# Patient Record
Sex: MALE | Race: WHITE | NOT HISPANIC OR LATINO | Employment: OTHER | URBAN - METROPOLITAN AREA
[De-identification: names, ages, dates, MRNs, and addresses within clinical notes are randomized per-mention and may not be internally consistent; named-entity substitution may affect disease eponyms.]

---

## 2017-08-28 ENCOUNTER — APPOINTMENT (OUTPATIENT)
Dept: AUDIOLOGY | Age: 72
End: 2017-08-28
Payer: COMMERCIAL

## 2017-08-28 PROCEDURE — 92567 TYMPANOMETRY: CPT | Performed by: AUDIOLOGIST

## 2017-08-28 PROCEDURE — 92557 COMPREHENSIVE HEARING TEST: CPT | Performed by: AUDIOLOGIST

## 2020-01-01 ENCOUNTER — PREP FOR PROCEDURE (OUTPATIENT)
Dept: INTERVENTIONAL RADIOLOGY/VASCULAR | Facility: CLINIC | Age: 75
End: 2020-01-01

## 2020-01-01 ENCOUNTER — HOSPITAL ENCOUNTER (OUTPATIENT)
Dept: INFUSION CENTER | Facility: CLINIC | Age: 75
Discharge: HOME | End: 2020-11-05

## 2020-01-01 ENCOUNTER — TELEPHONE (OUTPATIENT)
Dept: PALLIATIVE MEDICINE | Facility: CLINIC | Age: 75
End: 2020-01-01

## 2020-01-01 ENCOUNTER — OFFICE VISIT (OUTPATIENT)
Dept: HEMATOLOGY ONCOLOGY | Facility: CLINIC | Age: 75
End: 2020-01-01
Payer: COMMERCIAL

## 2020-01-01 ENCOUNTER — SOCIAL WORK (OUTPATIENT)
Dept: PALLIATIVE MEDICINE | Facility: CLINIC | Age: 75
End: 2020-01-01
Payer: COMMERCIAL

## 2020-01-01 ENCOUNTER — APPOINTMENT (INPATIENT)
Dept: ULTRASOUND IMAGING | Facility: HOSPITAL | Age: 75
DRG: 871 | End: 2020-01-01
Payer: COMMERCIAL

## 2020-01-01 ENCOUNTER — HOSPITAL ENCOUNTER (OUTPATIENT)
Dept: INFUSION CENTER | Facility: CLINIC | Age: 75
Discharge: HOME | End: 2020-10-20
Payer: COMMERCIAL

## 2020-01-01 ENCOUNTER — APPOINTMENT (EMERGENCY)
Dept: CT IMAGING | Facility: HOSPITAL | Age: 75
DRG: 872 | End: 2020-01-01
Payer: COMMERCIAL

## 2020-01-01 ENCOUNTER — APPOINTMENT (INPATIENT)
Dept: NON INVASIVE DIAGNOSTICS | Facility: HOSPITAL | Age: 75
DRG: 871 | End: 2020-01-01
Payer: COMMERCIAL

## 2020-01-01 ENCOUNTER — DOCUMENTATION (OUTPATIENT)
Dept: RADIATION ONCOLOGY | Facility: HOSPITAL | Age: 75
End: 2020-01-01

## 2020-01-01 ENCOUNTER — CONSULT (OUTPATIENT)
Dept: SURGERY | Facility: CLINIC | Age: 75
End: 2020-01-01
Payer: COMMERCIAL

## 2020-01-01 ENCOUNTER — DOCUMENTATION (OUTPATIENT)
Dept: CARDIOLOGY UNIT | Facility: HOSPITAL | Age: 75
End: 2020-01-01

## 2020-01-01 ENCOUNTER — TELEPHONE (OUTPATIENT)
Dept: HEMATOLOGY ONCOLOGY | Facility: CLINIC | Age: 75
End: 2020-01-01

## 2020-01-01 ENCOUNTER — TELEPHONE (OUTPATIENT)
Dept: SURGICAL ONCOLOGY | Facility: CLINIC | Age: 75
End: 2020-01-01

## 2020-01-01 ENCOUNTER — OFFICE VISIT (OUTPATIENT)
Dept: PALLIATIVE MEDICINE | Facility: CLINIC | Age: 75
End: 2020-01-01
Payer: COMMERCIAL

## 2020-01-01 ENCOUNTER — TELEPHONE (OUTPATIENT)
Dept: INTERNAL MEDICINE CLINIC | Facility: CLINIC | Age: 75
End: 2020-01-01

## 2020-01-01 ENCOUNTER — TELEPHONE (OUTPATIENT)
Dept: NUTRITION | Facility: CLINIC | Age: 75
End: 2020-01-01

## 2020-01-01 ENCOUNTER — APPOINTMENT (EMERGENCY)
Dept: RADIOLOGY | Facility: HOSPITAL | Age: 75
End: 2020-01-01
Payer: COMMERCIAL

## 2020-01-01 ENCOUNTER — TELEMEDICINE (OUTPATIENT)
Dept: INTERNAL MEDICINE CLINIC | Facility: CLINIC | Age: 75
End: 2020-01-01
Payer: COMMERCIAL

## 2020-01-01 ENCOUNTER — ANESTHESIA EVENT (OUTPATIENT)
Dept: GASTROENTEROLOGY | Facility: HOSPITAL | Age: 75
End: 2020-01-01

## 2020-01-01 ENCOUNTER — HOSPITAL ENCOUNTER (OUTPATIENT)
Dept: INFUSION CENTER | Facility: CLINIC | Age: 75
Discharge: HOME | End: 2020-10-06
Payer: COMMERCIAL

## 2020-01-01 ENCOUNTER — TELEPHONE (OUTPATIENT)
Dept: NEPHROLOGY | Facility: CLINIC | Age: 75
End: 2020-01-01

## 2020-01-01 ENCOUNTER — TELEPHONE (OUTPATIENT)
Dept: RADIOLOGY | Facility: HOSPITAL | Age: 75
End: 2020-01-01

## 2020-01-01 ENCOUNTER — HOSPITAL ENCOUNTER (OUTPATIENT)
Dept: INFUSION CENTER | Facility: CLINIC | Age: 75
Discharge: HOME | End: 2020-11-17

## 2020-01-01 ENCOUNTER — OFFICE VISIT (OUTPATIENT)
Dept: SURGICAL ONCOLOGY | Facility: CLINIC | Age: 75
End: 2020-01-01
Payer: COMMERCIAL

## 2020-01-01 ENCOUNTER — TELEPHONE (OUTPATIENT)
Dept: OTHER | Facility: OTHER | Age: 75
End: 2020-01-01

## 2020-01-01 ENCOUNTER — APPOINTMENT (EMERGENCY)
Dept: RADIOLOGY | Facility: HOSPITAL | Age: 75
DRG: 871 | End: 2020-01-01
Payer: COMMERCIAL

## 2020-01-01 ENCOUNTER — HOSPITAL ENCOUNTER (OUTPATIENT)
Dept: INFUSION CENTER | Facility: CLINIC | Age: 75
Discharge: HOME | End: 2020-12-24
Payer: COMMERCIAL

## 2020-01-01 ENCOUNTER — HOSPITAL ENCOUNTER (INPATIENT)
Facility: HOSPITAL | Age: 75
LOS: 2 days | Discharge: HOME HEALTH | DRG: 872 | End: 2020-11-19
Attending: INTERNAL MEDICINE | Admitting: INTERNAL MEDICINE
Payer: COMMERCIAL

## 2020-01-01 ENCOUNTER — CONSULT (OUTPATIENT)
Dept: SURGICAL ONCOLOGY | Facility: CLINIC | Age: 75
End: 2020-01-01
Payer: COMMERCIAL

## 2020-01-01 ENCOUNTER — TELEPHONE (OUTPATIENT)
Dept: SURGERY | Facility: HOSPITAL | Age: 75
End: 2020-01-01

## 2020-01-01 ENCOUNTER — HOSPITAL ENCOUNTER (OUTPATIENT)
Dept: INFUSION CENTER | Facility: CLINIC | Age: 75
Discharge: HOME | End: 2020-12-22
Payer: COMMERCIAL

## 2020-01-01 ENCOUNTER — HOSPITAL ENCOUNTER (INPATIENT)
Facility: HOSPITAL | Age: 75
LOS: 6 days | Discharge: HOME | DRG: 871 | End: 2020-09-09
Attending: HOSPITALIST | Admitting: HOSPITALIST
Payer: COMMERCIAL

## 2020-01-01 ENCOUNTER — ANESTHESIA (OUTPATIENT)
Dept: GASTROENTEROLOGY | Facility: HOSPITAL | Age: 75
End: 2020-01-01

## 2020-01-01 ENCOUNTER — HOSPITAL ENCOUNTER (EMERGENCY)
Facility: HOSPITAL | Age: 75
End: 2020-09-03
Payer: COMMERCIAL

## 2020-01-01 ENCOUNTER — HOSPITAL ENCOUNTER (INPATIENT)
Facility: HOSPITAL | Age: 75
LOS: 4 days | Discharge: HOME HEALTH | DRG: 872 | End: 2020-08-04
Attending: EMERGENCY MEDICINE | Admitting: INTERNAL MEDICINE
Payer: COMMERCIAL

## 2020-01-01 ENCOUNTER — LAB REQUISITION (OUTPATIENT)
Dept: LAB | Facility: HOSPITAL | Age: 75
DRG: 872 | End: 2020-01-01
Payer: COMMERCIAL

## 2020-01-01 ENCOUNTER — LAB REQUISITION (OUTPATIENT)
Dept: LAB | Facility: HOSPITAL | Age: 75
End: 2020-01-01
Payer: COMMERCIAL

## 2020-01-01 ENCOUNTER — HOSPITAL ENCOUNTER (OUTPATIENT)
Dept: INFUSION CENTER | Facility: CLINIC | Age: 75
End: 2020-01-01

## 2020-01-01 ENCOUNTER — APPOINTMENT (INPATIENT)
Dept: RADIOLOGY | Facility: HOSPITAL | Age: 75
DRG: 871 | End: 2020-01-01
Payer: COMMERCIAL

## 2020-01-01 ENCOUNTER — HOSPITAL ENCOUNTER (OUTPATIENT)
Dept: INFUSION CENTER | Facility: CLINIC | Age: 75
Discharge: HOME | End: 2020-09-21
Payer: COMMERCIAL

## 2020-01-01 ENCOUNTER — HOSPITAL ENCOUNTER (OUTPATIENT)
Dept: INFUSION CENTER | Facility: CLINIC | Age: 75
Discharge: HOME | End: 2020-12-01
Payer: COMMERCIAL

## 2020-01-01 ENCOUNTER — HOSPITAL ENCOUNTER (OUTPATIENT)
Dept: CT IMAGING | Facility: HOSPITAL | Age: 75
Discharge: HOME | End: 2020-11-27
Payer: COMMERCIAL

## 2020-01-01 ENCOUNTER — HOSPITAL ENCOUNTER (OUTPATIENT)
Dept: INFUSION CENTER | Facility: CLINIC | Age: 75
Discharge: HOME | End: 2020-11-22

## 2020-01-01 ENCOUNTER — HOSPITAL ENCOUNTER (OUTPATIENT)
Dept: INFUSION CENTER | Facility: CLINIC | Age: 75
Discharge: HOME | End: 2020-10-08

## 2020-01-01 ENCOUNTER — HOSPITAL ENCOUNTER (INPATIENT)
Facility: HOSPITAL | Age: 75
LOS: 1 days | DRG: 871 | End: 2020-11-17
Attending: EMERGENCY MEDICINE | Admitting: INTERNAL MEDICINE
Payer: COMMERCIAL

## 2020-01-01 ENCOUNTER — APPOINTMENT (EMERGENCY)
Dept: RADIOLOGY | Facility: HOSPITAL | Age: 75
DRG: 872 | End: 2020-01-01
Payer: COMMERCIAL

## 2020-01-01 ENCOUNTER — HOSPITAL ENCOUNTER (OUTPATIENT)
Dept: INFUSION CENTER | Facility: CLINIC | Age: 75
Discharge: HOME | End: 2020-12-16
Payer: COMMERCIAL

## 2020-01-01 ENCOUNTER — HOSPITAL ENCOUNTER (OUTPATIENT)
Dept: GASTROENTEROLOGY | Facility: AMBULARY SURGERY CENTER | Age: 75
Setting detail: OUTPATIENT SURGERY
Discharge: HOME | End: 2020-09-25

## 2020-01-01 ENCOUNTER — HOSPITAL ENCOUNTER (OUTPATIENT)
Dept: GASTROENTEROLOGY | Facility: HOSPITAL | Age: 75
Setting detail: OUTPATIENT SURGERY
Discharge: HOME | End: 2020-08-13
Admitting: INTERNAL MEDICINE
Payer: COMMERCIAL

## 2020-01-01 ENCOUNTER — HOSPITAL ENCOUNTER (OUTPATIENT)
Dept: INFUSION CENTER | Facility: CLINIC | Age: 75
Discharge: HOME | End: 2020-11-03
Payer: COMMERCIAL

## 2020-01-01 ENCOUNTER — HOSPITAL ENCOUNTER (OUTPATIENT)
Dept: INFUSION CENTER | Facility: CLINIC | Age: 75
Discharge: HOME | End: 2020-09-23

## 2020-01-01 ENCOUNTER — HOSPITAL ENCOUNTER (OUTPATIENT)
Dept: INFUSION CENTER | Facility: CLINIC | Age: 75
Discharge: HOME | End: 2020-11-20

## 2020-01-01 ENCOUNTER — CONSULT (OUTPATIENT)
Dept: HEMATOLOGY ONCOLOGY | Facility: CLINIC | Age: 75
End: 2020-01-01
Payer: COMMERCIAL

## 2020-01-01 ENCOUNTER — HOSPITAL ENCOUNTER (OUTPATIENT)
Dept: RADIOLOGY | Facility: HOSPITAL | Age: 75
Discharge: HOME | End: 2020-09-16
Attending: RADIOLOGY
Payer: COMMERCIAL

## 2020-01-01 ENCOUNTER — CONSULT (OUTPATIENT)
Dept: PALLIATIVE MEDICINE | Facility: CLINIC | Age: 75
End: 2020-01-01
Payer: COMMERCIAL

## 2020-01-01 ENCOUNTER — HOSPITAL ENCOUNTER (OUTPATIENT)
Dept: INFUSION CENTER | Facility: CLINIC | Age: 75
Discharge: HOME | End: 2020-12-03
Payer: COMMERCIAL

## 2020-01-01 ENCOUNTER — HOSPITAL ENCOUNTER (OUTPATIENT)
Dept: RADIOLOGY | Age: 75
Discharge: HOME | DRG: 871 | End: 2020-08-31
Payer: COMMERCIAL

## 2020-01-01 ENCOUNTER — APPOINTMENT (INPATIENT)
Dept: RADIOLOGY | Facility: HOSPITAL | Age: 75
DRG: 871 | End: 2020-01-01
Attending: HOSPITALIST
Payer: COMMERCIAL

## 2020-01-01 ENCOUNTER — HOSPITAL ENCOUNTER (OUTPATIENT)
Dept: INFUSION CENTER | Facility: CLINIC | Age: 75
Discharge: HOME | End: 2020-10-22

## 2020-01-01 VITALS
TEMPERATURE: 97.3 F | BODY MASS INDEX: 31.47 KG/M2 | DIASTOLIC BLOOD PRESSURE: 82 MMHG | RESPIRATION RATE: 18 BRPM | OXYGEN SATURATION: 96 % | HEIGHT: 72 IN | HEART RATE: 117 BPM | WEIGHT: 232.37 LBS | SYSTOLIC BLOOD PRESSURE: 128 MMHG

## 2020-01-01 VITALS
TEMPERATURE: 97.7 F | WEIGHT: 229 LBS | HEART RATE: 101 BPM | HEIGHT: 72 IN | SYSTOLIC BLOOD PRESSURE: 137 MMHG | BODY MASS INDEX: 31.02 KG/M2 | RESPIRATION RATE: 18 BRPM | OXYGEN SATURATION: 95 % | DIASTOLIC BLOOD PRESSURE: 74 MMHG

## 2020-01-01 VITALS
OXYGEN SATURATION: 95 % | DIASTOLIC BLOOD PRESSURE: 83 MMHG | HEART RATE: 85 BPM | TEMPERATURE: 98.8 F | RESPIRATION RATE: 18 BRPM | WEIGHT: 247.4 LBS | HEIGHT: 72 IN | BODY MASS INDEX: 33.51 KG/M2 | SYSTOLIC BLOOD PRESSURE: 140 MMHG

## 2020-01-01 VITALS
OXYGEN SATURATION: 98 % | SYSTOLIC BLOOD PRESSURE: 91 MMHG | TEMPERATURE: 97.9 F | HEART RATE: 107 BPM | RESPIRATION RATE: 22 BRPM | WEIGHT: 246 LBS | DIASTOLIC BLOOD PRESSURE: 47 MMHG | BODY MASS INDEX: 33.36 KG/M2

## 2020-01-01 VITALS
SYSTOLIC BLOOD PRESSURE: 110 MMHG | DIASTOLIC BLOOD PRESSURE: 61 MMHG | OXYGEN SATURATION: 94 % | RESPIRATION RATE: 18 BRPM | TEMPERATURE: 98.8 F | HEART RATE: 94 BPM

## 2020-01-01 VITALS
SYSTOLIC BLOOD PRESSURE: 148 MMHG | HEART RATE: 107 BPM | OXYGEN SATURATION: 96 % | DIASTOLIC BLOOD PRESSURE: 76 MMHG | TEMPERATURE: 98 F | RESPIRATION RATE: 20 BRPM | HEIGHT: 72 IN | BODY MASS INDEX: 31.83 KG/M2 | WEIGHT: 235 LBS

## 2020-01-01 VITALS
HEIGHT: 72 IN | TEMPERATURE: 98.5 F | SYSTOLIC BLOOD PRESSURE: 170 MMHG | HEIGHT: 70 IN | OXYGEN SATURATION: 95 % | BODY MASS INDEX: 31.29 KG/M2 | BODY MASS INDEX: 35.93 KG/M2 | DIASTOLIC BLOOD PRESSURE: 92 MMHG | OXYGEN SATURATION: 96 % | RESPIRATION RATE: 16 BRPM | WEIGHT: 231.04 LBS | HEART RATE: 114 BPM | DIASTOLIC BLOOD PRESSURE: 84 MMHG | WEIGHT: 251 LBS | RESPIRATION RATE: 17 BRPM | HEART RATE: 98 BPM | TEMPERATURE: 97.7 F | SYSTOLIC BLOOD PRESSURE: 163 MMHG

## 2020-01-01 VITALS
WEIGHT: 226 LBS | SYSTOLIC BLOOD PRESSURE: 168 MMHG | HEIGHT: 72 IN | RESPIRATION RATE: 16 BRPM | DIASTOLIC BLOOD PRESSURE: 96 MMHG | BODY MASS INDEX: 30.61 KG/M2 | OXYGEN SATURATION: 98 % | TEMPERATURE: 97.5 F | HEART RATE: 128 BPM

## 2020-01-01 VITALS
HEIGHT: 71 IN | OXYGEN SATURATION: 98 % | SYSTOLIC BLOOD PRESSURE: 162 MMHG | WEIGHT: 235 LBS | RESPIRATION RATE: 16 BRPM | TEMPERATURE: 97.5 F | HEART RATE: 108 BPM | BODY MASS INDEX: 32.9 KG/M2 | DIASTOLIC BLOOD PRESSURE: 82 MMHG

## 2020-01-01 VITALS
BODY MASS INDEX: 34.72 KG/M2 | DIASTOLIC BLOOD PRESSURE: 82 MMHG | HEIGHT: 72 IN | HEART RATE: 129 BPM | WEIGHT: 242.5 LBS | BODY MASS INDEX: 34.19 KG/M2 | HEART RATE: 100 BPM | WEIGHT: 252.4 LBS | DIASTOLIC BLOOD PRESSURE: 80 MMHG | TEMPERATURE: 98.6 F | TEMPERATURE: 98.4 F | SYSTOLIC BLOOD PRESSURE: 120 MMHG | HEIGHT: 70 IN | SYSTOLIC BLOOD PRESSURE: 144 MMHG | RESPIRATION RATE: 14 BRPM

## 2020-01-01 VITALS
OXYGEN SATURATION: 97 % | HEART RATE: 143 BPM | HEIGHT: 72 IN | SYSTOLIC BLOOD PRESSURE: 142 MMHG | RESPIRATION RATE: 16 BRPM | DIASTOLIC BLOOD PRESSURE: 76 MMHG | BODY MASS INDEX: 30.88 KG/M2 | WEIGHT: 228 LBS | TEMPERATURE: 98.4 F

## 2020-01-01 VITALS
HEIGHT: 72 IN | RESPIRATION RATE: 20 BRPM | BODY MASS INDEX: 30.94 KG/M2 | TEMPERATURE: 97.7 F | DIASTOLIC BLOOD PRESSURE: 77 MMHG | SYSTOLIC BLOOD PRESSURE: 141 MMHG | WEIGHT: 228.4 LBS | OXYGEN SATURATION: 98 % | HEART RATE: 108 BPM

## 2020-01-01 VITALS
HEART RATE: 104 BPM | TEMPERATURE: 97.3 F | SYSTOLIC BLOOD PRESSURE: 150 MMHG | RESPIRATION RATE: 18 BRPM | OXYGEN SATURATION: 94 % | DIASTOLIC BLOOD PRESSURE: 73 MMHG

## 2020-01-01 VITALS — TEMPERATURE: 97.8 F

## 2020-01-01 VITALS
SYSTOLIC BLOOD PRESSURE: 150 MMHG | DIASTOLIC BLOOD PRESSURE: 70 MMHG | HEIGHT: 72 IN | OXYGEN SATURATION: 98 % | TEMPERATURE: 98 F | WEIGHT: 225 LBS | HEART RATE: 125 BPM | BODY MASS INDEX: 30.48 KG/M2 | RESPIRATION RATE: 18 BRPM

## 2020-01-01 VITALS
SYSTOLIC BLOOD PRESSURE: 141 MMHG | RESPIRATION RATE: 20 BRPM | HEIGHT: 72 IN | DIASTOLIC BLOOD PRESSURE: 78 MMHG | TEMPERATURE: 99.7 F | HEART RATE: 92 BPM | WEIGHT: 225.31 LBS | BODY MASS INDEX: 30.52 KG/M2 | OXYGEN SATURATION: 100 %

## 2020-01-01 VITALS
SYSTOLIC BLOOD PRESSURE: 138 MMHG | WEIGHT: 242 LBS | TEMPERATURE: 128 F | BODY MASS INDEX: 32.82 KG/M2 | RESPIRATION RATE: 18 BRPM | DIASTOLIC BLOOD PRESSURE: 88 MMHG | HEART RATE: 98 BPM

## 2020-01-01 VITALS
WEIGHT: 250.66 LBS | HEIGHT: 72 IN | RESPIRATION RATE: 18 BRPM | BODY MASS INDEX: 33.95 KG/M2 | OXYGEN SATURATION: 95 % | DIASTOLIC BLOOD PRESSURE: 88 MMHG | TEMPERATURE: 98.2 F | SYSTOLIC BLOOD PRESSURE: 151 MMHG | HEART RATE: 89 BPM

## 2020-01-01 VITALS
DIASTOLIC BLOOD PRESSURE: 88 MMHG | RESPIRATION RATE: 16 BRPM | TEMPERATURE: 98.6 F | HEART RATE: 126 BPM | BODY MASS INDEX: 30.85 KG/M2 | SYSTOLIC BLOOD PRESSURE: 132 MMHG | HEIGHT: 72 IN | WEIGHT: 227.8 LBS

## 2020-01-01 VITALS
SYSTOLIC BLOOD PRESSURE: 136 MMHG | DIASTOLIC BLOOD PRESSURE: 75 MMHG | TEMPERATURE: 97.6 F | HEIGHT: 72 IN | BODY MASS INDEX: 30.49 KG/M2 | RESPIRATION RATE: 18 BRPM | WEIGHT: 225.09 LBS | HEART RATE: 100 BPM | OXYGEN SATURATION: 95 %

## 2020-01-01 VITALS — TEMPERATURE: 97.8 F | TEMPERATURE: 97.8 F

## 2020-01-01 VITALS — TEMPERATURE: 97.3 F

## 2020-01-01 VITALS — TEMPERATURE: 96.1 F

## 2020-01-01 VITALS
BODY MASS INDEX: 33.04 KG/M2 | TEMPERATURE: 98.1 F | RESPIRATION RATE: 20 BRPM | SYSTOLIC BLOOD PRESSURE: 168 MMHG | HEART RATE: 100 BPM | OXYGEN SATURATION: 97 % | HEIGHT: 71 IN | DIASTOLIC BLOOD PRESSURE: 78 MMHG | WEIGHT: 236 LBS

## 2020-01-01 VITALS
WEIGHT: 225 LBS | HEART RATE: 134 BPM | BODY MASS INDEX: 30.52 KG/M2 | SYSTOLIC BLOOD PRESSURE: 135 MMHG | RESPIRATION RATE: 18 BRPM | TEMPERATURE: 98.9 F | OXYGEN SATURATION: 97 % | DIASTOLIC BLOOD PRESSURE: 79 MMHG

## 2020-01-01 VITALS — TEMPERATURE: 95.8 F

## 2020-01-01 DIAGNOSIS — C25.2 MALIGNANT NEOPLASM OF TAIL OF PANCREAS (HCC): Primary | ICD-10-CM

## 2020-01-01 DIAGNOSIS — A41.9 SEPSIS (HCC): Primary | ICD-10-CM

## 2020-01-01 DIAGNOSIS — Z16.12 UTI DUE TO EXTENDED-SPECTRUM BETA LACTAMASE (ESBL) PRODUCING ESCHERICHIA COLI: ICD-10-CM

## 2020-01-01 DIAGNOSIS — I10 ESSENTIAL HYPERTENSION: ICD-10-CM

## 2020-01-01 DIAGNOSIS — R11.0 NAUSEA: ICD-10-CM

## 2020-01-01 DIAGNOSIS — N18.30 CHRONIC KIDNEY DISEASE (CKD), STAGE III (MODERATE) (HCC): ICD-10-CM

## 2020-01-01 DIAGNOSIS — R78.81 BACTEREMIA: ICD-10-CM

## 2020-01-01 DIAGNOSIS — E11.65 TYPE 2 DIABETES MELLITUS WITH HYPERGLYCEMIA, WITH LONG-TERM CURRENT USE OF INSULIN (HCC): ICD-10-CM

## 2020-01-01 DIAGNOSIS — R50.9 FEVER AND CHILLS: Primary | ICD-10-CM

## 2020-01-01 DIAGNOSIS — G89.3 CANCER RELATED PAIN: ICD-10-CM

## 2020-01-01 DIAGNOSIS — C25.2 MALIGNANT NEOPLASM OF TAIL OF PANCREAS (HCC): ICD-10-CM

## 2020-01-01 DIAGNOSIS — G47.33 OBSTRUCTIVE SLEEP APNEA SYNDROME: Primary | ICD-10-CM

## 2020-01-01 DIAGNOSIS — C25.0 MALIGNANT NEOPLASM OF HEAD OF PANCREAS (HCC): Primary | ICD-10-CM

## 2020-01-01 DIAGNOSIS — E87.6 HYPOKALEMIA: ICD-10-CM

## 2020-01-01 DIAGNOSIS — R18.8 OTHER ASCITES: ICD-10-CM

## 2020-01-01 DIAGNOSIS — E11.9 DIABETES MELLITUS WITHOUT COMPLICATION (HCC): Primary | ICD-10-CM

## 2020-01-01 DIAGNOSIS — Z79.4 TYPE 2 DIABETES MELLITUS WITH HYPERGLYCEMIA, WITH LONG-TERM CURRENT USE OF INSULIN (HCC): ICD-10-CM

## 2020-01-01 DIAGNOSIS — K86.89 PANCREATIC MASS: ICD-10-CM

## 2020-01-01 DIAGNOSIS — Z79.899 PATIENT ON ANTINEOPLASTIC CHEMOTHERAPY REGIMEN: ICD-10-CM

## 2020-01-01 DIAGNOSIS — E44.0 MODERATE PROTEIN-CALORIE MALNUTRITION (HCC): ICD-10-CM

## 2020-01-01 DIAGNOSIS — N18.30 STAGE 3 CHRONIC KIDNEY DISEASE, UNSPECIFIED WHETHER STAGE 3A OR 3B CKD (HCC): ICD-10-CM

## 2020-01-01 DIAGNOSIS — C25.9 PANCREATIC CARCINOMA (HCC): Primary | ICD-10-CM

## 2020-01-01 DIAGNOSIS — N39.0 UTI DUE TO EXTENDED-SPECTRUM BETA LACTAMASE (ESBL) PRODUCING ESCHERICHIA COLI: ICD-10-CM

## 2020-01-01 DIAGNOSIS — N12 PYELONEPHRITIS: ICD-10-CM

## 2020-01-01 DIAGNOSIS — N30.00 ACUTE CYSTITIS WITHOUT HEMATURIA: ICD-10-CM

## 2020-01-01 DIAGNOSIS — E11.9 DIABETES MELLITUS WITHOUT COMPLICATION (HCC): ICD-10-CM

## 2020-01-01 DIAGNOSIS — Z51.5 PALLIATIVE CARE PATIENT: ICD-10-CM

## 2020-01-01 DIAGNOSIS — E83.42 HYPOMAGNESEMIA: ICD-10-CM

## 2020-01-01 DIAGNOSIS — Z71.89 COUNSELING AND COORDINATION OF CARE: Primary | ICD-10-CM

## 2020-01-01 DIAGNOSIS — E46 PROTEIN-CALORIE MALNUTRITION, UNSPECIFIED SEVERITY (HCC): ICD-10-CM

## 2020-01-01 DIAGNOSIS — C25.2 CANCER OF PANCREAS, TAIL (HCC): Primary | ICD-10-CM

## 2020-01-01 DIAGNOSIS — E66.9 OBESITY (BMI 30.0-34.9): ICD-10-CM

## 2020-01-01 DIAGNOSIS — C25.9 CARCINOMA OF PANCREAS (HCC): ICD-10-CM

## 2020-01-01 DIAGNOSIS — Z71.89 GOALS OF CARE, COUNSELING/DISCUSSION: ICD-10-CM

## 2020-01-01 DIAGNOSIS — N39.0 URINARY TRACT INFECTION: ICD-10-CM

## 2020-01-01 DIAGNOSIS — U07.1 COVID-19: ICD-10-CM

## 2020-01-01 DIAGNOSIS — B96.29 UTI DUE TO EXTENDED-SPECTRUM BETA LACTAMASE (ESBL) PRODUCING ESCHERICHIA COLI: ICD-10-CM

## 2020-01-01 DIAGNOSIS — C25.9 PANCREATIC CANCER (HCC): ICD-10-CM

## 2020-01-01 DIAGNOSIS — N17.9 AKI (ACUTE KIDNEY INJURY) (HCC): Primary | ICD-10-CM

## 2020-01-01 DIAGNOSIS — C25.0 MALIGNANT NEOPLASM OF HEAD OF PANCREAS (HCC): ICD-10-CM

## 2020-01-01 DIAGNOSIS — C25.9 PANCREATIC CARCINOMA (HCC): ICD-10-CM

## 2020-01-01 DIAGNOSIS — C25.9 MALIGNANT NEOPLASM OF PANCREAS, UNSPECIFIED LOCATION OF MALIGNANCY (HCC): Primary | ICD-10-CM

## 2020-01-01 DIAGNOSIS — N17.9 ACUTE RENAL FAILURE, UNSPECIFIED ACUTE RENAL FAILURE TYPE (HCC): Primary | ICD-10-CM

## 2020-01-01 LAB
ALBUMIN FLD-MCNC: 2.4 G/DL
ALBUMIN SERPL BCP-MCNC: 2.1 G/DL (ref 3.5–5)
ALBUMIN SERPL BCP-MCNC: 2.2 G/DL (ref 3.5–5)
ALBUMIN SERPL BCP-MCNC: 2.3 G/DL (ref 3.5–5)
ALBUMIN SERPL BCP-MCNC: 2.6 G/DL (ref 3.5–5)
ALBUMIN SERPL BCP-MCNC: 3.1 G/DL (ref 3.5–5)
ALBUMIN SERPL BCP-MCNC: 3.5 G/DL (ref 3.4–4.8)
ALBUMIN SERPL BCP-MCNC: 3.5 G/DL (ref 3.4–4.8)
ALBUMIN SERPL BCP-MCNC: 3.8 G/DL (ref 3.4–4.8)
ALBUMIN SERPL BCP-MCNC: 4.4 G/DL (ref 3.4–4.8)
ALBUMIN SERPL-MCNC: 3.2 G/DL (ref 3.6–5.1)
ALBUMIN SERPL-MCNC: 3.4 G/DL (ref 3.6–5.1)
ALBUMIN SERPL-MCNC: 3.7 G/DL (ref 3.6–5.1)
ALBUMIN SERPL-MCNC: 3.8 G/DL (ref 3.6–5.1)
ALBUMIN SERPL-MCNC: 3.8 G/DL (ref 3.6–5.1)
ALBUMIN SERPL-MCNC: 3.9 G/DL (ref 3.6–5.1)
ALBUMIN SERPL-MCNC: 4.2 G/DL (ref 3.6–5.1)
ALBUMIN/GLOB SERPL: 1.2 (CALC) (ref 1–2.5)
ALBUMIN/GLOB SERPL: 1.3 (CALC) (ref 1–2.5)
ALBUMIN/GLOB SERPL: 1.4 (CALC) (ref 1–2.5)
ALBUMIN/GLOB SERPL: 1.5 (CALC) (ref 1–2.5)
ALBUMIN/GLOB SERPL: 1.5 (CALC) (ref 1–2.5)
ALP SERPL-CCNC: 46 U/L (ref 46–116)
ALP SERPL-CCNC: 48 U/L (ref 46–116)
ALP SERPL-CCNC: 48 U/L (ref 46–116)
ALP SERPL-CCNC: 49 U/L (ref 46–116)
ALP SERPL-CCNC: 50 U/L (ref 46–116)
ALP SERPL-CCNC: 53 U/L (ref 35–144)
ALP SERPL-CCNC: 53 U/L (ref 46–116)
ALP SERPL-CCNC: 54.4 U/L (ref 10–129)
ALP SERPL-CCNC: 56.1 U/L (ref 10–129)
ALP SERPL-CCNC: 56.7 U/L (ref 10–129)
ALP SERPL-CCNC: 57 U/L (ref 35–144)
ALP SERPL-CCNC: 58 U/L (ref 35–144)
ALP SERPL-CCNC: 60 U/L (ref 10–129)
ALP SERPL-CCNC: 61 U/L (ref 35–144)
ALP SERPL-CCNC: 66 U/L (ref 35–144)
ALP SERPL-CCNC: 75 U/L (ref 35–144)
ALP SERPL-CCNC: 76 U/L (ref 35–144)
ALP SERPL-CCNC: 94 U/L (ref 46–116)
ALT SERPL W P-5'-P-CCNC: 10 U/L (ref 5–63)
ALT SERPL W P-5'-P-CCNC: 11 U/L (ref 12–78)
ALT SERPL W P-5'-P-CCNC: 12 U/L (ref 12–78)
ALT SERPL W P-5'-P-CCNC: 13 U/L (ref 5–63)
ALT SERPL W P-5'-P-CCNC: 14 U/L (ref 5–63)
ALT SERPL W P-5'-P-CCNC: 15 U/L (ref 12–78)
ALT SERPL W P-5'-P-CCNC: 8 U/L (ref 12–78)
ALT SERPL W P-5'-P-CCNC: 8 U/L (ref 12–78)
ALT SERPL W P-5'-P-CCNC: 8 U/L (ref 5–63)
ALT SERPL W P-5'-P-CCNC: 9 U/L (ref 12–78)
ALT SERPL W P-5'-P-CCNC: 9 U/L (ref 12–78)
ALT SERPL-CCNC: 10 U/L (ref 9–46)
ALT SERPL-CCNC: 13 U/L (ref 9–46)
ALT SERPL-CCNC: 30 U/L (ref 9–46)
ALT SERPL-CCNC: 6 U/L (ref 9–46)
ALT SERPL-CCNC: 7 U/L (ref 9–46)
ALT SERPL-CCNC: 8 U/L (ref 9–46)
ALT SERPL-CCNC: 8 U/L (ref 9–46)
AMORPH URATE CRY URNS QL MICRO: ABNORMAL /HPF
ANION GAP SERPL CALCULATED.3IONS-SCNC: 10 MMOL/L (ref 4–13)
ANION GAP SERPL CALCULATED.3IONS-SCNC: 11 MMOL/L (ref 4–13)
ANION GAP SERPL CALCULATED.3IONS-SCNC: 12 MMOL/L (ref 4–13)
ANION GAP SERPL CALCULATED.3IONS-SCNC: 16 MMOL/L (ref 4–13)
ANION GAP SERPL CALCULATED.3IONS-SCNC: 4 MMOL/L (ref 4–13)
ANION GAP SERPL CALCULATED.3IONS-SCNC: 5 MMOL/L (ref 4–13)
ANION GAP SERPL CALCULATED.3IONS-SCNC: 6 MMOL/L (ref 4–13)
ANION GAP SERPL CALCULATED.3IONS-SCNC: 7 MMOL/L (ref 4–13)
ANION GAP SERPL CALCULATED.3IONS-SCNC: 7 MMOL/L (ref 4–13)
ANION GAP SERPL CALCULATED.3IONS-SCNC: 8 MMOL/L (ref 4–13)
ANION GAP SERPL CALCULATED.3IONS-SCNC: 9 MMOL/L (ref 4–13)
ANION GAP SERPL CALCULATED.3IONS-SCNC: 9 MMOL/L (ref 4–13)
APTT PPP: 24 SECONDS (ref 23–31)
APTT PPP: 29 SECONDS (ref 23–31)
AST SERPL W P-5'-P-CCNC: 10 U/L (ref 15–41)
AST SERPL W P-5'-P-CCNC: 10 U/L (ref 5–45)
AST SERPL W P-5'-P-CCNC: 10 U/L (ref 5–45)
AST SERPL W P-5'-P-CCNC: 11 U/L (ref 5–45)
AST SERPL W P-5'-P-CCNC: 13 U/L (ref 15–41)
AST SERPL W P-5'-P-CCNC: 14 U/L (ref 15–41)
AST SERPL W P-5'-P-CCNC: 17 U/L (ref 5–45)
AST SERPL W P-5'-P-CCNC: 21 U/L (ref 5–45)
AST SERPL W P-5'-P-CCNC: 5 U/L (ref 5–45)
AST SERPL W P-5'-P-CCNC: 7 U/L (ref 5–45)
AST SERPL W P-5'-P-CCNC: 9 U/L (ref 15–41)
AST SERPL-CCNC: 10 U/L (ref 10–35)
AST SERPL-CCNC: 11 U/L (ref 10–35)
AST SERPL-CCNC: 11 U/L (ref 10–35)
AST SERPL-CCNC: 12 U/L (ref 10–35)
AST SERPL-CCNC: 12 U/L (ref 10–35)
AST SERPL-CCNC: 17 U/L (ref 10–35)
AST SERPL-CCNC: 18 U/L (ref 10–35)
ATRIAL RATE: 109 BPM
ATRIAL RATE: 135 BPM
ATRIAL RATE: 91 BPM
BACTERIA BLD CULT: ABNORMAL
BACTERIA BLD CULT: NORMAL
BACTERIA SPEC BFLD CULT: NO GROWTH
BACTERIA UR CULT: ABNORMAL
BACTERIA UR CULT: ABNORMAL
BACTERIA UR QL AUTO: ABNORMAL /HPF
BASOPHILS # BLD AUTO: 0.01 THOUSANDS/ΜL (ref 0–0.1)
BASOPHILS # BLD AUTO: 0.02 THOUSANDS/ΜL (ref 0–0.1)
BASOPHILS # BLD AUTO: 0.03 THOUSANDS/ΜL (ref 0–0.1)
BASOPHILS # BLD AUTO: 0.04 THOUSANDS/ΜL (ref 0–0.1)
BASOPHILS # BLD AUTO: 0.05 THOUSANDS/ΜL (ref 0–0.1)
BASOPHILS # BLD AUTO: 22 CELLS/UL (ref 0–200)
BASOPHILS # BLD AUTO: 29 CELLS/UL (ref 0–200)
BASOPHILS # BLD AUTO: 32 CELLS/UL (ref 0–200)
BASOPHILS # BLD AUTO: 32 CELLS/UL (ref 0–200)
BASOPHILS # BLD AUTO: 42 CELLS/UL (ref 0–200)
BASOPHILS # BLD AUTO: 59 CELLS/UL (ref 0–200)
BASOPHILS # BLD AUTO: 62 CELLS/UL (ref 0–200)
BASOPHILS # BLD MANUAL: 0 THOUSAND/UL (ref 0–0.1)
BASOPHILS NFR BLD AUTO: 0 % (ref 0–1)
BASOPHILS NFR BLD AUTO: 0.3 %
BASOPHILS NFR BLD AUTO: 0.4 %
BASOPHILS NFR BLD AUTO: 0.5 %
BASOPHILS NFR BLD AUTO: 0.6 %
BASOPHILS NFR BLD AUTO: 0.6 %
BASOPHILS NFR BLD AUTO: 0.8 %
BASOPHILS NFR BLD AUTO: 0.9 %
BASOPHILS NFR BLD AUTO: 1 % (ref 0–1)
BASOPHILS NFR MAR MANUAL: 0 % (ref 0–1)
BILIRUB SERPL-MCNC: 0.25 MG/DL (ref 0.2–1)
BILIRUB SERPL-MCNC: 0.26 MG/DL (ref 0.2–1)
BILIRUB SERPL-MCNC: 0.29 MG/DL (ref 0.2–1)
BILIRUB SERPL-MCNC: 0.3 MG/DL (ref 0.2–1.2)
BILIRUB SERPL-MCNC: 0.33 MG/DL (ref 0.2–1)
BILIRUB SERPL-MCNC: 0.4 MG/DL (ref 0.2–1.2)
BILIRUB SERPL-MCNC: 0.4 MG/DL (ref 0.2–1.2)
BILIRUB SERPL-MCNC: 0.41 MG/DL (ref 0.2–1)
BILIRUB SERPL-MCNC: 0.44 MG/DL (ref 0.2–1)
BILIRUB SERPL-MCNC: 0.5 MG/DL (ref 0.2–1.2)
BILIRUB SERPL-MCNC: 0.53 MG/DL (ref 0.3–1.2)
BILIRUB SERPL-MCNC: 0.6 MG/DL (ref 0.2–1)
BILIRUB SERPL-MCNC: 0.63 MG/DL (ref 0.3–1.2)
BILIRUB SERPL-MCNC: 0.79 MG/DL (ref 0.3–1.2)
BILIRUB SERPL-MCNC: 1.14 MG/DL (ref 0.3–1.2)
BILIRUB UR QL STRIP: ABNORMAL
BILIRUB UR QL STRIP: NEGATIVE
BILIRUB UR QL STRIP: NEGATIVE
BUN SERPL-MCNC: 10 MG/DL (ref 5–25)
BUN SERPL-MCNC: 11 MG/DL (ref 6–20)
BUN SERPL-MCNC: 11 MG/DL (ref 7–25)
BUN SERPL-MCNC: 12 MG/DL (ref 5–25)
BUN SERPL-MCNC: 12 MG/DL (ref 6–20)
BUN SERPL-MCNC: 12 MG/DL (ref 6–20)
BUN SERPL-MCNC: 12 MG/DL (ref 7–25)
BUN SERPL-MCNC: 14 MG/DL (ref 5–25)
BUN SERPL-MCNC: 14 MG/DL (ref 7–25)
BUN SERPL-MCNC: 15 MG/DL (ref 5–25)
BUN SERPL-MCNC: 15 MG/DL (ref 6–20)
BUN SERPL-MCNC: 15 MG/DL (ref 7–25)
BUN SERPL-MCNC: 15 MG/DL (ref 7–25)
BUN SERPL-MCNC: 19 MG/DL (ref 7–25)
BUN SERPL-MCNC: 20 MG/DL (ref 6–20)
BUN SERPL-MCNC: 21 MG/DL (ref 5–25)
BUN SERPL-MCNC: 38 MG/DL (ref 5–25)
BUN SERPL-MCNC: 48 MG/DL (ref 6–20)
BUN SERPL-MCNC: 54 MG/DL (ref 5–25)
BUN SERPL-MCNC: 54 MG/DL (ref 5–25)
BUN SERPL-MCNC: 57 MG/DL (ref 5–25)
BUN SERPL-MCNC: 59 MG/DL (ref 5–25)
BUN SERPL-MCNC: 7 MG/DL (ref 7–25)
BUN SERPL-MCNC: 7 MG/DL (ref 7–25)
BUN SERPL-MCNC: 8 MG/DL (ref 5–25)
BUN SERPL-MCNC: 8 MG/DL (ref 6–20)
BUN SERPL-MCNC: 8 MG/DL (ref 6–20)
BUN SERPL-MCNC: 9 MG/DL (ref 7–25)
BUN/CREAT SERPL: ABNORMAL (CALC) (ref 6–22)
CALCIUM ALBUM COR SERPL-MCNC: 9.6 MG/DL (ref 8.3–10.1)
CALCIUM SERPL-MCNC: 7.5 MG/DL (ref 8.3–10.1)
CALCIUM SERPL-MCNC: 7.6 MG/DL (ref 8.3–10.1)
CALCIUM SERPL-MCNC: 7.6 MG/DL (ref 8.3–10.1)
CALCIUM SERPL-MCNC: 7.8 MG/DL (ref 8.3–10.1)
CALCIUM SERPL-MCNC: 7.9 MG/DL (ref 8.3–10.1)
CALCIUM SERPL-MCNC: 7.9 MG/DL (ref 8.3–10.1)
CALCIUM SERPL-MCNC: 8.2 MG/DL (ref 8.3–10.1)
CALCIUM SERPL-MCNC: 8.3 MG/DL (ref 8.6–10.3)
CALCIUM SERPL-MCNC: 8.4 MG/DL (ref 8.3–10.1)
CALCIUM SERPL-MCNC: 8.4 MG/DL (ref 8.4–10.2)
CALCIUM SERPL-MCNC: 8.4 MG/DL (ref 8.6–10.3)
CALCIUM SERPL-MCNC: 8.4 MG/DL (ref 8.6–10.3)
CALCIUM SERPL-MCNC: 8.5 MG/DL (ref 8.4–10.2)
CALCIUM SERPL-MCNC: 8.6 MG/DL (ref 8.4–10.2)
CALCIUM SERPL-MCNC: 8.6 MG/DL (ref 8.4–10.2)
CALCIUM SERPL-MCNC: 8.7 MG/DL (ref 8.3–10.1)
CALCIUM SERPL-MCNC: 8.7 MG/DL (ref 8.4–10.2)
CALCIUM SERPL-MCNC: 8.7 MG/DL (ref 8.6–10.3)
CALCIUM SERPL-MCNC: 8.8 MG/DL (ref 8.4–10.2)
CALCIUM SERPL-MCNC: 8.9 MG/DL (ref 8.3–10.1)
CALCIUM SERPL-MCNC: 8.9 MG/DL (ref 8.4–10.2)
CALCIUM SERPL-MCNC: 9 MG/DL (ref 8.6–10.3)
CALCIUM SERPL-MCNC: 9.2 MG/DL (ref 8.6–10.3)
CALCIUM SERPL-MCNC: 9.3 MG/DL (ref 8.6–10.3)
CALCIUM SERPL-MCNC: 9.3 MG/DL (ref 8.6–10.3)
CALCIUM SERPL-MCNC: 9.5 MG/DL (ref 8.3–10.1)
CALCIUM SERPL-MCNC: 9.7 MG/DL (ref 8.4–10.2)
CANCER AG19-9 SERPL-ACNC: 6782 U/ML
CANCER AG19-9 SERPL-ACNC: 9017 U/ML
CANCER AG19-9 SERPL-ACNC: 9714 U/ML
CANCER AG19-9 SERPL-ACNC: ABNORMAL U/ML
CANCER AG19-9 SERPL-ACNC: ABNORMAL U/ML
CHLORIDE SERPL-SCNC: 100 MMOL/L (ref 100–108)
CHLORIDE SERPL-SCNC: 100 MMOL/L (ref 96–108)
CHLORIDE SERPL-SCNC: 101 MMOL/L (ref 100–108)
CHLORIDE SERPL-SCNC: 101 MMOL/L (ref 96–108)
CHLORIDE SERPL-SCNC: 101 MMOL/L (ref 98–110)
CHLORIDE SERPL-SCNC: 101 MMOL/L (ref 98–110)
CHLORIDE SERPL-SCNC: 102 MMOL/L (ref 96–108)
CHLORIDE SERPL-SCNC: 102 MMOL/L (ref 98–110)
CHLORIDE SERPL-SCNC: 103 MMOL/L (ref 100–108)
CHLORIDE SERPL-SCNC: 103 MMOL/L (ref 96–108)
CHLORIDE SERPL-SCNC: 103 MMOL/L (ref 98–110)
CHLORIDE SERPL-SCNC: 104 MMOL/L (ref 100–108)
CHLORIDE SERPL-SCNC: 104 MMOL/L (ref 100–108)
CHLORIDE SERPL-SCNC: 104 MMOL/L (ref 96–108)
CHLORIDE SERPL-SCNC: 104 MMOL/L (ref 96–108)
CHLORIDE SERPL-SCNC: 107 MMOL/L (ref 100–108)
CHLORIDE SERPL-SCNC: 108 MMOL/L (ref 100–108)
CHLORIDE SERPL-SCNC: 108 MMOL/L (ref 100–108)
CHLORIDE SERPL-SCNC: 110 MMOL/L (ref 100–108)
CHLORIDE SERPL-SCNC: 95 MMOL/L (ref 96–108)
CHLORIDE SERPL-SCNC: 98 MMOL/L (ref 100–108)
CHLORIDE SERPL-SCNC: 98 MMOL/L (ref 96–108)
CHLORIDE SERPL-SCNC: 99 MMOL/L (ref 100–108)
CLARITY UR: ABNORMAL
CLARITY UR: ABNORMAL
CLARITY UR: CLEAR
CO2 SERPL-SCNC: 20 MMOL/L (ref 21–32)
CO2 SERPL-SCNC: 22 MMOL/L (ref 21–32)
CO2 SERPL-SCNC: 22 MMOL/L (ref 21–32)
CO2 SERPL-SCNC: 22 MMOL/L (ref 22–33)
CO2 SERPL-SCNC: 23 MMOL/L (ref 21–32)
CO2 SERPL-SCNC: 23 MMOL/L (ref 21–32)
CO2 SERPL-SCNC: 24 MMOL/L (ref 21–32)
CO2 SERPL-SCNC: 24 MMOL/L (ref 22–33)
CO2 SERPL-SCNC: 25 MMOL/L (ref 22–33)
CO2 SERPL-SCNC: 25 MMOL/L (ref 22–33)
CO2 SERPL-SCNC: 26 MMOL/L (ref 20–32)
CO2 SERPL-SCNC: 26 MMOL/L (ref 20–32)
CO2 SERPL-SCNC: 26 MMOL/L (ref 21–32)
CO2 SERPL-SCNC: 26 MMOL/L (ref 21–32)
CO2 SERPL-SCNC: 26 MMOL/L (ref 22–33)
CO2 SERPL-SCNC: 27 MMOL/L (ref 20–32)
CO2 SERPL-SCNC: 27 MMOL/L (ref 21–32)
CO2 SERPL-SCNC: 28 MMOL/L (ref 20–32)
CO2 SERPL-SCNC: 28 MMOL/L (ref 20–32)
CO2 SERPL-SCNC: 28 MMOL/L (ref 21–32)
CO2 SERPL-SCNC: 28 MMOL/L (ref 21–32)
CO2 SERPL-SCNC: 28 MMOL/L (ref 22–33)
CO2 SERPL-SCNC: 28 MMOL/L (ref 22–33)
CO2 SERPL-SCNC: 29 MMOL/L (ref 20–32)
CO2 SERPL-SCNC: 29 MMOL/L (ref 22–33)
CO2 SERPL-SCNC: 30 MMOL/L (ref 20–32)
CO2 SERPL-SCNC: 30 MMOL/L (ref 20–32)
COLOR UR: ABNORMAL
COLOR UR: YELLOW
COLOR UR: YELLOW
CORTIS SERPL-MCNC: 31.3 UG/DL
CREAT SERPL-MCNC: 0.75 MG/DL (ref 0.5–1.2)
CREAT SERPL-MCNC: 0.75 MG/DL (ref 0.6–1.3)
CREAT SERPL-MCNC: 0.84 MG/DL (ref 0.6–1.3)
CREAT SERPL-MCNC: 0.85 MG/DL (ref 0.5–1.2)
CREAT SERPL-MCNC: 0.86 MG/DL (ref 0.5–1.2)
CREAT SERPL-MCNC: 0.9 MG/DL (ref 0.7–1.18)
CREAT SERPL-MCNC: 0.91 MG/DL (ref 0.5–1.2)
CREAT SERPL-MCNC: 0.92 MG/DL (ref 0.5–1.2)
CREAT SERPL-MCNC: 0.93 MG/DL (ref 0.6–1.3)
CREAT SERPL-MCNC: 0.94 MG/DL (ref 0.6–1.3)
CREAT SERPL-MCNC: 0.94 MG/DL (ref 0.7–1.18)
CREAT SERPL-MCNC: 0.95 MG/DL (ref 0.7–1.18)
CREAT SERPL-MCNC: 0.98 MG/DL (ref 0.6–1.3)
CREAT SERPL-MCNC: 1 MG/DL (ref 0.7–1.18)
CREAT SERPL-MCNC: 1.01 MG/DL (ref 0.5–1.2)
CREAT SERPL-MCNC: 1.01 MG/DL (ref 0.6–1.3)
CREAT SERPL-MCNC: 1.02 MG/DL (ref 0.7–1.18)
CREAT SERPL-MCNC: 1.05 MG/DL (ref 0.5–1.2)
CREAT SERPL-MCNC: 1.08 MG/DL (ref 0.6–1.3)
CREAT SERPL-MCNC: 1.51 MG/DL (ref 0.7–1.18)
CREAT SERPL-MCNC: 1.59 MG/DL (ref 0.6–1.3)
CREAT SERPL-MCNC: 3.28 MG/DL (ref 0.6–1.3)
CREAT SERPL-MCNC: 5.74 MG/DL (ref 0.6–1.3)
CREAT SERPL-MCNC: 5.85 MG/DL (ref 0.5–1.2)
CREAT SERPL-MCNC: 6.21 MG/DL (ref 0.6–1.3)
CREAT SERPL-MCNC: 6.44 MG/DL (ref 0.6–1.3)
EOSINOPHIL # BLD AUTO: 0.05 THOUSAND/ΜL (ref 0–0.61)
EOSINOPHIL # BLD AUTO: 0.09 THOUSAND/ΜL (ref 0–0.61)
EOSINOPHIL # BLD AUTO: 0.1 THOUSAND/ΜL (ref 0–0.61)
EOSINOPHIL # BLD AUTO: 0.1 THOUSAND/ΜL (ref 0–0.61)
EOSINOPHIL # BLD AUTO: 0.12 THOUSAND/ΜL (ref 0–0.61)
EOSINOPHIL # BLD AUTO: 0.17 THOUSAND/ΜL (ref 0–0.61)
EOSINOPHIL # BLD AUTO: 0.18 THOUSAND/ΜL (ref 0–0.61)
EOSINOPHIL # BLD AUTO: 0.2 THOUSAND/ΜL (ref 0–0.61)
EOSINOPHIL # BLD AUTO: 0.21 THOUSAND/ΜL (ref 0–0.61)
EOSINOPHIL # BLD AUTO: 0.22 THOUSAND/ΜL (ref 0–0.61)
EOSINOPHIL # BLD AUTO: 0.25 THOUSAND/ΜL (ref 0–0.61)
EOSINOPHIL # BLD AUTO: 0.28 THOUSAND/ΜL (ref 0–0.61)
EOSINOPHIL # BLD AUTO: 0.32 THOUSAND/ΜL (ref 0–0.61)
EOSINOPHIL # BLD AUTO: 133 CELLS/UL (ref 15–500)
EOSINOPHIL # BLD AUTO: 162 CELLS/UL (ref 15–500)
EOSINOPHIL # BLD AUTO: 200 CELLS/UL (ref 15–500)
EOSINOPHIL # BLD AUTO: 208 CELLS/UL (ref 15–500)
EOSINOPHIL # BLD AUTO: 210 CELLS/UL (ref 15–500)
EOSINOPHIL # BLD AUTO: 214 CELLS/UL (ref 15–500)
EOSINOPHIL # BLD AUTO: 281 CELLS/UL (ref 15–500)
EOSINOPHIL # BLD MANUAL: 0.13 THOUSAND/UL (ref 0–0.4)
EOSINOPHIL NFR BLD AUTO: 1 % (ref 0–6)
EOSINOPHIL NFR BLD AUTO: 1 % (ref 0–6)
EOSINOPHIL NFR BLD AUTO: 1.7 %
EOSINOPHIL NFR BLD AUTO: 1.8 %
EOSINOPHIL NFR BLD AUTO: 2 % (ref 0–6)
EOSINOPHIL NFR BLD AUTO: 3 %
EOSINOPHIL NFR BLD AUTO: 3 % (ref 0–6)
EOSINOPHIL NFR BLD AUTO: 3.1 %
EOSINOPHIL NFR BLD AUTO: 3.3 %
EOSINOPHIL NFR BLD AUTO: 3.7 %
EOSINOPHIL NFR BLD AUTO: 4 % (ref 0–6)
EOSINOPHIL NFR BLD AUTO: 5.3 %
EOSINOPHIL NFR BLD MANUAL: 3 % (ref 0–6)
ERYTHROCYTE [DISTWIDTH] IN BLOOD BY AUTOMATED COUNT: 12.2 % (ref 11–15)
ERYTHROCYTE [DISTWIDTH] IN BLOOD BY AUTOMATED COUNT: 12.5 % (ref 11–15)
ERYTHROCYTE [DISTWIDTH] IN BLOOD BY AUTOMATED COUNT: 12.7 % (ref 11.6–15.1)
ERYTHROCYTE [DISTWIDTH] IN BLOOD BY AUTOMATED COUNT: 12.8 % (ref 11.6–15.1)
ERYTHROCYTE [DISTWIDTH] IN BLOOD BY AUTOMATED COUNT: 12.9 % (ref 11.6–15.1)
ERYTHROCYTE [DISTWIDTH] IN BLOOD BY AUTOMATED COUNT: 13.2 % (ref 11.6–15.1)
ERYTHROCYTE [DISTWIDTH] IN BLOOD BY AUTOMATED COUNT: 13.2 % (ref 11–15)
ERYTHROCYTE [DISTWIDTH] IN BLOOD BY AUTOMATED COUNT: 13.3 % (ref 11.6–15.1)
ERYTHROCYTE [DISTWIDTH] IN BLOOD BY AUTOMATED COUNT: 13.3 % (ref 11.6–15.1)
ERYTHROCYTE [DISTWIDTH] IN BLOOD BY AUTOMATED COUNT: 13.5 % (ref 11.6–15.1)
ERYTHROCYTE [DISTWIDTH] IN BLOOD BY AUTOMATED COUNT: 13.6 % (ref 11.6–15.1)
ERYTHROCYTE [DISTWIDTH] IN BLOOD BY AUTOMATED COUNT: 13.7 % (ref 11.6–15.1)
ERYTHROCYTE [DISTWIDTH] IN BLOOD BY AUTOMATED COUNT: 14.9 % (ref 11–15)
ERYTHROCYTE [DISTWIDTH] IN BLOOD BY AUTOMATED COUNT: 16.4 % (ref 11–15)
ERYTHROCYTE [DISTWIDTH] IN BLOOD BY AUTOMATED COUNT: 16.6 % (ref 11.6–15.1)
ERYTHROCYTE [DISTWIDTH] IN BLOOD BY AUTOMATED COUNT: 16.6 % (ref 11–15)
ERYTHROCYTE [DISTWIDTH] IN BLOOD BY AUTOMATED COUNT: 17.1 % (ref 11.6–15.1)
ERYTHROCYTE [DISTWIDTH] IN BLOOD BY AUTOMATED COUNT: 17.3 % (ref 11.6–15.1)
ERYTHROCYTE [DISTWIDTH] IN BLOOD BY AUTOMATED COUNT: 17.3 % (ref 11–15)
ERYTHROCYTE [DISTWIDTH] IN BLOOD BY AUTOMATED COUNT: 17.4 % (ref 11.6–15.1)
ERYTHROCYTE [DISTWIDTH] IN BLOOD BY AUTOMATED COUNT: 17.8 % (ref 11.6–15.1)
ERYTHROCYTE [DISTWIDTH] IN BLOOD BY AUTOMATED COUNT: 18 % (ref 11.6–15.1)
ERYTHROCYTE [DISTWIDTH] IN BLOOD BY AUTOMATED COUNT: 18.1 % (ref 11.6–15.1)
EST. AVERAGE GLUCOSE BLD GHB EST-MCNC: 140 MG/DL
EST. AVERAGE GLUCOSE BLD GHB EST-MCNC: 160 MG/DL
FLUAV RNA RESP QL NAA+PROBE: NEGATIVE
FLUBV RNA RESP QL NAA+PROBE: NEGATIVE
GFR SERPL CREATININE-BSD FRML MDRD: 17 ML/MIN/1.73SQ M
GFR SERPL CREATININE-BSD FRML MDRD: 41 ML/MIN/1.73SQ M
GFR SERPL CREATININE-BSD FRML MDRD: 66 ML/MIN/1.73SQ M
GFR SERPL CREATININE-BSD FRML MDRD: 68 ML/MIN/1.73SQ M
GFR SERPL CREATININE-BSD FRML MDRD: 71 ML/MIN/1.73SQ M
GFR SERPL CREATININE-BSD FRML MDRD: 71 ML/MIN/1.73SQ M
GFR SERPL CREATININE-BSD FRML MDRD: 74 ML/MIN/1.73SQ M
GFR SERPL CREATININE-BSD FRML MDRD: 78 ML/MIN/1.73SQ M
GFR SERPL CREATININE-BSD FRML MDRD: 79 ML/MIN/1.73SQ M
GFR SERPL CREATININE-BSD FRML MDRD: 8 ML/MIN/1.73SQ M
GFR SERPL CREATININE-BSD FRML MDRD: 8 ML/MIN/1.73SQ M
GFR SERPL CREATININE-BSD FRML MDRD: 80 ML/MIN/1.73SQ M
GFR SERPL CREATININE-BSD FRML MDRD: 81 ML/MIN/1.73SQ M
GFR SERPL CREATININE-BSD FRML MDRD: 84 ML/MIN/1.73SQ M
GFR SERPL CREATININE-BSD FRML MDRD: 88 ML/MIN/1.73SQ M
GFR SERPL CREATININE-BSD FRML MDRD: 88 ML/MIN/1.73SQ M
GFR SERPL CREATININE-BSD FRML MDRD: 9 ML/MIN/1.73SQ M
GFR SERPL CREATININE-BSD FRML MDRD: 9 ML/MIN/1.73SQ M
GLOBULIN SER CALC-MCNC: 2.5 G/DL (CALC) (ref 1.9–3.7)
GLOBULIN SER CALC-MCNC: 2.6 G/DL (CALC) (ref 1.9–3.7)
GLOBULIN SER CALC-MCNC: 2.8 G/DL (CALC) (ref 1.9–3.7)
GLOBULIN SER CALC-MCNC: 3 G/DL (CALC) (ref 1.9–3.7)
GLOBULIN SER CALC-MCNC: 3.1 G/DL (CALC) (ref 1.9–3.7)
GLUCOSE SERPL-MCNC: 117 MG/DL (ref 65–99)
GLUCOSE SERPL-MCNC: 123 MG/DL (ref 65–140)
GLUCOSE SERPL-MCNC: 126 MG/DL (ref 65–140)
GLUCOSE SERPL-MCNC: 128 MG/DL (ref 65–140)
GLUCOSE SERPL-MCNC: 129 MG/DL (ref 65–140)
GLUCOSE SERPL-MCNC: 129 MG/DL (ref 65–140)
GLUCOSE SERPL-MCNC: 130 MG/DL (ref 65–140)
GLUCOSE SERPL-MCNC: 130 MG/DL (ref 65–140)
GLUCOSE SERPL-MCNC: 132 MG/DL (ref 65–140)
GLUCOSE SERPL-MCNC: 133 MG/DL (ref 65–140)
GLUCOSE SERPL-MCNC: 134 MG/DL (ref 65–99)
GLUCOSE SERPL-MCNC: 134 MG/DL (ref 65–99)
GLUCOSE SERPL-MCNC: 138 MG/DL (ref 65–140)
GLUCOSE SERPL-MCNC: 141 MG/DL (ref 65–140)
GLUCOSE SERPL-MCNC: 143 MG/DL (ref 65–140)
GLUCOSE SERPL-MCNC: 144 MG/DL (ref 65–140)
GLUCOSE SERPL-MCNC: 145 MG/DL (ref 65–140)
GLUCOSE SERPL-MCNC: 145 MG/DL (ref 65–140)
GLUCOSE SERPL-MCNC: 146 MG/DL (ref 65–140)
GLUCOSE SERPL-MCNC: 147 MG/DL (ref 65–140)
GLUCOSE SERPL-MCNC: 147 MG/DL (ref 65–140)
GLUCOSE SERPL-MCNC: 149 MG/DL (ref 65–140)
GLUCOSE SERPL-MCNC: 150 MG/DL (ref 65–140)
GLUCOSE SERPL-MCNC: 152 MG/DL (ref 65–99)
GLUCOSE SERPL-MCNC: 153 MG/DL (ref 65–140)
GLUCOSE SERPL-MCNC: 154 MG/DL (ref 65–140)
GLUCOSE SERPL-MCNC: 156 MG/DL (ref 65–140)
GLUCOSE SERPL-MCNC: 156 MG/DL (ref 65–140)
GLUCOSE SERPL-MCNC: 157 MG/DL (ref 65–140)
GLUCOSE SERPL-MCNC: 158 MG/DL (ref 65–140)
GLUCOSE SERPL-MCNC: 160 MG/DL (ref 65–140)
GLUCOSE SERPL-MCNC: 160 MG/DL (ref 65–140)
GLUCOSE SERPL-MCNC: 162 MG/DL (ref 65–140)
GLUCOSE SERPL-MCNC: 163 MG/DL (ref 65–140)
GLUCOSE SERPL-MCNC: 163 MG/DL (ref 65–140)
GLUCOSE SERPL-MCNC: 164 MG/DL (ref 65–140)
GLUCOSE SERPL-MCNC: 164 MG/DL (ref 65–140)
GLUCOSE SERPL-MCNC: 167 MG/DL (ref 65–140)
GLUCOSE SERPL-MCNC: 169 MG/DL (ref 65–140)
GLUCOSE SERPL-MCNC: 169 MG/DL (ref 65–140)
GLUCOSE SERPL-MCNC: 170 MG/DL (ref 65–140)
GLUCOSE SERPL-MCNC: 171 MG/DL (ref 65–140)
GLUCOSE SERPL-MCNC: 171 MG/DL (ref 65–140)
GLUCOSE SERPL-MCNC: 172 MG/DL (ref 65–140)
GLUCOSE SERPL-MCNC: 172 MG/DL (ref 65–99)
GLUCOSE SERPL-MCNC: 174 MG/DL (ref 65–140)
GLUCOSE SERPL-MCNC: 175 MG/DL (ref 65–140)
GLUCOSE SERPL-MCNC: 176 MG/DL (ref 65–140)
GLUCOSE SERPL-MCNC: 177 MG/DL (ref 65–140)
GLUCOSE SERPL-MCNC: 177 MG/DL (ref 65–140)
GLUCOSE SERPL-MCNC: 178 MG/DL (ref 65–140)
GLUCOSE SERPL-MCNC: 178 MG/DL (ref 65–140)
GLUCOSE SERPL-MCNC: 178 MG/DL (ref 65–99)
GLUCOSE SERPL-MCNC: 180 MG/DL (ref 65–140)
GLUCOSE SERPL-MCNC: 180 MG/DL (ref 65–140)
GLUCOSE SERPL-MCNC: 180 MG/DL (ref 65–99)
GLUCOSE SERPL-MCNC: 182 MG/DL (ref 65–140)
GLUCOSE SERPL-MCNC: 187 MG/DL (ref 65–140)
GLUCOSE SERPL-MCNC: 189 MG/DL (ref 65–140)
GLUCOSE SERPL-MCNC: 190 MG/DL (ref 65–140)
GLUCOSE SERPL-MCNC: 190 MG/DL (ref 65–140)
GLUCOSE SERPL-MCNC: 191 MG/DL (ref 65–140)
GLUCOSE SERPL-MCNC: 192 MG/DL (ref 65–140)
GLUCOSE SERPL-MCNC: 193 MG/DL (ref 65–140)
GLUCOSE SERPL-MCNC: 193 MG/DL (ref 65–99)
GLUCOSE SERPL-MCNC: 195 MG/DL (ref 65–140)
GLUCOSE SERPL-MCNC: 198 MG/DL (ref 65–140)
GLUCOSE SERPL-MCNC: 200 MG/DL (ref 65–140)
GLUCOSE SERPL-MCNC: 205 MG/DL (ref 65–140)
GLUCOSE SERPL-MCNC: 207 MG/DL (ref 65–140)
GLUCOSE SERPL-MCNC: 216 MG/DL (ref 65–140)
GLUCOSE SERPL-MCNC: 225 MG/DL (ref 65–140)
GLUCOSE SERPL-MCNC: 230 MG/DL (ref 65–140)
GLUCOSE SERPL-MCNC: 262 MG/DL (ref 65–140)
GLUCOSE SERPL-MCNC: 268 MG/DL (ref 65–140)
GLUCOSE SERPL-MCNC: 273 MG/DL (ref 65–140)
GLUCOSE SERPL-MCNC: 295 MG/DL (ref 65–140)
GLUCOSE UR STRIP-MCNC: NEGATIVE MG/DL
GRAM STN SPEC: ABNORMAL
GRAM STN SPEC: NORMAL
HBA1C MFR BLD: 6.5 %
HBA1C MFR BLD: 7.2 %
HCT VFR BLD AUTO: 33 % (ref 36.5–49.3)
HCT VFR BLD AUTO: 33 % (ref 36.5–49.3)
HCT VFR BLD AUTO: 33.1 % (ref 36.5–49.3)
HCT VFR BLD AUTO: 33.5 % (ref 36.5–49.3)
HCT VFR BLD AUTO: 33.7 % (ref 36.5–49.3)
HCT VFR BLD AUTO: 34.1 % (ref 36.5–49.3)
HCT VFR BLD AUTO: 34.4 % (ref 38.5–50)
HCT VFR BLD AUTO: 34.5 % (ref 36.5–49.3)
HCT VFR BLD AUTO: 34.5 % (ref 36.5–49.3)
HCT VFR BLD AUTO: 34.6 % (ref 36.5–49.3)
HCT VFR BLD AUTO: 34.6 % (ref 38.5–50)
HCT VFR BLD AUTO: 34.7 % (ref 36.5–49.3)
HCT VFR BLD AUTO: 34.8 % (ref 36.5–49.3)
HCT VFR BLD AUTO: 35.2 % (ref 36.5–49.3)
HCT VFR BLD AUTO: 35.3 % (ref 38.5–50)
HCT VFR BLD AUTO: 35.8 % (ref 36.5–49.3)
HCT VFR BLD AUTO: 36.1 % (ref 36.5–49.3)
HCT VFR BLD AUTO: 36.7 % (ref 38.5–50)
HCT VFR BLD AUTO: 37.4 % (ref 36.5–49.3)
HCT VFR BLD AUTO: 37.6 % (ref 36.5–49.3)
HCT VFR BLD AUTO: 38.3 % (ref 38.5–50)
HCT VFR BLD AUTO: 38.4 % (ref 38.5–50)
HCT VFR BLD AUTO: 38.7 % (ref 38.5–50)
HCT VFR BLD AUTO: 38.9 % (ref 36.5–49.3)
HCT VFR BLD AUTO: 40.5 % (ref 36.5–49.3)
HCT VFR BLD AUTO: 44.1 % (ref 36.5–49.3)
HGB BLD-MCNC: 10.5 G/DL (ref 12–17)
HGB BLD-MCNC: 10.6 G/DL (ref 12–17)
HGB BLD-MCNC: 10.7 G/DL (ref 12–17)
HGB BLD-MCNC: 10.8 G/DL (ref 12–17)
HGB BLD-MCNC: 10.9 G/DL (ref 12–17)
HGB BLD-MCNC: 11 G/DL (ref 12–17)
HGB BLD-MCNC: 11 G/DL (ref 12–17)
HGB BLD-MCNC: 11.1 G/DL (ref 13.2–17.1)
HGB BLD-MCNC: 11.2 G/DL (ref 12–17)
HGB BLD-MCNC: 11.4 G/DL (ref 12–17)
HGB BLD-MCNC: 11.4 G/DL (ref 13.2–17.1)
HGB BLD-MCNC: 11.4 G/DL (ref 13.2–17.1)
HGB BLD-MCNC: 11.7 G/DL (ref 12–17)
HGB BLD-MCNC: 11.8 G/DL (ref 13.2–17.1)
HGB BLD-MCNC: 11.9 G/DL (ref 12–17)
HGB BLD-MCNC: 12 G/DL (ref 12–17)
HGB BLD-MCNC: 12.1 G/DL (ref 12–17)
HGB BLD-MCNC: 12.4 G/DL (ref 12–17)
HGB BLD-MCNC: 12.4 G/DL (ref 12–17)
HGB BLD-MCNC: 12.4 G/DL (ref 13.2–17.1)
HGB BLD-MCNC: 12.6 G/DL (ref 13.2–17.1)
HGB BLD-MCNC: 12.7 G/DL (ref 13.2–17.1)
HGB BLD-MCNC: 13.4 G/DL (ref 12–17)
HGB BLD-MCNC: 14.7 G/DL (ref 12–17)
HGB UR QL STRIP.AUTO: ABNORMAL
HGB UR QL STRIP.AUTO: ABNORMAL
HGB UR QL STRIP.AUTO: NEGATIVE
HISTIOCYTES NFR FLD: 15 %
HOLD SPECIMEN: NORMAL
HOLD SPECIMEN: NORMAL
IMM GRANULOCYTES # BLD AUTO: 0 THOUSAND/UL (ref 0–0.2)
IMM GRANULOCYTES # BLD AUTO: 0.01 THOUSAND/UL (ref 0–0.2)
IMM GRANULOCYTES # BLD AUTO: 0.01 THOUSAND/UL (ref 0–0.2)
IMM GRANULOCYTES # BLD AUTO: 0.02 THOUSAND/UL (ref 0–0.2)
IMM GRANULOCYTES # BLD AUTO: 0.02 THOUSAND/UL (ref 0–0.2)
IMM GRANULOCYTES # BLD AUTO: 0.03 THOUSAND/UL (ref 0–0.2)
IMM GRANULOCYTES # BLD AUTO: 0.04 THOUSAND/UL (ref 0–0.2)
IMM GRANULOCYTES # BLD AUTO: 0.05 THOUSAND/UL (ref 0–0.2)
IMM GRANULOCYTES # BLD AUTO: 0.05 THOUSAND/UL (ref 0–0.2)
IMM GRANULOCYTES # BLD AUTO: 0.07 THOUSAND/UL (ref 0–0.2)
IMM GRANULOCYTES # BLD AUTO: 0.17 THOUSAND/UL (ref 0–0.2)
IMM GRANULOCYTES NFR BLD AUTO: 0 % (ref 0–2)
IMM GRANULOCYTES NFR BLD AUTO: 1 % (ref 0–2)
IMM GRANULOCYTES NFR BLD AUTO: 2 % (ref 0–2)
INR PPP: 0.99 (ref 0.9–1.1)
INR PPP: 1 (ref 0.9–1.1)
KETONES UR STRIP-MCNC: ABNORMAL MG/DL
KETONES UR STRIP-MCNC: NEGATIVE MG/DL
KETONES UR STRIP-MCNC: NEGATIVE MG/DL
LACTATE SERPL-SCNC: 0.7 MMOL/L (ref 0–2)
LACTATE SERPL-SCNC: 1.3 MMOL/L (ref 0.5–2)
LACTATE SERPL-SCNC: 1.6 MMOL/L (ref 0–2)
LACTATE SERPL-SCNC: 1.9 MMOL/L (ref 0–2)
LACTATE SERPL-SCNC: 2.2 MMOL/L (ref 0.5–2)
LACTATE SERPL-SCNC: 2.4 MMOL/L (ref 0.5–2)
LACTATE SERPL-SCNC: 2.5 MMOL/L (ref 0–2)
LACTATE SERPL-SCNC: 2.7 MMOL/L (ref 0.5–2)
LEUKOCYTE ESTERASE UR QL STRIP: ABNORMAL
LIPASE SERPL-CCNC: 14 U/L (ref 13–60)
LYMPHOCYTES # BLD AUTO: 0.64 THOUSANDS/ΜL (ref 0.6–4.47)
LYMPHOCYTES # BLD AUTO: 0.71 THOUSANDS/ΜL (ref 0.6–4.47)
LYMPHOCYTES # BLD AUTO: 1.03 THOUSANDS/ΜL (ref 0.6–4.47)
LYMPHOCYTES # BLD AUTO: 1.04 THOUSANDS/ΜL (ref 0.6–4.47)
LYMPHOCYTES # BLD AUTO: 1.09 THOUSANDS/ΜL (ref 0.6–4.47)
LYMPHOCYTES # BLD AUTO: 1.11 THOUSANDS/ΜL (ref 0.6–4.47)
LYMPHOCYTES # BLD AUTO: 1.14 THOUSANDS/ΜL (ref 0.6–4.47)
LYMPHOCYTES # BLD AUTO: 1.14 THOUSANDS/ΜL (ref 0.6–4.47)
LYMPHOCYTES # BLD AUTO: 1.21 THOUSANDS/ΜL (ref 0.6–4.47)
LYMPHOCYTES # BLD AUTO: 1.25 THOUSANDS/ΜL (ref 0.6–4.47)
LYMPHOCYTES # BLD AUTO: 1.31 THOUSANDS/ΜL (ref 0.6–4.47)
LYMPHOCYTES # BLD AUTO: 1.42 THOUSANDS/ΜL (ref 0.6–4.47)
LYMPHOCYTES # BLD AUTO: 1.58 THOUSANDS/ΜL (ref 0.6–4.47)
LYMPHOCYTES # BLD AUTO: 1.6 THOUSANDS/ΜL (ref 0.6–4.47)
LYMPHOCYTES # BLD AUTO: 1.81 THOUSAND/UL (ref 0.6–4.47)
LYMPHOCYTES # BLD AUTO: 1102 CELLS/UL (ref 850–3900)
LYMPHOCYTES # BLD AUTO: 1235 CELLS/UL (ref 850–3900)
LYMPHOCYTES # BLD AUTO: 1414 CELLS/UL (ref 850–3900)
LYMPHOCYTES # BLD AUTO: 1643 CELLS/UL (ref 850–3900)
LYMPHOCYTES # BLD AUTO: 1651 CELLS/UL (ref 850–3900)
LYMPHOCYTES # BLD AUTO: 2.11 THOUSANDS/ΜL (ref 0.6–4.47)
LYMPHOCYTES # BLD AUTO: 2013 CELLS/UL (ref 850–3900)
LYMPHOCYTES # BLD AUTO: 2322 CELLS/UL (ref 850–3900)
LYMPHOCYTES # BLD AUTO: 42 % (ref 14–44)
LYMPHOCYTES NFR BLD AUTO: 11.6 %
LYMPHOCYTES NFR BLD AUTO: 12 % (ref 14–44)
LYMPHOCYTES NFR BLD AUTO: 13 % (ref 14–44)
LYMPHOCYTES NFR BLD AUTO: 15 % (ref 14–44)
LYMPHOCYTES NFR BLD AUTO: 15 % (ref 14–44)
LYMPHOCYTES NFR BLD AUTO: 16 % (ref 14–44)
LYMPHOCYTES NFR BLD AUTO: 17 % (ref 14–44)
LYMPHOCYTES NFR BLD AUTO: 17.9 %
LYMPHOCYTES NFR BLD AUTO: 18 % (ref 14–44)
LYMPHOCYTES NFR BLD AUTO: 19 % (ref 14–44)
LYMPHOCYTES NFR BLD AUTO: 20.2 %
LYMPHOCYTES NFR BLD AUTO: 23 % (ref 14–44)
LYMPHOCYTES NFR BLD AUTO: 25 %
LYMPHOCYTES NFR BLD AUTO: 26.2 %
LYMPHOCYTES NFR BLD AUTO: 27.2 %
LYMPHOCYTES NFR BLD AUTO: 30 % (ref 14–44)
LYMPHOCYTES NFR BLD AUTO: 31 %
LYMPHOCYTES NFR BLD AUTO: 37 % (ref 14–44)
LYMPHOCYTES NFR BLD AUTO: 43 %
LYMPHOCYTES NFR BLD AUTO: 43 % (ref 14–44)
LYMPHOCYTES NFR BLD AUTO: 7 % (ref 14–44)
MAGNESIUM SERPL-MCNC: 1.4 MG/DL (ref 1.6–2.6)
MAGNESIUM SERPL-MCNC: 1.5 MG/DL (ref 1.6–2.6)
MAGNESIUM SERPL-MCNC: 1.6 MG/DL (ref 1.6–2.6)
MAGNESIUM SERPL-MCNC: 1.7 MG/DL (ref 1.6–2.6)
MAGNESIUM SERPL-MCNC: 1.8 MG/DL (ref 1.6–2.6)
MAGNESIUM SERPL-MCNC: 1.8 MG/DL (ref 1.6–2.6)
MAGNESIUM SERPL-MCNC: 1.9 MG/DL (ref 1.6–2.6)
MAGNESIUM SERPL-MCNC: 1.9 MG/DL (ref 1.6–2.6)
MAGNESIUM SERPL-MCNC: 2 MG/DL (ref 1.6–2.6)
MAGNESIUM SERPL-MCNC: 2.3 MG/DL (ref 1.6–2.6)
MCH RBC QN AUTO: 28.1 PG (ref 27–33)
MCH RBC QN AUTO: 28.5 PG (ref 27–33)
MCH RBC QN AUTO: 28.6 PG (ref 27–33)
MCH RBC QN AUTO: 28.9 PG (ref 27–33)
MCH RBC QN AUTO: 29 PG (ref 26.8–34.3)
MCH RBC QN AUTO: 29 PG (ref 26.8–34.3)
MCH RBC QN AUTO: 29.1 PG (ref 26.8–34.3)
MCH RBC QN AUTO: 29.2 PG (ref 26.8–34.3)
MCH RBC QN AUTO: 29.2 PG (ref 27–33)
MCH RBC QN AUTO: 29.3 PG (ref 26.8–34.3)
MCH RBC QN AUTO: 29.3 PG (ref 26.8–34.3)
MCH RBC QN AUTO: 29.4 PG (ref 26.8–34.3)
MCH RBC QN AUTO: 29.5 PG (ref 26.8–34.3)
MCH RBC QN AUTO: 29.5 PG (ref 26.8–34.3)
MCH RBC QN AUTO: 29.7 PG (ref 26.8–34.3)
MCH RBC QN AUTO: 29.8 PG (ref 26.8–34.3)
MCH RBC QN AUTO: 29.9 PG (ref 26.8–34.3)
MCH RBC QN AUTO: 30.1 PG (ref 26.8–34.3)
MCH RBC QN AUTO: 30.4 PG (ref 27–33)
MCH RBC QN AUTO: 30.6 PG (ref 27–33)
MCHC RBC AUTO-ENTMCNC: 31.6 G/DL (ref 31.4–37.4)
MCHC RBC AUTO-ENTMCNC: 31.8 G/DL (ref 31.4–37.4)
MCHC RBC AUTO-ENTMCNC: 31.8 G/DL (ref 31.4–37.4)
MCHC RBC AUTO-ENTMCNC: 31.9 G/DL (ref 31.4–37.4)
MCHC RBC AUTO-ENTMCNC: 31.9 G/DL (ref 31.4–37.4)
MCHC RBC AUTO-ENTMCNC: 32.1 G/DL (ref 32–36)
MCHC RBC AUTO-ENTMCNC: 32.2 G/DL (ref 32–36)
MCHC RBC AUTO-ENTMCNC: 32.3 G/DL (ref 31.4–37.4)
MCHC RBC AUTO-ENTMCNC: 32.3 G/DL (ref 31.4–37.4)
MCHC RBC AUTO-ENTMCNC: 32.3 G/DL (ref 32–36)
MCHC RBC AUTO-ENTMCNC: 32.3 G/DL (ref 32–36)
MCHC RBC AUTO-ENTMCNC: 32.4 G/DL (ref 31.4–37.4)
MCHC RBC AUTO-ENTMCNC: 32.4 G/DL (ref 31.4–37.4)
MCHC RBC AUTO-ENTMCNC: 32.5 G/DL (ref 31.4–37.4)
MCHC RBC AUTO-ENTMCNC: 32.6 G/DL (ref 32–36)
MCHC RBC AUTO-ENTMCNC: 32.7 G/DL (ref 31.4–37.4)
MCHC RBC AUTO-ENTMCNC: 32.8 G/DL (ref 31.4–37.4)
MCHC RBC AUTO-ENTMCNC: 32.9 G/DL (ref 31.4–37.4)
MCHC RBC AUTO-ENTMCNC: 33 G/DL (ref 31.4–37.4)
MCHC RBC AUTO-ENTMCNC: 33 G/DL (ref 31.4–37.4)
MCHC RBC AUTO-ENTMCNC: 33.1 G/DL (ref 31.4–37.4)
MCHC RBC AUTO-ENTMCNC: 33.1 G/DL (ref 32–36)
MCHC RBC AUTO-ENTMCNC: 33.2 G/DL (ref 31.4–37.4)
MCHC RBC AUTO-ENTMCNC: 33.2 G/DL (ref 32–36)
MCHC RBC AUTO-ENTMCNC: 33.3 G/DL (ref 31.4–37.4)
MCHC RBC AUTO-ENTMCNC: 33.5 G/DL (ref 31.4–37.4)
MCV RBC AUTO: 87.4 FL (ref 80–100)
MCV RBC AUTO: 88 FL (ref 80–100)
MCV RBC AUTO: 88 FL (ref 82–98)
MCV RBC AUTO: 88.5 FL (ref 80–100)
MCV RBC AUTO: 88.7 FL (ref 80–100)
MCV RBC AUTO: 89 FL (ref 82–98)
MCV RBC AUTO: 89.6 FL (ref 80–100)
MCV RBC AUTO: 90 FL (ref 82–98)
MCV RBC AUTO: 91 FL (ref 82–98)
MCV RBC AUTO: 92 FL (ref 82–98)
MCV RBC AUTO: 92.3 FL (ref 80–100)
MCV RBC AUTO: 93 FL (ref 82–98)
MCV RBC AUTO: 93 FL (ref 82–98)
MCV RBC AUTO: 93.5 FL (ref 80–100)
METAMYELOCYTES NFR BLD MANUAL: 1 % (ref 0–1)
MONOCYTES # BLD AUTO: 0.38 THOUSAND/ΜL (ref 0.17–1.22)
MONOCYTES # BLD AUTO: 0.4 THOUSAND/ΜL (ref 0.17–1.22)
MONOCYTES # BLD AUTO: 0.51 THOUSAND/ΜL (ref 0.17–1.22)
MONOCYTES # BLD AUTO: 0.52 THOUSAND/UL (ref 0–1.22)
MONOCYTES # BLD AUTO: 0.59 THOUSAND/ΜL (ref 0.17–1.22)
MONOCYTES # BLD AUTO: 0.64 THOUSAND/ΜL (ref 0.17–1.22)
MONOCYTES # BLD AUTO: 0.66 THOUSAND/ΜL (ref 0.17–1.22)
MONOCYTES # BLD AUTO: 0.67 THOUSAND/ΜL (ref 0.17–1.22)
MONOCYTES # BLD AUTO: 0.7 THOUSAND/ΜL (ref 0.17–1.22)
MONOCYTES # BLD AUTO: 0.74 THOUSAND/ΜL (ref 0.17–1.22)
MONOCYTES # BLD AUTO: 0.76 THOUSAND/ΜL (ref 0.17–1.22)
MONOCYTES # BLD AUTO: 0.84 THOUSAND/ΜL (ref 0.17–1.22)
MONOCYTES # BLD AUTO: 0.84 THOUSAND/ΜL (ref 0.17–1.22)
MONOCYTES # BLD AUTO: 1.04 THOUSAND/ΜL (ref 0.17–1.22)
MONOCYTES # BLD AUTO: 1.04 THOUSAND/ΜL (ref 0.17–1.22)
MONOCYTES # BLD AUTO: 1.17 THOUSAND/ΜL (ref 0.17–1.22)
MONOCYTES # BLD AUTO: 393 CELLS/UL (ref 200–950)
MONOCYTES # BLD AUTO: 413 CELLS/UL (ref 200–950)
MONOCYTES # BLD AUTO: 513 CELLS/UL (ref 200–950)
MONOCYTES # BLD AUTO: 529 CELLS/UL (ref 200–950)
MONOCYTES # BLD AUTO: 602 CELLS/UL (ref 200–950)
MONOCYTES # BLD AUTO: 622 CELLS/UL (ref 200–950)
MONOCYTES # BLD AUTO: 637 CELLS/UL (ref 200–950)
MONOCYTES NFR BLD AUTO: 10 % (ref 4–12)
MONOCYTES NFR BLD AUTO: 11 % (ref 4–12)
MONOCYTES NFR BLD AUTO: 11 % (ref 4–12)
MONOCYTES NFR BLD AUTO: 11.8 %
MONOCYTES NFR BLD AUTO: 12 % (ref 4–12)
MONOCYTES NFR BLD AUTO: 13 % (ref 4–12)
MONOCYTES NFR BLD AUTO: 16 % (ref 4–12)
MONOCYTES NFR BLD AUTO: 23 %
MONOCYTES NFR BLD AUTO: 24 % (ref 4–12)
MONOCYTES NFR BLD AUTO: 5.4 %
MONOCYTES NFR BLD AUTO: 5.7 %
MONOCYTES NFR BLD AUTO: 7 % (ref 4–12)
MONOCYTES NFR BLD AUTO: 7.8 %
MONOCYTES NFR BLD AUTO: 8 % (ref 4–12)
MONOCYTES NFR BLD AUTO: 8 % (ref 4–12)
MONOCYTES NFR BLD AUTO: 8.4 %
MONOCYTES NFR BLD AUTO: 8.4 %
MONOCYTES NFR BLD AUTO: 8.6 %
MONOCYTES NFR BLD AUTO: 9 % (ref 4–12)
MONOCYTES NFR BLD AUTO: 9 % (ref 4–12)
MONOCYTES NFR BLD: 12 % (ref 4–12)
MUCOUS THREADS UR QL AUTO: ABNORMAL
MYELOCYTES NFR BLD MANUAL: 1 % (ref 0–1)
NEUTROPHILS # BLD AUTO: 0.67 THOUSANDS/ΜL (ref 1.85–7.62)
NEUTROPHILS # BLD AUTO: 1.19 THOUSANDS/ΜL (ref 1.85–7.62)
NEUTROPHILS # BLD AUTO: 1.47 THOUSANDS/ΜL (ref 1.85–7.62)
NEUTROPHILS # BLD AUTO: 10.86 THOUSANDS/ΜL (ref 1.85–7.62)
NEUTROPHILS # BLD AUTO: 2219 CELLS/UL (ref 1500–7800)
NEUTROPHILS # BLD AUTO: 2931 CELLS/UL (ref 1500–7800)
NEUTROPHILS # BLD AUTO: 3.84 THOUSANDS/ΜL (ref 1.85–7.62)
NEUTROPHILS # BLD AUTO: 3881 CELLS/UL (ref 1500–7800)
NEUTROPHILS # BLD AUTO: 4.44 THOUSANDS/ΜL (ref 1.85–7.62)
NEUTROPHILS # BLD AUTO: 4.79 THOUSANDS/ΜL (ref 1.85–7.62)
NEUTROPHILS # BLD AUTO: 4.84 THOUSANDS/ΜL (ref 1.85–7.62)
NEUTROPHILS # BLD AUTO: 4.84 THOUSANDS/ΜL (ref 1.85–7.62)
NEUTROPHILS # BLD AUTO: 4.86 THOUSANDS/ΜL (ref 1.85–7.62)
NEUTROPHILS # BLD AUTO: 4573 CELLS/UL (ref 1500–7800)
NEUTROPHILS # BLD AUTO: 4732 CELLS/UL (ref 1500–7800)
NEUTROPHILS # BLD AUTO: 4996 CELLS/UL (ref 1500–7800)
NEUTROPHILS # BLD AUTO: 5.49 THOUSANDS/ΜL (ref 1.85–7.62)
NEUTROPHILS # BLD AUTO: 6 THOUSANDS/ΜL (ref 1.85–7.62)
NEUTROPHILS # BLD AUTO: 7.33 THOUSANDS/ΜL (ref 1.85–7.62)
NEUTROPHILS # BLD AUTO: 7695 CELLS/UL (ref 1500–7800)
NEUTROPHILS # BLD AUTO: 8.06 THOUSANDS/ΜL (ref 1.85–7.62)
NEUTROPHILS # BLD AUTO: 9.64 THOUSANDS/ΜL (ref 1.85–7.62)
NEUTROPHILS # BLD MANUAL: 1.77 THOUSAND/UL (ref 1.85–7.62)
NEUTROPHILS NFR BLD AUTO: 41.1 %
NEUTROPHILS NFR BLD AUTO: 55.3 %
NEUTROPHILS NFR BLD AUTO: 61.6 %
NEUTROPHILS NFR BLD AUTO: 61.8 %
NEUTROPHILS NFR BLD AUTO: 67.6 %
NEUTROPHILS NFR BLD AUTO: 72.4 %
NEUTROPHILS NFR BLD AUTO: 81 %
NEUTS BAND NFR BLD MANUAL: 2 % (ref 0–8)
NEUTS SEG NFR BLD AUTO: 28 % (ref 43–75)
NEUTS SEG NFR BLD AUTO: 39 % (ref 43–75)
NEUTS SEG NFR BLD AUTO: 4 %
NEUTS SEG NFR BLD AUTO: 47 % (ref 43–75)
NEUTS SEG NFR BLD AUTO: 51 % (ref 43–75)
NEUTS SEG NFR BLD AUTO: 61 % (ref 43–75)
NEUTS SEG NFR BLD AUTO: 66 % (ref 43–75)
NEUTS SEG NFR BLD AUTO: 67 % (ref 43–75)
NEUTS SEG NFR BLD AUTO: 68 % (ref 43–75)
NEUTS SEG NFR BLD AUTO: 70 % (ref 43–75)
NEUTS SEG NFR BLD AUTO: 71 % (ref 43–75)
NEUTS SEG NFR BLD AUTO: 71 % (ref 43–75)
NEUTS SEG NFR BLD AUTO: 73 % (ref 43–75)
NEUTS SEG NFR BLD AUTO: 73 % (ref 43–75)
NEUTS SEG NFR BLD AUTO: 74 % (ref 43–75)
NEUTS SEG NFR BLD AUTO: 79 % (ref 43–75)
NEUTS SEG NFR BLD AUTO: 83 % (ref 43–75)
NITRITE UR QL STRIP: NEGATIVE
NITRITE UR QL STRIP: POSITIVE
NITRITE UR QL STRIP: POSITIVE
NON-SQ EPI CELLS URNS QL MICRO: ABNORMAL /HPF
NRBC BLD AUTO-RTO: 0 /100 WBCS
OSMOLALITY UR/SERPL-RTO: 296 MMOL/KG (ref 282–298)
OSMOLALITY UR: 308 MMOL/KG
P AXIS: 46 DEGREES
P AXIS: 53 DEGREES
P AXIS: 67 DEGREES
PATHOLOGY REVIEW: YES
PH UR STRIP.AUTO: 5 [PH]
PH UR STRIP.AUTO: 6 [PH]
PH UR STRIP.AUTO: 6 [PH]
PHOSPHATE SERPL-MCNC: 2.7 MG/DL (ref 2.3–4.1)
PHOSPHATE SERPL-MCNC: 3 MG/DL (ref 2.3–4.1)
PHOSPHATE SERPL-MCNC: 3.4 MG/DL (ref 2.3–4.1)
PHOSPHATE SERPL-MCNC: 3.4 MG/DL (ref 2.3–4.1)
PHOSPHATE SERPL-MCNC: 4.6 MG/DL (ref 2.3–4.1)
PHOSPHATE SERPL-MCNC: 6.9 MG/DL (ref 2.3–4.1)
PLATELET # BLD AUTO: 100 THOUSANDS/UL (ref 149–390)
PLATELET # BLD AUTO: 108 THOUSANDS/UL (ref 149–390)
PLATELET # BLD AUTO: 109 THOUSANDS/UL (ref 149–390)
PLATELET # BLD AUTO: 110 THOUSAND/UL (ref 140–400)
PLATELET # BLD AUTO: 115 THOUSANDS/UL (ref 149–390)
PLATELET # BLD AUTO: 123 THOUSANDS/UL (ref 149–390)
PLATELET # BLD AUTO: 126 THOUSAND/UL (ref 140–400)
PLATELET # BLD AUTO: 127 THOUSAND/UL (ref 140–400)
PLATELET # BLD AUTO: 147 THOUSAND/UL (ref 140–400)
PLATELET # BLD AUTO: 149 THOUSAND/UL (ref 140–400)
PLATELET # BLD AUTO: 166 THOUSANDS/UL (ref 149–390)
PLATELET # BLD AUTO: 170 THOUSAND/UL (ref 140–400)
PLATELET # BLD AUTO: 183 THOUSANDS/UL (ref 149–390)
PLATELET # BLD AUTO: 185 THOUSANDS/UL (ref 149–390)
PLATELET # BLD AUTO: 185 THOUSANDS/UL (ref 149–390)
PLATELET # BLD AUTO: 191 THOUSANDS/UL (ref 149–390)
PLATELET # BLD AUTO: 196 THOUSANDS/UL (ref 149–390)
PLATELET # BLD AUTO: 201 THOUSANDS/UL (ref 149–390)
PLATELET # BLD AUTO: 254 THOUSANDS/UL (ref 149–390)
PLATELET # BLD AUTO: 303 THOUSANDS/UL (ref 149–390)
PLATELET # BLD AUTO: 47 THOUSANDS/UL (ref 149–390)
PLATELET # BLD AUTO: 57 THOUSANDS/UL (ref 149–390)
PLATELET # BLD AUTO: 60 THOUSANDS/UL (ref 149–390)
PLATELET # BLD AUTO: 71 THOUSANDS/UL (ref 149–390)
PLATELET # BLD AUTO: 92 THOUSANDS/UL (ref 149–390)
PLATELET # BLD AUTO: 98 THOUSANDS/UL (ref 149–390)
PLATELET # BLD AUTO: 99 THOUSAND/UL (ref 140–400)
PLATELET BLD QL SMEAR: ADEQUATE
PMV BLD AUTO: 11.1 FL (ref 8.9–12.7)
PMV BLD AUTO: 11.1 FL (ref 8.9–12.7)
PMV BLD AUTO: 11.2 FL (ref 8.9–12.7)
PMV BLD AUTO: 11.3 FL (ref 8.9–12.7)
PMV BLD AUTO: 11.5 FL (ref 8.9–12.7)
PMV BLD AUTO: 11.6 FL (ref 8.9–12.7)
PMV BLD AUTO: 11.6 FL (ref 8.9–12.7)
PMV BLD AUTO: 11.9 FL (ref 8.9–12.7)
PMV BLD AUTO: 12 FL (ref 8.9–12.7)
PMV BLD AUTO: 12.1 FL (ref 8.9–12.7)
PMV BLD AUTO: 12.2 FL (ref 8.9–12.7)
PMV BLD AUTO: 12.3 FL (ref 8.9–12.7)
PMV BLD AUTO: 12.3 FL (ref 8.9–12.7)
PMV BLD AUTO: 12.6 FL (ref 8.9–12.7)
PMV BLD AUTO: 12.8 FL (ref 8.9–12.7)
PMV BLD REES-ECKER: 11 FL (ref 7.5–12.5)
PMV BLD REES-ECKER: 11.4 FL (ref 7.5–12.5)
PMV BLD REES-ECKER: 11.6 FL (ref 7.5–12.5)
PMV BLD REES-ECKER: 11.6 FL (ref 7.5–12.5)
PMV BLD REES-ECKER: 12 FL (ref 7.5–12.5)
PMV BLD REES-ECKER: 12.2 FL (ref 7.5–12.5)
PMV BLD REES-ECKER: 12.2 FL (ref 7.5–12.5)
POTASSIUM SERPL-SCNC: 3.2 MMOL/L (ref 3.5–5)
POTASSIUM SERPL-SCNC: 3.4 MMOL/L (ref 3.5–5)
POTASSIUM SERPL-SCNC: 3.4 MMOL/L (ref 3.5–5.3)
POTASSIUM SERPL-SCNC: 3.5 MMOL/L (ref 3.5–5.3)
POTASSIUM SERPL-SCNC: 3.6 MMOL/L (ref 3.5–5)
POTASSIUM SERPL-SCNC: 3.6 MMOL/L (ref 3.5–5)
POTASSIUM SERPL-SCNC: 3.6 MMOL/L (ref 3.5–5.3)
POTASSIUM SERPL-SCNC: 3.7 MMOL/L (ref 3.5–5)
POTASSIUM SERPL-SCNC: 3.7 MMOL/L (ref 3.5–5)
POTASSIUM SERPL-SCNC: 3.7 MMOL/L (ref 3.5–5.3)
POTASSIUM SERPL-SCNC: 3.8 MMOL/L (ref 3.5–5.3)
POTASSIUM SERPL-SCNC: 3.9 MMOL/L (ref 3.5–5)
POTASSIUM SERPL-SCNC: 3.9 MMOL/L (ref 3.5–5.3)
POTASSIUM SERPL-SCNC: 4 MMOL/L (ref 3.5–5)
POTASSIUM SERPL-SCNC: 4 MMOL/L (ref 3.5–5.3)
POTASSIUM SERPL-SCNC: 4.1 MMOL/L (ref 3.5–5.3)
POTASSIUM SERPL-SCNC: 4.1 MMOL/L (ref 3.5–5.3)
POTASSIUM SERPL-SCNC: 4.2 MMOL/L (ref 3.5–5.3)
POTASSIUM SERPL-SCNC: 4.2 MMOL/L (ref 3.5–5.3)
POTASSIUM SERPL-SCNC: 4.3 MMOL/L (ref 3.5–5.3)
POTASSIUM SERPL-SCNC: 4.3 MMOL/L (ref 3.5–5.3)
POTASSIUM SERPL-SCNC: 4.4 MMOL/L (ref 3.5–5.3)
POTASSIUM SERPL-SCNC: 4.6 MMOL/L (ref 3.5–5.3)
PR INTERVAL: 138 MS
PR INTERVAL: 141 MS
PR INTERVAL: 152 MS
PROCALCITONIN SERPL-MCNC: 0.08 NG/ML
PROCALCITONIN SERPL-MCNC: 0.14 NG/ML
PROCALCITONIN SERPL-MCNC: 0.21 NG/ML
PROCALCITONIN SERPL-MCNC: 0.24 NG/ML
PROCALCITONIN SERPL-MCNC: 0.54 NG/ML
PROCALCITONIN SERPL-MCNC: 0.61 NG/ML
PROCALCITONIN SERPL-MCNC: <0.05 NG/ML
PROT FLD-MCNC: 4.9 G/DL
PROT SERPL-MCNC: 5.4 G/DL (ref 6.4–8.2)
PROT SERPL-MCNC: 5.7 G/DL (ref 6.1–8.1)
PROT SERPL-MCNC: 5.7 G/DL (ref 6.4–8.2)
PROT SERPL-MCNC: 5.8 G/DL (ref 6.4–8.2)
PROT SERPL-MCNC: 5.8 G/DL (ref 6.4–8.2)
PROT SERPL-MCNC: 5.9 G/DL (ref 6.4–8.2)
PROT SERPL-MCNC: 6 G/DL (ref 6.4–8.2)
PROT SERPL-MCNC: 6.2 G/DL (ref 6.1–8.1)
PROT SERPL-MCNC: 6.3 G/DL (ref 6.4–8.3)
PROT SERPL-MCNC: 6.5 G/DL (ref 6.1–8.1)
PROT SERPL-MCNC: 6.6 G/DL (ref 6.1–8.1)
PROT SERPL-MCNC: 6.7 G/DL (ref 6.1–8.1)
PROT SERPL-MCNC: 6.8 G/DL (ref 6.4–8.2)
PROT SERPL-MCNC: 6.8 G/DL (ref 6.4–8.3)
PROT SERPL-MCNC: 6.9 G/DL (ref 6.1–8.1)
PROT SERPL-MCNC: 7 G/DL (ref 6.1–8.1)
PROT SERPL-MCNC: 7.2 G/DL (ref 6.4–8.3)
PROT SERPL-MCNC: 7.8 G/DL (ref 6.4–8.3)
PROT UR STRIP-MCNC: ABNORMAL MG/DL
PROT UR STRIP-MCNC: ABNORMAL MG/DL
PROT UR STRIP-MCNC: NEGATIVE MG/DL
PROTHROMBIN TIME: 10.6 SECONDS (ref 9.5–12.1)
PROTHROMBIN TIME: 11.2 SECONDS (ref 9.5–12.1)
QRS AXIS: -13 DEGREES
QRS AXIS: -27 DEGREES
QRS AXIS: -73 DEGREES
QRSD INTERVAL: 101 MS
QRSD INTERVAL: 85 MS
QRSD INTERVAL: 92 MS
QT INTERVAL: 302 MS
QT INTERVAL: 348 MS
QT INTERVAL: 372 MS
QTC INTERVAL: 453 MS
QTC INTERVAL: 458 MS
QTC INTERVAL: 469 MS
RBC # BLD AUTO: 3.6 MILLION/UL (ref 3.88–5.62)
RBC # BLD AUTO: 3.65 MILLION/UL (ref 3.88–5.62)
RBC # BLD AUTO: 3.67 MILLION/UL (ref 3.88–5.62)
RBC # BLD AUTO: 3.69 MILLION/UL (ref 3.88–5.62)
RBC # BLD AUTO: 3.7 MILLION/UL (ref 3.88–5.62)
RBC # BLD AUTO: 3.7 MILLION/UL (ref 3.88–5.62)
RBC # BLD AUTO: 3.76 MILLION/UL (ref 3.88–5.62)
RBC # BLD AUTO: 3.77 MILLION/UL (ref 3.88–5.62)
RBC # BLD AUTO: 3.83 MILLION/UL (ref 3.88–5.62)
RBC # BLD AUTO: 3.84 MILLION/UL (ref 3.88–5.62)
RBC # BLD AUTO: 3.85 MILLION/UL (ref 3.88–5.62)
RBC # BLD AUTO: 3.9 MILLION/UL (ref 4.2–5.8)
RBC # BLD AUTO: 3.91 MILLION/UL (ref 4.2–5.8)
RBC # BLD AUTO: 3.94 MILLION/UL (ref 4.2–5.8)
RBC # BLD AUTO: 3.96 MILLION/UL (ref 3.88–5.62)
RBC # BLD AUTO: 4.01 MILLION/UL (ref 3.88–5.62)
RBC # BLD AUTO: 4.02 MILLION/UL (ref 3.88–5.62)
RBC # BLD AUTO: 4.02 MILLION/UL (ref 3.88–5.62)
RBC # BLD AUTO: 4.14 MILLION/UL (ref 4.2–5.8)
RBC # BLD AUTO: 4.15 MILLION/UL (ref 4.2–5.8)
RBC # BLD AUTO: 4.18 MILLION/UL (ref 3.88–5.62)
RBC # BLD AUTO: 4.2 MILLION/UL (ref 4.2–5.8)
RBC # BLD AUTO: 4.23 MILLION/UL (ref 3.88–5.62)
RBC # BLD AUTO: 4.34 MILLION/UL (ref 4.2–5.8)
RBC # BLD AUTO: 4.59 MILLION/UL (ref 3.88–5.62)
RBC # BLD AUTO: 4.95 MILLION/UL (ref 3.88–5.62)
RBC #/AREA URNS AUTO: ABNORMAL /HPF
RBC MORPH BLD: NORMAL
RSV RNA RESP QL NAA+PROBE: NEGATIVE
SARS-COV-2 RNA RESP QL NAA+PROBE: NEGATIVE
SARS-COV-2 RNA RESP QL NAA+PROBE: NEGATIVE
SITE: NORMAL
SL AMB EGFR AFRICAN AMERICAN: 51 ML/MIN/1.73M2
SL AMB EGFR AFRICAN AMERICAN: 82 ML/MIN/1.73M2
SL AMB EGFR AFRICAN AMERICAN: 84 ML/MIN/1.73M2
SL AMB EGFR AFRICAN AMERICAN: 89 ML/MIN/1.73M2
SL AMB EGFR AFRICAN AMERICAN: 90 ML/MIN/1.73M2
SL AMB EGFR AFRICAN AMERICAN: 95 ML/MIN/1.73M2
SL AMB EGFR NON AFRICAN AMERICAN: 44 ML/MIN/1.73M2
SL AMB EGFR NON AFRICAN AMERICAN: 71 ML/MIN/1.73M2
SL AMB EGFR NON AFRICAN AMERICAN: 72 ML/MIN/1.73M2
SL AMB EGFR NON AFRICAN AMERICAN: 77 ML/MIN/1.73M2
SL AMB EGFR NON AFRICAN AMERICAN: 78 ML/MIN/1.73M2
SL AMB EGFR NON AFRICAN AMERICAN: 82 ML/MIN/1.73M2
SODIUM 24H UR-SCNC: 13 MOL/L
SODIUM SERPL-SCNC: 131 MMOL/L (ref 136–145)
SODIUM SERPL-SCNC: 132 MMOL/L (ref 136–145)
SODIUM SERPL-SCNC: 132 MMOL/L (ref 136–145)
SODIUM SERPL-SCNC: 133 MMOL/L (ref 133–145)
SODIUM SERPL-SCNC: 133 MMOL/L (ref 133–145)
SODIUM SERPL-SCNC: 134 MMOL/L (ref 133–145)
SODIUM SERPL-SCNC: 134 MMOL/L (ref 136–145)
SODIUM SERPL-SCNC: 135 MMOL/L (ref 136–145)
SODIUM SERPL-SCNC: 136 MMOL/L (ref 133–145)
SODIUM SERPL-SCNC: 136 MMOL/L (ref 133–145)
SODIUM SERPL-SCNC: 137 MMOL/L (ref 133–145)
SODIUM SERPL-SCNC: 137 MMOL/L (ref 135–146)
SODIUM SERPL-SCNC: 138 MMOL/L (ref 135–146)
SODIUM SERPL-SCNC: 138 MMOL/L (ref 136–145)
SODIUM SERPL-SCNC: 139 MMOL/L (ref 133–145)
SODIUM SERPL-SCNC: 139 MMOL/L (ref 135–146)
SODIUM SERPL-SCNC: 139 MMOL/L (ref 136–145)
SODIUM SERPL-SCNC: 140 MMOL/L (ref 133–145)
SODIUM SERPL-SCNC: 140 MMOL/L (ref 135–146)
SODIUM SERPL-SCNC: 140 MMOL/L (ref 136–145)
SODIUM SERPL-SCNC: 141 MMOL/L (ref 135–146)
SODIUM SERPL-SCNC: 141 MMOL/L (ref 136–145)
SP GR UR STRIP.AUTO: 1.01 (ref 1–1.03)
SP GR UR STRIP.AUTO: 1.02 (ref 1–1.03)
SP GR UR STRIP.AUTO: 1.03 (ref 1–1.03)
T WAVE AXIS: 17 DEGREES
T WAVE AXIS: 25 DEGREES
T WAVE AXIS: 73 DEGREES
T4 FREE SERPL-MCNC: 1.13 NG/DL (ref 0.76–1.46)
TOTAL CELLS COUNTED SPEC: 100
TOTAL CELLS COUNTED SPEC: 100
TSH SERPL DL<=0.05 MIU/L-ACNC: 2.45 UIU/ML (ref 0.36–3.74)
TSH SERPL DL<=0.05 MIU/L-ACNC: 3.15 UIU/ML (ref 0.36–3.74)
URATE SERPL-MCNC: 11.9 MG/DL (ref 4.2–8)
UROBILINOGEN UR QL STRIP.AUTO: 0.2 E.U./DL
UROBILINOGEN UR QL STRIP.AUTO: 0.2 E.U./DL
UROBILINOGEN UR QL STRIP.AUTO: 1 E.U./DL
VENTRICULAR RATE: 109 BPM
VENTRICULAR RATE: 135 BPM
VENTRICULAR RATE: 91 BPM
WBC # BLD AUTO: 10.35 THOUSAND/UL (ref 4.31–10.16)
WBC # BLD AUTO: 12.64 THOUSAND/UL (ref 4.31–10.16)
WBC # BLD AUTO: 13.23 THOUSAND/UL (ref 4.31–10.16)
WBC # BLD AUTO: 13.67 THOUSAND/UL (ref 4.31–10.16)
WBC # BLD AUTO: 2.36 THOUSAND/UL (ref 4.31–10.16)
WBC # BLD AUTO: 2.42 THOUSAND/UL (ref 4.31–10.16)
WBC # BLD AUTO: 3.02 THOUSAND/UL (ref 4.31–10.16)
WBC # BLD AUTO: 3.11 THOUSAND/UL (ref 4.31–10.16)
WBC # BLD AUTO: 4.27 THOUSAND/UL (ref 4.31–10.16)
WBC # BLD AUTO: 4.31 THOUSAND/UL (ref 4.31–10.16)
WBC # BLD AUTO: 5.3 THOUSAND/UL (ref 3.8–10.8)
WBC # BLD AUTO: 5.4 THOUSAND/UL (ref 3.8–10.8)
WBC # BLD AUTO: 6.24 THOUSAND/UL (ref 4.31–10.16)
WBC # BLD AUTO: 6.3 THOUSAND/UL (ref 3.8–10.8)
WBC # BLD AUTO: 6.62 THOUSAND/UL (ref 4.31–10.16)
WBC # BLD AUTO: 6.74 THOUSAND/UL (ref 4.31–10.16)
WBC # BLD AUTO: 6.9 THOUSAND/UL (ref 3.8–10.8)
WBC # BLD AUTO: 6.98 THOUSAND/UL (ref 4.31–10.16)
WBC # BLD AUTO: 7 THOUSAND/UL (ref 3.8–10.8)
WBC # BLD AUTO: 7.06 THOUSAND/UL (ref 4.31–10.16)
WBC # BLD AUTO: 7.24 THOUSAND/UL (ref 4.31–10.16)
WBC # BLD AUTO: 7.4 THOUSAND/UL (ref 3.8–10.8)
WBC # BLD AUTO: 7.56 THOUSAND/UL (ref 4.31–10.16)
WBC # BLD AUTO: 8.14 THOUSAND/UL (ref 4.31–10.16)
WBC # BLD AUTO: 9.5 THOUSAND/UL (ref 3.8–10.8)
WBC # BLD AUTO: 9.73 THOUSAND/UL (ref 4.31–10.16)
WBC # FLD MANUAL: 917 /UL
WBC #/AREA URNS AUTO: ABNORMAL /HPF
WBC OTHER NFR FLD MANUAL: 33 %

## 2020-01-01 PROCEDURE — 97110 THERAPEUTIC EXERCISES: CPT

## 2020-01-01 PROCEDURE — 93005 ELECTROCARDIOGRAM TRACING: CPT

## 2020-01-01 PROCEDURE — 80048 BASIC METABOLIC PNL TOTAL CA: CPT | Performed by: INTERNAL MEDICINE

## 2020-01-01 PROCEDURE — 85025 COMPLETE CBC W/AUTO DIFF WBC: CPT | Performed by: EMERGENCY MEDICINE

## 2020-01-01 PROCEDURE — 3077F SYST BP >= 140 MM HG: CPT | Performed by: NURSE PRACTITIONER

## 2020-01-01 PROCEDURE — 82042 OTHER SOURCE ALBUMIN QUAN EA: CPT | Performed by: HOSPITALIST

## 2020-01-01 PROCEDURE — 96372 THER/PROPH/DIAG INJ SC/IM: CPT

## 2020-01-01 PROCEDURE — 71260 CT THORAX DX C+: CPT

## 2020-01-01 PROCEDURE — 87040 BLOOD CULTURE FOR BACTERIA: CPT | Performed by: PHYSICIAN ASSISTANT

## 2020-01-01 PROCEDURE — 71045 X-RAY EXAM CHEST 1 VIEW: CPT

## 2020-01-01 PROCEDURE — 96368 THER/DIAG CONCURRENT INF: CPT

## 2020-01-01 PROCEDURE — 99285 EMERGENCY DEPT VISIT HI MDM: CPT | Performed by: EMERGENCY MEDICINE

## 2020-01-01 PROCEDURE — 96375 TX/PRO/DX INJ NEW DRUG ADDON: CPT

## 2020-01-01 PROCEDURE — 85025 COMPLETE CBC W/AUTO DIFF WBC: CPT | Performed by: INTERNAL MEDICINE

## 2020-01-01 PROCEDURE — 99285 EMERGENCY DEPT VISIT HI MDM: CPT | Performed by: PHYSICIAN ASSISTANT

## 2020-01-01 PROCEDURE — 80053 COMPREHEN METABOLIC PANEL: CPT | Performed by: INTERNAL MEDICINE

## 2020-01-01 PROCEDURE — 99223 1ST HOSP IP/OBS HIGH 75: CPT | Performed by: SURGERY

## 2020-01-01 PROCEDURE — 99233 SBSQ HOSP IP/OBS HIGH 50: CPT | Performed by: INTERNAL MEDICINE

## 2020-01-01 PROCEDURE — 82948 REAGENT STRIP/BLOOD GLUCOSE: CPT

## 2020-01-01 PROCEDURE — 1160F RVW MEDS BY RX/DR IN RCRD: CPT | Performed by: NURSE PRACTITIONER

## 2020-01-01 PROCEDURE — 83735 ASSAY OF MAGNESIUM: CPT | Performed by: INTERNAL MEDICINE

## 2020-01-01 PROCEDURE — 84300 ASSAY OF URINE SODIUM: CPT | Performed by: INTERNAL MEDICINE

## 2020-01-01 PROCEDURE — 85730 THROMBOPLASTIN TIME PARTIAL: CPT | Performed by: EMERGENCY MEDICINE

## 2020-01-01 PROCEDURE — 88305 TISSUE EXAM BY PATHOLOGIST: CPT | Performed by: PATHOLOGY

## 2020-01-01 PROCEDURE — 99285 EMERGENCY DEPT VISIT HI MDM: CPT

## 2020-01-01 PROCEDURE — 99232 SBSQ HOSP IP/OBS MODERATE 35: CPT | Performed by: INTERNAL MEDICINE

## 2020-01-01 PROCEDURE — 96411 CHEMO IV PUSH ADDL DRUG: CPT

## 2020-01-01 PROCEDURE — 99232 SBSQ HOSP IP/OBS MODERATE 35: CPT | Performed by: PHYSICIAN ASSISTANT

## 2020-01-01 PROCEDURE — 84100 ASSAY OF PHOSPHORUS: CPT | Performed by: INTERNAL MEDICINE

## 2020-01-01 PROCEDURE — 80053 COMPREHEN METABOLIC PANEL: CPT | Performed by: PHYSICIAN ASSISTANT

## 2020-01-01 PROCEDURE — 88342 IMHCHEM/IMCYTCHM 1ST ANTB: CPT | Performed by: PATHOLOGY

## 2020-01-01 PROCEDURE — 99214 OFFICE O/P EST MOD 30 MIN: CPT | Performed by: NURSE PRACTITIONER

## 2020-01-01 PROCEDURE — NC001 PR NO CHARGE: Performed by: INTERNAL MEDICINE

## 2020-01-01 PROCEDURE — 99223 1ST HOSP IP/OBS HIGH 75: CPT | Performed by: INTERNAL MEDICINE

## 2020-01-01 PROCEDURE — A9552 F18 FDG: HCPCS

## 2020-01-01 PROCEDURE — 1036F TOBACCO NON-USER: CPT | Performed by: NURSE PRACTITIONER

## 2020-01-01 PROCEDURE — 96374 THER/PROPH/DIAG INJ IV PUSH: CPT

## 2020-01-01 PROCEDURE — 3079F DIAST BP 80-89 MM HG: CPT | Performed by: SURGERY

## 2020-01-01 PROCEDURE — 84157 ASSAY OF PROTEIN OTHER: CPT | Performed by: HOSPITALIST

## 2020-01-01 PROCEDURE — 96415 CHEMO IV INFUSION ADDL HR: CPT

## 2020-01-01 PROCEDURE — 78815 PET IMAGE W/CT SKULL-THIGH: CPT

## 2020-01-01 PROCEDURE — G0498 CHEMO EXTEND IV INFUS W/PUMP: HCPCS

## 2020-01-01 PROCEDURE — 93306 TTE W/DOPPLER COMPLETE: CPT | Performed by: INTERNAL MEDICINE

## 2020-01-01 PROCEDURE — 99215 OFFICE O/P EST HI 40 MIN: CPT | Performed by: INTERNAL MEDICINE

## 2020-01-01 PROCEDURE — 84443 ASSAY THYROID STIM HORMONE: CPT | Performed by: HOSPITALIST

## 2020-01-01 PROCEDURE — 87205 SMEAR GRAM STAIN: CPT | Performed by: HOSPITALIST

## 2020-01-01 PROCEDURE — 76937 US GUIDE VASCULAR ACCESS: CPT

## 2020-01-01 PROCEDURE — 99222 1ST HOSP IP/OBS MODERATE 55: CPT | Performed by: INTERNAL MEDICINE

## 2020-01-01 PROCEDURE — 36561 INSERT TUNNELED CV CATH: CPT

## 2020-01-01 PROCEDURE — 76770 US EXAM ABDO BACK WALL COMP: CPT

## 2020-01-01 PROCEDURE — 93010 ELECTROCARDIOGRAM REPORT: CPT | Performed by: INTERNAL MEDICINE

## 2020-01-01 PROCEDURE — 1036F TOBACCO NON-USER: CPT | Performed by: INTERNAL MEDICINE

## 2020-01-01 PROCEDURE — 88341 IMHCHEM/IMCYTCHM EA ADD ANTB: CPT | Performed by: PATHOLOGY

## 2020-01-01 PROCEDURE — 96365 THER/PROPH/DIAG IV INF INIT: CPT

## 2020-01-01 PROCEDURE — 85049 AUTOMATED PLATELET COUNT: CPT | Performed by: HOSPITALIST

## 2020-01-01 PROCEDURE — 96367 TX/PROPH/DG ADDL SEQ IV INF: CPT

## 2020-01-01 PROCEDURE — 3078F DIAST BP <80 MM HG: CPT | Performed by: NURSE PRACTITIONER

## 2020-01-01 PROCEDURE — 83735 ASSAY OF MAGNESIUM: CPT | Performed by: EMERGENCY MEDICINE

## 2020-01-01 PROCEDURE — 99213 OFFICE O/P EST LOW 20 MIN: CPT | Performed by: SURGERY

## 2020-01-01 PROCEDURE — 99214 OFFICE O/P EST MOD 30 MIN: CPT | Performed by: INTERNAL MEDICINE

## 2020-01-01 PROCEDURE — 83036 HEMOGLOBIN GLYCOSYLATED A1C: CPT | Performed by: INTERNAL MEDICINE

## 2020-01-01 PROCEDURE — 84439 ASSAY OF FREE THYROXINE: CPT | Performed by: INTERNAL MEDICINE

## 2020-01-01 PROCEDURE — 76937 US GUIDE VASCULAR ACCESS: CPT | Performed by: RADIOLOGY

## 2020-01-01 PROCEDURE — 84145 PROCALCITONIN (PCT): CPT | Performed by: EMERGENCY MEDICINE

## 2020-01-01 PROCEDURE — G2012 BRIEF CHECK IN BY MD/QHP: HCPCS | Performed by: INTERNAL MEDICINE

## 2020-01-01 PROCEDURE — 97165 OT EVAL LOW COMPLEX 30 MIN: CPT

## 2020-01-01 PROCEDURE — 3079F DIAST BP 80-89 MM HG: CPT | Performed by: NURSE PRACTITIONER

## 2020-01-01 PROCEDURE — 1036F TOBACCO NON-USER: CPT | Performed by: SURGERY

## 2020-01-01 PROCEDURE — 97163 PT EVAL HIGH COMPLEX 45 MIN: CPT

## 2020-01-01 PROCEDURE — 83690 ASSAY OF LIPASE: CPT

## 2020-01-01 PROCEDURE — 96413 CHEMO IV INFUSION 1 HR: CPT

## 2020-01-01 PROCEDURE — 85025 COMPLETE CBC W/AUTO DIFF WBC: CPT

## 2020-01-01 PROCEDURE — 81001 URINALYSIS AUTO W/SCOPE: CPT | Performed by: INTERNAL MEDICINE

## 2020-01-01 PROCEDURE — 85027 COMPLETE CBC AUTOMATED: CPT | Performed by: INTERNAL MEDICINE

## 2020-01-01 PROCEDURE — 99153 MOD SED SAME PHYS/QHP EA: CPT

## 2020-01-01 PROCEDURE — 84443 ASSAY THYROID STIM HORMONE: CPT | Performed by: INTERNAL MEDICINE

## 2020-01-01 PROCEDURE — 77001 FLUOROGUIDE FOR VEIN DEVICE: CPT

## 2020-01-01 PROCEDURE — 81001 URINALYSIS AUTO W/SCOPE: CPT | Performed by: EMERGENCY MEDICINE

## 2020-01-01 PROCEDURE — 88313 SPECIAL STAINS GROUP 2: CPT | Performed by: PATHOLOGY

## 2020-01-01 PROCEDURE — 83605 ASSAY OF LACTIC ACID: CPT | Performed by: INTERNAL MEDICINE

## 2020-01-01 PROCEDURE — 88173 CYTOPATH EVAL FNA REPORT: CPT | Performed by: PATHOLOGY

## 2020-01-01 PROCEDURE — G1004 CDSM NDSC: HCPCS

## 2020-01-01 PROCEDURE — G0427 INPT/ED TELECONSULT70: HCPCS | Performed by: INTERNAL MEDICINE

## 2020-01-01 PROCEDURE — NC001 PR NO CHARGE

## 2020-01-01 PROCEDURE — 85610 PROTHROMBIN TIME: CPT | Performed by: EMERGENCY MEDICINE

## 2020-01-01 PROCEDURE — 87181 SC STD AGAR DILUTION PER AGT: CPT | Performed by: PHYSICIAN ASSISTANT

## 2020-01-01 PROCEDURE — 3078F DIAST BP <80 MM HG: CPT | Performed by: INTERNAL MEDICINE

## 2020-01-01 PROCEDURE — 99223 1ST HOSP IP/OBS HIGH 75: CPT | Performed by: HOSPITALIST

## 2020-01-01 PROCEDURE — 84145 PROCALCITONIN (PCT): CPT | Performed by: HOSPITALIST

## 2020-01-01 PROCEDURE — 82565 ASSAY OF CREATININE: CPT | Performed by: FAMILY MEDICINE

## 2020-01-01 PROCEDURE — C8929 TTE W OR WO FOL WCON,DOPPLER: HCPCS

## 2020-01-01 PROCEDURE — 96366 THER/PROPH/DIAG IV INF ADDON: CPT

## 2020-01-01 PROCEDURE — 81001 URINALYSIS AUTO W/SCOPE: CPT | Performed by: PHYSICIAN ASSISTANT

## 2020-01-01 PROCEDURE — 3074F SYST BP LT 130 MM HG: CPT | Performed by: SURGERY

## 2020-01-01 PROCEDURE — 1160F RVW MEDS BY RX/DR IN RCRD: CPT | Performed by: INTERNAL MEDICINE

## 2020-01-01 PROCEDURE — 0241U HB NFCT DS VIR RESP RNA 4 TRGT: CPT | Performed by: EMERGENCY MEDICINE

## 2020-01-01 PROCEDURE — 49083 ABD PARACENTESIS W/IMAGING: CPT

## 2020-01-01 PROCEDURE — 93010 ELECTROCARDIOGRAM REPORT: CPT

## 2020-01-01 PROCEDURE — 87186 SC STD MICRODIL/AGAR DIL: CPT | Performed by: PHYSICIAN ASSISTANT

## 2020-01-01 PROCEDURE — 88172 CYTP DX EVAL FNA 1ST EA SITE: CPT | Performed by: PATHOLOGY

## 2020-01-01 PROCEDURE — 99232 SBSQ HOSP IP/OBS MODERATE 35: CPT | Performed by: SURGERY

## 2020-01-01 PROCEDURE — 80053 COMPREHEN METABOLIC PANEL: CPT | Performed by: EMERGENCY MEDICINE

## 2020-01-01 PROCEDURE — 84145 PROCALCITONIN (PCT): CPT | Performed by: INTERNAL MEDICINE

## 2020-01-01 PROCEDURE — 87040 BLOOD CULTURE FOR BACTERIA: CPT | Performed by: EMERGENCY MEDICINE

## 2020-01-01 PROCEDURE — 89051 BODY FLUID CELL COUNT: CPT | Performed by: HOSPITALIST

## 2020-01-01 PROCEDURE — 85027 COMPLETE CBC AUTOMATED: CPT | Performed by: FAMILY MEDICINE

## 2020-01-01 PROCEDURE — 49083 ABD PARACENTESIS W/IMAGING: CPT | Performed by: NURSE PRACTITIONER

## 2020-01-01 PROCEDURE — 0W9G3ZX DRAINAGE OF PERITONEAL CAVITY, PERCUTANEOUS APPROACH, DIAGNOSTIC: ICD-10-PCS | Performed by: RADIOLOGY

## 2020-01-01 PROCEDURE — 87086 URINE CULTURE/COLONY COUNT: CPT | Performed by: PHYSICIAN ASSISTANT

## 2020-01-01 PROCEDURE — 97166 OT EVAL MOD COMPLEX 45 MIN: CPT

## 2020-01-01 PROCEDURE — 36415 COLL VENOUS BLD VENIPUNCTURE: CPT | Performed by: PHYSICIAN ASSISTANT

## 2020-01-01 PROCEDURE — 87077 CULTURE AEROBIC IDENTIFY: CPT | Performed by: EMERGENCY MEDICINE

## 2020-01-01 PROCEDURE — 99152 MOD SED SAME PHYS/QHP 5/>YRS: CPT | Performed by: RADIOLOGY

## 2020-01-01 PROCEDURE — 83935 ASSAY OF URINE OSMOLALITY: CPT | Performed by: INTERNAL MEDICINE

## 2020-01-01 PROCEDURE — 74177 CT ABD & PELVIS W/CONTRAST: CPT

## 2020-01-01 PROCEDURE — 94664 DEMO&/EVAL PT USE INHALER: CPT

## 2020-01-01 PROCEDURE — 99239 HOSP IP/OBS DSCHRG MGMT >30: CPT | Performed by: INTERNAL MEDICINE

## 2020-01-01 PROCEDURE — 99205 OFFICE O/P NEW HI 60 MIN: CPT | Performed by: SURGERY

## 2020-01-01 PROCEDURE — 85730 THROMBOPLASTIN TIME PARTIAL: CPT | Performed by: PHYSICIAN ASSISTANT

## 2020-01-01 PROCEDURE — 83036 HEMOGLOBIN GLYCOSYLATED A1C: CPT | Performed by: PHYSICIAN ASSISTANT

## 2020-01-01 PROCEDURE — 87186 SC STD MICRODIL/AGAR DIL: CPT | Performed by: EMERGENCY MEDICINE

## 2020-01-01 PROCEDURE — 85025 COMPLETE CBC W/AUTO DIFF WBC: CPT | Performed by: PHYSICIAN ASSISTANT

## 2020-01-01 PROCEDURE — 99205 OFFICE O/P NEW HI 60 MIN: CPT | Performed by: INTERNAL MEDICINE

## 2020-01-01 PROCEDURE — 83605 ASSAY OF LACTIC ACID: CPT | Performed by: PHYSICIAN ASSISTANT

## 2020-01-01 PROCEDURE — 1160F RVW MEDS BY RX/DR IN RCRD: CPT | Performed by: SURGERY

## 2020-01-01 PROCEDURE — 83605 ASSAY OF LACTIC ACID: CPT | Performed by: HOSPITALIST

## 2020-01-01 PROCEDURE — 84145 PROCALCITONIN (PCT): CPT | Performed by: PHYSICIAN ASSISTANT

## 2020-01-01 PROCEDURE — 85007 BL SMEAR W/DIFF WBC COUNT: CPT | Performed by: FAMILY MEDICINE

## 2020-01-01 PROCEDURE — 71046 X-RAY EXAM CHEST 2 VIEWS: CPT

## 2020-01-01 PROCEDURE — 82533 TOTAL CORTISOL: CPT | Performed by: INTERNAL MEDICINE

## 2020-01-01 PROCEDURE — C1788 PORT, INDWELLING, IMP: HCPCS

## 2020-01-01 PROCEDURE — 84550 ASSAY OF BLOOD/URIC ACID: CPT | Performed by: INTERNAL MEDICINE

## 2020-01-01 PROCEDURE — 85610 PROTHROMBIN TIME: CPT | Performed by: PHYSICIAN ASSISTANT

## 2020-01-01 PROCEDURE — 83930 ASSAY OF BLOOD OSMOLALITY: CPT | Performed by: INTERNAL MEDICINE

## 2020-01-01 PROCEDURE — 97116 GAIT TRAINING THERAPY: CPT

## 2020-01-01 PROCEDURE — 36561 INSERT TUNNELED CV CATH: CPT | Performed by: RADIOLOGY

## 2020-01-01 PROCEDURE — 80048 BASIC METABOLIC PNL TOTAL CA: CPT | Performed by: HOSPITALIST

## 2020-01-01 PROCEDURE — 87070 CULTURE OTHR SPECIMN AEROBIC: CPT | Performed by: HOSPITALIST

## 2020-01-01 PROCEDURE — 77001 FLUOROGUIDE FOR VEIN DEVICE: CPT | Performed by: RADIOLOGY

## 2020-01-01 PROCEDURE — 99152 MOD SED SAME PHYS/QHP 5/>YRS: CPT

## 2020-01-01 PROCEDURE — 3075F SYST BP GE 130 - 139MM HG: CPT | Performed by: INTERNAL MEDICINE

## 2020-01-01 PROCEDURE — 83605 ASSAY OF LACTIC ACID: CPT | Performed by: EMERGENCY MEDICINE

## 2020-01-01 PROCEDURE — 87086 URINE CULTURE/COLONY COUNT: CPT | Performed by: EMERGENCY MEDICINE

## 2020-01-01 PROCEDURE — 80048 BASIC METABOLIC PNL TOTAL CA: CPT | Performed by: PHYSICIAN ASSISTANT

## 2020-01-01 PROCEDURE — 99284 EMERGENCY DEPT VISIT MOD MDM: CPT | Performed by: SURGERY

## 2020-01-01 PROCEDURE — 87040 BLOOD CULTURE FOR BACTERIA: CPT | Performed by: INTERNAL MEDICINE

## 2020-01-01 PROCEDURE — 87635 SARS-COV-2 COVID-19 AMP PRB: CPT | Performed by: PHYSICIAN ASSISTANT

## 2020-01-01 PROCEDURE — 36415 COLL VENOUS BLD VENIPUNCTURE: CPT | Performed by: EMERGENCY MEDICINE

## 2020-01-01 PROCEDURE — 99239 HOSP IP/OBS DSCHRG MGMT >30: CPT | Performed by: PHYSICIAN ASSISTANT

## 2020-01-01 PROCEDURE — 74176 CT ABD & PELVIS W/O CONTRAST: CPT

## 2020-01-01 PROCEDURE — 36415 COLL VENOUS BLD VENIPUNCTURE: CPT

## 2020-01-01 PROCEDURE — 87077 CULTURE AEROBIC IDENTIFY: CPT | Performed by: PHYSICIAN ASSISTANT

## 2020-01-01 PROCEDURE — 87181 SC STD AGAR DILUTION PER AGT: CPT | Performed by: EMERGENCY MEDICINE

## 2020-01-01 PROCEDURE — 96361 HYDRATE IV INFUSION ADD-ON: CPT

## 2020-01-01 PROCEDURE — 80053 COMPREHEN METABOLIC PANEL: CPT

## 2020-01-01 RX ORDER — ASPIRIN 81 MG/1
81 TABLET ORAL DAILY
Status: CANCELLED | OUTPATIENT
Start: 2020-01-01

## 2020-01-01 RX ORDER — METFORMIN HYDROCHLORIDE 500 MG/1
500 TABLET, EXTENDED RELEASE ORAL 2 TIMES DAILY WITH MEALS
Qty: 180 TABLET | Refills: 0 | Status: SHIPPED | OUTPATIENT
Start: 2020-01-01 | End: 2021-01-01 | Stop reason: HOSPADM

## 2020-01-01 RX ORDER — ACETAMINOPHEN 500 MG
1000 TABLET ORAL EVERY 6 HOURS PRN
COMMUNITY
End: 2021-01-01 | Stop reason: HOSPADM

## 2020-01-01 RX ORDER — LOSARTAN POTASSIUM 50 MG/1
50 TABLET ORAL DAILY
Status: DISCONTINUED | OUTPATIENT
Start: 2020-01-01 | End: 2020-01-01 | Stop reason: HOSPADM

## 2020-01-01 RX ORDER — LEVOFLOXACIN 5 MG/ML
750 INJECTION, SOLUTION INTRAVENOUS ONCE
Status: COMPLETED | OUTPATIENT
Start: 2020-01-01 | End: 2020-01-01

## 2020-01-01 RX ORDER — FLUOROURACIL 50 MG/ML
400 INJECTION, SOLUTION INTRAVENOUS ONCE
Status: COMPLETED | OUTPATIENT
Start: 2020-01-01 | End: 2020-01-01

## 2020-01-01 RX ORDER — DEXTROSE MONOHYDRATE 50 MG/ML
20 INJECTION, SOLUTION INTRAVENOUS ONCE
Status: CANCELLED | OUTPATIENT
Start: 2020-01-01

## 2020-01-01 RX ORDER — AMLODIPINE BESYLATE 2.5 MG/1
2.5 TABLET ORAL DAILY
Qty: 90 TABLET | Refills: 0 | Status: SHIPPED | OUTPATIENT
Start: 2020-01-01 | End: 2020-01-01

## 2020-01-01 RX ORDER — DEXTROSE MONOHYDRATE 50 MG/ML
20 INJECTION, SOLUTION INTRAVENOUS ONCE
Status: COMPLETED | OUTPATIENT
Start: 2020-01-01 | End: 2020-01-01

## 2020-01-01 RX ORDER — MAGNESIUM SULFATE HEPTAHYDRATE 40 MG/ML
2 INJECTION, SOLUTION INTRAVENOUS ONCE
Status: COMPLETED | OUTPATIENT
Start: 2020-01-01 | End: 2020-01-01

## 2020-01-01 RX ORDER — ONDANSETRON 2 MG/ML
4 INJECTION INTRAMUSCULAR; INTRAVENOUS EVERY 4 HOURS PRN
Status: DISCONTINUED | OUTPATIENT
Start: 2020-01-01 | End: 2020-01-01 | Stop reason: HOSPADM

## 2020-01-01 RX ORDER — SODIUM CHLORIDE 9 MG/ML
125 INJECTION, SOLUTION INTRAVENOUS CONTINUOUS
Status: CANCELLED | OUTPATIENT
Start: 2020-01-01

## 2020-01-01 RX ORDER — PANTOPRAZOLE SODIUM 40 MG/1
40 TABLET, DELAYED RELEASE ORAL
Status: DISCONTINUED | OUTPATIENT
Start: 2020-01-01 | End: 2020-01-01 | Stop reason: HOSPADM

## 2020-01-01 RX ORDER — METFORMIN HYDROCHLORIDE 500 MG/1
500 TABLET, EXTENDED RELEASE ORAL 2 TIMES DAILY WITH MEALS
Qty: 60 TABLET | Refills: 0 | Status: SHIPPED | OUTPATIENT
Start: 2020-01-01 | End: 2020-01-01 | Stop reason: SDUPTHER

## 2020-01-01 RX ORDER — SODIUM CHLORIDE 9 MG/ML
20 INJECTION, SOLUTION INTRAVENOUS ONCE AS NEEDED
Status: DISCONTINUED | OUTPATIENT
Start: 2020-01-01 | End: 2020-01-01 | Stop reason: HOSPADM

## 2020-01-01 RX ORDER — FLUOROURACIL 50 MG/ML
400 INJECTION, SOLUTION INTRAVENOUS ONCE
Status: CANCELLED | OUTPATIENT
Start: 2020-01-01

## 2020-01-01 RX ORDER — SODIUM CHLORIDE 9 MG/ML
125 INJECTION, SOLUTION INTRAVENOUS CONTINUOUS
Status: DISCONTINUED | OUTPATIENT
Start: 2020-01-01 | End: 2020-01-01

## 2020-01-01 RX ORDER — HYDROMORPHONE HCL 110MG/55ML
0.5 PATIENT CONTROLLED ANALGESIA SYRINGE INTRAVENOUS EVERY 2 HOUR PRN
Status: DISCONTINUED | OUTPATIENT
Start: 2020-01-01 | End: 2020-01-01 | Stop reason: HOSPADM

## 2020-01-01 RX ORDER — POTASSIUM CHLORIDE 20 MEQ/1
40 TABLET, EXTENDED RELEASE ORAL ONCE
Status: COMPLETED | OUTPATIENT
Start: 2020-01-01 | End: 2020-01-01

## 2020-01-01 RX ORDER — SODIUM CHLORIDE, SODIUM LACTATE, POTASSIUM CHLORIDE, CALCIUM CHLORIDE 600; 310; 30; 20 MG/100ML; MG/100ML; MG/100ML; MG/100ML
50 INJECTION, SOLUTION INTRAVENOUS CONTINUOUS
Status: DISCONTINUED | OUTPATIENT
Start: 2020-01-01 | End: 2020-01-01

## 2020-01-01 RX ORDER — ACETAMINOPHEN 500 MG
1000 TABLET ORAL EVERY 6 HOURS PRN
Status: DISCONTINUED | OUTPATIENT
Start: 2020-01-01 | End: 2020-01-01

## 2020-01-01 RX ORDER — PROCHLORPERAZINE MALEATE 10 MG
10 TABLET ORAL EVERY 6 HOURS PRN
Qty: 45 TABLET | Refills: 3 | Status: SHIPPED | OUTPATIENT
Start: 2020-01-01 | End: 2021-01-01 | Stop reason: HOSPADM

## 2020-01-01 RX ORDER — AMLODIPINE BESYLATE 10 MG/1
10 TABLET ORAL DAILY
Status: CANCELLED | OUTPATIENT
Start: 2020-01-01

## 2020-01-01 RX ORDER — PANTOPRAZOLE SODIUM 40 MG/1
40 TABLET, DELAYED RELEASE ORAL DAILY
Status: DISCONTINUED | OUTPATIENT
Start: 2020-01-01 | End: 2020-01-01 | Stop reason: HOSPADM

## 2020-01-01 RX ORDER — MAGNESIUM SULFATE 1 G/100ML
1 INJECTION INTRAVENOUS ONCE
Status: COMPLETED | OUTPATIENT
Start: 2020-01-01 | End: 2020-01-01

## 2020-01-01 RX ORDER — AMLODIPINE BESYLATE 5 MG/1
5 TABLET ORAL 2 TIMES DAILY
Start: 2020-01-01 | End: 2021-01-01 | Stop reason: HOSPADM

## 2020-01-01 RX ORDER — HEPARIN SODIUM 5000 [USP'U]/ML
5000 INJECTION, SOLUTION INTRAVENOUS; SUBCUTANEOUS EVERY 8 HOURS SCHEDULED
Status: DISCONTINUED | OUTPATIENT
Start: 2020-01-01 | End: 2020-01-01

## 2020-01-01 RX ORDER — OXYCODONE HYDROCHLORIDE AND ACETAMINOPHEN 5; 325 MG/1; MG/1
1 TABLET ORAL EVERY 6 HOURS PRN
Status: DISCONTINUED | OUTPATIENT
Start: 2020-01-01 | End: 2020-01-01

## 2020-01-01 RX ORDER — SODIUM CHLORIDE 9 MG/ML
75 INJECTION, SOLUTION INTRAVENOUS CONTINUOUS
Status: DISCONTINUED | OUTPATIENT
Start: 2020-01-01 | End: 2020-01-01

## 2020-01-01 RX ORDER — DEXTROSE MONOHYDRATE 50 MG/ML
20 INJECTION, SOLUTION INTRAVENOUS ONCE
Status: CANCELLED | OUTPATIENT
Start: 2021-01-01

## 2020-01-01 RX ORDER — ACETAMINOPHEN 325 MG/1
650 TABLET ORAL EVERY 6 HOURS PRN
Status: DISCONTINUED | OUTPATIENT
Start: 2020-01-01 | End: 2020-01-01

## 2020-01-01 RX ORDER — AMLODIPINE BESYLATE 5 MG/1
5 TABLET ORAL DAILY
Status: DISCONTINUED | OUTPATIENT
Start: 2020-01-01 | End: 2020-01-01 | Stop reason: HOSPADM

## 2020-01-01 RX ORDER — TAMSULOSIN HYDROCHLORIDE 0.4 MG/1
0.4 CAPSULE ORAL DAILY
COMMUNITY
Start: 2020-01-01 | End: 2021-01-01 | Stop reason: HOSPADM

## 2020-01-01 RX ORDER — SODIUM CHLORIDE, SODIUM GLUCONATE, SODIUM ACETATE, POTASSIUM CHLORIDE, MAGNESIUM CHLORIDE, SODIUM PHOSPHATE, DIBASIC, AND POTASSIUM PHOSPHATE .53; .5; .37; .037; .03; .012; .00082 G/100ML; G/100ML; G/100ML; G/100ML; G/100ML; G/100ML; G/100ML
250 INJECTION, SOLUTION INTRAVENOUS ONCE
Status: COMPLETED | OUTPATIENT
Start: 2020-01-01 | End: 2020-01-01

## 2020-01-01 RX ORDER — ERTAPENEM 1 G/1
INJECTION, POWDER, LYOPHILIZED, FOR SOLUTION INTRAMUSCULAR; INTRAVENOUS
COMMUNITY
Start: 2020-01-01 | End: 2021-01-01 | Stop reason: HOSPADM

## 2020-01-01 RX ORDER — OXYCODONE HYDROCHLORIDE AND ACETAMINOPHEN 5; 325 MG/1; MG/1
1 TABLET ORAL EVERY 8 HOURS PRN
Status: DISCONTINUED | OUTPATIENT
Start: 2020-01-01 | End: 2020-01-01 | Stop reason: HOSPADM

## 2020-01-01 RX ORDER — OXYCODONE HYDROCHLORIDE 5 MG/1
5 TABLET ORAL EVERY 4 HOURS PRN
Qty: 90 TABLET | Refills: 0 | Status: SHIPPED | OUTPATIENT
Start: 2020-01-01 | End: 2020-01-01 | Stop reason: SDUPTHER

## 2020-01-01 RX ORDER — ALBUMIN (HUMAN) 12.5 G/50ML
25 SOLUTION INTRAVENOUS ONCE
Status: COMPLETED | OUTPATIENT
Start: 2020-01-01 | End: 2020-01-01

## 2020-01-01 RX ORDER — ACETAMINOPHEN 325 MG/1
650 TABLET ORAL EVERY 6 HOURS PRN
Status: DISCONTINUED | OUTPATIENT
Start: 2020-01-01 | End: 2020-01-01 | Stop reason: HOSPADM

## 2020-01-01 RX ORDER — OXYCODONE HYDROCHLORIDE 5 MG/1
5 TABLET ORAL EVERY 4 HOURS PRN
Status: CANCELLED | OUTPATIENT
Start: 2020-01-01

## 2020-01-01 RX ORDER — ACETAMINOPHEN 325 MG/1
975 TABLET ORAL ONCE
Status: COMPLETED | OUTPATIENT
Start: 2020-01-01 | End: 2020-01-01

## 2020-01-01 RX ORDER — HEPARIN SODIUM 5000 [USP'U]/ML
5000 INJECTION, SOLUTION INTRAVENOUS; SUBCUTANEOUS EVERY 8 HOURS SCHEDULED
Status: DISCONTINUED | OUTPATIENT
Start: 2020-01-01 | End: 2020-01-01 | Stop reason: HOSPADM

## 2020-01-01 RX ORDER — LOSARTAN POTASSIUM 100 MG/1
50 TABLET ORAL DAILY
COMMUNITY
End: 2020-01-01 | Stop reason: HOSPADM

## 2020-01-01 RX ORDER — SODIUM CHLORIDE 9 MG/ML
20 INJECTION, SOLUTION INTRAVENOUS ONCE AS NEEDED
Status: CANCELLED | OUTPATIENT
Start: 2020-01-01

## 2020-01-01 RX ORDER — ONDANSETRON 2 MG/ML
4 INJECTION INTRAMUSCULAR; INTRAVENOUS EVERY 6 HOURS PRN
Status: DISCONTINUED | OUTPATIENT
Start: 2020-01-01 | End: 2020-01-01 | Stop reason: HOSPADM

## 2020-01-01 RX ORDER — OXYCODONE HYDROCHLORIDE 5 MG/1
5 TABLET ORAL EVERY 4 HOURS PRN
Status: DISCONTINUED | OUTPATIENT
Start: 2020-01-01 | End: 2020-01-01 | Stop reason: HOSPADM

## 2020-01-01 RX ORDER — ASPIRIN 81 MG/1
81 TABLET ORAL DAILY
Status: DISCONTINUED | OUTPATIENT
Start: 2020-01-01 | End: 2020-01-01 | Stop reason: HOSPADM

## 2020-01-01 RX ORDER — ALBUMIN (HUMAN) 12.5 G/50ML
25 SOLUTION INTRAVENOUS
Status: COMPLETED | OUTPATIENT
Start: 2020-01-01 | End: 2020-01-01

## 2020-01-01 RX ORDER — OXYCODONE HYDROCHLORIDE 5 MG/1
10 TABLET ORAL EVERY 4 HOURS PRN
Status: DISCONTINUED | OUTPATIENT
Start: 2020-01-01 | End: 2020-01-01 | Stop reason: HOSPADM

## 2020-01-01 RX ORDER — CEPHALEXIN 500 MG/1
500 CAPSULE ORAL EVERY 6 HOURS SCHEDULED
Qty: 20 CAPSULE | Refills: 0 | Status: SHIPPED | OUTPATIENT
Start: 2020-01-01 | End: 2020-01-01

## 2020-01-01 RX ORDER — AMLODIPINE BESYLATE 2.5 MG/1
2.5 TABLET ORAL DAILY
Status: DISCONTINUED | OUTPATIENT
Start: 2020-01-01 | End: 2020-01-01

## 2020-01-01 RX ORDER — PROPOFOL 10 MG/ML
INJECTION, EMULSION INTRAVENOUS CONTINUOUS PRN
Status: DISCONTINUED | OUTPATIENT
Start: 2020-01-01 | End: 2020-01-01

## 2020-01-01 RX ORDER — FLUOROURACIL 50 MG/ML
400 INJECTION, SOLUTION INTRAVENOUS ONCE
Status: CANCELLED | OUTPATIENT
Start: 2021-01-01

## 2020-01-01 RX ORDER — SODIUM CHLORIDE 9 MG/ML
250 INJECTION, SOLUTION INTRAVENOUS CONTINUOUS
Status: DISCONTINUED | OUTPATIENT
Start: 2020-01-01 | End: 2020-01-01

## 2020-01-01 RX ORDER — SODIUM CHLORIDE 9 MG/ML
20 INJECTION, SOLUTION INTRAVENOUS ONCE
Status: CANCELLED | OUTPATIENT
Start: 2020-01-01

## 2020-01-01 RX ORDER — AMLODIPINE BESYLATE 2.5 MG/1
TABLET ORAL
Qty: 90 TABLET | Refills: 0 | Status: SHIPPED | OUTPATIENT
Start: 2020-01-01 | End: 2020-01-01 | Stop reason: SDUPTHER

## 2020-01-01 RX ORDER — AMLODIPINE BESYLATE 10 MG/1
10 TABLET ORAL DAILY
COMMUNITY
End: 2020-01-01 | Stop reason: HOSPADM

## 2020-01-01 RX ORDER — ACETAMINOPHEN 325 MG/1
650 TABLET ORAL EVERY 6 HOURS PRN
Qty: 30 TABLET | Refills: 0 | Status: SHIPPED | OUTPATIENT
Start: 2020-01-01 | End: 2020-01-01 | Stop reason: DRUGHIGH

## 2020-01-01 RX ORDER — CEFAZOLIN SODIUM 2 G/50ML
2000 SOLUTION INTRAVENOUS ONCE
Status: COMPLETED | OUTPATIENT
Start: 2020-01-01 | End: 2020-01-01

## 2020-01-01 RX ORDER — ACETAMINOPHEN 325 MG/1
650 TABLET ORAL EVERY 4 HOURS PRN
Status: DISCONTINUED | OUTPATIENT
Start: 2020-01-01 | End: 2020-01-01 | Stop reason: HOSPADM

## 2020-01-01 RX ORDER — ACETAMINOPHEN 325 MG/1
650 TABLET ORAL EVERY 6 HOURS PRN
Status: CANCELLED | OUTPATIENT
Start: 2020-01-01

## 2020-01-01 RX ORDER — OXYCODONE HYDROCHLORIDE AND ACETAMINOPHEN 5; 325 MG/1; MG/1
1 TABLET ORAL EVERY 4 HOURS PRN
Qty: 12 TABLET | Refills: 0 | Status: SHIPPED | OUTPATIENT
Start: 2020-01-01 | End: 2020-01-01 | Stop reason: ALTCHOICE

## 2020-01-01 RX ORDER — LANCETS 28 GAUGE
EACH MISCELLANEOUS
COMMUNITY
Start: 2020-01-01

## 2020-01-01 RX ORDER — LIDOCAINE WITH 8.4% SOD BICARB 0.9%(10ML)
SYRINGE (ML) INJECTION CODE/TRAUMA/SEDATION MEDICATION
Status: COMPLETED | OUTPATIENT
Start: 2020-01-01 | End: 2020-01-01

## 2020-01-01 RX ORDER — ONDANSETRON 2 MG/ML
4 INJECTION INTRAMUSCULAR; INTRAVENOUS EVERY 6 HOURS PRN
Status: CANCELLED | OUTPATIENT
Start: 2020-01-01

## 2020-01-01 RX ORDER — AMLODIPINE BESYLATE 10 MG/1
10 TABLET ORAL DAILY
Status: DISCONTINUED | OUTPATIENT
Start: 2020-01-01 | End: 2020-01-01 | Stop reason: HOSPADM

## 2020-01-01 RX ORDER — MIDODRINE HYDROCHLORIDE 5 MG/1
10 TABLET ORAL
Status: DISCONTINUED | OUTPATIENT
Start: 2020-01-01 | End: 2020-01-01

## 2020-01-01 RX ORDER — LOSARTAN POTASSIUM 50 MG/1
50 TABLET ORAL DAILY
COMMUNITY
End: 2021-01-01 | Stop reason: HOSPADM

## 2020-01-01 RX ORDER — PANTOPRAZOLE SODIUM 40 MG/1
40 TABLET, DELAYED RELEASE ORAL DAILY
Status: CANCELLED | OUTPATIENT
Start: 2020-01-01

## 2020-01-01 RX ORDER — HYDRALAZINE HYDROCHLORIDE 20 MG/ML
5 INJECTION INTRAMUSCULAR; INTRAVENOUS EVERY 6 HOURS PRN
Status: DISCONTINUED | OUTPATIENT
Start: 2020-01-01 | End: 2020-01-01 | Stop reason: HOSPADM

## 2020-01-01 RX ORDER — PROPOFOL 10 MG/ML
INJECTION, EMULSION INTRAVENOUS AS NEEDED
Status: DISCONTINUED | OUTPATIENT
Start: 2020-01-01 | End: 2020-01-01

## 2020-01-01 RX ORDER — LOSARTAN POTASSIUM 50 MG/1
100 TABLET ORAL DAILY
Status: DISCONTINUED | OUTPATIENT
Start: 2020-01-01 | End: 2020-01-01 | Stop reason: HOSPADM

## 2020-01-01 RX ORDER — ONDANSETRON 2 MG/ML
4 INJECTION INTRAMUSCULAR; INTRAVENOUS EVERY 6 HOURS PRN
Status: DISCONTINUED | OUTPATIENT
Start: 2020-01-01 | End: 2020-01-01

## 2020-01-01 RX ORDER — GLYCOPYRROLATE 0.2 MG/ML
INJECTION INTRAMUSCULAR; INTRAVENOUS AS NEEDED
Status: DISCONTINUED | OUTPATIENT
Start: 2020-01-01 | End: 2020-01-01

## 2020-01-01 RX ORDER — MAGNESIUM HYDROXIDE/ALUMINUM HYDROXICE/SIMETHICONE 120; 1200; 1200 MG/30ML; MG/30ML; MG/30ML
30 SUSPENSION ORAL EVERY 6 HOURS PRN
Status: DISCONTINUED | OUTPATIENT
Start: 2020-01-01 | End: 2020-01-01 | Stop reason: HOSPADM

## 2020-01-01 RX ORDER — ASPIRIN 81 MG/1
81 TABLET ORAL DAILY
COMMUNITY
End: 2021-01-01 | Stop reason: HOSPADM

## 2020-01-01 RX ORDER — LIDOCAINE HYDROCHLORIDE 10 MG/ML
INJECTION, SOLUTION EPIDURAL; INFILTRATION; INTRACAUDAL; PERINEURAL AS NEEDED
Status: DISCONTINUED | OUTPATIENT
Start: 2020-01-01 | End: 2020-01-01

## 2020-01-01 RX ORDER — FENTANYL CITRATE 50 UG/ML
INJECTION, SOLUTION INTRAMUSCULAR; INTRAVENOUS CODE/TRAUMA/SEDATION MEDICATION
Status: COMPLETED | OUTPATIENT
Start: 2020-01-01 | End: 2020-01-01

## 2020-01-01 RX ORDER — SODIUM CHLORIDE 9 MG/ML
INJECTION, SOLUTION INTRAVENOUS CONTINUOUS PRN
Status: DISCONTINUED | OUTPATIENT
Start: 2020-01-01 | End: 2020-01-01

## 2020-01-01 RX ORDER — SODIUM CHLORIDE 9 MG/ML
20 INJECTION, SOLUTION INTRAVENOUS ONCE AS NEEDED
Status: CANCELLED | OUTPATIENT
Start: 2021-01-01

## 2020-01-01 RX ORDER — PANTOPRAZOLE SODIUM 40 MG/1
40 TABLET, DELAYED RELEASE ORAL DAILY
COMMUNITY
Start: 2020-01-01 | End: 2021-01-01 | Stop reason: HOSPADM

## 2020-01-01 RX ORDER — ACETAMINOPHEN 325 MG/1
975 TABLET ORAL EVERY 6 HOURS PRN
Status: DISCONTINUED | OUTPATIENT
Start: 2020-01-01 | End: 2020-01-01

## 2020-01-01 RX ORDER — SODIUM CHLORIDE, SODIUM GLUCONATE, SODIUM ACETATE, POTASSIUM CHLORIDE, MAGNESIUM CHLORIDE, SODIUM PHOSPHATE, DIBASIC, AND POTASSIUM PHOSPHATE .53; .5; .37; .037; .03; .012; .00082 G/100ML; G/100ML; G/100ML; G/100ML; G/100ML; G/100ML; G/100ML
75 INJECTION, SOLUTION INTRAVENOUS CONTINUOUS
Status: DISCONTINUED | OUTPATIENT
Start: 2020-01-01 | End: 2020-01-01

## 2020-01-01 RX ORDER — LEVOFLOXACIN 5 MG/ML
500 INJECTION, SOLUTION INTRAVENOUS ONCE
Status: COMPLETED | OUTPATIENT
Start: 2020-01-01 | End: 2020-01-01

## 2020-01-01 RX ORDER — CEFTRIAXONE 1 G/50ML
1000 INJECTION, SOLUTION INTRAVENOUS EVERY 24 HOURS
Status: DISCONTINUED | OUTPATIENT
Start: 2020-01-01 | End: 2020-01-01

## 2020-01-01 RX ORDER — SODIUM CHLORIDE 9 MG/ML
50 INJECTION, SOLUTION INTRAVENOUS CONTINUOUS
Status: DISPENSED | OUTPATIENT
Start: 2020-01-01 | End: 2020-01-01

## 2020-01-01 RX ORDER — AMLODIPINE BESYLATE 5 MG/1
5 TABLET ORAL DAILY
Qty: 30 TABLET | Refills: 0 | Status: ON HOLD | OUTPATIENT
Start: 2020-01-01 | End: 2020-01-01 | Stop reason: SDUPTHER

## 2020-01-01 RX ORDER — OXYCODONE HYDROCHLORIDE 5 MG/1
5 TABLET ORAL EVERY 4 HOURS PRN
Qty: 90 TABLET | Refills: 0 | Status: SHIPPED | OUTPATIENT
Start: 2020-01-01 | End: 2021-01-01 | Stop reason: HOSPADM

## 2020-01-01 RX ORDER — AMLODIPINE BESYLATE 2.5 MG/1
TABLET ORAL
Qty: 90 TABLET | Refills: 0 | Status: SHIPPED | OUTPATIENT
Start: 2020-01-01 | End: 2020-01-01 | Stop reason: HOSPADM

## 2020-01-01 RX ORDER — LOSARTAN POTASSIUM 50 MG/1
50 TABLET ORAL DAILY
Status: CANCELLED | OUTPATIENT
Start: 2020-01-01

## 2020-01-01 RX ORDER — ONDANSETRON 2 MG/ML
4 INJECTION INTRAMUSCULAR; INTRAVENOUS ONCE
Status: COMPLETED | OUTPATIENT
Start: 2020-01-01 | End: 2020-01-01

## 2020-01-01 RX ORDER — MIDAZOLAM HYDROCHLORIDE 2 MG/2ML
INJECTION, SOLUTION INTRAMUSCULAR; INTRAVENOUS CODE/TRAUMA/SEDATION MEDICATION
Status: COMPLETED | OUTPATIENT
Start: 2020-01-01 | End: 2020-01-01

## 2020-01-01 RX ORDER — HYDROMORPHONE HCL/PF 1 MG/ML
0.5 SYRINGE (ML) INJECTION ONCE
Status: COMPLETED | OUTPATIENT
Start: 2020-01-01 | End: 2020-01-01

## 2020-01-01 RX ORDER — POTASSIUM CHLORIDE 20MEQ/15ML
40 LIQUID (ML) ORAL ONCE
Status: COMPLETED | OUTPATIENT
Start: 2020-01-01 | End: 2020-01-01

## 2020-01-01 RX ADMIN — INSULIN LISPRO 2 UNITS: 100 INJECTION, SOLUTION INTRAVENOUS; SUBCUTANEOUS at 12:15

## 2020-01-01 RX ADMIN — MIDODRINE HYDROCHLORIDE 10 MG: 5 TABLET ORAL at 17:50

## 2020-01-01 RX ADMIN — SODIUM CHLORIDE 1000 ML: 0.9 INJECTION, SOLUTION INTRAVENOUS at 22:29

## 2020-01-01 RX ADMIN — HEPARIN SODIUM 5000 UNITS: 5000 INJECTION INTRAVENOUS; SUBCUTANEOUS at 06:07

## 2020-01-01 RX ADMIN — INSULIN LISPRO 2 UNITS: 100 INJECTION, SOLUTION INTRAVENOUS; SUBCUTANEOUS at 13:58

## 2020-01-01 RX ADMIN — ENOXAPARIN SODIUM 40 MG: 40 INJECTION SUBCUTANEOUS at 08:59

## 2020-01-01 RX ADMIN — ACETAMINOPHEN 650 MG: 325 TABLET, FILM COATED ORAL at 03:34

## 2020-01-01 RX ADMIN — PROPOFOL 120 MG: 10 INJECTION, EMULSION INTRAVENOUS at 13:08

## 2020-01-01 RX ADMIN — LIDOCAINE HYDROCHLORIDE 100 MG: 10 INJECTION, SOLUTION EPIDURAL; INFILTRATION; INTRACAUDAL; PERINEURAL at 13:08

## 2020-01-01 RX ADMIN — INSULIN LISPRO 1 UNITS: 100 INJECTION, SOLUTION INTRAVENOUS; SUBCUTANEOUS at 18:34

## 2020-01-01 RX ADMIN — SODIUM CHLORIDE 125 ML/HR: 0.9 INJECTION, SOLUTION INTRAVENOUS at 14:34

## 2020-01-01 RX ADMIN — AMLODIPINE BESYLATE 5 MG: 5 TABLET ORAL at 08:46

## 2020-01-01 RX ADMIN — AMLODIPINE BESYLATE 10 MG: 10 TABLET ORAL at 09:10

## 2020-01-01 RX ADMIN — ACETAMINOPHEN 650 MG: 325 TABLET, FILM COATED ORAL at 10:41

## 2020-01-01 RX ADMIN — MIDODRINE HYDROCHLORIDE 10 MG: 5 TABLET ORAL at 17:14

## 2020-01-01 RX ADMIN — ERTAPENEM SODIUM 1000 MG: 1 INJECTION, POWDER, LYOPHILIZED, FOR SOLUTION INTRAMUSCULAR; INTRAVENOUS at 13:59

## 2020-01-01 RX ADMIN — IOHEXOL 100 ML: 350 INJECTION, SOLUTION INTRAVENOUS at 18:30

## 2020-01-01 RX ADMIN — MEROPENEM 1000 MG: 1 INJECTION, POWDER, FOR SOLUTION INTRAVENOUS at 17:35

## 2020-01-01 RX ADMIN — MAGNESIUM GLUCONATE 500 MG ORAL TABLET 400 MG: 500 TABLET ORAL at 08:59

## 2020-01-01 RX ADMIN — MAGNESIUM SULFATE HEPTAHYDRATE 2 G: 40 INJECTION, SOLUTION INTRAVENOUS at 03:58

## 2020-01-01 RX ADMIN — INSULIN LISPRO 1 UNITS: 100 INJECTION, SOLUTION INTRAVENOUS; SUBCUTANEOUS at 08:59

## 2020-01-01 RX ADMIN — CEFTRIAXONE SODIUM 1000 MG: 2 INJECTION, POWDER, FOR SOLUTION INTRAMUSCULAR; INTRAVENOUS at 10:27

## 2020-01-01 RX ADMIN — PANTOPRAZOLE SODIUM 40 MG: 40 TABLET, DELAYED RELEASE ORAL at 08:46

## 2020-01-01 RX ADMIN — INSULIN LISPRO 1 UNITS: 100 INJECTION, SOLUTION INTRAVENOUS; SUBCUTANEOUS at 12:39

## 2020-01-01 RX ADMIN — MAGNESIUM SULFATE HEPTAHYDRATE 2 G: 40 INJECTION, SOLUTION INTRAVENOUS at 23:26

## 2020-01-01 RX ADMIN — HEPARIN SODIUM 5000 UNITS: 5000 INJECTION INTRAVENOUS; SUBCUTANEOUS at 05:25

## 2020-01-01 RX ADMIN — INSULIN LISPRO 2 UNITS: 100 INJECTION, SOLUTION INTRAVENOUS; SUBCUTANEOUS at 12:35

## 2020-01-01 RX ADMIN — CEFTRIAXONE 1000 MG: 1 INJECTION, SOLUTION INTRAVENOUS at 19:40

## 2020-01-01 RX ADMIN — HEPARIN SODIUM 5000 UNITS: 5000 INJECTION INTRAVENOUS; SUBCUTANEOUS at 14:57

## 2020-01-01 RX ADMIN — HEPARIN SODIUM 5000 UNITS: 5000 INJECTION INTRAVENOUS; SUBCUTANEOUS at 22:25

## 2020-01-01 RX ADMIN — LEUCOVORIN CALCIUM 900 MG: 350 INJECTION, POWDER, LYOPHILIZED, FOR SOLUTION INTRAMUSCULAR; INTRAVENOUS at 12:42

## 2020-01-01 RX ADMIN — HEPARIN SODIUM 5000 UNITS: 5000 INJECTION INTRAVENOUS; SUBCUTANEOUS at 06:04

## 2020-01-01 RX ADMIN — DEXTROSE 20 ML/HR: 5 SOLUTION INTRAVENOUS at 09:18

## 2020-01-01 RX ADMIN — ACETAMINOPHEN 650 MG: 325 TABLET, FILM COATED ORAL at 16:40

## 2020-01-01 RX ADMIN — INSULIN LISPRO 1 UNITS: 100 INJECTION, SOLUTION INTRAVENOUS; SUBCUTANEOUS at 22:22

## 2020-01-01 RX ADMIN — ASPIRIN 81 MG: 81 TABLET, COATED ORAL at 09:23

## 2020-01-01 RX ADMIN — ONDANSETRON 4 MG: 2 INJECTION INTRAMUSCULAR; INTRAVENOUS at 08:53

## 2020-01-01 RX ADMIN — OXALIPLATIN 200 MG: 5 INJECTION, SOLUTION INTRAVENOUS at 09:42

## 2020-01-01 RX ADMIN — LEVOFLOXACIN 500 MG: 5 INJECTION, SOLUTION INTRAVENOUS at 15:13

## 2020-01-01 RX ADMIN — DEXAMETHASONE SODIUM PHOSPHATE: 10 INJECTION, SOLUTION INTRAMUSCULAR; INTRAVENOUS at 08:33

## 2020-01-01 RX ADMIN — LEUCOVORIN CALCIUM 900 MG: 200 INJECTION, POWDER, LYOPHILIZED, FOR SUSPENSION INTRAMUSCULAR; INTRAVENOUS at 09:27

## 2020-01-01 RX ADMIN — AMLODIPINE BESYLATE 10 MG: 10 TABLET ORAL at 09:13

## 2020-01-01 RX ADMIN — OXYCODONE HYDROCHLORIDE 5 MG: 5 TABLET ORAL at 20:23

## 2020-01-01 RX ADMIN — SODIUM CHLORIDE 125 ML/HR: 0.9 INJECTION, SOLUTION INTRAVENOUS at 23:07

## 2020-01-01 RX ADMIN — HYDRALAZINE HYDROCHLORIDE 5 MG: 20 INJECTION INTRAMUSCULAR; INTRAVENOUS at 22:25

## 2020-01-01 RX ADMIN — FLUOROURACIL 900 MG: 50 INJECTION, SOLUTION INTRAVENOUS at 14:11

## 2020-01-01 RX ADMIN — SODIUM CHLORIDE 20 ML/HR: 0.9 INJECTION, SOLUTION INTRAVENOUS at 08:15

## 2020-01-01 RX ADMIN — LEUCOVORIN CALCIUM 900 MG: 500 INJECTION, POWDER, LYOPHILIZED, FOR SOLUTION INTRAMUSCULAR; INTRAVENOUS at 09:03

## 2020-01-01 RX ADMIN — Medication 10 ML: at 17:19

## 2020-01-01 RX ADMIN — AMLODIPINE BESYLATE 10 MG: 10 TABLET ORAL at 08:47

## 2020-01-01 RX ADMIN — SODIUM CHLORIDE 75 ML/HR: 0.9 INJECTION, SOLUTION INTRAVENOUS at 12:07

## 2020-01-01 RX ADMIN — MAGNESIUM SULFATE HEPTAHYDRATE 1 G: 1 INJECTION, SOLUTION INTRAVENOUS at 09:40

## 2020-01-01 RX ADMIN — MIDODRINE HYDROCHLORIDE 10 MG: 5 TABLET ORAL at 14:44

## 2020-01-01 RX ADMIN — HEPARIN SODIUM 5000 UNITS: 5000 INJECTION INTRAVENOUS; SUBCUTANEOUS at 13:02

## 2020-01-01 RX ADMIN — IOHEXOL 100 ML: 350 INJECTION, SOLUTION INTRAVENOUS at 15:17

## 2020-01-01 RX ADMIN — HEPARIN SODIUM 5000 UNITS: 5000 INJECTION INTRAVENOUS; SUBCUTANEOUS at 14:46

## 2020-01-01 RX ADMIN — SODIUM CHLORIDE 1000 ML: 0.9 INJECTION, SOLUTION INTRAVENOUS at 14:00

## 2020-01-01 RX ADMIN — HEPARIN SODIUM 5000 UNITS: 5000 INJECTION INTRAVENOUS; SUBCUTANEOUS at 21:02

## 2020-01-01 RX ADMIN — INSULIN LISPRO 1 UNITS: 100 INJECTION, SOLUTION INTRAVENOUS; SUBCUTANEOUS at 17:16

## 2020-01-01 RX ADMIN — LEUCOVORIN CALCIUM 900 MG: 500 INJECTION, POWDER, LYOPHILIZED, FOR SOLUTION INTRAMUSCULAR; INTRAVENOUS at 09:41

## 2020-01-01 RX ADMIN — HEPARIN SODIUM 5000 UNITS: 5000 INJECTION INTRAVENOUS; SUBCUTANEOUS at 21:32

## 2020-01-01 RX ADMIN — PEGFILGRASTIM 6 MG: KIT SUBCUTANEOUS at 13:03

## 2020-01-01 RX ADMIN — DEXTROSE 20 ML/HR: 5 SOLUTION INTRAVENOUS at 12:00

## 2020-01-01 RX ADMIN — ALBUMIN (HUMAN) 25 G: 0.25 INJECTION, SOLUTION INTRAVENOUS at 22:26

## 2020-01-01 RX ADMIN — FLUOROURACIL 895 MG: 50 INJECTION, SOLUTION INTRAVENOUS at 14:49

## 2020-01-01 RX ADMIN — CEFTRIAXONE SODIUM 1000 MG: 2 INJECTION, POWDER, FOR SOLUTION INTRAMUSCULAR; INTRAVENOUS at 12:30

## 2020-01-01 RX ADMIN — HEPARIN SODIUM 5000 UNITS: 5000 INJECTION INTRAVENOUS; SUBCUTANEOUS at 05:07

## 2020-01-01 RX ADMIN — OXALIPLATIN 200 MG: 5 INJECTION, SOLUTION, CONCENTRATE INTRAVENOUS at 09:02

## 2020-01-01 RX ADMIN — PANTOPRAZOLE SODIUM 40 MG: 40 TABLET, DELAYED RELEASE ORAL at 09:10

## 2020-01-01 RX ADMIN — OXALIPLATIN 200 MG: 5 INJECTION, SOLUTION, CONCENTRATE INTRAVENOUS at 12:42

## 2020-01-01 RX ADMIN — MEROPENEM 500 MG: 500 INJECTION, POWDER, FOR SOLUTION INTRAVENOUS at 03:36

## 2020-01-01 RX ADMIN — DEXAMETHASONE SODIUM PHOSPHATE: 10 INJECTION, SOLUTION INTRAMUSCULAR; INTRAVENOUS at 08:47

## 2020-01-01 RX ADMIN — SODIUM CHLORIDE 1000 ML: 0.9 INJECTION, SOLUTION INTRAVENOUS at 14:22

## 2020-01-01 RX ADMIN — SODIUM CHLORIDE: 9 INJECTION, SOLUTION INTRAVENOUS at 12:10

## 2020-01-01 RX ADMIN — ENOXAPARIN SODIUM 40 MG: 40 INJECTION SUBCUTANEOUS at 08:00

## 2020-01-01 RX ADMIN — AMLODIPINE BESYLATE 10 MG: 10 TABLET ORAL at 09:44

## 2020-01-01 RX ADMIN — OXALIPLATIN 200 MG: 5 INJECTION, SOLUTION INTRAVENOUS at 12:09

## 2020-01-01 RX ADMIN — LOSARTAN POTASSIUM 100 MG: 50 TABLET, FILM COATED ORAL at 08:59

## 2020-01-01 RX ADMIN — HEPARIN SODIUM 5000 UNITS: 5000 INJECTION INTRAVENOUS; SUBCUTANEOUS at 14:12

## 2020-01-01 RX ADMIN — HEPARIN SODIUM 5000 UNITS: 5000 INJECTION INTRAVENOUS; SUBCUTANEOUS at 06:03

## 2020-01-01 RX ADMIN — ASPIRIN 81 MG: 81 TABLET, COATED ORAL at 09:10

## 2020-01-01 RX ADMIN — Medication 3.38 G: at 14:58

## 2020-01-01 RX ADMIN — SODIUM CHLORIDE 250 ML/HR: 0.9 INJECTION, SOLUTION INTRAVENOUS at 15:15

## 2020-01-01 RX ADMIN — DEXAMETHASONE SODIUM PHOSPHATE: 10 INJECTION, SOLUTION INTRAMUSCULAR; INTRAVENOUS at 09:00

## 2020-01-01 RX ADMIN — SODIUM CHLORIDE 1000 ML: 0.9 INJECTION, SOLUTION INTRAVENOUS at 22:52

## 2020-01-01 RX ADMIN — PANTOPRAZOLE SODIUM 40 MG: 40 TABLET, DELAYED RELEASE ORAL at 06:33

## 2020-01-01 RX ADMIN — LOSARTAN POTASSIUM 50 MG: 50 TABLET, FILM COATED ORAL at 09:10

## 2020-01-01 RX ADMIN — ACETAMINOPHEN 650 MG: 325 TABLET, FILM COATED ORAL at 18:50

## 2020-01-01 RX ADMIN — FENTANYL CITRATE 100 MCG: 50 INJECTION INTRAMUSCULAR; INTRAVENOUS at 12:13

## 2020-01-01 RX ADMIN — ASPIRIN 81 MG: 81 TABLET, COATED ORAL at 09:13

## 2020-01-01 RX ADMIN — DEXAMETHASONE SODIUM PHOSPHATE: 10 INJECTION, SOLUTION INTRAMUSCULAR; INTRAVENOUS at 12:01

## 2020-01-01 RX ADMIN — Medication 500 MG: at 19:38

## 2020-01-01 RX ADMIN — INSULIN LISPRO 2 UNITS: 100 INJECTION, SOLUTION INTRAVENOUS; SUBCUTANEOUS at 12:29

## 2020-01-01 RX ADMIN — Medication 10 ML: at 12:13

## 2020-01-01 RX ADMIN — PANTOPRAZOLE SODIUM 40 MG: 40 TABLET, DELAYED RELEASE ORAL at 06:06

## 2020-01-01 RX ADMIN — FLUOROURACIL 920 MG: 50 INJECTION, SOLUTION INTRAVENOUS at 11:31

## 2020-01-01 RX ADMIN — LOSARTAN POTASSIUM 100 MG: 50 TABLET, FILM COATED ORAL at 08:00

## 2020-01-01 RX ADMIN — AMLODIPINE BESYLATE 5 MG: 5 TABLET ORAL at 08:53

## 2020-01-01 RX ADMIN — Medication 3.38 G: at 09:29

## 2020-01-01 RX ADMIN — INSULIN LISPRO 1 UNITS: 100 INJECTION, SOLUTION INTRAVENOUS; SUBCUTANEOUS at 17:34

## 2020-01-01 RX ADMIN — INSULIN LISPRO 3 UNITS: 100 INJECTION, SOLUTION INTRAVENOUS; SUBCUTANEOUS at 12:06

## 2020-01-01 RX ADMIN — INSULIN LISPRO 1 UNITS: 100 INJECTION, SOLUTION INTRAVENOUS; SUBCUTANEOUS at 23:08

## 2020-01-01 RX ADMIN — MEROPENEM 1000 MG: 1 INJECTION, POWDER, FOR SOLUTION INTRAVENOUS at 09:32

## 2020-01-01 RX ADMIN — GLYCOPYRROLATE 0.1 MG: 0.2 INJECTION, SOLUTION INTRAMUSCULAR; INTRAVENOUS at 13:27

## 2020-01-01 RX ADMIN — MEROPENEM 1000 MG: 1 INJECTION, POWDER, FOR SOLUTION INTRAVENOUS at 01:00

## 2020-01-01 RX ADMIN — SODIUM CHLORIDE 125 ML/HR: 0.9 INJECTION, SOLUTION INTRAVENOUS at 06:05

## 2020-01-01 RX ADMIN — ENOXAPARIN SODIUM 40 MG: 40 INJECTION SUBCUTANEOUS at 08:39

## 2020-01-01 RX ADMIN — MIDODRINE HYDROCHLORIDE 10 MG: 5 TABLET ORAL at 12:26

## 2020-01-01 RX ADMIN — SODIUM CHLORIDE 20 ML/HR: 0.9 INJECTION, SOLUTION INTRAVENOUS at 08:54

## 2020-01-01 RX ADMIN — MIDODRINE HYDROCHLORIDE 10 MG: 5 TABLET ORAL at 12:31

## 2020-01-01 RX ADMIN — MIDAZOLAM HYDROCHLORIDE 2 MG: 1 INJECTION, SOLUTION INTRAMUSCULAR; INTRAVENOUS at 12:13

## 2020-01-01 RX ADMIN — MAGNESIUM SULFATE 2 G: 2 INJECTION INTRAVENOUS at 12:41

## 2020-01-01 RX ADMIN — INSULIN LISPRO 1 UNITS: 100 INJECTION, SOLUTION INTRAVENOUS; SUBCUTANEOUS at 22:11

## 2020-01-01 RX ADMIN — LOSARTAN POTASSIUM 100 MG: 50 TABLET, FILM COATED ORAL at 08:39

## 2020-01-01 RX ADMIN — SODIUM CHLORIDE 250 ML: 0.9 INJECTION, SOLUTION INTRAVENOUS at 18:30

## 2020-01-01 RX ADMIN — CEFTRIAXONE SODIUM 1000 MG: 2 INJECTION, POWDER, FOR SOLUTION INTRAMUSCULAR; INTRAVENOUS at 14:35

## 2020-01-01 RX ADMIN — SODIUM CHLORIDE 20 ML/HR: 0.9 INJECTION, SOLUTION INTRAVENOUS at 11:10

## 2020-01-01 RX ADMIN — SODIUM CHLORIDE 50 ML/HR: 0.9 INJECTION, SOLUTION INTRAVENOUS at 02:34

## 2020-01-01 RX ADMIN — INSULIN LISPRO 1 UNITS: 100 INJECTION, SOLUTION INTRAVENOUS; SUBCUTANEOUS at 09:10

## 2020-01-01 RX ADMIN — INSULIN LISPRO 1 UNITS: 100 INJECTION, SOLUTION INTRAVENOUS; SUBCUTANEOUS at 08:01

## 2020-01-01 RX ADMIN — FLUOROURACIL 895 MG: 50 INJECTION, SOLUTION INTRAVENOUS at 10:57

## 2020-01-01 RX ADMIN — CEFTRIAXONE 1000 MG: 1 INJECTION, SOLUTION INTRAVENOUS at 19:48

## 2020-01-01 RX ADMIN — SODIUM CHLORIDE 125 ML/HR: 0.9 INJECTION, SOLUTION INTRAVENOUS at 21:02

## 2020-01-01 RX ADMIN — LOSARTAN POTASSIUM 50 MG: 50 TABLET, FILM COATED ORAL at 09:13

## 2020-01-01 RX ADMIN — MAGNESIUM GLUCONATE 500 MG ORAL TABLET 400 MG: 500 TABLET ORAL at 17:00

## 2020-01-01 RX ADMIN — Medication 500 MG: at 08:47

## 2020-01-01 RX ADMIN — HEPARIN SODIUM 5000 UNITS: 5000 INJECTION INTRAVENOUS; SUBCUTANEOUS at 05:03

## 2020-01-01 RX ADMIN — ENOXAPARIN SODIUM 40 MG: 40 INJECTION SUBCUTANEOUS at 09:45

## 2020-01-01 RX ADMIN — PERFLUTREN 1.2 ML/MIN: 6.52 INJECTION, SUSPENSION INTRAVENOUS at 11:48

## 2020-01-01 RX ADMIN — DEXAMETHASONE SODIUM PHOSPHATE: 10 INJECTION, SOLUTION INTRAMUSCULAR; INTRAVENOUS at 08:18

## 2020-01-01 RX ADMIN — SODIUM CHLORIDE, SODIUM GLUCONATE, SODIUM ACETATE, POTASSIUM CHLORIDE, MAGNESIUM CHLORIDE, SODIUM PHOSPHATE, DIBASIC, AND POTASSIUM PHOSPHATE 250 ML: .53; .5; .37; .037; .03; .012; .00082 INJECTION, SOLUTION INTRAVENOUS at 12:22

## 2020-01-01 RX ADMIN — Medication 500 MG: at 01:37

## 2020-01-01 RX ADMIN — MAGNESIUM GLUCONATE 500 MG ORAL TABLET 400 MG: 500 TABLET ORAL at 08:01

## 2020-01-01 RX ADMIN — DEXTROSE 20 ML/HR: 5 SOLUTION INTRAVENOUS at 08:56

## 2020-01-01 RX ADMIN — POTASSIUM CHLORIDE 40 MEQ: 1500 TABLET, EXTENDED RELEASE ORAL at 23:25

## 2020-01-01 RX ADMIN — MEROPENEM 1000 MG: 1 INJECTION, POWDER, FOR SOLUTION INTRAVENOUS at 17:00

## 2020-01-01 RX ADMIN — SODIUM CHLORIDE 125 ML/HR: 0.9 INJECTION, SOLUTION INTRAVENOUS at 11:48

## 2020-01-01 RX ADMIN — Medication 3.38 G: at 02:45

## 2020-01-01 RX ADMIN — AMLODIPINE BESYLATE 5 MG: 5 TABLET ORAL at 08:35

## 2020-01-01 RX ADMIN — PEGFILGRASTIM 6 MG: KIT SUBCUTANEOUS at 09:30

## 2020-01-01 RX ADMIN — HEPARIN SODIUM 5000 UNITS: 5000 INJECTION INTRAVENOUS; SUBCUTANEOUS at 05:23

## 2020-01-01 RX ADMIN — AMLODIPINE BESYLATE 10 MG: 10 TABLET ORAL at 08:01

## 2020-01-01 RX ADMIN — OXYCODONE HYDROCHLORIDE 5 MG: 5 TABLET ORAL at 00:54

## 2020-01-01 RX ADMIN — INSULIN LISPRO 1 UNITS: 100 INJECTION, SOLUTION INTRAVENOUS; SUBCUTANEOUS at 16:59

## 2020-01-01 RX ADMIN — INSULIN LISPRO 1 UNITS: 100 INJECTION, SOLUTION INTRAVENOUS; SUBCUTANEOUS at 08:39

## 2020-01-01 RX ADMIN — AMLODIPINE BESYLATE 10 MG: 10 TABLET ORAL at 08:59

## 2020-01-01 RX ADMIN — INSULIN LISPRO 1 UNITS: 100 INJECTION, SOLUTION INTRAVENOUS; SUBCUTANEOUS at 22:25

## 2020-01-01 RX ADMIN — SODIUM CHLORIDE 75 ML/HR: 0.9 INJECTION, SOLUTION INTRAVENOUS at 16:43

## 2020-01-01 RX ADMIN — DEXAMETHASONE SODIUM PHOSPHATE: 10 INJECTION, SOLUTION INTRAMUSCULAR; INTRAVENOUS at 11:16

## 2020-01-01 RX ADMIN — FLUOROURACIL 910 MG: 50 INJECTION, SOLUTION INTRAVENOUS at 11:45

## 2020-01-01 RX ADMIN — MEROPENEM 500 MG: 500 INJECTION, POWDER, FOR SOLUTION INTRAVENOUS at 21:10

## 2020-01-01 RX ADMIN — LEVOFLOXACIN 750 MG: 5 INJECTION, SOLUTION INTRAVENOUS at 22:23

## 2020-01-01 RX ADMIN — SODIUM CHLORIDE 1000 ML: 0.9 INJECTION, SOLUTION INTRAVENOUS at 22:10

## 2020-01-01 RX ADMIN — OXALIPLATIN 200 MG: 5 INJECTION, SOLUTION INTRAVENOUS at 09:29

## 2020-01-01 RX ADMIN — LOSARTAN POTASSIUM 50 MG: 50 TABLET, FILM COATED ORAL at 08:47

## 2020-01-01 RX ADMIN — HEPARIN SODIUM 5000 UNITS: 5000 INJECTION INTRAVENOUS; SUBCUTANEOUS at 12:31

## 2020-01-01 RX ADMIN — MIDODRINE HYDROCHLORIDE 10 MG: 5 TABLET ORAL at 06:04

## 2020-01-01 RX ADMIN — MEROPENEM 1000 MG: 1 INJECTION, POWDER, FOR SOLUTION INTRAVENOUS at 02:30

## 2020-01-01 RX ADMIN — FLUOROURACIL 910 MG: 50 INJECTION, SOLUTION INTRAVENOUS at 11:55

## 2020-01-01 RX ADMIN — INSULIN LISPRO 1 UNITS: 100 INJECTION, SOLUTION INTRAVENOUS; SUBCUTANEOUS at 21:34

## 2020-01-01 RX ADMIN — CEFAZOLIN SODIUM 2000 MG: 2 SOLUTION INTRAVENOUS at 11:52

## 2020-01-01 RX ADMIN — ONDANSETRON 4 MG: 2 INJECTION INTRAMUSCULAR; INTRAVENOUS at 02:38

## 2020-01-01 RX ADMIN — DEXTROSE 20 ML/HR: 5 SOLUTION INTRAVENOUS at 09:30

## 2020-01-01 RX ADMIN — POTASSIUM CHLORIDE 40 MEQ: 20 SOLUTION ORAL at 08:39

## 2020-01-01 RX ADMIN — SODIUM CHLORIDE 125 ML/HR: 0.9 INJECTION, SOLUTION INTRAVENOUS at 21:00

## 2020-01-01 RX ADMIN — HEPARIN SODIUM 5000 UNITS: 5000 INJECTION INTRAVENOUS; SUBCUTANEOUS at 21:42

## 2020-01-01 RX ADMIN — CEFTRIAXONE SODIUM 1000 MG: 2 INJECTION, POWDER, FOR SOLUTION INTRAMUSCULAR; INTRAVENOUS at 11:58

## 2020-01-01 RX ADMIN — SODIUM CHLORIDE 20 ML/HR: 0.9 INJECTION, SOLUTION INTRAVENOUS at 08:29

## 2020-01-01 RX ADMIN — CEFTRIAXONE SODIUM 1000 MG: 2 INJECTION, POWDER, FOR SOLUTION INTRAMUSCULAR; INTRAVENOUS at 12:20

## 2020-01-01 RX ADMIN — SODIUM CHLORIDE 20 ML/HR: 0.9 INJECTION, SOLUTION INTRAVENOUS at 08:30

## 2020-01-01 RX ADMIN — LEUCOVORIN CALCIUM 900 MG: 500 INJECTION, POWDER, LYOPHILIZED, FOR SOLUTION INTRAMUSCULAR; INTRAVENOUS at 09:42

## 2020-01-01 RX ADMIN — POTASSIUM CHLORIDE 40 MEQ: 1500 TABLET, EXTENDED RELEASE ORAL at 09:29

## 2020-01-01 RX ADMIN — INSULIN LISPRO 1 UNITS: 100 INJECTION, SOLUTION INTRAVENOUS; SUBCUTANEOUS at 21:43

## 2020-01-01 RX ADMIN — ACETAMINOPHEN 975 MG: 325 TABLET, FILM COATED ORAL at 14:31

## 2020-01-01 RX ADMIN — ASPIRIN 81 MG: 81 TABLET, COATED ORAL at 08:46

## 2020-01-01 RX ADMIN — ALBUMIN (HUMAN) 25 G: 0.25 INJECTION, SOLUTION INTRAVENOUS at 19:58

## 2020-01-01 RX ADMIN — ERTAPENEM SODIUM 1000 MG: 1 INJECTION, POWDER, LYOPHILIZED, FOR SOLUTION INTRAMUSCULAR; INTRAVENOUS at 10:03

## 2020-01-01 RX ADMIN — INSULIN LISPRO 1 UNITS: 100 INJECTION, SOLUTION INTRAVENOUS; SUBCUTANEOUS at 21:06

## 2020-01-01 RX ADMIN — Medication 500 MG: at 14:00

## 2020-01-01 RX ADMIN — HYDROMORPHONE HYDROCHLORIDE 0.5 MG: 1 INJECTION, SOLUTION INTRAMUSCULAR; INTRAVENOUS; SUBCUTANEOUS at 08:55

## 2020-01-01 RX ADMIN — AMLODIPINE BESYLATE 10 MG: 10 TABLET ORAL at 08:39

## 2020-01-01 RX ADMIN — DEXTROSE 20 ML/HR: 5 SOLUTION INTRAVENOUS at 09:17

## 2020-01-01 RX ADMIN — HEPARIN SODIUM 5000 UNITS: 5000 INJECTION INTRAVENOUS; SUBCUTANEOUS at 23:09

## 2020-01-01 RX ADMIN — INSULIN LISPRO 1 UNITS: 100 INJECTION, SOLUTION INTRAVENOUS; SUBCUTANEOUS at 13:04

## 2020-01-01 RX ADMIN — LEUCOVORIN CALCIUM 900 MG: 10 INJECTION INTRAMUSCULAR; INTRAVENOUS at 12:03

## 2020-01-01 RX ADMIN — MEROPENEM 500 MG: 500 INJECTION, POWDER, FOR SOLUTION INTRAVENOUS at 09:10

## 2020-01-01 RX ADMIN — ALBUMIN (HUMAN) 25 G: 0.25 INJECTION, SOLUTION INTRAVENOUS at 13:39

## 2020-01-01 RX ADMIN — MIDODRINE HYDROCHLORIDE 10 MG: 5 TABLET ORAL at 06:07

## 2020-01-01 RX ADMIN — HEPARIN SODIUM 5000 UNITS: 5000 INJECTION INTRAVENOUS; SUBCUTANEOUS at 14:33

## 2020-01-01 RX ADMIN — ERTAPENEM SODIUM 1000 MG: 1 INJECTION, POWDER, LYOPHILIZED, FOR SOLUTION INTRAMUSCULAR; INTRAVENOUS at 12:30

## 2020-01-01 RX ADMIN — HEPARIN SODIUM 5000 UNITS: 5000 INJECTION INTRAVENOUS; SUBCUTANEOUS at 22:10

## 2020-01-01 RX ADMIN — LOSARTAN POTASSIUM 100 MG: 50 TABLET, FILM COATED ORAL at 20:36

## 2020-01-01 RX ADMIN — PROPOFOL 80 MCG/KG/MIN: 10 INJECTION, EMULSION INTRAVENOUS at 13:08

## 2020-04-23 PROBLEM — R50.9 FEVER AND CHILLS: Status: ACTIVE | Noted: 2020-01-01

## 2020-07-31 PROBLEM — K86.89 PANCREATIC MASS: Status: ACTIVE | Noted: 2020-01-01

## 2020-07-31 PROBLEM — E66.9 OBESITY (BMI 30.0-34.9): Status: ACTIVE | Noted: 2020-01-01

## 2020-07-31 PROBLEM — A41.9 SEPSIS (HCC): Status: ACTIVE | Noted: 2020-01-01

## 2020-07-31 PROBLEM — E11.65 TYPE 2 DIABETES MELLITUS WITH HYPERGLYCEMIA (HCC): Status: ACTIVE | Noted: 2020-01-01

## 2020-07-31 NOTE — ASSESSMENT & PLAN NOTE
No results found for: HGBA1C    No results for input(s): POCGLU in the last 72 hours  Blood Sugar Average: Last 72 hrs:    Known diabetic on metformin 500 mg extended release once in the morning  Unknown HbA1c patient reports compliance to medications diet and exercise  Reports home glucose monitoring with fasting levels of 150s  Recheck HGB A1c in the morning  Will hold the metformin and will start patient on sliding scale of insulin depending on Accu-Cheks  Hypoglycemia protocol in place

## 2020-07-31 NOTE — ED NOTES
Patient transported to [address] [place] [address], [address] [place] [address]  07/31/20 1675

## 2020-07-31 NOTE — ASSESSMENT & PLAN NOTE
Fever of 1 day duration with a T-max of 101 8° in the ER, tachycardic, blood pressure is mildly elevated  Patient lactic acid are 2 5, repeat of 0 7  Urinalysis positive for nitrates and leukocyte Estrace RBCs and a pyuria with innumerable bacteriuria  Evidence of leukocytosis with left shift, procalcitonin pending  CT abdomen and pelvis with contrast showing hyperdensity upper pole of the left kidney with concern for focal pyelonephritis, although patient does not have any CVA tenderness  Status post 2 L of saline in the ER, will continue with 125  mL of saline for maintenance  If he had 1 dose of level floxacillin 500 mg in the yd  Will change antibiotic to 1 g of IV ceftriaxone every 24 hours  Monitor vitals and hemodynamics, temperature and white count  Blood culture and urine cultures are pending

## 2020-07-31 NOTE — ED PROVIDER NOTES
History  Chief Complaint   Patient presents with    Fever - 76 years or older     pt presents to ed with fever that started this am     PT with Past Medical History: Abdominal aortic aneurysm (AAA) )5/26/2017, Anxiety, BPH, CDK, stage II 08/15/2019, COPD, HTN, DM, Diverticulosis of large intestine without perforation or abscess without bleeding  Presents to ED with 1 day history of fever T-max 101 8° here in emergency department with no obvious focal cause  Patient denies pain, no shortness of breath, no nausea, no vomiting, no bowel changes when asked about symptoms ,patient states he has a slight cough which is no different than normal has some slight dysuria  Patient denies any known COVID contacts has been staying inside          Prior to Admission Medications   Prescriptions Last Dose Informant Patient Reported? Taking?    amLODIPine (NORVASC) 2 5 mg tablet   No No   Sig: Take 1 tablet (2 5 mg total) by mouth daily   metFORMIN (GLUCOPHAGE-XR) 500 mg 24 hr tablet   No No   Sig: Take 1 tablet (500 mg total) by mouth 2 (two) times a day with meals      Facility-Administered Medications: None       Past Medical History:   Diagnosis Date    Abdominal aortic aneurysm (AAA) ([facility] ) 05/26/2017    Per [facility]     Anxiety state 05/26/2017    Per [facility]     Benign prostatic hyperplasia     Benign prostatic hyperplasia without lower urinary tract symptoms     Body mass index 33 0-33 9, adult 08/15/2019    Per [facility]ak[facility]     Chronic kidney disease, stage II (mild) 08/15/2019    Per [facility]akly     Chronic kidney disease, unspecified 05/26/2017    Per [facility]akly     Chronic obstructive pulmonary disease (COPD) ([facility] ) 09/23/2019    Per Elizabeth James     Diverticulosis of large intestine without perforation or abscess without bleeding 04/18/2019    Per Elizabeth James     Essential hypertension 05/26/2017    Per Elizabeth James     Hearing loss, bilateral 02/26/2017    Per Aretha     Mixed hyperlipidemia 05/26/2017    Per Elizabeth James     Obesity, unspecified 05/26/2017    Per Aretha     Type 2 diabetes mellitus without complication ([facility] ) 34/34/8758    Per [employer]        Past Surgical History:   Procedure Laterality Date    CHOLECYSTECTOMY      Per [employer]     COLECTOMY      removal of 8 inches of colon for diverticular disease  Per [employer]        Family History   Problem Relation Age of Onset    Diabetes Father      I have reviewed and agree with the history as documented  E-Cigarette/Vaping     E-Cigarette/Vaping Substances     Social History     Tobacco Use    Smoking status: Former Smoker    Smokeless tobacco: Never Used   Substance Use Topics    Alcohol use: Yes     Comment: Occasional- Per Dinorah Rodríguez Drug use: Never       Review of Systems   Constitutional: Positive for chills and fever  HENT: Negative for ear pain, hearing loss, rhinorrhea, sinus pain, sore throat and trouble swallowing  Eyes: Negative for pain, discharge and visual disturbance  Respiratory: Positive for chest tightness  Negative for cough and shortness of breath  Cardiovascular: Negative for chest pain and leg swelling  Gastrointestinal: Negative for abdominal pain, diarrhea, nausea and vomiting  Genitourinary: Positive for dysuria  Negative for flank pain, frequency and hematuria  Musculoskeletal: Negative for arthralgias and myalgias  Skin: Negative for color change, pallor and rash  Neurological: Negative for dizziness, weakness, light-headedness and numbness  Psychiatric/Behavioral: Negative for behavioral problems  Physical Exam  Physical Exam   Constitutional: He is oriented to person, place, and time  He appears well-developed and well-nourished  He appears distressed (mild)  HENT:   Head: Normocephalic and atraumatic  Nose: Nose normal    Mouth/Throat: Oropharynx is clear and moist  No oropharyngeal exudate  Eyes: Conjunctivae and EOM are normal  Right eye exhibits no discharge  Left eye exhibits no discharge     Neck: Normal range of motion  Cardiovascular: Normal rate and regular rhythm  Pulmonary/Chest: Effort normal and breath sounds normal  No respiratory distress  He has no wheezes  Abdominal: Soft  Bowel sounds are normal  He exhibits distension  He exhibits no mass  There is no tenderness  There is no rebound  Hyper-resonant to percussion  obese   Musculoskeletal: Normal range of motion  He exhibits no tenderness  Lymphadenopathy:     He has no cervical adenopathy  Neurological: He is alert and oriented to person, place, and time  No cranial nerve deficit  Skin: Skin is warm and dry  No rash noted  Psychiatric: He has a normal mood and affect  His behavior is normal    Nursing note and vitals reviewed        Vital Signs  ED Triage Vitals   Temperature Pulse Respirations Blood Pressure SpO2   07/31/20 1344 07/31/20 1344 07/31/20 1344 07/31/20 1344 07/31/20 1344   (!) 101 8 °F (38 8 °C) 105 18 155/77 95 %      Temp Source Heart Rate Source Patient Position - Orthostatic VS BP Location FiO2 (%)   07/31/20 1344 07/31/20 1344 07/31/20 1602 07/31/20 1344 --   Oral Monitor Lying Right arm       Pain Score       07/31/20 1344       No Pain           Vitals:    07/31/20 1444 07/31/20 1528 07/31/20 1602 07/31/20 1838   BP: 148/71 141/75 154/62 148/73   Pulse: 103 99 (!) 106 (!) 116   Patient Position - Orthostatic VS:   Lying Lying         Visual Acuity      ED Medications  Medications   sodium chloride 0 9 % infusion (250 mL/hr Intravenous Restarted 7/31/20 1525)   sodium chloride 0 9 % bolus 1,000 mL (0 mL Intravenous Stopped 7/31/20 1522)   sodium chloride 0 9 % bolus 1,000 mL (0 mL Intravenous Stopped 7/31/20 1515)   acetaminophen (TYLENOL) tablet 975 mg (975 mg Oral Given 7/31/20 1431)   levofloxacin (LEVAQUIN) IVPB (premix) 500 mg 100 mL (0 mg Intravenous Stopped 7/31/20 1832)   iohexol (OMNIPAQUE) 350 MG/ML injection (MULTI-DOSE) 100 mL (100 mL Intravenous Given 7/31/20 1517)       Diagnostic Studies  Results Reviewed Procedure Component Value Units Date/Time    Lactic acid 2 Hours [[id number]]  (Normal) Resulted:  07/31/20 1535    Lab Status:  Final result Specimen:  Blood Updated:  07/31/20 1535     LACTIC ACID 0 7 mmol/L     Narrative:       Result may be elevated if tourniquet was used during collection  Urine Microscopic [[id number]]  (Abnormal) Collected:  07/31/20 1400    Lab Status:  Final result Specimen:  Urine, Clean Catch Updated:  07/31/20 1502     RBC, UA 4-10 /hpf      WBC, UA 30-50 /hpf      Epithelial Cells Occasional /hpf      Bacteria, UA Innumerable /hpf     Urine culture [[id number]] Collected:  07/31/20 1400    Lab Status: In process Specimen:  Urine, Clean Catch Updated:  07/31/20 1502    Lactic acid [[id number]]  (Abnormal) Collected:  07/31/20 1359    Lab Status:  Final result Specimen:  Blood from Arm, Right Updated:  07/31/20 1449     LACTIC ACID 2 5 mmol/L     Narrative:       Result may be elevated if tourniquet was used during collection  Result may be elevated if tourniquet was used during collection  Result may be elevated if tourniquet was used during collection      Comprehensive metabolic panel [[id number]]  (Abnormal) Collected:  07/31/20 1359    Lab Status:  Final result Specimen:  Blood from Arm, Right Updated:  07/31/20 1446     Sodium 134 mmol/L      Potassium 3 7 mmol/L      Chloride 98 mmol/L      CO2 25 mmol/L      ANION GAP 11 mmol/L      BUN 20 mg/dL      Creatinine 1 05 mg/dL      Glucose 158 mg/dL      Calcium 9 7 mg/dL      AST 9 U/L      ALT 10 U/L      Alkaline Phosphatase 54 4 U/L      Total Protein 7 8 g/dL      Albumin 4 4 g/dL      Total Bilirubin 1 14 mg/dL      eGFR 68 ml/min/1 73sq m     Narrative:       [place] guidelines for Chronic Kidney Disease (CKD):     Stage 1 with normal or high GFR (GFR > 90 mL/min/1 73 square meters)    Stage 2 Mild CKD (GFR = 60-89 mL/min/1 73 square meters)    Stage 3A Moderate CKD (GFR = 45-59 mL/min/1 73 square meters)    Stage 3B Moderate CKD (GFR = 30-44 mL/min/1 73 square meters)    Stage 4 Severe CKD (GFR = 15-29 mL/min/1 73 square meters)    Stage 5 End Stage CKD (GFR <15 mL/min/1 73 square meters)  Note: GFR calculation is accurate only with a steady state creatinine    UA w Reflex to Microscopic w Reflex to Culture [[id number]]  (Abnormal) Collected:  07/31/20 1400    Lab Status:  Final result Specimen:  Urine, Clean Catch Updated:  07/31/20 1435     Color, UA Yellow     Clarity, UA Slightly Cloudy     Specific [place], UA 1 020     pH, UA 6 0     Leukocytes, UA 2+     Nitrite, UA Positive     Protein, UA Trace mg/dl      Glucose, UA Negative mg/dl      Ketones, UA Negative mg/dl      Urobilinogen, UA 0 2 E U /dl      Bilirubin, UA Negative     Blood, UA 1+    APTT [[id number]]  (Normal) Collected:  07/31/20 1359    Lab Status:  Final result Specimen:  Blood from Arm, Right Updated:  07/31/20 1435     PTT 29 seconds     Protime-INR [[id number]]  (Normal) Collected:  07/31/20 1359    Lab Status:  Final result Specimen:  Blood from Arm, Right Updated:  07/31/20 1435     Protime 10 6 seconds      INR 1 00    Narrative:       INR Reference Ranges:  No Anticoagulant, Normal:           0 9-1 1  Standard Dose, Oral Anticoagulant:  2 0-3 0  High Dose, Oral Anticoagulant:      2 5-3 5    CBC and differential [[id number]]  (Abnormal) Collected:  07/31/20 1359    Lab Status:  Final result Specimen:  Blood from Arm, Right Updated:  07/31/20 1416     WBC 13 67 Thousand/uL      RBC 4 95 Million/uL      Hemoglobin 14 7 g/dL      Hematocrit 44 1 %      MCV 89 fL      MCH 29 7 pg      MCHC 33 3 g/dL      RDW 13 6 %      MPV 11 2 fL      Platelets 101 Thousands/uL      Neutrophils Relative 79 %      Immat GRANS % 0 %      Lymphocytes Relative 12 %      Monocytes Relative 8 %      Eosinophils Relative 1 %      Basophils Relative 0 %      Neutrophils Absolute 10 86 Thousands/µL      Immature Grans Absolute 0 05 Thousand/uL Lymphocytes Absolute 1 58 Thousands/µL      Monocytes Absolute 1 04 Thousand/µL      Eosinophils Absolute 0 10 Thousand/µL      Basophils Absolute 0 04 Thousands/µL     Blood culture #1 [[id number]] Collected:  07/31/20 1359    Lab Status: In process Specimen:  Blood from Arm, Right Updated:  07/31/20 1412    Blood culture #2 [[id number]] Collected:  07/31/20 1359    Lab Status: In process Specimen:  Blood from Arm, Left Updated:  07/31/20 1412    Procalcitonin [[id number]] Collected:  07/31/20 1359    Lab Status: In process Specimen:  Blood from Arm, Right Updated:  07/31/20 1411                 XR chest pa & lateral   Final Result by Ann Collier MD (07/31 7728)   Mild basilar interstitial prominence  Workstation performed: [id number]         CT abdomen pelvis with contrast   Final Result by Geetha White MD (07/31 8344)      1  Hypoattenuating lesion within the pancreatic tail resulting in marked attenuation/ occlusion of the distal splenic vein with multiple adjacent varices, suspicious for pancreatic malignancy  Multiple omental nodules are present, consistent with    peritoneal metastases  2   Bladder wall findings suggestive of cystitis  Additionally, there is area of ill-defined hypoattenuation within the upper pole of the left kidney, which may represent focal pyelonephritis  Recommend follow-up with either renal protocol CT or MRI    after acute issues are resolved for reevaluation  3   Tree-in-bud nodules and bronchiectasis within the lung bases, likely related to acute or chronic small airways infection                   I personally discussed this study with Kaila Amanda on 7/31/2020 at 4:24 PM                             Workstation performed: [id number]                    Procedures  Procedures         ED Course  ED Course as of Jul 31 1857   Fri Jul 31, 2020   1709 Spoke with our hospitalist Dr Eliana Dixon and due to pancreatic lesions and likelihood of surgical consult, refers pt for transfer  I spoke to the [facility] team who will consult our surgeon          US AUDIT      Most Recent Value   Initial Alcohol Screen: US AUDIT-C    1  How often do you have a drink containing alcohol?  0 Filed at: 07/31/2020 1347   2  How many drinks containing alcohol do you have on a typical day you are drinking? 0 Filed at: 07/31/2020 1347   3a  Male UNDER 65: How often do you have five or more drinks on one occasion? 0 Filed at: 07/31/2020 1347   3b  FEMALE Any Age, or MALE 65+: How often do you have 4 or more drinks on one occassion? 0 Filed at: 07/31/2020 1347   Audit-C Score  0 Filed at: 07/31/2020 1347                  WENDY/DAST-10      Most Recent Value   How many times in the past year have you    Used an illegal drug or used a prescription medication for non-medical reasons? Never Filed at: 07/31/2020 1347                                MDM  Number of Diagnoses or Management Options  Pancreatic mass:   Pyelonephritis:   Sepsis [facility]):   Diagnosis management comments: Patient with sepsis, pyelonephritis, new pancreatic mass I spoke with Dr Shlomo Grijalva who recommended surgical consult, spoke with Dr Lazaro Mckee who feels patient can be admitted here and treated for sepsis, and then will obtain surgical, surgical oncology consult once more stabilized    COVID neg       Amount and/or Complexity of Data Reviewed  Clinical lab tests: reviewed  Tests in the radiology section of CPT®: reviewed          Disposition  Final diagnoses:   Sepsis ([address] )   Pyelonephritis   Pancreatic mass     Time reflects when diagnosis was documented in both MDM as applicable and the Disposition within this note     Time User Action Codes Description Comment    7/31/2020  5:25 PM Gibson Canelo Add [A41 9] Sepsis ([address] )     7/31/2020  5:25 PM Gibson Canelo Add [N12] Pyelonephritis     7/31/2020  5:25 PM Gibson Canelo Add [K86 89] Pancreatic mass       ED Disposition     ED Disposition Condition Date/Time Comment Admit Stable Fri Jul 31, 2020  6:56 PM Case was discussed with Sunita Goodman and the patient's admission status was agreed to be Admission Status: inpatient status to the service of Dr Sunita Goodman   Follow-up Information    None         Patient's Medications   Discharge Prescriptions    No medications on file     No discharge procedures on file      PDMP Review     None          ED Provider  Electronically Signed by           Dayanna Brothers PA-C  07/31/20 7063

## 2020-07-31 NOTE — ASSESSMENT & PLAN NOTE
Fever and chills likely secondary to urinary infection/suspected focal pyelonephritis of the left kidney  See assessment and plan under sepsis  Oral contrast in progress at this time. Pt reporting abdominal pain has improved, but it is in his back now. RN collected urine specimen, labeled and sent to lab.       [address] [place] [address], RN  07/19/19 2890

## 2020-07-31 NOTE — ASSESSMENT & PLAN NOTE
History of unintentional weight loss of 12 lb over the past 6 months, and early CAD, no change in bowel habits reported  CT scan of the abdomen with contrast shows lesion in the pancreatic a and omental nodules suspicious for peritoneal metastasis  ED earlier contacted Dr Mar Roper 0 plans for biopsy and further workup after resolution of the acute urinary infection  General surgery consult has been placed

## 2020-07-31 NOTE — PROGRESS NOTES
VTE Prophylaxis: Enoxaparin (Lovenox)  / sequential compression device   Code Status: full code    POLST: POLST is not applicable to this patient  Discussion with family:  Discussed the findings on CT abdomen and pelvis and urinalysis finding a need for antibiotics IV fluids and surgical consult for pancreatic mass  Anticipated Length of Stay:  Patient will be admitted on an Inpatient basis with an anticipated length of stay of   2 midnights  Justification for Hospital Stay:     Total Time for Visit, including Counseling / Coordination of Care: 45 minutes  Greater than 50% of this total time spent on direct patient counseling and coordination of care  Chief Complaint:   Fever of 102 in the morning  History of Present Illness:    Jewel Thompson is a 66 y o  male who presents with complains off a fever of 102 in the morning at home  Patient did not notice anything unusual over the past 2 days  Denies chills, nausea, vomiting, diarrhea, no worsening of the chronic existing cough, no sputum production, chest pain, palpitations, shortness of breath, leg swelling, tick bites, rash, muscle or joint pains or swelling, recent travel, contact with covid positive patients  Patient has been mostly at home during the past few months  Daughter and spouse at the bedside also reports patient has had early satiety and a unintended weight loss of 12 lb over the past 6 months  Patient denies any blood in his stools  He is scheduled for screening colonoscopy in the next week  He reports no abnormal findings on his last colonoscopy although he is not able to recall when it was done  Review of Systems:    Review of Systems   Constitutional: Positive for appetite change, fatigue, fever and unexpected weight change  Negative for activity change, chills and diaphoresis  HENT: Positive for sinus pressure  Negative for congestion, dental problem, drooling, rhinorrhea, sinus pain, sneezing and sore throat      Eyes: Negative  Respiratory: Negative for apnea, cough, choking, chest tightness, shortness of breath, wheezing and stridor  Cardiovascular: Negative for chest pain, palpitations and leg swelling  Gastrointestinal: Negative for abdominal distention, abdominal pain, anal bleeding, blood in stool, constipation, diarrhea, nausea, rectal pain and vomiting  Endocrine: Negative  Genitourinary: Negative for decreased urine volume, difficulty urinating, discharge, dysuria, enuresis, flank pain, frequency, genital sores, hematuria, penile pain, penile swelling, scrotal swelling, testicular pain and urgency  Musculoskeletal: Negative  Skin: Negative  Allergic/Immunologic: Negative  Neurological: Negative  Hematological: Negative  Psychiatric/Behavioral: Negative  Past Medical and Surgical History:     Past Medical History:   Diagnosis Date    Abdominal aortic aneurysm (AAA) ([facility] ) 05/26/2017    Per [place]     Anxiety state 05/26/2017    Per [place]tha     Benign prostatic hyperplasia     Benign prostatic hyperplasia without lower urinary tract symptoms     Body mass index 33 0-33 9, adult 08/15/2019    Per [place]tha     Chronic kidney disease, stage II (mild) 08/15/2019    Per [place]     Chronic kidney disease, unspecified 05/26/2017    Per [place]     Chronic obstructive pulmonary disease (COPD) ([facility] ) 09/23/2019    Per Milena Galeas     Diverticulosis of large intestine without perforation or abscess without bleeding 04/18/2019    Per Milena Galeas     Essential hypertension 05/26/2017    Per Milena [place]aleas     Hearing loss, bilateral 02/26/2017    Per [place]     Mixed hyperlipidemia 05/26/2017    Per [place]     Obesity, unspecified 05/26/2017    Per Milena Galeas     Type 2 diabetes mellitus without complication [facility] ) 25/77/2632    Per Milena Galeas        Past Surgical History:   Procedure Laterality Date    CHOLECYSTECTOMY      Per Milena Galeas     COLECTOMY      removal of 8 inches of colon for diverticular disease   Per [place]        Meds/Allergies:    Prior to Admission medications    Medication Sig Start Date End Date Taking? Authorizing Provider   amLODIPine (NORVASC) 10 mg tablet Take 10 mg by mouth daily   Yes Historical Provider, MD   losartan (COZAAR) 100 MG tablet Take 25 mg by mouth daily   Yes Historical Provider, MD   metFORMIN (GLUCOPHAGE-XR) 500 mg 24 hr tablet Take 1 tablet (500 mg total) by mouth 2 (two) times a day with meals 6/17/20  Yes Kizzy Ley MD   amLODIPine (NORVASC) 2 5 mg tablet Take 1 tablet (2 5 mg total) by mouth daily 7/8/20 7/31/20  Kizzy Ley MD     I have reviewed home medications with patient personally  Allergies: No Known Allergies    Social History:     Marital Status: /Civil Union   Occupation: retired  Patient Pre-hospital Living Situation: home  Patient Pre-hospital Level of Mobility:   Patient Pre-hospital Diet Restrictions:  Carbohydrate consistent diet  Substance Use History:   Social History     Substance and Sexual Activity   Alcohol Use Yes    Comment: Occasional- Per Aretha      Social History     Tobacco Use   Smoking Status Former Smoker   Smokeless Tobacco Never Used     Social History     Substance and Sexual Activity   Drug Use Never       Family History:    Family History   Problem Relation Age of Onset    Diabetes Father        Physical Exam:     Vitals:   Blood Pressure: 148/73 (07/31/20 1838)  Pulse: (!) 116 (07/31/20 1838)  Temperature: 99 6 °F (37 6 °C) (07/31/20 1838)  Temp Source: Oral (07/31/20 1838)  Respirations: 19 (07/31/20 1838)  Height: 6' (182 9 cm) (07/31/20 1344)  Weight - Scale: 115 kg (253 lb 1 4 oz) (07/31/20 1344)  SpO2: 96 % (07/31/20 1838)    Physical Exam   Constitutional: He is oriented to person, place, and time  He appears well-developed and well-nourished  No distress  Neck: No JVD present  Cardiovascular: Regular rhythm, normal heart sounds and intact distal pulses  Exam reveals no gallop     No murmur heard   Pulmonary/Chest: Effort normal and breath sounds normal  No stridor  No respiratory distress  He has no wheezes  He has no rales  He exhibits no tenderness  Abdominal: Soft  Bowel sounds are normal  He exhibits no distension and no mass  There is no tenderness  There is no rebound and no guarding  No hernia  Musculoskeletal: He exhibits no edema or tenderness  Lymphadenopathy:     He has no cervical adenopathy  Neurological: He is alert and oriented to person, place, and time  Skin: Skin is warm and dry  Capillary refill takes less than 2 seconds  He is not diaphoretic  Psychiatric: He has a normal mood and affect  Vitals reviewed  (  Be Sure to Include Physical Exam: Delete this entire line when you have entered your exam)    Additional Data:     Lab Results: I have personally reviewed pertinent reports  Results from last 7 days   Lab Units 07/31/20  1359   WBC Thousand/uL 13 67*   HEMOGLOBIN g/dL 14 7   HEMATOCRIT % 44 1   PLATELETS Thousands/uL 123*   NEUTROS PCT % 79*   LYMPHS PCT % 12*   MONOS PCT % 8   EOS PCT % 1     Results from last 7 days   Lab Units 07/31/20  1359   SODIUM mmol/L 134   POTASSIUM mmol/L 3 7   CHLORIDE mmol/L 98   CO2 mmol/L 25   BUN mg/dL 20   CREATININE mg/dL 1 05   ANION GAP mmol/L 11   CALCIUM mg/dL 9 7   ALBUMIN g/dL 4 4   TOTAL BILIRUBIN mg/dL 1 14   ALK PHOS U/L 54 4   ALT U/L 10   AST U/L 9*   GLUCOSE RANDOM mg/dL 158*     Results from last 7 days   Lab Units 07/31/20  1359   INR  1 00             Results from last 7 days   Lab Units 07/31/20  1535 07/31/20  1359   LACTIC ACID mmol/L 0 7 2 5*       Imaging: I have personally reviewed pertinent reports  XR chest pa & lateral   Final Result by Florentino Wright MD (07/31 1646)   Mild basilar interstitial prominence  Workstation performed: [id number]         CT abdomen pelvis with contrast   Final Result by Tiffanie Gonzalez MD (07/31 1624)      1    Hypoattenuating lesion within the pancreatic tail resulting in marked attenuation/ occlusion of the distal splenic vein with multiple adjacent varices, suspicious for pancreatic malignancy  Multiple omental nodules are present, consistent with    peritoneal metastases  2   Bladder wall findings suggestive of cystitis  Additionally, there is area of ill-defined hypoattenuation within the upper pole of the left kidney, which may represent focal pyelonephritis  Recommend follow-up with either renal protocol CT or MRI    after acute issues are resolved for reevaluation  3   Tree-in-bud nodules and bronchiectasis within the lung bases, likely related to acute or chronic small airways infection  I personally discussed this study with Hiram Gould on 7/31/2020 at 4:24 PM                             Workstation performed: [id number]             EKG, Pathology, and Other Studies Reviewed on Admission:   · EKG: pending     Allscripts / Epic Records Reviewed: Yes     ** Please Note: This note has been constructed using a voice recognition system  **    Progress Note - Thedora Mcmullen 1945, 66 y o  male MRN: [id number]    Unit/Bed#: ED 14 Encounter: [id number]    Primary Care Provider: Rafael Wisdom MD   Date and time admitted to hospital: 7/31/2020  1:39 PM        * Sepsis [facility])  Assessment & Plan  Fever of 1 day duration with a T-max of 101 8° in the ER, tachycardic, blood pressure is mildly elevated  Patient lactic acid are 2 5, repeat of 0 7  Urinalysis positive for nitrates and leukocyte Estrace RBCs and a pyuria with innumerable bacteriuria  Evidence of leukocytosis with left shift, procalcitonin pending  CT abdomen and pelvis with contrast showing hyperdensity upper pole of the left kidney with concern for focal pyelonephritis, although patient does not have any CVA tenderness  Status post 2 L of saline in the ER, will continue with 125  mL of saline for maintenance    If he had 1 dose of level floxacillin 500 mg in the yd   Will change antibiotic to 1 g of IV ceftriaxone every 24 hours  Monitor vitals and hemodynamics, temperature and white count  Blood culture and urine cultures are pending  Pancreatic mass  Assessment & Plan  History of unintentional weight loss of 12 lb over the past 6 months, and early CAD, no change in bowel habits reported  CT scan of the abdomen with contrast shows lesion in the pancreatic a and omental nodules suspicious for peritoneal metastasis  ED earlier contacted Dr Odelia Meigs 0 plans for biopsy and further workup after resolution of the acute urinary infection  General surgery consult has been placed  Type 2 diabetes mellitus with hyperglycemia (HCC)  Assessment & Plan  No results found for: HGBA1C    No results for input(s): POCGLU in the last 72 hours  Blood Sugar Average: Last 72 hrs:    Known diabetic on metformin 500 mg extended release once in the morning  Unknown HbA1c patient reports compliance to medications diet and exercise  Reports home glucose monitoring with fasting levels of 150s  Recheck HGB A1c in the morning  Will hold the metformin and will start patient on sliding scale of insulin depending on Accu-Cheks  Hypoglycemia protocol in place  Fever and chills  Assessment & Plan  Fever and chills likely secondary to urinary infection/suspected focal pyelonephritis of the left kidney  See assessment and plan under sepsis  Subjective/Objective     Subjective:       Objective:  Vitals: Blood pressure 148/73, pulse (!) 116, temperature 99 6 °F (37 6 °C), temperature source Oral, resp  rate 19, height 6' (1 829 m), weight 115 kg (253 lb 1 4 oz), SpO2 96 %  ,Body mass index is 34 32 kg/m²        Intake/Output Summary (Last 24 hours) at 7/31/2020 2012  Last data filed at 7/31/2020 1832  Gross per 24 hour   Intake 100 ml   Output    Net 100 ml       Invasive Devices     Peripheral Intravenous Line            Peripheral IV 07/31/20 Right Antecubital less than 1 day Physical Exam:     Lab, Imaging and other studies: reviewed    VTE Pharmacologic Prophylaxis: Enoxaparin (Lovenox)  VTE Mechanical Prophylaxis: sequential compression device

## 2020-08-01 NOTE — H&P
[facility] Attending Physician Attestation Note - Admission    I have seen and examined Dell Councilman personally and have reviewed the medical record independently  I have reviewed the case with the resident physician including all assessments and the plan of care for each  I agree with the resident physician and offer the following addendum to the below statements by the resident physician:     Date Evaluated: On 07/31/2020  Time Evaluated:  A m  Subjective / HPI:  I have seen and examined the patient at the bedside on 07/31/2020 at 5:45 p m , patient present to the emergency department with fever and chills  Denies any cough, chest pain, shortness of breath  Denies any abdominal pain  Denies any diarrhea  Patient was found to have severe sepsis as evident with fever, tachycardia, UA positive for UTI, elevated lactate levels  Exam:  General appearance:  Patient not in acute distress  Eyes:  Pupils equal reacting to light  ENT:  Moist oral mucous membranes  CVS:  S1-S2 heard, regular rate and rhythm, no pedal edema, radial peripheral pulses shows tachycardia, capillary refill less than 2nd  Chest:  Bilateral air entry present, clear to auscultation  Abdomen:  Soft, nontender, bowel sounds present  CNS:  No focal neurological deficits  Genitourinary: deferred  Skin:  No acute rash   psychiatric:  No psychosis  Musculoskeletal:  No joint deformities    Assessment and Plan:  Severe sepsis-as evident with fever, tachycardia, elevated lactate levels, positive UA-a secondary to acute pyelonephritis  Acute pyelonephritis-acute cystitis-continue antibiotics, follow-up cultures  Incidental finding of pancreatic mass, family does report weight loss, poor appetite  Increase in abdominal girth  Patient would need outpatient follow-up for further workup for pancreatic mass, currently he is in severe sepsis and requires acute care    Discussed with the surgical attending Dr Amor Holliday, he would see the patient, he did recommend to treat severe sepsis and UTI and pyelonephritis and then pursue pancreatic mass as an outpatient  Education and Discussions with Family / Patient:  Discussed with the family at the bedside        Current Patient Status: Inpatient   Anticipated Length of Stay:  Patient will be admitted on an Inpatient basis with an anticipated length of stay of  more than 2 midnights  Justification for Hospital Stay:  Severe sepsis    Epic / Care Everywhere Records Reviewed Independently: Yes     For detailed history, assessment, and plan of care, please review the statements below by Dr Flakita Messer MD      VTE Prophylaxis: Enoxaparin (Lovenox)  / sequential compression device   Code Status: full code     POLST: POLST is not applicable to this patient  Discussion with family:  Discussed the findings on CT abdomen and pelvis and urinalysis finding a need for antibiotics IV fluids and surgical consult for pancreatic mass      Anticipated Length of Stay:  Patient will be admitted on an Inpatient basis with an anticipated length of stay of   2 midnights  Justification for Hospital Stay:      Total Time for Visit, including Counseling / Coordination of Care: 45 minutes  Greater than 50% of this total time spent on direct patient counseling and coordination of care      Chief Complaint:   Fever of 102 in the morning      History of Present Illness:     Marjan Hernandez is a 66 y o  male who presents with complains off a fever of 102 in the morning at home  Patient did not notice anything unusual over the past 2 days  Denies chills, nausea, vomiting, diarrhea, no worsening of the chronic existing cough, no sputum production, chest pain, palpitations, shortness of breath, leg swelling, tick bites, rash, muscle or joint pains or swelling, recent travel, contact with covid positive patients    Patient has been mostly at home during the past few months      Daughter and spouse at the bedside also reports patient has had early satiety and a unintended weight loss of 12 lb over the past 6 months  Patient denies any blood in his stools  He is scheduled for screening colonoscopy in the next week  He reports no abnormal findings on his last colonoscopy although he is not able to recall when it was done      Review of Systems:     Review of Systems   Constitutional: Positive for appetite change, fatigue, fever and unexpected weight change  Negative for activity change, chills and diaphoresis  HENT: Positive for sinus pressure  Negative for congestion, dental problem, drooling, rhinorrhea, sinus pain, sneezing and sore throat  Eyes: Negative  Respiratory: Negative for apnea, cough, choking, chest tightness, shortness of breath, wheezing and stridor  Cardiovascular: Negative for chest pain, palpitations and leg swelling  Gastrointestinal: Negative for abdominal distention, abdominal pain, anal bleeding, blood in stool, constipation, diarrhea, nausea, rectal pain and vomiting  Endocrine: Negative  Genitourinary: Negative for decreased urine volume, difficulty urinating, discharge, dysuria, enuresis, flank pain, frequency, genital sores, hematuria, penile pain, penile swelling, scrotal swelling, testicular pain and urgency  Musculoskeletal: Negative  Skin: Negative  Allergic/Immunologic: Negative  Neurological: Negative  Hematological: Negative      Psychiatric/Behavioral: Negative           Past Medical and Surgical History:      Medical History        Past Medical History:   Diagnosis Date    Abdominal [facility]AAA) [facility] ) 05/26/2017     Per [place]     Anxiety state 05/26/2017     Per [place]     Benign prostatic hyperplasia       Benign prostatic hyperplasia without lower urinary tract symptoms     Body mass index 33 0-33 9, adult 08/15/2019     Per [facility]     Chronic kidney disease, stage II (mild) 08/15/2019     Per [facility]     Chronic kidney disease, unspecified 05/26/2017     Per [facility]     Chronic obstructive pulmonary disease (COPD) ([facility] ) 09/23/2019     Per Milena Galeas     Diverticulosis of large intestine without perforation or abscess without bleeding 04/18/2019     Per Milena Gale[facility]     Essential hypertension 05/26/2017     Per Milena Galeas     Hearing loss, bilateral 02/26/2017     Per [place]     Mixed hyperlipidemia 05/26/2017     Per [place]     Obesity, unspecified 05/26/2017     Per Milena Galeas     Type 2 diabetes mellitus without complication ([facility] ) 78/06/2780     Per Milena Galeas             Surgical History         Past Surgical History:   Procedure Laterality Date    CHOLECYSTECTOMY         Per [facility] G[facility]     COLECTOMY         removal of 8 inches of colon for diverticular disease  Per Milena Galeas             Meds/Allergies:             Prior to Admission medications    Medication Sig Start Date End Date Taking?  Authorizing Provider   amLODIPine (NORVASC) 10 mg tablet Take 10 mg by mouth daily     Yes Historical Provider, MD   losartan (COZAAR) 100 MG tablet Take 25 mg by mouth daily     Yes Historical Provider, MD   metFORMIN (GLUCOPHAGE-XR) 500 mg 24 hr tablet Take 1 tablet (500 mg total) by mouth 2 (two) times a day with meals 6/17/20   Yes Franklin Capellan MD   amLODIPine (NORVASC) 2 5 mg tablet Take 1 tablet (2 5 mg total) by mouth daily 7/8/20 7/31/20   Franklin Capellan MD      I have reviewed home medications with patient personally      Allergies: No Known Allergies     Social History:     Marital Status: /Civil Union   Occupation: retired  Patient Pre-hospital Living Situation: home  Patient Pre-hospital Level of Mobility:   Patient Pre-hospital Diet Restrictions:  Carbohydrate consistent diet  Substance Use History:   Social History           Substance and Sexual Activity   Alcohol Use Yes     Comment: Occasional- Per [facility]       Social History          Tobacco Use   Smoking Status Former Smoker   Smokeless Tobacco Never Used      Social History        Substance and Sexual Activity   Drug Use Never         Family History:           Family History   Problem Relation Age of Onset    Diabetes Father           Physical Exam:      Vitals:   Blood Pressure: 148/73 (07/31/20 1838)  Pulse: (!) 116 (07/31/20 1838)  Temperature: 99 6 °F (37 6 °C) (07/31/20 1838)  Temp Source: Oral (07/31/20 1838)  Respirations: 19 (07/31/20 1838)  Height: 6' (182 9 cm) (07/31/20 1344)  Weight - Scale: 115 kg (253 lb 1 4 oz) (07/31/20 1344)  SpO2: 96 % (07/31/20 1838)     Physical Exam   Constitutional: He is oriented to person, place, and time  He appears well-developed and well-nourished  No distress  Neck: No JVD present  Cardiovascular: Regular rhythm, normal heart sounds and intact distal pulses  Exam reveals no gallop  No murmur heard  Pulmonary/Chest: Effort normal and breath sounds normal  No stridor  No respiratory distress  He has no wheezes  He has no rales  He exhibits no tenderness  Abdominal: Soft  Bowel sounds are normal  He exhibits no distension and no mass  There is no tenderness  There is no rebound and no guarding  No hernia  Musculoskeletal: He exhibits no edema or tenderness  Lymphadenopathy:     He has no cervical adenopathy  Neurological: He is alert and oriented to person, place, and time  Skin: Skin is warm and dry  Capillary refill takes less than 2 seconds  He is not diaphoretic  Psychiatric: He has a normal mood and affect     Vitals reviewed         (  Be Sure to Include Physical Exam: Delete this entire line when you have entered your exam)     Additional Data:      Lab Results: I have personally reviewed pertinent reports             Results from last 7 days   Lab Units 07/31/20  1359   WBC Thousand/uL 13 67*   HEMOGLOBIN g/dL 14 7   HEMATOCRIT % 44 1   PLATELETS Thousands/uL 123*   NEUTROS PCT % 79*   LYMPHS PCT % 12*   MONOS PCT % 8   EOS PCT % 1           Results from last 7 days   Lab Units 07/31/20  1359   SODIUM mmol/L 134 POTASSIUM mmol/L 3 7   CHLORIDE mmol/L 98   CO2 mmol/L 25   BUN mg/dL 20   CREATININE mg/dL 1 05   ANION GAP mmol/L 11   CALCIUM mg/dL 9 7   ALBUMIN g/dL 4 4   TOTAL BILIRUBIN mg/dL 1 14   ALK PHOS U/L 54 4   ALT U/L 10   AST U/L 9*   GLUCOSE RANDOM mg/dL 158*           Results from last 7 days   Lab Units 07/31/20  1359   INR   1 00                    Results from last 7 days   Lab Units 07/31/20  1535 07/31/20  1359   LACTIC ACID mmol/L 0 7 2 5*         Imaging: I have personally reviewed pertinent reports        XR chest pa & lateral   Final Result by Jeanie Harris MD (07/31 1646)   Mild basilar interstitial prominence        Workstation performed: [id number]           CT abdomen pelvis with contrast   Final Result by Joaquim Reynoso MD (07/31 1754)       1  Hypoattenuating lesion within the pancreatic tail resulting in marked attenuation/ occlusion of the distal splenic vein with multiple adjacent varices, suspicious for pancreatic malignancy  Multiple omental nodules are present, consistent with    peritoneal metastases            2  Bladder wall findings suggestive of cystitis  Additionally, there is area of ill-defined hypoattenuation within the upper pole of the left kidney, which may represent focal pyelonephritis  Recommend follow-up with either renal protocol CT or MRI    after acute issues are resolved for reevaluation                3  Tree-in-bud nodules and bronchiectasis within the lung bases, likely related to acute or chronic small airways infection                    I personally discussed this study with Ram[address]ama [address] on 7/31/2020 at 4:24 PM                                    Workstation performed: [id number]                 EKG, Pathology, and Other Studies Reviewed on Admission:   · EKG: pending      Allscripts / Epic Records Reviewed: Yes      ** Please Note: This note has been constructed using a voice recognition system   **     Progress Note - Dell Councilman 1945, 66 y o  male MRN: [id number]     Unit/Bed#: ED 14 Encounter: [id number]     Primary Care Provider: Landon Noel MD   Date and time admitted to hospital: 7/31/2020  1:39 PM           * Sepsis ([facility] )  Assessment & Plan  Fever of 1 day duration with a T-max of 101 8° in the ER, tachycardic, blood pressure is mildly elevated  Patient lactic acid are 2 5, repeat of 0 7  Urinalysis positive for nitrates and leukocyte Estrace RBCs and a pyuria with innumerable bacteriuria  Evidence of leukocytosis with left shift, procalcitonin pending  CT abdomen and pelvis with contrast showing hyperdensity upper pole of the left kidney with concern for focal pyelonephritis, although patient does not have any CVA tenderness  Status post 2 L of saline in the ER, will continue with 125  mL of saline for maintenance  If he had 1 dose of level floxacillin 500 mg in the yd  Will change antibiotic to 1 g of IV ceftriaxone every 24 hours  Monitor vitals and hemodynamics, temperature and white count  Blood culture and urine cultures are pending      Pancreatic mass  Assessment & Plan  History of unintentional weight loss of 12 lb over the past 6 months, and early CAD, no change in bowel habits reported  CT scan of the abdomen with contrast shows lesion in the pancreatic a and omental nodules suspicious for peritoneal metastasis  ED earlier contacted Dr Giordano Stain 0 plans for biopsy and further workup after resolution of the acute urinary infection  General surgery consult has been placed      Type 2 diabetes mellitus with hyperglycemia ([facility] )  Assessment & Plan  No results found for: HGBA1C     No results for input(s): POCGLU in the last 72 hours      Blood Sugar Average: Last 72 hrs:     Known diabetic on metformin 500 mg extended release once in the morning  Unknown HbA1c patient reports compliance to medications diet and exercise  Reports home glucose monitoring with fasting levels of 150s  Recheck HGB A1c in the morning    Will hold the metformin and will start patient on sliding scale of insulin depending on Accu-Cheks  Hypoglycemia protocol in place      Fever and chills  Assessment & Plan  Fever and chills likely secondary to urinary infection/suspected focal pyelonephritis of the left kidney  See assessment and plan under sepsis              Subjective/Objective      Subjective:         Objective:  Vitals: Blood pressure 148/73, pulse (!) 116, temperature 99 6 °F (37 6 °C), temperature source Oral, resp  rate 19, height 6' (1 829 m), weight 115 kg (253 lb 1 4 oz), SpO2 96 %  ,Body mass index is 34 32 kg/m²         Intake/Output Summary (Last 24 hours) at 7/31/2020 2012  Last data filed at 7/31/2020 1832      Gross per 24 hour   Intake 100 ml   Output    Net 100 ml             Invasive Devices            Peripheral Intravenous Line                     Peripheral IV 07/31/20 Right Antecubital less than 1 day                     Physical Exam:      Lab, Imaging and other studies: reviewed     VTE Pharmacologic Prophylaxis: Enoxaparin (Lovenox)  VTE Mechanical Prophylaxis: sequential compression device

## 2020-08-01 NOTE — ASSESSMENT & PLAN NOTE
Sepsis secondary to acute cystitis versus pyelonephritis  Urine and blood cultures in progress  Patient p o  Intake okay  Lactic acidosis resolved  Discontinue IV fluids  Day 2 of ceftriaxone 100 mg q d    Continue monitor temperature and heart rate

## 2020-08-01 NOTE — UTILIZATION REVIEW
Initial Clinical Review    Admission: Date/Time/Statement: Admission Orders (From admission, onward)     Ordered        07/31/20 1727  Inpatient Admission (expected length of stay for this patient Order details is greater than two midnights)  Once                   Orders Placed This Encounter   Procedures    Inpatient Admission (expected length of stay for this patient Order details is greater than two midnights)     Standing Status:   Standing     Number of Occurrences:   1     Order Specific Question:   Admitting Physician     Answer:   Angel Garibay [[id number]]     Order Specific Question:   Level of Care     Answer:   Med Surg [16]     Order Specific Question:   Bed request comments     Answer:   Tele     Order Specific Question:   Estimated length of stay     Answer:   More than 2 Midnights     Order Specific Question:   Certification     Answer:   I certify that inpatient services are medically necessary for this patient for a duration of greater than two midnights  See H&P and MD Progress Notes for additional information about the patient's course of treatment  ED Arrival Information     Expected Arrival Acuity Means of Arrival Escorted By Service Admission Type    - 7/31/2020 13:35 Urgent Walk-In Self General Medicine Urgent    Arrival Complaint    Fever        Chief Complaint   Patient presents with    Fever - 76 years or older     pt presents to ed with fever that started this am     Assessment/Plan:   66 yof ambulatory to er from home c/o fever 101 8, no obvious focal cause  Hx Abdominal aortic aneurysm (AAA) )5/26/2017, Anxiety, BPH, CDK, stage II 08/15/2019, COPD, HTN, DM, Diverticulosis of large intestine without perforation or abscess without bleeding  Presents febrile & tachycardic with chills, chest tightness, dysuria, abd distention  Family reports patient has had early satiety and a unintended weight loss of 12 lb over the past 6 months   Admission work-up showing leukocytosis, elevated lactic acid, +u/a, cystitis & suspected pancreatic mass on ct scan  Admitted to inpatient status for sepsis 2nd uti  Started on ivabt & ivf, surgery consulted for pancreatic mass      ED Triage Vitals   Temperature Pulse Respirations Blood Pressure SpO2   07/31/20 1344 07/31/20 1344 07/31/20 1344 07/31/20 1344 07/31/20 1344   (!) 101 8 °F (38 8 °C) 105 18 155/77 95 %      Temp Source Heart Rate Source Patient Position - Orthostatic VS BP Location FiO2 (%)   07/31/20 1344 07/31/20 1344 07/31/20 1602 07/31/20 1344 --   Oral Monitor Lying Right arm       Pain Score       07/31/20 1344       No Pain        Wt Readings from Last 1 Encounters:   07/31/20 115 kg (253 lb 1 4 oz)     Additional Vital Signs:   07/31/20 1602  100 3 °F (37 9 °C)  106Abnormal   18  154/62  96 %    Lying   07/31/20 1528  100 1 °F (37 8 °C)  99  18  141/75  99 %  None (Room air)     07/31/20 1444    103  18  148/71  97 %  None (Room air)     07/31/20 1344  101 8 °F (38 8 °C) po  Abnormal   105  18  155/77  95 %  None (Room air)     Pertinent Labs/Diagnostic Test Results:   Results from last 7 days   Lab Units 07/31/20  1454   SARS-COV-2  Negative     Results from last 7 days   Lab Units 07/31/20  1359   WBC Thousand/uL 13 67*   HEMOGLOBIN g/dL 14 7   HEMATOCRIT % 44 1   PLATELETS Thousands/uL 123*   NEUTROS ABS Thousands/µL 10 86*     Results from last 7 days   Lab Units 07/31/20  1359   SODIUM mmol/L 134   POTASSIUM mmol/L 3 7   CHLORIDE mmol/L 98   CO2 mmol/L 25   ANION GAP mmol/L 11   BUN mg/dL 20   CREATININE mg/dL 1 05   EGFR ml/min/1 73sq m 68   CALCIUM mg/dL 9 7     Results from last 7 days   Lab Units 07/31/20  1359   AST U/L 9*   ALT U/L 10   ALK PHOS U/L 54 4   TOTAL PROTEIN g/dL 7 8   ALBUMIN g/dL 4 4   TOTAL BILIRUBIN mg/dL 1 14     Results from last 7 days   Lab Units 07/31/20  2210   POC GLUCOSE mg/dl 176*     Results from last 7 days   Lab Units 07/31/20  1359   GLUCOSE RANDOM mg/dL 158*     Results from last 7 days   Lab Units 07/31/20  1359   PROTIME seconds 10 6   INR  1 00   PTT seconds 29     Results from last 7 days   Lab Units 07/31/20  1359   PROCALCITONIN ng/ml <0 05     Results from last 7 days   Lab Units 07/31/20  1535 07/31/20  1359   LACTIC ACID mmol/L 0 7 2 5*     Results from last 7 days   Lab Units 07/31/20  1400   CLARITY UA  Slightly Cloudy*   COLOR UA  Yellow   SPEC GRAV UA  1 020   PH UA  6 0   GLUCOSE UA mg/dl Negative   KETONES UA mg/dl Negative   BLOOD UA  1+*   PROTEIN UA mg/dl Trace*   NITRITE UA  Positive*   BILIRUBIN UA  Negative   UROBILINOGEN UA E U /dl 0 2   LEUKOCYTES UA  2+*   WBC UA /hpf 30-50*   RBC UA /hpf 4-10*   BACTERIA UA /hpf Innumerable*   EPITHELIAL CELLS WET PREP /hpf Occasional     Results from last 7 days   Lab Units 07/31/20  1359   BLOOD CULTURE  Received in Microbiology Lab  Culture in Progress  Received in Microbiology Lab  Culture in Progress  7/31  Ct a/p=1  Hypoattenuating lesion within the pancreatic tail resulting in marked attenuation/ occlusion of the distal splenic vein with multiple adjacent varices, suspicious for pancreatic malignancy  Multiple omental nodules are present, consistent with peritoneal metastases  2   Bladder wall findings suggestive of cystitis  Additionally, there is area of ill-defined hypoattenuation within the upper pole of the left kidney, which may represent focal pyelonephritis  Recommend follow-up with either renal protocol CT or MRI after acute issues are resolved for reevaluation    3   Tree-in-bud nodules and bronchiectasis within the lung bases, likely related to acute or chronic small airways infection  Cxr=Mild basilar interstitial prominence    ED Treatment:   Medication Administration from 07/31/2020 1335 to 07/31/2020 2023       Date/Time Order Dose Route Action     07/31/2020 1422 sodium chloride 0 9 % bolus 1,000 mL 1,000 mL Intravenous New Bag     07/31/2020 1400 sodium chloride 0 9 % bolus 1,000 mL 1,000 mL Intravenous New Bag 07/31/2020 1525 sodium chloride 0 9 % infusion 250 mL/hr Intravenous Restarted     07/31/2020 1515 sodium chloride 0 9 % infusion 250 mL/hr Intravenous New Bag     07/31/2020 1431 acetaminophen (TYLENOL) tablet 975 mg 975 mg Oral Given     07/31/2020 1513 levofloxacin (LEVAQUIN) IVPB (premix) 500 mg 100 mL 500 mg Intravenous New Bag     07/31/2020 1517 iohexol (OMNIPAQUE) 350 MG/ML injection (MULTI-DOSE) 100 mL 100 mL Intravenous Given     07/31/2020 1948 cefTRIAXone (ROCEPHIN) IVPB (premix) 1,000 mg 50 mL 1,000 mg Intravenous New Bag        Past Medical History:   Diagnosis Date    Abdominal aortic aneurysm (AAA) ([facility] ) 05/26/2017    Per [place]     Anxiety state 05/26/2017    Per [place]     Benign prostatic hyperplasia     Benign prostatic hyperplasia without lower urinary tract symptoms     Body mass index 33 0-33 9, adult 08/15/2019    Per [place]     Chronic kidney disease, stage II (mild) 08/15/2019    Per [place]     Chronic kidney disease, unspecified 05/26/2017    Per [place]     Chronic obstructive pulmonary disease (COPD) ([facility] ) 09/23/2019    Per Arianna Macario     Diverticulosis of large intestine without perforation or abscess without bleeding 04/18/2019    Per Arianna Macario     Essential hypertension 05/26/2017    Per Arianna Macario     Hearing loss, bilateral 02/26/2017    Per [place]     Mixed hyperlipidemia 05/26/2017    Per [place]     Obesity, unspecified 05/26/2017    Per Arianna Macario     Type 2 diabetes mellitus without complication ([facility] ) 16/43/1560    Per Arianna Macario      Present on Admission:   Sepsis ([facility] )   Pancreatic mass   Type 2 diabetes mellitus with hyperglycemia (HCC)   Fever and chills   Obesity (BMI 30 0-34  9)    Admitting Diagnosis: Pyelonephritis [N12]  Pancreatic mass [K86 89]  Fever [R50 9]  Sepsis ([facility] ) [A41 9]  Age/Sex: 66 y o  male  Admission Orders:  Consult surgery  accuchecks with coverage scale    Scheduled Medications:  amLODIPine 10 mg Oral Daily   cefTRIAXone 1,000 mg Intravenous Q24H enoxaparin 40 mg Subcutaneous Daily   insulin lispro 1-5 Units Subcutaneous TID AC   losartan 100 mg Oral Daily     Continuous IV Infusions:  sodium chloride 125 mL/hr Intravenous Continuous     PRN Meds:  acetaminophen 650 mg Oral Q6H PRN   aluminum-magnesium hydroxide-simethicone 30 mL Oral Q6H PRN   ondansetron 4 mg Intravenous Q6H PRN     Network Utilization Review Department  [email or web address]  ATTENTION: Please call with any questions or concerns to [phone] and carefully listen to the prompts so that you are directed to the right person  All voicemails are confidential   Tata Mora all requests for admission clinical reviews, approved or denied determinations and any other requests to dedicated fax number below belonging to the campus where the patient is receiving treatment   List of dedicated fax numbers for the Facilities:  [address] DENIALS (Administrative/Medical Necessity) [phone]   [address] (Maternity/NICU/Pediatrics) [phone]   Keyona Varner [phone]   Haim Denis [phone]   [facility] [phone]   Arelis Dalton [phone]   [address] [phone]   [facility]  [phone]   [address] [facility]  [address] [facility] [address] [facility] [phone]

## 2020-08-01 NOTE — UTILIZATION REVIEW
Notification of Inpatient Admission/Inpatient Authorization Request   This is a Notification of Inpatient Admission for [address]  Be advised that this patient was admitted to our facility under Inpatient Status  Contact Everette Rowland at [phone] for additional admission information  [address] [facility] DEPT  DEDICATED [phone]  Patient Name:   Estrellita Leonard   YOB: 1945       [address]   [address]:   [facility]  Tax ID: [id number]  NPI: [id number] Attending Provider/NPI:  Phone:  Address: Cj Cunha [[id number]]  [phone]  Same as the facility   Place of Service Code: 24 Place of Service Name:  [facility]   Start Date: 7/31/20 1726 Discharge Date & Time: No discharge date for patient encounter  Type of Admission: Inpatient Status Discharge Disposition   (if discharged): Final discharge disposition not confirmed   Patient Diagnoses: Pyelonephritis [N12]  Pancreatic mass [K86 89]  Fever [R50 9]  Sepsis ([address] ) [A41 9]   Orders: Admission Orders (From admission, onward)   Ordered     07/31/20 1727 Inpatient Admission (expected length of stay for this patient Order details is greater than two midnights)  Once                 Assigned Utilization Review Contact: Everette Rowland  Utilization   Network Utilization Review Department  Phone: [phone]; Fax [phone]  Email: [email or web address]   ATTENTION PAYERS: Please call the assigned Utilization  directly with any questions or concerns ALL voicemails in the department are confidential  Send all requests for admission clinical reviews, approved or denied determinations and any other requests to dedicated fax number belonging to the campus where the patient is receiving treatment

## 2020-08-01 NOTE — PLAN OF CARE
Problem: PAIN - ADULT  Goal: Verbalizes/displays adequate comfort level or baseline comfort level  Description  Interventions:  - Encourage patient to monitor pain and request assistance  - Assess pain using appropriate pain scale  - Administer analgesics based on type and severity of pain and evaluate response  - Implement non-pharmacological measures as appropriate and evaluate response  - Consider cultural and social influences on pain and pain management  - Notify physician/advanced practitioner if interventions unsuccessful or patient reports new pain  Outcome: Progressing     Problem: INFECTION - ADULT  Goal: Absence or prevention of progression during hospitalization  Description  INTERVENTIONS:  - Assess and monitor for signs and symptoms of infection  - Monitor lab/diagnostic results  - Monitor all insertion sites, i e  indwelling lines, tubes, and drains  - Monitor endotracheal if appropriate and nasal secretions for changes in amount and color  - [place] appropriate cooling/warming therapies per order  - Administer medications as ordered  - Instruct and encourage patient and family to use good hand hygiene technique  - Identify and instruct in appropriate isolation precautions for identified infection/condition  Outcome: Progressing  Goal: Absence of fever/infection during neutropenic period  Description  INTERVENTIONS:  - Monitor WBC    Outcome: Progressing     Problem: SAFETY ADULT  Goal: Patient will remain free of falls  Description  INTERVENTIONS:  - Assess patient frequently for physical needs  -  Identify cognitive and physical deficits and behaviors that affect risk of falls    -  [place] fall precautions as indicated by assessment   - Educate patient/family on patient safety including physical limitations  - Instruct patient to call for assistance with activity based on assessment  - Modify environment to reduce risk of injury  - Consider OT/PT consult to assist with strengthening/mobility  Outcome: Progressing  Goal: Maintain or return to baseline ADL function  Description  INTERVENTIONS:  -  Assess patient's ability to carry out ADLs; assess patient's baseline for ADL function and identify physical deficits which impact ability to perform ADLs (bathing, care of mouth/teeth, toileting, grooming, dressing, etc )  - Assess/evaluate cause of self-care deficits   - Assess range of motion  - Assess patient's mobility; develop plan if impaired  - Assess patient's need for assistive devices and provide as appropriate  - Encourage maximum independence but intervene and supervise when necessary  - Involve family in performance of ADLs  - Assess for home care needs following discharge   - Consider OT consult to assist with ADL evaluation and planning for discharge  - Provide patient education as appropriate  Outcome: Progressing  Goal: Maintain or return mobility status to optimal level  Description  INTERVENTIONS:  - Assess patient's baseline mobility status (ambulation, transfers, stairs, etc )    - Identify cognitive and physical deficits and behaviors that affect mobility  - Identify mobility aids required to assist with transfers and/or ambulation (gait belt, sit-to-stand, lift, walker, cane, etc )  - [place] fall precautions as indicated by assessment  - Record patient progress and toleration of activity level on Mobility SBAR; progress patient to next Phase/Stage  - Instruct patient to call for assistance with activity based on assessment  - Consider rehabilitation consult to assist with strengthening/weightbearing, etc   Outcome: Progressing     Problem: DISCHARGE PLANNING  Goal: Discharge to home or other facility with appropriate resources  Description  INTERVENTIONS:  - Identify barriers to discharge w/patient and caregiver  - Arrange for needed discharge resources and transportation as appropriate  - Identify discharge learning needs (meds, wound care, etc )  - Arrange for interpretive services to assist at discharge as needed  - Refer to Case Management Department for coordinating discharge planning if the patient needs post-hospital services based on physician/advanced practitioner order or complex needs related to functional status, cognitive ability, or social support system  Outcome: Progressing     Problem: Knowledge Deficit  Goal: Patient/family/caregiver demonstrates understanding of disease process, treatment plan, medications, and discharge instructions  Description  Complete learning assessment and assess knowledge base    Interventions:  - Provide teaching at level of understanding  - Provide teaching via preferred learning methods  Outcome: Progressing

## 2020-08-01 NOTE — PROGRESS NOTES
Progress Note - Dell Councilman 1945, 66 y o  male MRN: [id number]    Unit/Bed#: -[place] Encounter: [id number]    Primary Care Provider: Angelika Collier MD   Date and time admitted to hospital: 7/31/2020  1:39 PM        * Sepsis [facility])  Assessment & Plan  Sepsis secondary to acute cystitis versus pyelonephritis  Urine and blood cultures in progress  Patient p o  Intake okay  Lactic acidosis resolved  Discontinue IV fluids  Day 2 of ceftriaxone 100 mg q d  Continue monitor temperature and heart rate    Fever and chills  Assessment & Plan  Fever spikes in the morning at 101 4 F  No chills  See assessment and plan under sepsis  Pancreatic mass  Assessment & Plan  History of unintentional weight loss of 12 lb over the past 6 months  CT scan of the abdomen with contrast shows lesion in the pancreatic a and omental nodules suspicious for peritoneal metastasis  General surgery consult has been placed  Plan to have biopsy outpatient once infection is controlled    Type 2 diabetes mellitus with hyperglycemia [facility])  Assessment & Plan  No results found for: HGBA1C    Recent Labs     07/31/20  2210 08/01/20  0808 08/01/20  1215 08/01/20  1613   POCGLU 176* 149* 169* 138       Blood Sugar Average: Last 72 hrs:  (P) 158  Known diabetic on metformin 500 mg extended release once in the morning  Unknown HbA1c patient reports compliance to medications diet and exercise  Reports home glucose monitoring with fasting levels of 150s  Recheck HGB A1c in the morning    Will hold the metformin and will start patient on sliding scale  Monitor Blood glucose          VTE Pharmacologic Prophylaxis:   Pharmacologic: Enoxaparin (Lovenox)  Mechanical VTE Prophylaxis in Place: Yes    Discussions with Specialists or Other Care Team Provider: No    Education and Discussions with Family / Patient: patient and daughter    Current Length of Stay: 1 day(s)    Current Patient Status: Inpatient     Discharge Plan / Estimated Discharge Date: to be determined    Code Status: Level 1 - Full Code      Subjective:   Patient feels weak and complained bilateral mild flank pain  No dysuria or increased frequency  Drinking and eating is okay  Objective:     Vitals:   Temp (24hrs), Av 5 °F (37 5 °C), Min:97 6 °F (36 4 °C), Max:101 4 °F (38 6 °C)    Temp:  [97 6 °F (36 4 °C)-101 4 °F (38 6 °C)] 99 6 °F (37 6 °C)  HR:  [] 116  Resp:  [18-20] 18  BP: (130-151)/(70-81) 134/70  SpO2:  [95 %-96 %] 96 %  Body mass index is 33 49 kg/m²  Input and Output Summary (last 24 hours): Intake/Output Summary (Last 24 hours) at 2020 1647  Last data filed at 2020 1601  Gross per 24 hour   Intake 580 ml   Output 1675 ml   Net -1095 ml       Physical Exam:     Physical Exam   Constitutional: He is oriented to person, place, and time  He appears well-developed and well-nourished  No distress  HENT:   Head: Normocephalic  Mouth/Throat: No oropharyngeal exudate  Eyes: Pupils are equal, round, and reactive to light  Neck: Normal range of motion  No JVD present  Cardiovascular: Normal rate and regular rhythm  No murmur heard  Pulmonary/Chest: Effort normal and breath sounds normal  No respiratory distress  He has no wheezes  He has no rales  He exhibits no tenderness  Abdominal: Soft  Bowel sounds are normal  He exhibits no distension and no mass  There is tenderness (bilateral flanks (mild))  There is no rebound and no guarding  Musculoskeletal: Normal range of motion  He exhibits no edema or tenderness  Neurological: He is alert and oriented to person, place, and time  Skin: Skin is warm  He is diaphoretic  Psychiatric: He has a normal mood and affect             Additional Data:     Labs:    Results from last 7 days   Lab Units 20  0459 20  1359   WBC Thousand/uL 12 64* 13 67*   HEMOGLOBIN g/dL 12 4 14 7   HEMATOCRIT % 37 6 44 1   PLATELETS Thousands/uL 98* 123*   NEUTROS PCT %  --  79*   LYMPHS PCT %  --  12*   MONOS PCT %  --  8   EOS PCT %  --  1     Results from last 7 days   Lab Units 07/31/20  1359   POTASSIUM mmol/L 3 7   CHLORIDE mmol/L 98   CO2 mmol/L 25   BUN mg/dL 20   CREATININE mg/dL 1 05   CALCIUM mg/dL 9 7   ALK PHOS U/L 54 4   ALT U/L 10   AST U/L 9*     Results from last 7 days   Lab Units 07/31/20  1359   INR  1 00       * I Have Reviewed All Lab Data Listed Above  * Additional Pertinent Lab Tests Reviewed: Leonard[facility] 64 Admission Reviewed    Imaging:    Imaging Reports Reviewed Today Include: No  Imaging Personally Reviewed by Myself Includes:  CT    Recent Cultures (last 7 days):     Results from last 7 days   Lab Units 07/31/20  1359   BLOOD CULTURE  Received in Microbiology Lab  Culture in Progress  Received in Microbiology Lab  Culture in Progress  Last 24 Hours Medication List:     Current Facility-Administered Medications:  acetaminophen 650 mg Oral Q6H PRN Hank Gomez MD    aluminum-magnesium hydroxide-simethicone 30 mL Oral Q6H PRN Hank Gomez MD    amLODIPine 10 mg Oral Daily Hank Gomez MD    cefTRIAXone 1,000 mg Intravenous Q24H Annette Chavez MD Last Rate: 1,000 mg (07/31/20 1948)   enoxaparin 40 mg Subcutaneous Daily Lacy Duarte MD    insulin lispro 1-5 Units Subcutaneous TID AC Hank Gomez MD    losartan 100 mg Oral Daily Hank Gomez MD    ondansetron 4 mg Intravenous Q6H PRN Lacy Duarte MD         Today, Patient Was Seen By: Allison Wilson MD    ** Please Note: This note has been constructed using a voice recognition system   **

## 2020-08-01 NOTE — ASSESSMENT & PLAN NOTE
No results found for: HGBA1C    Recent Labs     07/31/20  2210 08/01/20  0808 08/01/20  1215 08/01/20  1613   POCGLU 176* 149* 169* 138       Blood Sugar Average: Last 72 hrs:  (P) 158  Known diabetic on metformin 500 mg extended release once in the morning  Unknown HbA1c patient reports compliance to medications diet and exercise  Reports home glucose monitoring with fasting levels of 150s  Recheck HGB A1c in the morning    Will hold the metformin and will start patient on sliding scale  Monitor Blood glucose

## 2020-08-01 NOTE — ASSESSMENT & PLAN NOTE
History of unintentional weight loss of 12 lb over the past 6 months  CT scan of the abdomen with contrast shows lesion in the pancreatic a and omental nodules suspicious for peritoneal metastasis    General surgery on board  Plan to have biopsy outpatient once infection is controlled

## 2020-08-01 NOTE — CONSULTS
Consultation - General Surgery   Liyah De La Cruz 66 y o  male MRN: [id number]  Unit/Bed#: ED 14 Encounter: [id number]    Assessment/Plan     Assessment:  His current problem is 2 folds  The acute problem is of urinary tract infection for which he is getting treatment  The less acute problem is the mass in his pancreas which is highly suspicious of malignancy; especially due to presence of omental nodules which may be indicative of metastatic disease  Plan:  Treatment of UTI with antibiotics  Repeat labs tomorrow morning  For pancreatic mass he will need a full workup which  includes biopsy of the omental nodules and/or biopsy of the pancreatic mass for a start  This will obviously be done once the septic process has resolved  He should also be referred to see Oncology  History of Present Illness  this is a 78-year-old male patient who came to the ER because of fever  The fever started in the morning  He does not complain of any abdominal pain  No complaints of nausea or vomiting  Patient has been tolerating diet  He does complain of 10 lb weight loss in last 3 months  He also complains of having progressively reduced appetite in last few weeks  No complains of jaundice  No complaints of blood in stools  Patient has history of partial colectomy done in 2009 for diverticulitis  He also has history of open cholecystectomy done in the past   His last colonoscopy was 3 years ago and was done by Dr Annika mckinley  As per the patient that colonoscopy was normal       Inpatient consult to Acute Care Surgery  Consult performed by: Tomasa Mantilla MD  Consult ordered by: Luis M Fountain MD          Review of Systems   Constitutional: Positive for chills and fever  HENT: Negative  Eyes: Negative  Respiratory: Negative  Cardiovascular: Negative  Gastrointestinal: Negative  Endocrine: Negative  Genitourinary: Negative  Musculoskeletal: Negative  Skin: Negative  Allergic/Immunologic: Negative  Neurological: Negative  Hematological: Negative  Psychiatric/Behavioral: Negative  Historical Information   Past Medical History:   Diagnosis Date    Abdominal aortic aneurysm (AAA) ([facility] ) 05/26/2017    Per [place]     Anxiety state 05/26/2017    Per [place]     Benign prostatic hyperplasia     Benign prostatic hyperplasia without lower urinary tract symptoms     Body mass index 33 0-33 9, adult 08/15/2019    Per [place]     Chronic kidney disease, stage II (mild) 08/15/2019    Per [place]     Chronic kidney disease, unspecified 05/26/2017    Per [place]     Chronic obstructive pulmonary disease (COPD) ([facility] ) 09/23/2019    Per [facility]     Diverticulosis of large intestine without perforation or abscess without bleeding 04/18/2019    Per [facility]     Essential hypertension 05/26/2017    Per [facility]     Hearing loss, bilateral 02/26/2017    Per [place]     Mixed hyperlipidemia 05/26/2017    Per [place]     Obesity, unspecified 05/26/2017    Per [facility]     Type 2 diabetes mellitus without complication ([facility] ) 37/02/7654    Per [facility]      Past Surgical History:   Procedure Laterality Date    CHOLECYSTECTOMY      Per [facility]     COLECTOMY      removal of 8 inches of colon for diverticular disease   Per [facility]      Social History   Social History     Substance and Sexual Activity   Alcohol Use Yes    Comment: Occasional- Per [place]      Social History     Substance and Sexual Activity   Drug Use Never     E-Cigarette/Vaping     E-Cigarette/Vaping Substances     Social History     Tobacco Use   Smoking Status Former Smoker   Smokeless Tobacco Never Used     Family History:   Family History   Problem Relation Age of Onset    Diabetes Father        Meds/Allergies   all current active meds have been reviewed  No Known Allergies    Objective   First Vitals:   Blood Pressure: 155/77 (07/31/20 1344)  Pulse: 105 (07/31/20 1344)  Temperature: (!) 101 8 °F (38 8 °C) (07/31/20 1344)  Temp Source: Oral (07/31/20 1344)  Respirations: 18 (07/31/20 1344)  Height: 6' (182 9 cm) (07/31/20 1344)  Weight - Scale: 115 kg (253 lb 1 4 oz) (07/31/20 1344)  SpO2: 95 % (07/31/20 1344)    Current Vitals:   Blood Pressure: 148/73 (07/31/20 1838)  Pulse: (!) 116 (07/31/20 1838)  Temperature: 99 6 °F (37 6 °C) (07/31/20 1838)  Temp Source: Oral (07/31/20 1838)  Respirations: 19 (07/31/20 1838)  Height: 6' (182 9 cm) (07/31/20 1344)  Weight - Scale: 115 kg (253 lb 1 4 oz) (07/31/20 1344)  SpO2: 96 % (07/31/20 1838)      Intake/Output Summary (Last 24 hours) at 7/31/2020 2004  Last data filed at 7/31/2020 1832  Gross per 24 hour   Intake 100 ml   Output    Net 100 ml       Invasive Devices     Peripheral Intravenous Line            Peripheral IV 07/31/20 Right Antecubital less than 1 day                Physical Exam   Constitutional: He is oriented to person, place, and time  He appears well-developed and well-nourished  HENT:   Head: Normocephalic and atraumatic  Eyes: Pupils are equal, round, and reactive to light  Conjunctivae are normal    Neck: Normal range of motion  Neck supple  Cardiovascular: Normal rate, regular rhythm and normal heart sounds  Pulmonary/Chest: Effort normal and breath sounds normal    Abdominal: Soft  Bowel sounds are normal  He exhibits no mass  There is no tenderness  There is no rebound and no guarding  No hernia  Neurological: He is alert and oriented to person, place, and time  Skin: Skin is warm and dry  Psychiatric: He has a normal mood and affect  His behavior is normal  Judgment and thought content normal        Lab Results:   I have personally reviewed pertinent lab results    , CBC:   Lab Results   Component Value Date    WBC 13 67 (H) 07/31/2020    HGB 14 7 07/31/2020    HCT 44 1 07/31/2020    MCV 89 07/31/2020     (L) 07/31/2020    MCH 29 7 07/31/2020    MCHC 33 3 07/31/2020    RDW 13 6 07/31/2020    MPV 11 2 07/31/2020   , CMP:   Lab Results Component Value Date    SODIUM 134 07/31/2020    K 3 7 07/31/2020    CL 98 07/31/2020    CO2 25 07/31/2020    BUN 20 07/31/2020    CREATININE 1 05 07/31/2020    CALCIUM 9 7 07/31/2020    AST 9 (L) 07/31/2020    ALT 10 07/31/2020    ALKPHOS 54 4 07/31/2020    EGFR 68 07/31/2020   , Coagulation:   Lab Results   Component Value Date    INR 1 00 07/31/2020   , Urinalysis:   Lab Results   Component Value Date    COLORU Yellow 07/31/2020    CLARITYU Slightly Cloudy (A) 07/31/2020    SPECGRAV 1 020 07/31/2020    PHUR 6 0 07/31/2020    LEUKOCYTESUR 2+ (A) 07/31/2020    NITRITE Positive (A) 07/31/2020    GLUCOSEU Negative 07/31/2020    KETONESU Negative 07/31/2020    BILIRUBINUR Negative 07/31/2020    BLOODU 1+ (A) 07/31/2020   Imaging: I have personally reviewed pertinent reports  and I have personally reviewed pertinent films in PACS  EKG, Pathology, and Other Studies:   Counseling / Coordination of Care  Total floor / unit time spent today 30 minutes  Greater than 50% of total time was spent with the patient and / or family counseling and / or coordination of care

## 2020-08-02 PROBLEM — I10 HTN (HYPERTENSION): Status: ACTIVE | Noted: 2020-01-01

## 2020-08-02 PROBLEM — E87.6 HYPOKALEMIA: Status: ACTIVE | Noted: 2020-01-01

## 2020-08-02 NOTE — ASSESSMENT & PLAN NOTE
Sepsis likely secondary to pyelonephritis versus acute cystitis  On admission tachycardic, febrile, elevated lactic acid  Urinalysis-evidence of UTI with positive nitrate  Urine cultures-gram-negative rods    Blood cultures negative so far  Day 3 of antibiotics  WBC count downtrending  Patient febrile since  past 24 hours with T-max of 99 6°

## 2020-08-02 NOTE — PLAN OF CARE

## 2020-08-02 NOTE — ASSESSMENT & PLAN NOTE
No results found for: HGBA1C    Recent Labs     08/01/20  0808 08/01/20  1215 08/01/20  1613 08/01/20 2027   POCGLU 149* 169* 138 190*       Blood Sugar Average: Last 72 hrs:  (P) 164 4   Continue sliding scale of insulin

## 2020-08-02 NOTE — PROGRESS NOTES
Progress Note - Mary Amado 1945, 66 y o  male MRN: [id number]    Unit/Bed#: -[place] Encounter: [id number]    Primary Care Provider: Lin Galeas MD   Date and time admitted to hospital: 7/31/2020  1:39 PM        * Sepsis [facility])  Assessment & Plan  Sepsis likely secondary to pyelonephritis versus acute cystitis  On admission tachycardic, febrile, elevated lactic acid  Urinalysis-evidence of UTI with positive nitrate  Urine cultures-gram-negative rods  Blood cultures negative so far  Day 3 of antibiotics  WBC count downtrending  Patient febrile since  past 24 hours with T-max of 99 6°    HTN (hypertension)  Assessment & Plan  Continue amlodipine and losartan    Hypokalemia  Assessment & Plan  Will replace in accordance    Type 2 diabetes mellitus with hyperglycemia [facility])  Assessment & Plan  No results found for: HGBA1C    Recent Labs     08/01/20  0808 08/01/20  1215 08/01/20  1613 08/01/20 2027   POCGLU 149* 169* 138 190*       Blood Sugar Average: Last 72 hrs:  (P) 164 4   Continue sliding scale of insulin    Pancreatic mass  Assessment & Plan  CT on admission-pancreatic mass  Patient seen by surgical team  Patient to follow-up outpatient  Fever and chills  Assessment & Plan  No febrile episodes since past 24 hours    DVT prophylaxis-Lovenox  ----------------------------------------------------------------------------------------  HPI/24hr events:  No overnight events reported    Disposition:  Continue inpatient management  Code Status: Level 1 - Full Code  ---------------------------------------------------------------------------------------  SUBJECTIVE  Patient seen at bedside today morning  Appears in no acute apparent distress  Patient is not oriented to time place and person  Patient reports he does have increased frequency of urination but it is much better since presentation  No abdominal pain, no flank pain, no fevers, no chills    No overnight events reported    Review of Systems Constitutional: Negative for activity change and appetite change  HENT: Negative for congestion  Eyes: Negative for discharge  Respiratory: Negative for apnea and chest tightness  Cardiovascular: Negative for chest pain and leg swelling  Gastrointestinal: Negative for abdominal distention and abdominal pain  Endocrine: Negative for cold intolerance and heat intolerance  Genitourinary: Positive for dysuria and frequency  Musculoskeletal: Negative for arthralgias  Neurological: Negative for dizziness  Hematological: Negative for adenopathy  Review of systems was reviewed and negative unless stated above in HPI/24-hour events   ---------------------------------------------------------------------------------------  OBJECTIVE    Vitals   Vitals:    20 0531 20 0555 20 0732   BP: 144/87 127/78  142/82   BP Location: Left arm Left arm  Right arm   Pulse: (!) 108 93  94   Resp: 18 18  18   Temp: 98 3 °F (36 8 °C) 99 2 °F (37 3 °C)  98 8 °F (37 1 °C)   TempSrc: Oral Tympanic  Tympanic   SpO2:  97%  96%   Weight:  111 kg (245 lb) 111 kg (245 lb)    Height:         Temp (24hrs), Av 1 °F (37 3 °C), Min:98 3 °F (36 8 °C), Max:99 6 °F (37 6 °C)  Current: Temperature: 98 8 °F (37 1 °C)          Respiratory:  SpO2: SpO2: 96 %       Invasive/non-invasive ventilation settings   Respiratory    Lab Data (Last 4 hours)    None         O2/Vent Data (Last 4 hours)    None                Physical Exam  Constitutional:       Appearance: He is well-developed and well-nourished  HENT:      Head: Normocephalic  Eyes:      Pupils: Pupils are equal, round, and reactive to light  Neck:      Musculoskeletal: Normal range of motion  Cardiovascular:      Rate and Rhythm: Regular rhythm  Heart sounds: S1 normal and S2 normal    Pulmonary:      Effort: Pulmonary effort is normal       Breath sounds: Normal breath sounds     Abdominal:      General: Bowel sounds are normal  Palpations: Abdomen is soft  Musculoskeletal: Normal range of motion  Skin:     General: Skin is warm  Neurological:      Mental Status: He is alert and oriented to person, place, and time  Laboratory and Diagnostics:  Results from last 7 days   Lab Units 08/02/20  0532 08/01/20  0459 07/31/20  1359   WBC Thousand/uL 10 35* 12 64* 13 67*   HEMOGLOBIN g/dL 12 0 12 4 14 7   HEMATOCRIT % 35 8* 37 6 44 1   PLATELETS Thousands/uL 92* 98* 123*   NEUTROS PCT % 71  --  79*   MONOS PCT % 11  --  8     Results from last 7 days   Lab Units 08/02/20  0532 07/31/20  1359   SODIUM mmol/L 133 134   POTASSIUM mmol/L 3 4* 3 7   CHLORIDE mmol/L 100 98   CO2 mmol/L 24 25   ANION GAP mmol/L 9 11   BUN mg/dL 12 20   CREATININE mg/dL 0 92 1 05   CALCIUM mg/dL 8 7 9 7   GLUCOSE RANDOM mg/dL 177* 158*   ALT U/L  --  10   AST U/L  --  9*   ALK PHOS U/L  --  54 4   ALBUMIN g/dL  --  4 4   TOTAL BILIRUBIN mg/dL  --  1 14     Results from last 7 days   Lab Units 08/01/20  0459   MAGNESIUM mg/dL 1 4*      Results from last 7 days   Lab Units 07/31/20  1359   INR  1 00   PTT seconds 29          Results from last 7 days   Lab Units 07/31/20  1535 07/31/20  1359   LACTIC ACID mmol/L 0 7 2 5*     ABG:    VBG:    Results from last 7 days   Lab Units 07/31/20  1359   PROCALCITONIN ng/ml <0 05       Micro  Results from last 7 days   Lab Units 07/31/20  1400 07/31/20  1359   BLOOD CULTURE   --  No Growth at 24 hrs  No Growth at 24 hrs  URINE CULTURE  >100,000 cfu/ml Gram Negative Aidan Enteric Like*  --        Imaging: I have personally reviewed pertinent reports  Intake and Output  I/O       07/31 0701 - 08/01 0700 08/01 0701 - 08/02 0700    P  O  480 500    IV Piggyback 100     Total Intake(mL/kg) 580 (5) 500 (4 5)    Urine (mL/kg/hr) 800 1975 (0 7)    Total Output 800 1975    Net -220 -7643                Height and Weights   Height: 6'  IBW: 77 6 kg  Body mass index is 33 23 kg/m²    Weight (last 2 days)     Date/Time   Weight 08/02/20 0555   111 (245)    08/02/20 0531   111 (245)    08/01/20 1027   112 (246 92)    07/31/20 1344   115 (253 09)                Nutrition       Diet Orders   (From admission, onward)             Start     Ordered    07/31/20 2116  Diet Jack/CHO Controlled; Consistent Carbohydrate Diet Level 2 (5 carb servings/75 grams CHO/meal)  Diet effective now     Question Answer Comment   Diet Type Jack/CHO Controlled    Jack/CHO Controlled Consistent Carbohydrate Diet Level 2 (5 carb servings/75 grams CHO/meal)    RD to adjust diet per protocol? Yes        07/31/20 2115                  Active Medications  Scheduled Meds:acetaminophen, 650 mg, Oral, Q6H PRN, Hank Gomez MD  aluminum-magnesium hydroxide-simethicone, 30 mL, Oral, Q6H PRN, Hank Gomez MD  amLODIPine, 10 mg, Oral, Daily, Hank Gomez MD  cefTRIAXone, 1,000 mg, Intravenous, Q24H, Rosalio MD Zahira, Last Rate: 1,000 mg (08/01/20 1940)  enoxaparin, 40 mg, Subcutaneous, Daily, Hank Gomez MD  insulin lispro, 1-5 Units, Subcutaneous, TID AC, Hank Gomez MD  losartan, 100 mg, Oral, Daily, Hank Gomez MD  ondansetron, 4 mg, Intravenous, Q6H PRN, Hank Gomez MD      Continuous Infusions:     PRN Meds:   acetaminophen, 650 mg, Q6H PRN  aluminum-magnesium hydroxide-simethicone, 30 mL, Q6H PRN  ondansetron, 4 mg, Q6H PRN        Invasive Devices Review  Invasive Devices     Peripheral Intravenous Line            Peripheral IV 07/31/20 Right Antecubital 1 day                Rationale for remaining devices:  Intravenous antibiotics  ---------------------------------------------------------------------------------------  ---------------------------------------------------------------------------------------      Claudeen Cobble, MD      Portions of the record may have been created with voice recognition software    Occasional wrong word or "sound a like" substitutions may have occurred due to the inherent limitations of voice recognition software    Read the chart carefully and recognize, using context, where substitutions have occurred

## 2020-08-02 NOTE — ASSESSMENT & PLAN NOTE
CT on admission-pancreatic mass    Patient seen by surgical team  Patient to follow-up outpatient with surgery and Gastroenterology

## 2020-08-03 PROBLEM — B96.29 UTI DUE TO EXTENDED-SPECTRUM BETA LACTAMASE (ESBL) PRODUCING ESCHERICHIA COLI: Status: ACTIVE | Noted: 2020-01-01

## 2020-08-03 PROBLEM — N39.0 UTI DUE TO EXTENDED-SPECTRUM BETA LACTAMASE (ESBL) PRODUCING ESCHERICHIA COLI: Status: ACTIVE | Noted: 2020-01-01

## 2020-08-03 PROBLEM — Z16.12 UTI DUE TO EXTENDED-SPECTRUM BETA LACTAMASE (ESBL) PRODUCING ESCHERICHIA COLI: Status: ACTIVE | Noted: 2020-01-01

## 2020-08-03 NOTE — ASSESSMENT & PLAN NOTE
Lab Results   Component Value Date    HGBA1C 6 5 (H) 08/02/2020       Recent Labs     08/02/20  1112 08/02/20  1647 08/02/20  2203 08/03/20  0711   POCGLU 273* 191* 145* 156*       Blood Sugar Average: Last 72 hrs:  (P) 176 7   Continue sliding scale of insulin

## 2020-08-03 NOTE — PLAN OF CARE
Problem: PAIN - ADULT  Goal: Verbalizes/displays adequate comfort level or baseline comfort level  Description: Interventions:  - Encourage patient to monitor pain and request assistance  - Assess pain using appropriate pain scale  - Administer analgesics based on type and severity of pain and evaluate response  - Implement non-pharmacological measures as appropriate and evaluate response  - Consider cultural and social influences on pain and pain management  - Notify physician/advanced practitioner if interventions unsuccessful or patient reports new pain  Outcome: Progressing     Problem: INFECTION - ADULT  Goal: Absence or prevention of progression during hospitalization  Description: INTERVENTIONS:  - Assess and monitor for signs and symptoms of infection  - Monitor lab/diagnostic results  - Monitor all insertion sites, i e  indwelling lines, tubes, and drains  - Monitor endotracheal if appropriate and nasal secretions for changes in amount and color  - [place] appropriate cooling/warming therapies per order  - Administer medications as ordered  - Instruct and encourage patient and family to use good hand hygiene technique  - Identify and instruct in appropriate isolation precautions for identified infection/condition  Outcome: Progressing  Goal: Absence of fever/infection during neutropenic period  Description: INTERVENTIONS:  - Monitor WBC    Outcome: Progressing     Problem: SAFETY ADULT  Goal: Patient will remain free of falls  Description: INTERVENTIONS:  - Assess patient frequently for physical needs  -  Identify cognitive and physical deficits and behaviors that affect risk of falls    -  [place] fall precautions as indicated by assessment   - Educate patient/family on patient safety including physical limitations  - Instruct patient to call for assistance with activity based on assessment  - Modify environment to reduce risk of injury  - Consider OT/PT consult to assist with strengthening/mobility  Outcome: Progressing  Goal: Maintain or return to baseline ADL function  Description: INTERVENTIONS:  -  Assess patient's ability to carry out ADLs; assess patient's baseline for ADL function and identify physical deficits which impact ability to perform ADLs (bathing, care of mouth/teeth, toileting, grooming, dressing, etc )  - Assess/evaluate cause of self-care deficits   - Assess range of motion  - Assess patient's mobility; develop plan if impaired  - Assess patient's need for assistive devices and provide as appropriate  - Encourage maximum independence but intervene and supervise when necessary  - Involve family in performance of ADLs  - Assess for home care needs following discharge   - Consider OT consult to assist with ADL evaluation and planning for discharge  - Provide patient education as appropriate  Outcome: Progressing  Goal: Maintain or return mobility status to optimal level  Description: INTERVENTIONS:  - Assess patient's baseline mobility status (ambulation, transfers, stairs, etc )    - Identify cognitive and physical deficits and behaviors that affect mobility  - Identify mobility aids required to assist with transfers and/or ambulation (gait belt, sit-to-stand, lift, walker, cane, etc )  - [place] fall precautions as indicated by assessment  - Record patient progress and toleration of activity level on Mobility SBAR; progress patient to next Phase/Stage  - Instruct patient to call for assistance with activity based on assessment  - Consider rehabilitation consult to assist with strengthening/weightbearing, etc   Outcome: Progressing     Problem: DISCHARGE PLANNING  Goal: Discharge to home or other facility with appropriate resources  Description: INTERVENTIONS:  - Identify barriers to discharge w/patient and caregiver  - Arrange for needed discharge resources and transportation as appropriate  - Identify discharge learning needs (meds, wound care, etc )  - Arrange for interpretive services to assist at discharge as needed  - Refer to Case Management Department for coordinating discharge planning if the patient needs post-hospital services based on physician/advanced practitioner order or complex needs related to functional status, cognitive ability, or social support system  Outcome: Progressing     Problem: Knowledge Deficit  Goal: Patient/family/caregiver demonstrates understanding of disease process, treatment plan, medications, and discharge instructions  Description: Complete learning assessment and assess knowledge base    Interventions:  - Provide teaching at level of understanding  - Provide teaching via preferred learning methods  Outcome: Progressing

## 2020-08-03 NOTE — ASSESSMENT & PLAN NOTE
Sepsis likely secondary to pyelonephritis versus acute cystitis  On admission tachycardic, febrile, elevated lactic acid  Urinalysis-evidence of UTI with positive nitrate  Urine cultures-ESBL E coli  Blood cultures negative so far  Day 2 of meropenem  Will switch to ertapenem from tomorrow  Patient afebrile since past 24 hrs

## 2020-08-03 NOTE — ASSESSMENT & PLAN NOTE
Urine culture>esbl ecoli  Started on meropenem  Infectious disease consult Quality 131: Pain Assessment And Follow-Up: Pain assessment using a standardized tool is documented as negative, no follow-up plan required Additional Notes: Pain intensity 0/10 Quality 130: Documentation Of Current Medications In The Medical Record: Current Medications Documented Detail Level: Generalized Quality 474: Zoster Vaccination Status: Shingrix vaccine was not administered for reasons documented by clinician (e.g. patient administered vaccine other than Shingrix, patient allergy or other medical reasons, patient declined or other patient reasons, vaccine not available or other system reasons)

## 2020-08-03 NOTE — SOCIAL WORK
LOS: 3 GMLOS: N/A    Pt is not a 30 day readmission or a bundle  Pt admitted for treatment of sepsis 2' pyelonephritis  Per ID, pt will need to have 10 more days of ABX Ertapenem 1G Q24  Attending informed SW that once arranged, pt is medically cleared for discharge  SW met with pt and his wife Abril Banks ([phone]) to discuss  Abril Banks confirms that she is a retired CNA  She is comfortable with assisting pt in administering IVABX at home but would like a refresher from a visiting nurse  Freedom of choice reviewed  Pt has no preference for agency as long as in-network with insurance  SW explained that ID is recommending midline placement  [facility] will check OOP cost for meds/supplies  SW to advise pt and spouse of any copayments due  Pt lives with his wife and disabled dtr in a 1st floor apartment  Pt reports he was fully independent with ambulation w/o DME and self-care tasks including driving prior to admission  No current in-home services or recent SNF admissions  No hx of [facility] dx or substance abuse reported  PCP is Dr Lamar Salas  Preferred pharmacy is [facility] [facility]  When medically cleared for discharge, wife to transport home  IVABX script sent to [address] [place] [address] via [facility]  Referral also sent to [facility]  Planning for [facility] 8/5  Pt will receive tomorrow's dose in-house prior to discharge  SW to f/u with nursing and Attending re midline placement and continue to follow for POC

## 2020-08-03 NOTE — PROGRESS NOTES
Progress Note - Marleny Olson 1945, 66 y o  male MRN: [id number]    Unit/Bed#: -[place] Encounter: [id number]    Primary Care Provider: Uziel Baum MD   Date and time admitted to hospital: 7/31/2020  1:39 PM        * Sepsis [facility])  Assessment & Plan  Sepsis likely secondary to pyelonephritis versus acute cystitis  On admission tachycardic, febrile, elevated lactic acid  Urinalysis-evidence of UTI with positive nitrate  Urine cultures-ESBL E coli  Blood cultures negative so far  Day 2 of meropenem  Will switch to ertapenem from tomorrow  Patient afebrile since past 24 hrs  UTI due to extended-spectrum beta lactamase (ESBL) producing Escherichia coli  Assessment & Plan  Urine culture>esbl ecoli  Started on meropenem  Infectious disease consult    HTN (hypertension)  Assessment & Plan  Continue amlodipine and losartan    Hypokalemia  Assessment & Plan  Will replace in accordance    Type 2 diabetes mellitus with hyperglycemia [facility])  Assessment & Plan  Lab Results   Component Value Date    HGBA1C 6 5 (H) 08/02/2020       Recent Labs     08/02/20  1112 08/02/20  1647 08/02/20  2203 08/03/20  0711   POCGLU 273* 191* 145* 156*       Blood Sugar Average: Last 72 hrs:  (P) 176 7   Continue sliding scale of insulin    Pancreatic mass  Assessment & Plan  CT on admission-pancreatic mass  Patient seen by surgical team  Patient to follow-up outpatient with surgery and Gastroenterology    Fever and chills  Assessment & Plan  No febrile episodes since past 24 hours    ----------------------------------------------------------------------------------------  HPI/24hr events:  No 24 hr events    Disposition: continue inpatient care  Code Status: Level 1 - Full Code  ---------------------------------------------------------------------------------------  SUBJECTIVE  Patient seen at bedside today morning  Patient appears in no acute distress  Patient is alert oriented to time place and person    Patient still reports of increased frequency and reports uncomfortable sensation when he pees  No few occasions, chills  Vague abdominal pain on the left, patient is unable to describe    Review of Systems   HENT: Negative for congestion  Eyes: Negative for discharge  Respiratory: Negative for apnea, cough and choking  Cardiovascular: Negative for chest pain, palpitations and leg swelling  Gastrointestinal: Positive for abdominal pain  Negative for abdominal distention, anal bleeding, blood in stool, constipation and diarrhea  Endocrine: Negative for cold intolerance and heat intolerance  Genitourinary: Positive for dysuria and frequency  Negative for difficulty urinating  Musculoskeletal: Negative for arthralgias and back pain  Neurological: Negative for dizziness, seizures and numbness  Hematological: Negative for adenopathy  Psychiatric/Behavioral: Negative for agitation  Review of systems was reviewed and negative unless stated above in HPI/24-hour events   ---------------------------------------------------------------------------------------  OBJECTIVE    Vitals   Vitals:    20 0000 20 0400 20 0807 20 1500   BP: 104/59 121/69 137/80 129/72   BP Location: Left arm Left arm Left arm Left arm   Pulse: 101 91 87 98   Resp: 18 18 20 20   Temp: 98 6 °F (37 °C) 97 5 °F (36 4 °C) 97 5 °F (36 4 °C) 98 1 °F (36 7 °C)   TempSrc: Tympanic Tympanic Tympanic Tympanic   SpO2: 98% 95% 96% 97%   Weight:       Height:         Temp (24hrs), Av 2 °F (36 8 °C), Min:97 5 °F (36 4 °C), Max:99 1 °F (37 3 °C)  Current: Temperature: 98 1 °F (36 7 °C)          Respiratory:  SpO2: SpO2: 97 %       Invasive/non-invasive ventilation settings   Respiratory    Lab Data (Last 4 hours)    None         O2/Vent Data (Last 4 hours)    None                Physical Exam  Constitutional:       Appearance: He is well-developed and well-nourished  HENT:      Head: Normocephalic     Eyes:      Pupils: Pupils are equal, round, and reactive to light  Neck:      Musculoskeletal: Normal range of motion  Cardiovascular:      Rate and Rhythm: Regular rhythm  Heart sounds: S1 normal and S2 normal    Pulmonary:      Effort: Pulmonary effort is normal       Breath sounds: Normal breath sounds  Abdominal:      General: Bowel sounds are normal  There is no distension  Palpations: Abdomen is soft  Tenderness: There is no abdominal tenderness  Musculoskeletal: Normal range of motion  Neurological:      Mental Status: He is alert and oriented to person, place, and time  Laboratory and Diagnostics:  Results from last 7 days   Lab Units 08/03/20  0450 08/02/20  0532 08/01/20  0459 07/31/20  1359   WBC Thousand/uL 7 24 10 35* 12 64* 13 67*   HEMOGLOBIN g/dL 11 9* 12 0 12 4 14 7   HEMATOCRIT % 36 1* 35 8* 37 6 44 1   PLATELETS Thousands/uL 109* 92* 98* 123*   NEUTROS PCT % 67 71  --  79*   MONOS PCT % 12 11  --  8     Results from last 7 days   Lab Units 08/03/20  0450 08/02/20  0532 07/31/20  1359   SODIUM mmol/L 137 133 134   POTASSIUM mmol/L 4 0 3 4* 3 7   CHLORIDE mmol/L 102 100 98   CO2 mmol/L 29 24 25   ANION GAP mmol/L 6 9 11   BUN mg/dL 12 12 20   CREATININE mg/dL 1 01 0 92 1 05   CALCIUM mg/dL 8 8 8 7 9 7   GLUCOSE RANDOM mg/dL 160* 177* 158*   ALT U/L  --   --  10   AST U/L  --   --  9*   ALK PHOS U/L  --   --  54 4   ALBUMIN g/dL  --   --  4 4   TOTAL BILIRUBIN mg/dL  --   --  1 14     Results from last 7 days   Lab Units 08/02/20  0532 08/01/20  0459   MAGNESIUM mg/dL 1 8 1 4*      Results from last 7 days   Lab Units 07/31/20  1359   INR  1 00   PTT seconds 29          Results from last 7 days   Lab Units 07/31/20  1535 07/31/20  1359   LACTIC ACID mmol/L 0 7 2 5*     ABG:    VBG:    Results from last 7 days   Lab Units 07/31/20  1359   PROCALCITONIN ng/ml <0 05       Micro  Results from last 7 days   Lab Units 07/31/20  1400 07/31/20  1359   BLOOD CULTURE   --  No Growth at 48 hrs    No Growth at 48 hrs  URINE CULTURE  >100,000 cfu/ml Escherichia coli ESBL*  --          Imaging: I have personally reviewed pertinent reports  Intake and Output  I/O       08/01 0701 - 08/02 0700 08/02 0701 - 08/03 0700 08/03 0701 - 08/04 0700    P  O  500      IV Piggyback       Total Intake(mL/kg) 500 (4 5)      Urine (mL/kg/hr) 1975 (0 7) 2170 (0 8)     Total Output 1975 2170     Net -1475 -2170                  Height and Weights   Height: 6'  IBW: 77 6 kg  Body mass index is 33 23 kg/m²  Weight (last 2 days)     Date/Time   Weight    08/02/20 0555   111 (245)    08/02/20 0531   111 (245)    08/01/20 1027   112 (246 92)                Nutrition       Diet Orders   (From admission, onward)             Start     Ordered    07/31/20 2116  Diet Jack/CHO Controlled; Consistent Carbohydrate Diet Level 2 (5 carb servings/75 grams CHO/meal)  Diet effective now     Question Answer Comment   Diet Type Jack/CHO Controlled    Jack/CHO Controlled Consistent Carbohydrate Diet Level 2 (5 carb servings/75 grams CHO/meal)    RD to adjust diet per protocol?  Yes        07/31/20 2115                  Active Medications  Scheduled Meds:acetaminophen, 650 mg, Oral, Q6H PRN, Hank Gomez MD  aluminum-magnesium hydroxide-simethicone, 30 mL, Oral, Q6H PRN, Hank Gomez MD  amLODIPine, 10 mg, Oral, Daily, Hank Gomez MD  enoxaparin, 40 mg, Subcutaneous, Daily, Hank Gomez MD  insulin lispro, 1-5 Units, Subcutaneous, TID AC, Elyse Núñez MD  insulin lispro, 1-6 Units, Subcutaneous, HS, Elyse Núñez MD  losartan, 100 mg, Oral, Daily, Hank Gomez MD  magnesium oxide, 400 mg, Oral, BID, Elyse Núñez MD  meropenem, 1,000 mg, Intravenous, Q8H, Elyse Núñez MD, Last Rate: 1,000 mg (08/03/20 1700)  ondansetron, 4 mg, Intravenous, Q6H PRN, Hank Gomez MD      Continuous Infusions:     PRN Meds:   acetaminophen, 650 mg, Q6H PRN  aluminum-magnesium hydroxide-simethicone, 30 mL, Q6H PRN  ondansetron, 4 mg, Q6H PRN        Invasive Devices Review  Invasive Devices     Peripheral Intravenous Line            Peripheral IV 07/31/20 Right Antecubital 3 days                Rationale for remaining devices:  IV antibiotics  --------------------------------------------------------------------------------------    Reuben Clement MD      Portions of the record may have been created with voice recognition software  Occasional wrong word or "sound a like" substitutions may have occurred due to the inherent limitations of voice recognition software    Read the chart carefully and recognize, using context, where substitutions have occurred

## 2020-08-04 PROBLEM — E87.6 HYPOKALEMIA: Status: RESOLVED | Noted: 2020-01-01 | Resolved: 2020-01-01

## 2020-08-04 PROBLEM — R50.9 FEVER AND CHILLS: Status: RESOLVED | Noted: 2020-01-01 | Resolved: 2020-01-01

## 2020-08-04 NOTE — INCIDENTAL FINDINGS
CT ABDOMEN AND PELVIS WITH IV CONTRAST     INDICATION:   Abdominal pain, fever   + UTI  COMPARISON:  None  TECHNIQUE:  CT examination of the abdomen and pelvis was performed  Axial, sagittal, and coronal 2D reformatted images were created from the source data and submitted for interpretation  Radiation dose length product (DLP) for this visit:  977 6 mGy-cm    This examination, like all CT scans performed in the [place], was performed utilizing techniques to minimize radiation dose exposure, including the use of iterative   reconstruction and automated exposure control  IV Contrast:  100 mL of iohexol (OMNIPAQUE)   Enteric Contrast:  Enteric contrast was not administered  FINDINGS:     ABDOMEN     LOWER CHEST:  Tree-in-bud nodules are present with mild bronchial wall thickening and bronchiectasis, likely related to small airways infectious process  LIVER/BILIARY TREE:  Unremarkable  GALLBLADDER: Cameron Cristobal is surgically absent  SPLEEN:  Unremarkable  PANCREAS:  4 9 x 3 6 cm hypoattenuating lesion is present within the pancreatic tail   There is some inflammatory stranding surrounding the lesion  Marlen Parkinson is no main ductal dilatation  ADRENAL GLANDS:  Unremarkable  KIDNEYS/URETERS: Marlen Santos is a 1 9 x 1 6 cm ill-defined hypoattenuating lesion within the upper pole of the left kidney (series 202 image 110)  STOMACH AND BOWEL: Marlen Santos is colonic diverticulosis without evidence of acute diverticulitis  APPENDIX:  A normal appendix was visualized  ABDOMINOPELVIC CAVITY:  There are several soft tissue nodules within the omentum   For reference, 9 x 10 mm nodules present in the left upper lobe (series 201 image 29), 1 1 x 1 4 cm nodules present within the right mid abdomen (series 201 image 49), 1 3    x 0 9 cm nodule present within the left lower quadrant (series 2 1 image 65)    Additional smaller nodules also noted   Overall these are suspicious for peritoneal metastases  VESSELS:  The splenic vein appears markedly attenuated versus occluded as it courses in the region of the pancreatic tail mass multiple perisplenic and gastric varices are present  PELVIS     REPRODUCTIVE ORGANS:  Unremarkable for patient's age  URINARY BLADDER:  Bladder wall appears thickened even given underdistention and there is submucosal hyperenhancement and perivesicular inflammatory changes, suggestive of cystitis  ABDOMINAL WALL/INGUINAL REGIONS:  Unremarkable  OSSEOUS STRUCTURES:  No acute fracture or destructive osseous lesion  Impression:        1   Hypoattenuating lesion within the pancreatic tail resulting in marked attenuation/ occlusion of the distal splenic vein with multiple adjacent varices, suspicious for pancreatic malignancy   Multiple omental nodules are present, consistent with   peritoneal metastases  2   Bladder wall findings suggestive of cystitis   Additionally, there is area of ill-defined hypoattenuation within the upper pole of the left kidney, which may represent focal pyelonephritis    Recommend follow-up with either renal protocol CT or MRI   after acute issues are resolved for reevaluation  3   Tree-in-bud nodules and bronchiectasis within the lung bases, likely related to acute or chronic small airways infection

## 2020-08-04 NOTE — SOCIAL WORK
LOS: [address] confirmed pt is covered 100% for ABX and infusion supplies  Midline placed this morning  Unit nurse to dose now  [facility] nurse scheduled for visit tomorrow between 9-10am  SW called MATTHEW BROWNE  Per rep [place]-she spoke with Dr Mazin Hickman who confirmed pt does not need to f/u in office (due to short term duration of ABX) but they will follow and assist VNA with orders/midline issues  All of above explained to pt in detail  Pt requests to schedule own f/u with PCP  In-basket message sent to office  IMM#2 completed  Pt gave verbal understanding, signed, and was provided a copy  Wife to transport home later today  Attending and nursing aware of above

## 2020-08-04 NOTE — DISCHARGE SUMMARY
Discharge- Laila Marcos 1945, 66 y o  male MRN: [id number]    Unit/Bed#: -01 Encounter: [id number]    Primary Care Provider: Stoney Stokes MD   Date and time admitted to hospital: 7/31/2020  1:39 PM        * Sepsis [facility])  Assessment & Plan  Sepsis likely secondary to pyelonephritis versus acute cystitis  On admission tachycardic, febrile, elevated lactic acid  Urinalysis-evidence of UTI with positive nitrate  Urine cultures-ESBL E coli  Blood cultures negative so far  Day 3 of antibiotics  Patient to complete 7 days of antibiotics outpatient  Script for ertapenem every 24 hours provided to the patient  Mid line has been inserted today  UTI due to extended-spectrum beta lactamase (ESBL) producing Escherichia coli  Assessment & Plan  Urine culture>esbl ecoli  Will complete total 10 days course of antibiotics  Script has been provided, home infusion has been set up  HTN (hypertension)  Assessment & Plan  Continue amlodipine and losartan    Type 2 diabetes mellitus with hyperglycemia [facility])  Assessment & Plan  Lab Results   Component Value Date    HGBA1C 6 5 (H) 08/02/2020       Recent Labs     08/03/20  1526 08/03/20  2033 08/04/20  0742 08/04/20  1113   POCGLU 153* 171* 175* 230*       Blood Sugar Average: Last 72 hrs:  (P) 544 [id number]   Follow-up with primary care physician  Continue metformin    Pancreatic mass  Assessment & Plan  CT on admission-pancreatic mass    Patient seen by surgical team  Patient to follow-up outpatient with surgery and Gastroenterology      Discharging Physician / Practitioner: Jad Caballero MD  PCP: Stoney Stokes MD  Admission Date:   Admission Orders (From admission, onward)     Ordered        07/31/20 1727  Inpatient Admission (expected length of stay for this patient Order details is greater than two midnights)  Once                   Discharge Date: 08/04/20    Resolved Problems  Date Reviewed: 8/2/2020          Resolved    Fever and chills 8/4/2020     Resolved by  Jayla Willams MD    Hypokalemia 8/4/2020     Resolved by  Jayla Willams MD          Consultations During Hospital Stay:  General surgery    Procedures Performed:   · None    Significant Findings / Test Results:   · Pancreatic mass, ESBL UTI    Incidental Findings:   · Pancreatic mass     Test Results Pending at Discharge (will require follow up):   · CBC, BMP     Outpatient Tests Requested:  · CBC BMP    Complications:  None    Reason for Admission:  UTI    Hospital Course:     Nery Zuleta is a 66 y o  male patient who originally presented to the hospital on 7/31/2020 due to chief complaints of fever and increased frequency of urination  Patient febrile with a temperature of 102° on admission  Lab showed leukocytosis  Urine cultures grew out ESBL E coli  Patient has completed 3 days course of antibiotics of carbapenems  Patient to complete remaining 7 days course of antibiotics of ertapenem every 24 hours outpatient  Midline has been placed for the same  Home infusion has been set up  On admission CT scan of the abdomen was done which showed pancreatic mass  General surgery consult was obtained  Further workup of pancreatic mass will be done outpatient  Patient has already been scheduled appointment with general surgery on 08/13/2020  Patient advised to follow-up with his gastroenterologist Dr Ulises De Leon   Patient has been given script for repeat CBC and BMP  Results to be followed with primary care doctor        Please see above list of diagnoses and related plan for additional information  Condition at Discharge: stable     Discharge Day Visit / Exam:     Subjective:  Patient seen bedside today morning  Patient in no acute apparent distress  No fevers, no chills    Vitals: Blood Pressure: 140/83 (08/04/20 0348)  Pulse: 85 (08/04/20 0348)  Temperature: 98 8 °F (37 1 °C) (08/04/20 0348)  Temp Source: Tympanic (08/04/20 0348)  Respirations: 18 (08/04/20 0348)  Height: 6' (07/31/20 1344)  Weight - Scale: 112 kg (247 lb 6 4 oz) (08/04/20 0350)  SpO2: 95 % (08/04/20 0348)  Exam:   Physical Exam   Constitutional: He is oriented to person, place, and time  He appears well-developed  HENT:   Head: Normocephalic and atraumatic  Eyes: Pupils are equal, round, and reactive to light  Neck: Normal range of motion  Cardiovascular: Regular rhythm, S1 normal and S2 normal    Pulmonary/Chest: Effort normal and breath sounds normal    Abdominal: Soft  Bowel sounds are normal    Musculoskeletal: Normal range of motion  Neurological: He is alert and oriented to person, place, and time  Discussion with Family:  Wife at bedside    Discharge instructions/Information to patient and family:   See after visit summary for information provided to patient and family  Provisions for Follow-Up Care:  See after visit summary for information related to follow-up care and any pertinent home health orders  Disposition:     Home with VNA Services (Reminder: Complete face to face encounter)    For Discharges to [facility] SNF:   · Not Applicable to this Patient - Not Applicable to this Patient    Planned Readmission:  no     Discharge Statement:  I spent 25 minutes discharging the patient  This time was spent on the day of discharge  I had direct contact with the patient on the day of discharge  Greater than 50% of the total time was spent examining patient, answering all patient questions, arranging and discussing plan of care with patient as well as directly providing post-discharge instructions  Additional time then spent on discharge activities  Discharge Medications:  See after visit summary for reconciled discharge medications provided to patient and family        ** Please Note: This note has been constructed using a voice recognition system **

## 2020-08-04 NOTE — ASSESSMENT & PLAN NOTE
Sepsis likely secondary to pyelonephritis versus acute cystitis  On admission tachycardic, febrile, elevated lactic acid  Urinalysis-evidence of UTI with positive nitrate  Urine cultures-ESBL E coli  Blood cultures negative so far  Day 3 of antibiotics  Patient to complete 7 days of antibiotics outpatient  Script for ertapenem every 24 hours provided to the patient  Mid line has been inserted today

## 2020-08-04 NOTE — PLAN OF CARE
Patient educated about med routine, follow up and blood work due on 8/6/20  Scripts for lab and antibiotic given   Patient and wife expressed understanding  Patient educated about midline and care  Patient ambulated to front door and assisted into care

## 2020-08-04 NOTE — ASSESSMENT & PLAN NOTE
Lab Results   Component Value Date    HGBA1C 6 5 (H) 08/02/2020       Recent Labs     08/03/20  1526 08/03/20  2033 08/04/20  0742 08/04/20  1113   POCGLU 153* 171* 175* 230*       Blood Sugar Average: Last 72 hrs:  (P) 593 [id number]   Follow-up with primary care physician  Continue metformin

## 2020-08-04 NOTE — CONSULTS
REQUIRED DOCUMENTATION:   1  This service was provided via Telemedicine  2  Provider located at home  3  TeleMed provider: Gabriel Roa DO   4  Identify all parties in room with patient during tele consult: NICO Abreu  5  After connecting through [employer], patient was identified by name and date of birth and assistant checked wristband  Patient was then informed that this was a Telemedicine visit and that the exam was being conducted through [employer] ([employer] or [employer] could not be used by the [address] [place] [address])  My office door was closed  No one else was in the room  Patient acknowledged consent of the Telemedicine visit, and gave us permission to have the assistant stay in the room in order to assist with the history and to conduct the exam   I informed the patient that I have reviewed their record in Epic and presented the opportunity for them to ask any questions regarding the visit today  The patient agreed to participate  TeleConsultation - Infectious Disease   Franny Macario 66 y o  male MRN: [id number]  Unit/Bed#: -01 Encounter: [id number]    IMPRESSION:   · Acute left pyelonephritis, complicated UTI due to ESBL E  Coli  · Sepsis with manifestations of fever, tachycardia, elevated lactic acid level (2 5): Clinically improving with carbapenem therapy as patient has defervesced and lactate has normalized  · Midline was placed today in the left upper extremity  · Pancreatic mass noted on CT   · Diabetes mellitus type 2 with hyperglycemia  RECOMMENDATIONS:   Transition from meropenem IV to ertapenem 1g IV Q 24 hours to facilitate home infusion   Meropenem IV was started on August 2nd   Continue ertapenem 1g IV Q 24 hours x7 more days until August 11th   Glycemic control per primary team   Jaycee Mata for discharge from ID standpoint   Outpatient followup of pancreatic mass as per surgical service      Extensive review of the medical records in epic including review of the notes, radiographs, and laboratory results  My recommendations were discussed with the patient in detail who verbalized understanding  My recommendations were discussed with medical resident, Dr Noam Borrero, from the primary service  Thank you for allowing me to participate in the care of this patient  HISTORY OF PRESENT ILLNESS:  Reason for Consult: ESBL UTI  HPI: Marleny Olson is a 66y o  year old male seen for UTI  He was admitted for sepsis as the patient was noted to have fever, tachycardia, elevated lactic acid level  His initial UA was concerning for UTI  CT abdomen confirmed acute pyelonephritis  Patient was initially treated with IV ceftriaxone by the admitting team and then transitioned to meropenem IV on August 2nd as urine cx returned positive for ESBL E  coli  Midline was placed this morning and patient is scheduled for hospital discharge this afternoon  His fevers have resolved  His urine frequency has improved  He currently denies dysuria  He denies gross hematuria  REVIEW OF SYSTEMS:  Constitutional: Positive for fever, chills  HENT: Negative for congestion, runny nose, sore throat  Eyes: Negative for visual disturbance  Respiratory: Negative for cough, shortness of breath  Cardiovascular: Negative for chest pain, palpitations  Gastrointestinal: Intermittent diarrhea from metformin use  Negative for abdominal pain, nausea, vomiting  Genitourinary: +Urine frequency  Negative for dysuria, hematuria, urgency  Musculoskeletal: Negative for arthralgias  Neurological: Negative for speech difficulty, headache, numbness  Psychiatric/Behavioral: Negative for depression, anxiety       PAST MEDICAL HISTORY:  Past Medical History:   Diagnosis Date    Abdominal aortic aneurysm (AAA) ([facility] [address] ) 05/26/2017    Per Aretha     Anxiety state 05/26/2017    Per Milena Galeas     Benign prostatic hyperplasia     Benign prostatic hyperplasia without lower urinary tract symptoms     Body mass index 33 0-33 9, adult 08/15/2019    Per [employer]     Chronic kidney disease, stage II (mild) 08/15/2019    Per Aretha     Chronic kidney disease, unspecified 05/26/2017    Per Aretha     Chronic obstructive pulmonary disease (COPD) ([facility] ) 09/23/2019    Per [employer]     Diverticulosis of large intestine without perforation or abscess without bleeding 04/18/2019    Per [employer]     Essential hypertension 05/26/2017    Per [employer]     Hearing loss, bilateral 02/26/2017    Per [facility]     Mixed hyperlipidemia 05/26/2017    Per [place]     Obesity, unspecified 05/26/2017    Per [employer]     Type 2 diabetes mellitus without complication ([facility] ) 26/08/5905    Per [employer]      Past Surgical History:   Procedure Laterality Date    CHOLECYSTECTOMY      Per [employer]     COLECTOMY      removal of 8 inches of colon for diverticular disease  Per Aretha      FAMILY HISTORY: Negative for recurrent infection    SOCIAL HISTORY:  Social History   Social History     Substance and Sexual Activity   Alcohol Use Yes    Comment: Occasional- Per [place]      Social History     Substance and Sexual Activity   Drug Use Never     Social History     Tobacco Use   Smoking Status Former Smoker   Smokeless Tobacco Never Used     ALLERGIES:  No Known Allergies    MEDICATIONS:  All current active medications have been reviewed    Scheduled Meds:acetaminophen, 650 mg, Oral, Q6H PRN, Hank Gomez MD  aluminum-magnesium hydroxide-simethicone, 30 mL, Oral, Q6H PRN, Hank Gomez MD  amLODIPine, 10 mg, Oral, Daily, Hank Gomez MD  enoxaparin, 40 mg, Subcutaneous, Daily, Hank Gomez MD  ertapenem, 1,000 mg, Intravenous, Q24H, Lev Adams MD, Last Rate: 1,000 mg (08/04/20 1003)  insulin lispro, 1-5 Units, Subcutaneous, TID AC, Lev Adams MD  insulin lispro, 1-6 Units, Subcutaneous, HS, Lev Adams MD  losartan, 100 mg, Oral, Daily, Hank Gomez MD  magnesium oxide, 400 mg, Oral, BID, Lev Adams MD  ondansetron, 4 mg, Intravenous, Q6H PRN, Hank Gomez MD      Continuous Infusions:   PRN Meds:   acetaminophen    aluminum-magnesium hydroxide-simethicone    ondansetron    PHYSICAL EXAM:  Temp:  [98 1 °F (36 7 °C)-98 8 °F (37 1 °C)] 98 8 °F (37 1 °C)  HR:  [85-98] 85  Resp:  [18-20] 18  BP: (129-140)/(72-83) 140/83  SpO2:  [95 %-99 %] 95 %  Temp (24hrs), Av 4 °F (36 9 °C), Min:98 1 °F (36 7 °C), Max:98 8 °F (37 1 °C)  Current: Temperature: 98 8 °F (37 1 °C)    Intake/Output Summary (Last 24 hours) at 2020 1209  Last data filed at 2020 0301  Gross per 24 hour   Intake 300 ml   Output 600 ml   Net -300 ml     Limited exam due to telehealth visit which was mostly done by the patient's nurse  General Appearance:  Nontoxic, in no acute distress   Head:  Normocephalic, without obvious abnormality, atraumatic   Eyes:  EOMI, Conjunctiva pink and sclera anicteric, both eyes   Nose: Nares normal, mucosa normal, no drainage   Throat: Oropharynx moist per RN   Neck: Supple   Lungs:   CTA b/l, respirations unlabored per RN   Heart:  S1, S2, regular rate/rhythm per RN   Abdomen:   Soft, abd is distended, nontender per RN   : No Delgado catheter present  Extremities: No distal leg edema bilateral per RN  Midline LUE intact  Skin: No rash per RN  Neurologic: Alert and oriented times 3, moves all extremities spontaneously x4 per RN     LABS, IMAGING, & OTHER STUDIES:  Lab Results:  I have personally reviewed pertinent labs    Results from last 7 days   Lab Units 20  0439 20  0450 20  0532   WBC Thousand/uL 6 24 7 24 10 35*   HEMOGLOBIN g/dL 11 4* 11 9* 12 0   PLATELETS Thousands/uL 115* 109* 92*     Results from last 7 days   Lab Units 20  0439  20  1359   POTASSIUM mmol/L 3 9   < > 3 7   CHLORIDE mmol/L 103   < > 98   CO2 mmol/L 28   < > 25   BUN mg/dL 15   < > 20   CREATININE mg/dL 0 91   < > 1 05   EGFR ml/min/1 73sq m 81   < > 68   CALCIUM mg/dL 8 6   < > 9 7   AST U/L  --   --  9* ALT U/L  --   --  10   ALK PHOS U/L  --   --  54 4    < > = values in this interval not displayed  Results from last 7 days   Lab Units 07/31/20  1400 07/31/20  1359   BLOOD CULTURE   --  No Growth at 72 hrs  No Growth at 72 hrs  URINE CULTURE  >100,000 cfu/ml Escherichia coli ESBL*  --      Results from last 7 days   Lab Units 07/31/20  1359   PROCALCITONIN ng/ml <0 05     Imaging Studies:   7/31 CT Abd: Bladder wall findings suggestive of cystitis  Ill-defined hypoattenuation within the upper pole of the left kidney, which may represent focal pyelonephritis  I have personally reviewed pertinent imaging study reports  VIRTUAL VISIT DISCLAIMER  The patient has consented to an online visit or consultation  The patient understands that the online visit is based solely on information provided by them, and that, in the absence of a face-to-face physical evaluation by the physician, the diagnosis they receive are both limited and provisional in terms of accuracy and completeness  This is not intended to replace a full medical face-to-face evaluation by the physician

## 2020-08-04 NOTE — ASSESSMENT & PLAN NOTE
Urine culture>esbl ecoli  Will complete total 10 days course of antibiotics  Script has been provided, home infusion has been set up

## 2020-08-04 NOTE — DISCHARGE INSTR - AVS FIRST PAGE
1  You are advised to follow-up with general surgery within 1 week in regards to follow-up of pancreatic mass    2  Your advised to follow-up with Gastroenterology within 1 week in regards to follow-up of pancreatic mass    3  Follow-up with your primary care physician in regards to recent hospitalization    4  You have been given script for repeat CBC and BMP to be repeated on 08/06 2020  Follow-up results with your primary care doctor    5  New medication prescribed-ertapenem every 24 hours for 7 days intravenous  6   In case of recurrent fevers you are advice to the ER

## 2020-08-07 NOTE — TELEPHONE ENCOUNTER
New GI Patient Encounter    New Patient GI Form   Patient Details:  Karime Saravia  1945  [id number]     Background Information:  [address] starts by opening a telephone encounter and gathering the following information   Who is calling to schedule and relationship?  spouse   Who is the referring provider? Farrah   To which specialty is the referral?  Medical Oncology   Reason for Visit? New Diagnosis   Tumor Type?  pancreas   Is there a confirmed diagnosis from biopsy/tissue reviewed by Pathology?  no   Date of Tissue Diagnosis  (If done outside of [facility] please obtain report and slides)  (If no tissue diagnosis, please stop and discuss with Navigator prior to scheduling)  bx to be scheduled   Is patient aware of the diagnosis? yes   Has Imaging been completed? yes   If YES, where was the imaging done? (If outside [place] [address] obtain records)  [facility]   Have any endoscopies been done (colonoscopy, EGD, EUS) To be scheduled w/ Dr Oziel Uribe at [facility]   If YES where were they performed? (If outside of [facility] obtain the records)     Has blood work been done?  yes   If YES, where was the blood work done? (If outside of [facility] obtain records)  [facility]   Is there a personal history of cancer? (If YES please list type)  no   Is there a family history of cancer? (If YES please list type)  yes  Sister breast   Scheduling Information:   Preferred [address]   Are there any days the patient cannot be seen? Miscellaneous: I spoke with [facility] at Dr Neftali Walker office [phone]  She will let me know if EUS can not be done prior to 8/28, and we will move appt back  After completing the above information, please route to finance, nurse navigation and clinical trials for review

## 2020-08-13 PROBLEM — G47.30 SLEEP APNEA: Status: ACTIVE | Noted: 2020-01-01

## 2020-08-13 PROBLEM — Q45.3 PANCREATIC ABNORMALITY: Status: ACTIVE | Noted: 2020-01-01

## 2020-08-13 NOTE — ANESTHESIA PREPROCEDURE EVALUATION
Procedure:  ENDOSCOPIC ULTRASOUND (UPPER)    Relevant Problems   ANESTHESIA (within normal limits)   (-) History of anesthesia complications      CARDIO   (+) HTN (hypertension)   (-) Chest pain   (-) PIEDRA (dyspnea on exertion)      ENDO (within normal limits)   (+) Type 2 diabetes mellitus with hyperglycemia (HCC)      GI/HEPATIC   (+) Pancreatic abnormality   (-) Gastroesophageal reflux disease      /RENAL (within normal limits)      MUSCULOSKELETAL (within normal limits)      NEURO/PSYCH   (-) CVA (cerebral vascular accident) ([facility] [address] )   (-) Seizures ([facility] [address] )      PULMONARY   (-) Shortness of breath   (-) URI (upper respiratory infection)      Other   (+) Obesity (BMI 30 0-34 9)   (+) Pancreatic mass        Physical Exam    Airway    Mallampati score: II  TM Distance: >3 FB  Neck ROM: limited     Dental   lower dentures and upper dentures,     Cardiovascular      Pulmonary      Other Findings        Anesthesia Plan  ASA Score- 3     Anesthesia Type- IV sedation with anesthesia with ASA Monitors  Additional Monitors:   Airway Plan:           Plan Factors-Exercise tolerance (METS): >4 METS  Chart reviewed  Existing labs reviewed  Patient is not a current smoker  Induction- intravenous  Postoperative Plan-     Informed Consent- Anesthetic plan and risks discussed with patient, daughter and spouse  I personally reviewed this patient with the CRNA  Discussed and agreed on the Anesthesia Plan with the CRNA  Rick Vivar

## 2020-08-14 NOTE — UTILIZATION REVIEW
Notification of Discharge  This is a Notification of Discharge from our facility [address]  Please be advised that this patient has been discharge from our facility  Below you will find the admission and discharge date and time including the patients disposition  PRESENTATION DATE: 7/31/2020  1:39 PM    IP ADMISSION DATE: 7/31/20 1726   DISCHARGE DATE: 8/4/2020 12:55 PM  DISPOSITION: Home with Howard Casillas with [facility] [employer]   Admission Orders listed below:  Admission Orders (From admission, onward)     Ordered        07/31/20 1727  Inpatient Admission (expected length of stay for this patient Order details is greater than two midnights)  Once                   Please contact the UR Department if additional information is required to close this patient's authorization/case  [facility]  Network Utilization Review Department  Main: [phone] x carefully listen to the prompts  All voicemails are confidential   [email or web address]  Send all requests for admission clinical reviews, approved or denied determinations and any other requests to dedicated fax number below belonging to the campus where the patient is receiving treatment   List of dedicated fax numbers:  [address] DENIALS (Administrative/Medical Necessity) [phone]   [address] (Maternity/NICU/Pediatrics) [phone]   Moni Peña [phone]   Lukas Garcia [phone]   Candice Sousa [phone]   [facility] [address] [facility] [phone]   [facility]  [phone]   [address] [facility] [address]  [facility] [address] [facility] [phone]

## 2020-08-20 NOTE — PROGRESS NOTES
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  I had a long discussion with the patient of the family  We discussed that the best option would be for him to be seen by surgical oncologist   I am referring him to see Dr Peter Chávez for the same  No problem-specific Assessment & Plan notes found for this encounter  Diagnoses and all orders for this visit:    Cancer of pancreas, tail ([facility] [address] )    Carcinoma of pancreas ([facility] [address] )  -     Ambulatory referral to Surgical Oncology; Future          Subjective:      Patient ID: Troy Crisostomo is a 66 y o  male  75-year-old male patient who was admitted to [facility] with UTI and was found to have a pancreatic tail mass incidentally  He had an EUS guided FNA last week which shows adeno carcinoma the pancreas  The CT scan of the abdomen is suspicious for omental metastasis  Patient does not have any complaints at present  The following portions of the patient's history were reviewed and updated as appropriate: allergies, current medications, past family history, past medical history, past surgical history and problem list     Review of Systems   Constitutional: Negative  HENT: Negative  Eyes: Negative  Respiratory: Negative  Cardiovascular: Negative  Gastrointestinal: Negative  Endocrine: Negative  Genitourinary: Negative  Musculoskeletal: Negative  Skin: Negative  Allergic/Immunologic: Negative  Neurological: Negative  Hematological: Negative  Psychiatric/Behavioral: Negative  All other systems reviewed and are negative  Objective:      /88 (BP Location: Right arm, Patient Position: Sitting, Cuff Size: Large)   Pulse 98   Temp (!) 128 °F (53 3 °C) (Tympanic)   Resp 18   Wt 110 kg (242 lb)   BMI 32 82 kg/m²          Physical Exam  Vitals signs and nursing note reviewed     Constitutional:       Appearance: Normal appearance  He is obese  HENT:      Head: Normocephalic and atraumatic  Mouth/Throat:      Mouth: Mucous membranes are moist    Eyes:      Pupils: Pupils are equal, round, and reactive to light  Cardiovascular:      Rate and Rhythm: Normal rate and regular rhythm  Pulmonary:      Effort: Pulmonary effort is normal       Breath sounds: Normal breath sounds  Abdominal:      General: Bowel sounds are normal       Palpations: Abdomen is soft  Neurological:      Mental Status: He is alert and oriented to person, place, and time  Psychiatric:         Mood and Affect: Mood normal          Behavior: Behavior normal          Thought Content:  Thought content normal          Judgment: Judgment normal

## 2020-08-20 NOTE — TELEPHONE ENCOUNTER
Spoke with Ninfa, Dr Chiqui Lisa RN  Per Miguel A Rico pt should see med onc first, Dr Yulissa Bajwa, and if Dr Yulissa Bajwa would like pt to see surg onc then will schedule

## 2020-08-21 NOTE — TELEPHONE ENCOUNTER
Pt's wife called stating that Dr Main Powell referred pt to Dr Chelsey Rodriguez  I explained that Dr Chelsey Rodriguez felt that medial oncology appt should be first and he would be referred to Dr Chelsey Rodriguez after seeing Dr Carole Casillas if necessary  Mrs Pelaez November was not comfortable with this  Per Sade Wills, I scheduled pt to see Dr Chelsey Rodriguez on 8/25 in the [facility]

## 2020-08-24 PROBLEM — C25.9 PANCREATIC ADENOCARCINOMA (HCC): Status: ACTIVE | Noted: 2020-01-01

## 2020-08-24 PROBLEM — C25.2 MALIGNANT NEOPLASM OF TAIL OF PANCREAS (HCC): Status: ACTIVE | Noted: 2020-01-01

## 2020-08-25 NOTE — PROGRESS NOTES
Surgical Oncology Consult       [address] [facility] [address] [id number] [phone]    Luis A Dixon  1945  [id number]  [address] [facility] [address] [id number] [phone]    Diagnoses and all orders for this visit:    Malignant neoplasm of tail of pancreas ([facility] )  -     NM PET CT skull base to mid thigh; Future  -     BUN; Future  -     Creatinine, serum; Future  -     Cancer antigen 19-9; Future  -     Ambulatory referral to Palliative Care; Future        No chief complaint on file  Return in about 3 months (around 11/25/2020)  Oncology History   Malignant neoplasm of tail of pancreas ([facility] )   8/7/2020 Initial Diagnosis    Pancreatic adenocarcinoma ([facility] )     8/7/2020 Biopsy    EUS  - adenocarcinoma          History of Present Illness:  38-year-old male who recently was admitted to [facility] with a UTI  CT on July 31, 2020 revealed a 4 9 x 3 6 cm mass in the pancreas tail  There were also several soft tissue nodules in the omentum were suspicious for metastatic disease  I personally reviewed the films  Patient underwent endoscopic ultrasound on August 13th  This revealed a 4 x 4 7 cm pancreatic tail mass  Biopsy revealed adenocarcinoma  No suspicious adenopathy was seen  He comes in now to discuss further therapy  The patient states his appetite is poor  He does have early satiety  He has lost 15 lb over the last 6 months  He has no nausea or vomiting  No family history of pancreas cancer or pancreatitis  He does have a sister with breast cancer diagnosed over 5 years ago  No mid back pain, although he does have low back pain  He has had previous surgeries including open left hemicolectomy for diverticulitis and cholecystectomy      Review of Systems  Complete ROS Surg Onc:   Constitutional: The patient denies new or recent history of general fatigue, no recent weight loss, no change in appetite  Eyes: No complaints of visual problems, no scleral icterus  ENT: no complaints of ear pain, no hoarseness, no difficulty swallowing,  no tinnitus and no new masses in head, oral cavity, or neck  Cardiovascular: No complaints of chest pain, no palpitations, no ankle edema  Respiratory: No complaints of shortness of breath, no cough  Gastrointestinal: No complaints of jaundice, no bloody stools, no pale stools  Genitourinary: No complaints of dysuria, no hematuria, no nocturia, no frequent urination, no urethral discharge  Musculoskeletal: No complaints of weakness, paralysis, joint stiffness or arthralgias  Integumentary: No complaints of rash, no new lesions  Neurological: No complaints of convulsions, no seizures, no dizziness  Hematologic/Lymphatic: No complaints of easy bruising  Endocrine:  No hot or cold intolerance  No polydipsia, polyphagia, or polyuria  Allergy/immunology:  No environmental allergies  No food allergies  Not immunocompromised  Skin:  No pallor or rash  No wound  Patient Active Problem List   Diagnosis    [facility]is ([facility] )    Pancreatic mass    Type 2 diabetes mellitus with hyperglycemia (HCC)    Obesity (BMI 30 0-34  9)    HTN (hypertension)    UTI due to extended-spectrum beta lactamase (ESBL) producing Escherichia coli    Pancreatic abnormality    Sleep apnea    Malignant neoplasm of tail of pancreas (HCC)     Past Medical History:   Diagnosis Date    Abdominal aortic aneurysm (AAA) ([facility] ) 2017    Per Aretha     Anxiety state 2017    Per Aretha     Benign prostatic hyperplasia     Benign prostatic hyperplasia without lower urinary tract symptoms     Body mass index 33 0-33 9, adult 08/15/2019    Per Aretha     Chronic kidney disease, stage II (mild) 08/15/2019    Per Aretha     Chronic kidney disease, unspecified 2017    Per Sheilda Apgar Chronic obstructive pulmonary disease (COPD) ([facility] ) 2019    Per Tejal Distance     Diverticulosis of large intestine without perforation or abscess without bleeding 2019    Per [address] hypertension 2017    Per Tejal Distance     Hearing loss, bilateral 2017    Per [place]     Mixed hyperlipidemia 2017    Per Aretha     Obesity, unspecified 2017    Per Tejal Distance     Type 2 diabetes mellitus without complication ([facility] )     Per Tejal Distance      Past Surgical History:   Procedure Laterality Date    CHOLECYSTECTOMY      Per Tejal Distance     COLECTOMY      removal of 8 inches of colon for diverticular disease   Per Tejal Distance      Family History   Problem Relation Age of Onset    Diabetes Father      Social History     Socioeconomic History    Marital status: /Civil Union     Spouse name: Not on file    Number of children: Not on file    Years of education: Not on file    Highest education level: Not on file   Occupational History    Not on file   Social Needs    Financial resource strain: Not on file    Food insecurity     Worry: Not on file     Inability: Not on file   [employer] needs     Medical: Not on file     Non-medical: Not on file   Tobacco Use    Smoking status: Former Smoker     Last attempt to quit: 1983     Years since quittin 6    Smokeless tobacco: Never Used    Tobacco comment: quit 37 years ago   Substance and Sexual Activity    Alcohol use: Yes     Comment: Occasional- Per [place] Drug use: Never    Sexual activity: Not on file   Lifestyle    Physical activity     Days per week: Not on file     Minutes per session: Not on file    Stress: Not on file   Relationships    Social connections     Talks on phone: Not on file     Gets together: Not on file     Attends Mu-ism service: Not on file     Active member of club or organization: Not on file     Attends meetings of clubs or organizations: Not on file     Relationship status: Not on file    Intimate partner violence     Fear of current or ex partner: Not on file     Emotionally abused: Not on file     Physically abused: Not on file     Forced sexual activity: Not on file   Other Topics Concern    Not on file   Social History Narrative    · Do you currently or have you served in the [employer]: Yes      · If Yes, What branch of service:   [employer]      · Were you activated, into active duty, as a member of the [employer] or as a Reservist:   No      · Passive smoke exposure: Yes      · Alcohol intake:   Occasional      · Marital status:         · Number of children:   7      · Live alone or with others:   with others      · Are you currently employed:   No      · Asbestos exposure:   No      · TB exposure:   No      · Environmental exposure: Yes      were in [employer]     · Animal exposure:   Yes      cat     Per Arianna Macario       Current Outpatient Medications:     amLODIPine (NORVASC) 10 mg tablet, Take 10 mg by mouth daily, Disp: , Rfl:     aspirin (ECOTRIN LOW STRENGTH) 81 mg EC tablet, Take 81 mg by mouth daily, Disp: , Rfl:     losartan (COZAAR) 100 MG tablet, Take 25 mg by mouth daily, Disp: , Rfl:     metFORMIN (GLUCOPHAGE-XR) 500 mg 24 hr tablet, Take 1 tablet (500 mg total) by mouth 2 (two) times a day with meals, Disp: 180 tablet, Rfl: 0  No Known Allergies  Vitals:    08/25/20 1107   BP: 120/82   Pulse: (!) 129   Temp: 98 4 °F (36 9 °C)       Physical Exam   Constitutional: General appearance: The Patient is well-developed and well-nourished who appears the stated age in no acute distress  Patient is pleasant and talkative  HEENT:  Normocephalic  Sclerae are anicteric  Mucous membranes are moist  Neck is supple without adenopathy  No JVD  Chest: The lungs are clear to auscultation  Cardiac: Heart is regular rate  Abdomen: Abdomen is soft, non-tender, non-distended and without masses  Extremities: There is no clubbing or cyanosis  There is no edema  Symmetric  Neuro: Grossly nonfocal  Gait is normal      Lymphatic: No evidence of cervical adenopathy bilaterally  No evidence of axillary adenopathy bilaterally  No evidence of inguinal adenopathy bilaterally  Skin: Warm, anicteric  Psych:  Patient is pleasant and talkative  Breasts:      Pathology:  Final Diagnosis    A -B -C  Pancreas, Tail Mass (Thin-prep, smears and cell block preparations): Malignant ([facility] Category VI) - See note  Adenocarcinoma      Satisfactory for evaluation      Note: The above diagnostic category is part of the six-tiered system proposed by The Papanicolaou Society of Cytopathology for the reporting of pancreaticobiliary specimens  This proposed scheme provides terminology that correlates the cytologic diagnostic nomenclature with the 2010 WHO classification of pancreatic tumors and standardizes the categorization of the various diseases of the pancreas, some of which are difficult to diagnose specifically by cytology  *The Papanicolaou Society of Cytopathology System for Reporting Pancreaticobiliary Cytology: Definitions, Criteria and Explanatory Notes  Magda Brown; Cohen, 2015          Electronically signed by Berhane Tucker MD on 8/19/2020 at  8:15 AM          Labs:      Imaging  Xr Chest Pa & Lateral    Result Date: 7/31/2020  Narrative: CHEST INDICATION:   sepsis, cough  22 COMPARISON:  None EXAM PERFORMED/VIEWS:  XR CHEST PA & LATERAL FINDINGS: Heart size is within normal limits  Atherosclerotic vascular calcifications are noted  Mild bibasilar interstitial changes of uncertain chronicity  No consolidation  No pneumothorax or pleural effusion  There are degenerative changes in the spine  Impression: Mild basilar interstitial prominence  Workstation performed: [id number]     Ct Abdomen Pelvis With Contrast    Result Date: 7/31/2020  Narrative: CT ABDOMEN AND PELVIS WITH IV CONTRAST INDICATION:   Abdominal pain, fever + UTI  COMPARISON:  None  TECHNIQUE:  CT examination of the abdomen and pelvis was performed  Axial, sagittal, and coronal 2D reformatted images were created from the source data and submitted for interpretation  Radiation dose length product (DLP) for this visit:  977 6 mGy-cm   This examination, like all CT scans performed in the [facility], was performed utilizing techniques to minimize radiation dose exposure, including the use of iterative reconstruction and automated exposure control  IV Contrast:  100 mL of iohexol (OMNIPAQUE) Enteric Contrast:  Enteric contrast was not administered  FINDINGS: ABDOMEN LOWER CHEST:  Tree-in-bud nodules are present with mild bronchial wall thickening and bronchiectasis, likely related to small airways infectious process  LIVER/BILIARY TREE:  Unremarkable  GALLBLADDER:  Gallbladder is surgically absent  SPLEEN:  Unremarkable  PANCREAS:  4 9 x 3 6 cm hypoattenuating lesion is present within the pancreatic tail  There is some inflammatory stranding surrounding the lesion  There is no main ductal dilatation  ADRENAL GLANDS:  Unremarkable  KIDNEYS/URETERS:  There is a 1 9 x 1 6 cm ill-defined hypoattenuating lesion within the upper pole of the left kidney (series 202 image 110)  STOMACH AND BOWEL:  There is colonic diverticulosis without evidence of acute diverticulitis  APPENDIX:  A normal appendix was visualized  ABDOMINOPELVIC CAVITY:  There are several soft tissue nodules within the omentum  For reference, 9 x 10 mm nodules present in the left upper lobe (series 201 image 29), 1 1 x 1 4 cm nodules present within the right mid abdomen (series 201 image 49), 1 3  x 0 9 cm nodule present within the left lower quadrant (series 2 1 image 65)  Additional smaller nodules also noted  Overall these are suspicious for peritoneal metastases   VESSELS:  The splenic vein appears markedly attenuated versus occluded as it courses in the region of the pancreatic tail mass multiple perisplenic and gastric varices are present  PELVIS REPRODUCTIVE ORGANS:  Unremarkable for patient's age  URINARY BLADDER:  Bladder wall appears thickened even given underdistention and there is submucosal hyperenhancement and perivesicular inflammatory changes, suggestive of cystitis  ABDOMINAL WALL/INGUINAL REGIONS:  Unremarkable  OSSEOUS STRUCTURES:  No acute fracture or destructive osseous lesion  Impression: 1  Hypoattenuating lesion within the pancreatic tail resulting in marked attenuation/ occlusion of the distal splenic vein with multiple adjacent varices, suspicious for pancreatic malignancy  Multiple omental nodules are present, consistent with peritoneal metastases  2   Bladder wall findings suggestive of cystitis  Additionally, there is area of ill-defined hypoattenuation within the upper pole of the left kidney, which may represent focal pyelonephritis  Recommend follow-up with either renal protocol CT or MRI after acute issues are resolved for reevaluation  3   Tree-in-bud nodules and bronchiectasis within the lung bases, likely related to acute or chronic small airways infection  I personally discussed this study with Melchor Burroughs on 7/31/2020 at 4:24 PM   Workstation performed: [id number]     I reviewed the above laboratory and imaging data  Discussion/Summary:  20-year-old male with pancreas cancer, possibly metastatic  It is unclear if these nodules are related to peritoneal metastasis or scarring from his previous surgeries  I have recommended obtaining a CA 19-9 as a baseline  I have also recommended obtaining a PET-CT to see if these nodules are actually extremely FDG avid and more consistent with metastasis verses inflammatory and scar tissue  If these are metastasis, I discussed the mainstay of treatment would be chemotherapy  Surgical intervention could be considered after chemotherapy if all sites of metastasis resolved and his CA 19-9 normalizes    If the lesions are indeed inflammatory, I would still recommend neoadjuvant chemotherapy in an effort to make sure he is node-negative as well as to ensure that there is no progression through chemotherapy making surgery futile  After some discussion, he is agreeable to the PET-CT  He is already being set up with Medical Oncology  I will order a CA 19-9  He will need a port for chemotherapy  The risks were explained including bleeding, infection, need for further surgery, wound complications, port malfunction, DVT, and pneumothorax  Informed consent was obtained  We will schedule this at our earliest mutual convenience  He is agreeable to this  All of his questions were answered

## 2020-08-25 NOTE — LETTER
August 25, 2020     Connie Issa MD  [address]  [facility]     Patient: Mague Dennison   YOB: 1945   Date of Visit: 8/25/2020       Dear Dr Ade Estrada:    Thank you for referring Mague Dennison to me for evaluation  Below are my notes for this consultation  If you have questions, please do not hesitate to call me  I look forward to following your patient along with you  Sincerely,        Nubia Deal MD        CC: MD Loy Patino MD Tedra Corona, MD  8/25/2020 11:54 AM  Sign when Signing Visit               Surgical Oncology Consult       [address] [facility] [address] [id number] [phone]    Mague Dennison  1945  [id number]  [address] [facility] [address] [address] [phone]    Diagnoses and all orders for this visit:    Malignant neoplasm of tail of pancreas ([facility] [address] )  -     NM PET CT skull base to mid thigh; Future  -     BUN; Future  -     Creatinine, serum; Future  -     Cancer antigen 19-9; Future  -     Ambulatory referral to Palliative Care; Future        No chief complaint on file  No follow-ups on file  Oncology History   Malignant neoplasm of tail of pancreas ([address] )   8/7/2020 Initial Diagnosis    Pancreatic adenocarcinoma ([facility] [address] )     8/7/2020 Biopsy    EUS  - adenocarcinoma          History of Present Illness:  56-year-old male who recently was admitted to [facility] with a UTI  CT on July 31, 2020 revealed a 4 9 x 3 6 cm mass in the pancreas tail  There were also several soft tissue nodules in the omentum were suspicious for metastatic disease  I personally reviewed the films  Patient underwent endoscopic ultrasound on August 13th  This revealed a 4 x 4 7 cm pancreatic tail mass  Biopsy revealed adenocarcinoma  No suspicious adenopathy was seen    He comes in now to discuss further therapy  The patient states his appetite is poor  He does have early satiety  He has lost 15 lb over the last 6 months  He has no nausea or vomiting  No family history of pancreas cancer or pancreatitis  He does have a sister with breast cancer diagnosed over 5 years ago  No mid back pain, although he does have low back pain  He has had previous surgeries including open left hemicolectomy for diverticulitis and cholecystectomy  Review of Systems  Complete ROS Surg Onc:   Constitutional: The patient denies new or recent history of general fatigue, no recent weight loss, no change in appetite  Eyes: No complaints of visual problems, no scleral icterus  ENT: no complaints of ear pain, no hoarseness, no difficulty swallowing,  no tinnitus and no new masses in head, oral cavity, or neck  Cardiovascular: No complaints of chest pain, no palpitations, no ankle edema  Respiratory: No complaints of shortness of breath, no cough  Gastrointestinal: No complaints of jaundice, no bloody stools, no pale stools  Genitourinary: No complaints of dysuria, no hematuria, no nocturia, no frequent urination, no urethral discharge  Musculoskeletal: No complaints of weakness, paralysis, joint stiffness or arthralgias  Integumentary: No complaints of rash, no new lesions  Neurological: No complaints of convulsions, no seizures, no dizziness  Hematologic/Lymphatic: No complaints of easy bruising  Endocrine:  No hot or cold intolerance  No polydipsia, polyphagia, or polyuria  Allergy/immunology:  No environmental allergies  No food allergies  Not immunocompromised  Skin:  No pallor or rash  No wound  Patient Active Problem List   Diagnosis    Sepsis ([facility] [address] )    Pancreatic mass    Type 2 diabetes mellitus with hyperglycemia (HCC)    Obesity (BMI 30 0-34  9)    HTN (hypertension)    UTI due to extended-spectrum beta lactamase (ESBL) producing Escherichia coli    Pancreatic abnormality    Sleep apnea    Malignant neoplasm of tail of pancreas [facility])     Past Medical History:   Diagnosis Date    Abdominal aortic aneurysm (AAA) ([facility] ) 2017    Per Aretha     Anxiety state 2017    Per Aretha     Benign prostatic hyperplasia     Benign prostatic hyperplasia without lower urinary tract symptoms     Body mass index 33 0-33 9, adult 08/15/2019    Per Aretha     Chronic kidney disease, stage II (mild) 08/15/2019    Per [place]     Chronic kidney disease, unspecified 2017    Per [place]     Chronic obstructive pulmonary disease (COPD) ([facility] ) 2019    Per Joana Pry     Diverticulosis of large intestine without perforation or abscess without bleeding 2019    Per Joana Pry     Essential hypertension 2017    Per Joana Pry     Hearing loss, bilateral 2017    Per Aretha     Mixed hyperlipidemia 2017    Per Aretha     Obesity, unspecified 2017    Per Joana Pry     Type 2 diabetes mellitus without complication ([facility] )     Per Joana Pry      Past Surgical History:   Procedure Laterality Date    CHOLECYSTECTOMY      Per Joana Pry     COLECTOMY      removal of 8 inches of colon for diverticular disease   Per Joana Pry      Family History   Problem Relation Age of Onset    Diabetes Father      Social History     Socioeconomic History    Marital status: /Civil Union     Spouse name: Not on file    Number of children: Not on file    Years of education: Not on file    Highest education level: Not on file   Occupational History    Not on file   Social Needs    Financial resource strain: Not on file    Food insecurity     Worry: Not on file     Inability: Not on file   [employer] needs     Medical: Not on file     Non-medical: Not on file   Tobacco Use    Smoking status: Former Smoker     Last attempt to quit:      Years since quittin 6    Smokeless tobacco: Never Used    Tobacco comment: quit 37 years ago   Substance and Sexual Activity    Alcohol use: Yes     Comment: Occasional- Per Lenoard Gaines Drug use: Never    Sexual activity: Not on file   Lifestyle    Physical activity     Days per week: Not on file     Minutes per session: Not on file    Stress: Not on file   Relationships    Social connections     Talks on phone: Not on file     Gets together: Not on file     Attends Faith service: Not on file     Active member of club or organization: Not on file     Attends meetings of clubs or organizations: Not on file     Relationship status: Not on file    Intimate partner violence     Fear of current or ex partner: Not on file     Emotionally abused: Not on file     Physically abused: Not on file     Forced sexual activity: Not on file   Other Topics Concern    Not on file   Social History Narrative    · Do you currently or have you served in [employer]: Yes      · If Yes, What branch of service:   [employer]      · Were you activated, into active duty, as a member of the [employer] or as a Reservist:   No      · Passive smoke exposure: Yes      · Alcohol intake:   Occasional      · Marital status:         · Number of children:   7      · Live alone or with others:   with others      · Are you currently employed:   No      · Asbestos exposure:   No      · TB exposure:   No      · Environmental exposure:    Yes      were in [employer]     · Animal exposure:   Yes      cat     Per [place]       Current Outpatient Medications:     amLODIPine (NORVASC) 10 mg tablet, Take 10 mg by mouth daily, Disp: , Rfl:     aspirin (ECOTRIN LOW STRENGTH) 81 mg EC tablet, Take 81 mg by mouth daily, Disp: , Rfl:     losartan (COZAAR) 100 MG tablet, Take 25 mg by mouth daily, Disp: , Rfl:     metFORMIN (GLUCOPHAGE-XR) 500 mg 24 hr tablet, Take 1 tablet (500 mg total) by mouth 2 (two) times a day with meals, Disp: 180 tablet, Rfl: 0  No Known Allergies  Vitals:    08/25/20 1107   BP: 120/82   Pulse: (!) 129   Temp: 98 4 °F (36 9 °C) Physical Exam   Constitutional: General appearance: The Patient is well-developed and well-nourished who appears the stated age in no acute distress  Patient is pleasant and talkative  HEENT:  Normocephalic  Sclerae are anicteric  Mucous membranes are moist  Neck is supple without adenopathy  No JVD  Chest: The lungs are clear to auscultation  Cardiac: Heart is regular rate  Abdomen: Abdomen is soft, non-tender, non-distended and without masses  Extremities: There is no clubbing or cyanosis  There is no edema  Symmetric  Neuro: Grossly nonfocal  Gait is normal      Lymphatic: No evidence of cervical adenopathy bilaterally  No evidence of axillary adenopathy bilaterally  No evidence of inguinal adenopathy bilaterally  Skin: Warm, anicteric  Psych:  Patient is pleasant and talkative  Breasts:      Pathology:  Final Diagnosis    A -B -C  Pancreas, Tail Mass (Thin-prep, smears and cell block preparations): Malignant ([facility] Category VI) - See note  Adenocarcinoma      Satisfactory for evaluation      Note: The above diagnostic category is part of the six-tiered system proposed by The Papanicolaou Society of Cytopathology for the reporting of pancreaticobiliary specimens  This proposed scheme provides terminology that correlates the cytologic diagnostic nomenclature with the 2010 WHO classification of pancreatic tumors and standardizes the categorization of the various diseases of the pancreas, some of which are difficult to diagnose specifically by cytology  *The Papanicolaou Society of Cytopathology System for Reporting Pancreaticobiliary Cytology: Definitions, Criteria and Explanatory Notes  Julia Burton; Cohen, 2015          Electronically signed by Fátima Soto MD on 8/19/2020 at  8:15 AM          Labs:      Imaging  Xr Chest Pa & Lateral    Result Date: 7/31/2020  Narrative: CHEST INDICATION:   sepsis, cough    22 COMPARISON:  None EXAM PERFORMED/VIEWS:  XR CHEST PA & LATERAL FINDINGS: Heart size is within normal limits  Atherosclerotic vascular calcifications are noted  Mild bibasilar interstitial changes of uncertain chronicity  No consolidation  No pneumothorax or pleural effusion  There are degenerative changes in the spine  Impression: Mild basilar interstitial prominence  Workstation performed: [id number]     Ct Abdomen Pelvis With Contrast    Result Date: 7/31/2020  Narrative: CT ABDOMEN AND PELVIS WITH IV CONTRAST INDICATION:   Abdominal pain, fever + UTI  COMPARISON:  None  TECHNIQUE:  CT examination of the abdomen and pelvis was performed  Axial, sagittal, and coronal 2D reformatted images were created from the source data and submitted for interpretation  Radiation dose length product (DLP) for this visit:  977 6 mGy-cm   This examination, like all CT scans performed in the [facility], was performed utilizing techniques to minimize radiation dose exposure, including the use of iterative reconstruction and automated exposure control  IV Contrast:  100 mL of iohexol (OMNIPAQUE) Enteric Contrast:  Enteric contrast was not administered  FINDINGS: ABDOMEN LOWER CHEST:  Tree-in-bud nodules are present with mild bronchial wall thickening and bronchiectasis, likely related to small airways infectious process  LIVER/BILIARY TREE:  Unremarkable  GALLBLADDER:  Gallbladder is surgically absent  SPLEEN:  Unremarkable  PANCREAS:  4 9 x 3 6 cm hypoattenuating lesion is present within the pancreatic tail  There is some inflammatory stranding surrounding the lesion  There is no main ductal dilatation  ADRENAL GLANDS:  Unremarkable  KIDNEYS/URETERS:  There is a 1 9 x 1 6 cm ill-defined hypoattenuating lesion within the upper pole of the left kidney (series 202 image 110)  STOMACH AND BOWEL:  There is colonic diverticulosis without evidence of acute diverticulitis  APPENDIX:  A normal appendix was visualized   ABDOMINOPELVIC CAVITY: There are several soft tissue nodules within the omentum  For reference, 9 x 10 mm nodules present in the left upper lobe (series 201 image 29), 1 1 x 1 4 cm nodules present within the right mid abdomen (series 201 image 49), 1 3  x 0 9 cm nodule present within the left lower quadrant (series 2 1 image 65)  Additional smaller nodules also noted  Overall these are suspicious for peritoneal metastases  VESSELS:  The splenic vein appears markedly attenuated versus occluded as it courses in the region of the pancreatic tail mass multiple perisplenic and gastric varices are present  PELVIS REPRODUCTIVE ORGANS:  Unremarkable for patient's age  URINARY BLADDER:  Bladder wall appears thickened even given underdistention and there is submucosal hyperenhancement and perivesicular inflammatory changes, suggestive of cystitis  ABDOMINAL WALL/INGUINAL REGIONS:  Unremarkable  OSSEOUS STRUCTURES:  No acute fracture or destructive osseous lesion  Impression: 1  Hypoattenuating lesion within the pancreatic tail resulting in marked attenuation/ occlusion of the distal splenic vein with multiple adjacent varices, suspicious for pancreatic malignancy  Multiple omental nodules are present, consistent with peritoneal metastases  2   Bladder wall findings suggestive of cystitis  Additionally, there is area of ill-defined hypoattenuation within the upper pole of the left kidney, which may represent focal pyelonephritis  Recommend follow-up with either renal protocol CT or MRI after acute issues are resolved for reevaluation  3   Tree-in-bud nodules and bronchiectasis within the lung bases, likely related to acute or chronic small airways infection  I personally discussed this study with Adonay Odom on 7/31/2020 at 4:24 PM   Workstation performed: [id number]     I reviewed the above laboratory and imaging data  Discussion/Summary:  49-year-old male with pancreas cancer, possibly metastatic    It is unclear if these nodules are related to peritoneal metastasis or scarring from his previous surgeries  I have recommended obtaining a CA 19-9 as a baseline  I have also recommended obtaining a PET-CT to see if these nodules are actually extremely FDG avid and more consistent with metastasis verses inflammatory and scar tissue  If these are metastasis, I discussed the mainstay of treatment would be chemotherapy  Surgical intervention could be considered after chemotherapy if all sites of metastasis resolved and his CA 19-9 normalizes  If the lesions are indeed inflammatory, I would still recommend neoadjuvant chemotherapy in an effort to make sure he is node-negative as well as to ensure that there is no progression through chemotherapy making surgery futile  After some discussion, he is agreeable to the PET-CT  He is already being set up with Medical Oncology  I will order a CA 19-9  He will need a port for chemotherapy  The risks were explained including bleeding, infection, need for further surgery, wound complications, port malfunction, DVT, and pneumothorax  Informed consent was obtained  We will schedule this at our earliest mutual convenience  He is agreeable to this  All of his questions were answered

## 2020-08-29 NOTE — TELEPHONE ENCOUNTER
Patients wife calling stating her  has been in pain from abdominal region down to pelvic to his prostate  Patients wife requesting to speak with Dr  to see if something can be called in for him?       Paged Dr Pamella Vargas per [address] [facility]

## 2020-08-31 NOTE — TELEPHONE ENCOUNTER
[phone] / Kierra Talley - wife / PT Marcos Mcmullen / [birth year] / pt wife spoke with Dr Bailey Bonner and now pt is in pain and was wondering if medication can be send over        Tiger texted Dr Resendez Means

## 2020-08-31 NOTE — TELEPHONE ENCOUNTER
Discussed PET-CT findings with patient's wife  Consistent with metastatic pancreas cancer  Have recommended chemotherapy  They have an appointment with Dr Emily Henry next week

## 2020-09-01 NOTE — TELEPHONE ENCOUNTER
Looks like surgical oncology gave them a script for a couple tablets  If she was not seen by med onc she should continue to go to surgical oncology for any issues

## 2020-09-01 NOTE — TELEPHONE ENCOUNTER
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

## 2020-09-01 NOTE — TELEPHONE ENCOUNTER
Please see attached note from Palliative Care  Perhaps you could reinforce the role/purpose of Palliative Care at the pt's appointment?

## 2020-09-01 NOTE — TELEPHONE ENCOUNTER
Do we know if this was addressed by Dr Son Zuleta? The patient has yet to be seen by medical oncology  She has an appt with Dr Conchis Lucas on 9/10

## 2020-09-02 NOTE — TELEPHONE ENCOUNTER
PT went to Dr Javy Jenkins office this morning  Dr stated that PT stomach is collecting fluids and needs to be drained  PT was informed to contact Dr Nany Blanchard and Dr Javy Jenkins faxed over a note to Dr Nany Blanchard

## 2020-09-02 NOTE — TELEPHONE ENCOUNTER
Aden Vitorks, wife called and is concerned about Pt BP and not feeling well and would like to consult a dr     Paged  Pt info to Dr Osvaldo Goodrich  Via TT @ 2028

## 2020-09-03 PROBLEM — I95.9 HYPOTENSION: Status: ACTIVE | Noted: 2020-01-01

## 2020-09-03 PROBLEM — G89.3 CANCER RELATED PAIN: Status: ACTIVE | Noted: 2020-01-01

## 2020-09-03 PROBLEM — R18.8 ASCITES: Status: ACTIVE | Noted: 2020-01-01

## 2020-09-03 PROBLEM — N17.9 AKI (ACUTE KIDNEY INJURY) (HCC): Status: ACTIVE | Noted: 2020-01-01

## 2020-09-03 PROBLEM — R65.10 SIRS (SYSTEMIC INFLAMMATORY RESPONSE SYNDROME) (HCC): Status: ACTIVE | Noted: 2020-01-01

## 2020-09-03 NOTE — CONSULTS
Consultation - Nephrology   Carlos Enrique Curran 66 y o  male MRN: [id number]  Unit/Bed#: [facility] 909-01 Encounter: [id number]    Inpatient consult to Nephrology  Consult performed by: Brandon Hand MD  Consult ordered by: Amita James MD          History of Present Illness   Physician Requesting Consult: Amita James MD  Reason for Consult / Principal Problem:  AK I    HPI: Carlos Enrique Curran is a 66y o  year old male who presents with a history diabetes mellitus/hypertension/recent diagnosis of pancreatic cancer when he was hospitalized at [facility] in July for UTI/urosepsis  Blood pressure is in the 80 systolic range  Patient came to the hospital because he was noted to have progressive abdominal distention by his family physician  He denied any fevers or chills  He denies any cough or colds  He has not been eating well but this is not feel he has lost weight  He denies any nausea vomiting or diarrhea  He has complained of abdominal distention with some mild complaints of pain on his flank areas  He denies any chest pain, mild dyspnea on exertion recently, and no significant lower extremity edema  We are seeing him for AK I on CKD stage 3:  -creatinine 1 05 07/31/2020  -creatinine 1 51 on 08/27/2020  -creatinine 5 85 today on admission  He denies any prior kidney disease except for the urosepsis  He may have had 1 or 2 other urinary tract infections in the past   Denies any kidney stones  He may have enlarged prostate but no prostate cancer  He denies any NSAID use      History obtained from patient and the records which I completely reviewed        Review of systems:  General:  Weak, poor appetite but no recent weight loss, but not eating well or drinking well, no fevers or chills  Cardiovascular:  See HPI  Respiratory:  No cough or colds or wheezing  Gastrointestinal:  See HPI  Genitourinary:  See HPI  Neuro:  No headaches, dizziness or lightheadedness  All other systems were reviewed and are negative    Historical Information   Past Medical History:   Diagnosis Date    Abdominal [facility] (AAA) [facility] ) 2017    Per Aretha     Anxiety state 2017    Per Aretha     Benign prostatic hyperplasia     Benign prostatic hyperplasia without lower urinary tract symptoms     Body mass index 33 0-33 9, adult 08/15/2019    Per Grants     Chronic kidney disease, stage II (mild) 08/15/2019    Per Grants     Chronic kidney disease, unspecified 2017    Per Grants     Chronic obstructive pulmonary disease (COPD) ([facility] ) 2019    Per Elfego Palacio     Diverticulosis of large intestine without perforation or abscess without bleeding 2019    Per Elfego Palacio     Essential hypertension 2017    Per Elfego Palacio     Hearing loss, bilateral 2017    Per Aretha     Mixed hyperlipidemia 2017    Per Aretha     Obesity, unspecified 2017    Per Elfego Palacio     Type 2 diabetes mellitus without complication ([facility] )     Per Elfego Palacio      Past Surgical History:   Procedure Laterality Date    CHOLECYSTECTOMY      Per Elfego Palacio     COLECTOMY      removal of 8 inches of colon for diverticular disease   Per Elfego Palacio      Social History   Social History     Substance and Sexual Activity   Alcohol Use Yes    Comment: Occasional- Per Aretha      Social History     Substance and Sexual Activity   Drug Use Never     Social History     Tobacco Use   Smoking Status Former Smoker    Last attempt to quit: Cinda Batista Years since quittin 6   Smokeless Tobacco Never Used   Tobacco Comment    quit 37 years ago     Family History   Problem Relation Age of Onset    Diabetes Father        Meds/Allergies   all current active meds have been reviewed, current meds:   Current Facility-Administered Medications   Medication Dose Route Frequency    acetaminophen (TYLENOL) tablet 650 mg  650 mg Oral Q6H PRN    albumin human (FLEXBUMIN) 25 % injection 25 g  25 g Intravenous Once    heparin (porcine) subcutaneous injection 5,000 Units  5,000 Units Subcutaneous Q8H [facility]    insulin lispro (HumaLOG) 100 units/mL subcutaneous injection 1-5 Units  1-5 Units Subcutaneous TID AC    insulin lispro (HumaLOG) 100 units/mL subcutaneous injection 1-5 Units  1-5 Units Subcutaneous HS    multi-electrolyte (PLASMALYTE-A/ISOLYTE-S PH 7 4) IV solution  75 mL/hr Intravenous Continuous    ondansetron (ZOFRAN) injection 4 mg  4 mg Intravenous Q6H PRN    oxyCODONE-acetaminophen (PERCOCET) 5-325 mg per tablet 1 tablet  1 tablet Oral Q6H PRN    and PTA meds:    Medications Prior to Admission   Medication    amLODIPine (NORVASC) 10 mg tablet    aspirin (ECOTRIN LOW STRENGTH) 81 mg EC tablet    losartan (COZAAR) 100 MG tablet    metFORMIN (GLUCOPHAGE-XR) 500 mg 24 hr tablet    oxyCODONE-acetaminophen (PERCOCET) 5-325 mg per tablet       No Known Allergies    Objective   No intake or output data in the 24 hours ending 09/03/20 1336  Patient Vitals for the past 24 hrs:   BP Temp Temp src Pulse Resp SpO2   09/03/20 1200 (!) 84/58        09/03/20 1154 (!) 87/50 97 6 °F (36 4 °C) Oral (!) 108 20 97 %       Invasive Devices:   Urethral Catheter Non-latex 16 Fr   (Active)   Amt returned on insertion(mL) 15 mL 09/03/20 0812   Reasons to continue Urinary Catheter  Accurate I&O assessment in critically ill patients (48 hr  max) 09/03/20 0812   Site Assessment Clean;Skin intact 09/03/20 0812   Collection Container Standard drainage bag 09/03/20 0812   Securement Method Tape 09/03/20 0812     Vitals:    09/03/20 1200   BP: (!) 84/58   Pulse:    Resp:    Temp:    SpO2:      General:  Well-developed well-nourished weak appearing but no acute distress  Skin:  No acute rash and no jaundice  Eyes:  No scleral icterus and noninjected  ENT:  Normocephalic/atraumatic, mucous membranes slightly dry  Neck:  Supple, no jugular venous distention, no carotid bruits, 1+ carotid upstroke, no thyromegaly and midline trachea  Back:  No CVA tenderness  Chest: Slight crackles at the right base otherwise clear to auscultation percussion, good respiratory effort, no use of accessory respiratory muscles  CVS:  Regular rate rhythm without a murmur rub or gallops appreciable  Abdomen:  Very distended/mildly tympanitic, possible fluid wave but difficult exam, no real tenderness on exam, no overt hepatosplenomegaly or bruits appreciable  Extremities:  No clubbing, no cyanosis, and only trace lower extremity edema, poor distal pulses, no femoral bruits, no significant arthritic changes  Neuro:  Grossly intact  Psych:  Alert, oriented and appropriate    Current Weight:    First Weight:      Lab Results:  I have personally reviewed pertinent labs    CBC:   Lab Results   Component Value Date    WBC 13 23 (H) 09/03/2020    RBC 4 59 09/03/2020     CMP:   Lab Results   Component Value Date    CL 95 (L) 09/03/2020    CO2 22 09/03/2020    BUN 48 (H) 09/03/2020    BUN 19 08/27/2020    CREATININE 5 85 (H) 09/03/2020    CALCIUM 8 6 09/03/2020    AST 10 (L) 09/03/2020    ALT 8 09/03/2020    ALKPHOS 56 7 09/03/2020    EGFR 9 09/03/2020     Phosphorus: No results found for: PHOS  Magnesium:   Lab Results   Component Value Date    MG 1 8 08/02/2020     Urinalysis:   Lab Results   Component Value Date    COLORU Yellow 07/31/2020    CLARITYU Slightly Cloudy (A) 07/31/2020    SPECGRAV 1 020 07/31/2020    PHUR 6 0 07/31/2020    LEUKOCYTESUR 2+ (A) 07/31/2020    NITRITE Positive (A) 07/31/2020    GLUCOSEU Negative 07/31/2020    KETONESU Negative 07/31/2020    BILIRUBINUR Negative 07/31/2020    BLOODU 1+ (A) 07/31/2020     BMP:   Lab Results   Component Value Date    SODIUM 133 09/03/2020    CO2 22 09/03/2020    BUN 48 (H) 09/03/2020    BUN 19 08/27/2020    CREATININE 5 85 (H) 09/03/2020    CALCIUM 8 6 09/03/2020     Radiology review:   · Chest x-ray:  Hypoventilation with possible trace effusions, linear atelectasis at the bases  · Recent had scan on 08/31/2020:  Enlarging pancreatic tail mass compatible no malignancy; interval progression of multiple peritoneal nodules and large amount of abdominal pelvic ascites compatible with peritoneal carcinomatosis        Assessment/Plan   20-year-old male with history of diabetes mellitus type 2/hypertension/AAA with a recent diagnosis of pancreatic cancer with metastatic disease who we are asked to see for AK I on CKD stage 3:    1  CKD stage 3:  Baseline creatinine is approximately 1 0  Compatible diabetic nephropathy/hypertensive nephrosclerosis/arteriolar nephrosclerosis  2  AK I/POA:  · Current creatinine 5 85  · Etiology:  Compatible with possible prerenal azotemia/3rd spacing with the ascites/possible compartment type syndrome with massive ascites/ATN certainly with the hypotension which may be related to possible sepsis  Certainly, rule out obstructive uropathy as well  Recommend:  Workup:  · UA with microscopic  · Urine sodium  · CT scan of the abdomen pelvis with oral contrast only    Treatment:  · Support hypotension with IV fluids possible pressor if persistent  · IV fluids please see orders  · Consider therapeutic paracentesis to potentially help the possible compressive/compartment syndrome  · Avoid nephrotoxic agents such as NSAIDs  · Avoid and treat hypotension    Further treatment recommendations will be forthcoming depending upon the above results and course  We will closely monitor the patient for need for renal placement therapy  There is no acute need at this time  He is agreeable to renal placement therapy/hemodialysis if needed  2  Hypotension:  Certainly sepsis/prerenal/rule out cardiac/rule out hypothyroidism or adrenal insufficiency  Recommend:  · Check cortisol  · Check TSH/free T4  · Rule out sepsis  · Check echocardiogram  Treatment:  · IV fluid challenge  · May need pressor support, start with midodrine, may need IV pressor support    3  Hyponatremia:  Partly corrects for glucose to about 135   In part may be prerenal and possibly other causes including hypothyroidism/adrenal insufficiency/SIADH malignancy  Recommend:  · Urine for sodium  · Urine for osmolality  · Serum osmolality  · Thyroid profile  · Cortisol random given hypotension  Treat with isotonic fluids and monitor sodium closely    Discussed with hospitalist    Portions of the record may have been created with voice recognition software  Occasional wrong word or "sound a like" substitutions may have occurred due to the inherent limitations of voice recognition software  Read the chart carefully and recognize, using context, where substitutions have occurred

## 2020-09-03 NOTE — PROGRESS NOTES
Notified Dr Santo Brunner regarding patient's BP and deterioration score of 51%  Isolyte bolus administered per order  Patient's , skin cool and clammy    Patient now upgraded to level 2 SD

## 2020-09-03 NOTE — RESTORATIVE TECHNICIAN NOTE
Restorative Specialist Mobility Note       Activity: Other (Comment)(Attempted to see pt, pt is currently on hold per nursing 2* low BP's  )                Osvaldo AVILES, Restorative Technician,

## 2020-09-03 NOTE — ASSESSMENT & PLAN NOTE
Lab Results   Component Value Date    HGBA1C 6 5 (H) 08/02/2020       Recent Labs     09/03/20  1201   POCGLU 200*       Blood Sugar Average: Last 72 hrs:  (P) 200     Hold home metformin  ISS

## 2020-09-03 NOTE — ASSESSMENT & PLAN NOTE
· HR > 100, WBC 13k, procal 0 6, Lactate 2 2  · Mostly likely 2/2 dehydration, fluid loss >> infection  · Monitor for infection   H/o ESBL UTI in July  · F/u UA, send paracentesis fluid for cell count & cultures  · No fever or cough at home  · Monitor fever - if fever spikes check blood cultures & consider empiric Abx  · Monitor fever, WBC, procalcitonin

## 2020-09-03 NOTE — ASSESSMENT & PLAN NOTE
· Diagnosed last month, found incidentally on CT when he was admitted for urosepsis  · Underwent outpatient EGD/EUS shows adenocarcinoma  · Followed up with Surg Onc Dr Abiel Rojas who recommended Med Onc consultation as he first needs chemotherapy   hasnt seen them yet, appt with Dr Erica Rich on 9/10  · Will benefit from Med Onc consultation this hospitalization to explain him the severity of his disease & prognosis & treatment options

## 2020-09-03 NOTE — H&P
H&P- Dhruv Ramirez 1945, 66 y o  male MRN: [id number]    Unit/Bed#: [facility] 909-01 Encounter: [id number]    Primary Care Provider: Shravan Leon MD   Date and time admitted to hospital: 9/3/2020 11:24 AM    Admit as level 2 SD  If he worsens, he may need critical care evaluation, especially if he needs more emergent dialysis/CVVHD    * LY (acute kidney injury) ([facility] )  Assessment & Plan  · Suspect pre-renal vs ATN 2/2 combination of poor PO intake + low BP from BP meds + ARB use + high intraabdominal pressure from ascites  · BL Cr 0 9- 1  Today 5 8  · Check UA  · stark inserted at THE [facility] ED with minimal output  · Stop BP meds, give IVF, gave 250 cc bolus, IV albumin, placed on NS @ 125/hr per nephro  · Appreciate nephro input, discussed extensively with them  · Check CT abdomen  · Midodrine added 5 TID  · No indication for emergent dialysis at this time    SIRS (systemic inflammatory response syndrome) (HCC)  Assessment & Plan  · HR > 100, WBC 13k, procal 0 6, Lactate 2 2  · Mostly likely 2/2 dehydration, fluid loss >> infection  · Monitor for infection  H/o ESBL UTI in July  · F/u UA, send paracentesis fluid for cell count & cultures  · No fever or cough at home  · Monitor fever - if fever spikes check blood cultures & consider empiric Abx  · Monitor fever, WBC, procalcitonin    Cancer related pain  Assessment & Plan  · Cont home oxy 5 at lower frequency of 8 hr prn   Monitor with renal function    Ascites  Assessment & Plan  · PET CT on 8/31 showed enlarging peritoneal nodules, large ascites  · abdo significantly distended  · IR paracentesis ordered for diagnostic & therapeutic purposes    HTN (hypertension)  Assessment & Plan  · Now hypotensive  · Hold home meds    Type 2 diabetes mellitus with hyperglycemia [facility])  Assessment & Plan  Lab Results   Component Value Date    HGBA1C 6 5 (H) 08/02/2020       Recent Labs     09/03/20  1201   POCGLU 200*       Blood Sugar Average: Last 72 hrs:  (P) 200     Hold home metformin  ISS    Pancreatic mass  Assessment & Plan  · Diagnosed last month, found incidentally on CT when he was admitted for urosepsis  · Underwent outpatient EGD/EUS shows adenocarcinoma  · Followed up with Surg Onc Dr Humble Denson who recommended Med Onc consultation as he first needs chemotherapy  hasnt seen them yet, appt with Dr Glenn Hughes on 9/10  · Will benefit from Med Onc consultation this hospitalization to explain him the severity of his disease & prognosis & treatment options      History and Physical - [facility] Internal Medicine    Patient Information: Sumit Saul 66 y o  male MRN: [id number]  Unit/Bed#: [facility] 909-01 Encounter: [id number]  Admitting Physician: Paulette Rodriguez MD  PCP: Keshawn Claudio MD  Date of Admission:  09/03/20    Assessment/Plan:    Hospital Problem List:     Principal Problem:    LY (acute kidney injury) ([facility] [address] )  Active Problems:    SIRS (systemic inflammatory response syndrome) ([facility] [address] )    Pancreatic mass    Type 2 diabetes mellitus with hyperglycemia ([facility] [address] )    HTN (hypertension)    Malignant neoplasm of tail of pancreas ([facility] [address] )    Ascites    Cancer related pain      VTE Prophylaxis: Heparin  / sequential compression device   Code Status: FC  POLST: There is no POLST form on file for this patient (pre-hospital)    Anticipated Length of Stay:  Patient will be admitted on an Inpatient basis with an anticipated length of stay of  > 2 midnights  Justification for Hospital Stay: LY, hypotension    Total Time for Visit, including Counseling / Coordination of Care: 1 hour  Greater than 50% of this total time spent on direct patient counseling and coordination of care  Chief Complaint:   Transfer from [facility] where he presented due to no urine output    History of Present Illness:    Sumit Saul is a 66 y o  male who presented initially to [facility] with no urine output in 1 5 days   He was admitted to THE [facility] in July with ESBL [facility] UTI, when he was incidentally found to have a lesion in pancreas, omental nodules, was recommended OP f/u with GI & Gen Surg  He underwent EGD/EUS biopsy showing adenoca of pancreas, was referred to Surg onc who then referred him to Med Onc because Dr Shari Ahmadi thought he needs Chemo, also went for PET CT  He has apt with Dr Dexter Bamberger on 9/10  In the meantime he has been having worsening b/l lower rib/flank area pain, back pain, decrease in appetite, nausea  + abdo distention  He was still taking his BP & BS meds till this am  He was seen by PCP yesterday - his BP then was 80/50, was recommended to decrease losartan to half & recommended paracentesis & going to ER  Today AM, wife called surg onc stating he has not urinated, he has the sensation, at times dribbles but no UO  Before stopping it was darker, no burning or pain  Has noticed some PIEDRA, no fever, no cough or chest pain, no pedal edema      Review of Systems:    Review of Systems   Constitutional: Positive for activity change and appetite change  Negative for chills and fever  HENT: Negative for congestion and rhinorrhea  Respiratory: Positive for shortness of breath  Negative for cough and wheezing  Cardiovascular: Negative for chest pain, palpitations and leg swelling  Gastrointestinal: Positive for abdominal distention, abdominal pain, constipation and nausea  Negative for diarrhea and vomiting  Genitourinary: Positive for difficulty urinating  Negative for dysuria  Musculoskeletal: Positive for back pain  Neurological: Negative for dizziness, syncope and light-headedness  All other systems reviewed and are negative        Past Medical and Surgical History:     Past Medical History:   Diagnosis Date    Abdominal aortic aneurysm (AAA) ([facility] [address] ) 05/26/2017    Per [place]     Anxiety state 05/26/2017    Per [place]     Benign prostatic hyperplasia     Benign prostatic hyperplasia without lower urinary tract symptoms     Body mass index 33 0-33 9, adult 08/15/2019    Per [place] kidney disease, stage II (mild) 08/15/2019    Per [place]     Chronic kidney disease, unspecified 05/26/2017    Per [place]     Chronic obstructive pulmonary disease (COPD) [facility] ) 09/23/2019    Per Alyx Wynne     Diverticulosis of large intestine without perforation or abscess without bleeding 04/18/2019    Per Alyx Wynne     Essential hypertension 05/26/2017    Per Alyx Wynne     Hearing loss, bilateral 02/26/2017    Per [place]     Mixed hyperlipidemia 05/26/2017    Per [place]     Obesity, unspecified 05/26/2017    Per Alyx Wynne     Type 2 diabetes mellitus without complication ([facility] ) 48/07/7374    Per Alyx Wynne        Past Surgical History:   Procedure Laterality Date    CHOLECYSTECTOMY      Per Alyx Wynne     COLECTOMY      removal of 8 inches of colon for diverticular disease  Per Alyx Wynne        Meds/Allergies:    Prior to Admission medications    Medication Sig Start Date End Date Taking? Authorizing Provider   amLODIPine (NORVASC) 10 mg tablet Take 10 mg by mouth daily   Yes Historical Provider, MD   aspirin (ECOTRIN LOW STRENGTH) 81 mg EC tablet Take 81 mg by mouth daily   Yes Historical Provider, MD   losartan (COZAAR) 100 MG tablet Take 50 mg by mouth daily    Yes Historical Provider, MD   metFORMIN (GLUCOPHAGE-XR) 500 mg 24 hr tablet Take 1 tablet (500 mg total) by mouth 2 (two) times a day with meals 6/17/20  Yes Nia Carballo MD   oxyCODONE-acetaminophen (PERCOCET) 5-325 mg per tablet Take 1 tablet by mouth every 4 (four) hours as needed for moderate painMax Daily Amount: 6 tablets 8/29/20  Yes Maximino Luke MD     I have reviewed home medications with a medical source (PCP, Pharmacy, other)      Allergies: No Known Allergies    Social History:     Marital Status: /Civil Union   Occupation: none  Patient Pre-hospital Living Situation: at home  Patient Pre-hospital Level of Mobility: active  Patient Pre-hospital Diet Restrictions: diabetic  Substance Use History:   Social History     Substance and Sexual Activity   Alcohol Use Yes    Comment: Occasional- Per [place]      Social History     Tobacco Use   Smoking Status Former Smoker    Last attempt to quit: Pauline Basurto Years since quittin 6   Smokeless Tobacco Never Used   Tobacco Comment    quit 37 years ago     Social History     Substance and Sexual Activity   Drug Use Never       Family History:    Family History   Problem Relation Age of Onset    Diabetes Father        Physical Exam:     Vitals:   Blood Pressure: 101/52 (20 1509)  Pulse: 103 (20 1509)  Temperature: 97 9 °F (36 6 °C) (20 1509)  Temp Source: Oral (20 1154)  Respirations: 20 (20 1509)  Height: 6' (182 9 cm) (20 1154)  Weight - Scale: 117 kg (258 lb 9 6 oz) (20 1154)  SpO2: 96 % (20 1509)    Physical Exam  Vitals signs reviewed  HENT:      Head: Normocephalic and atraumatic  Eyes:      General: No scleral icterus  Conjunctiva/sclera: Conjunctivae normal    Cardiovascular:      Rate and Rhythm: Normal rate and regular rhythm  Heart sounds: Normal heart sounds  No murmur  No gallop  Pulmonary:      Effort: Pulmonary effort is normal  No respiratory distress  Breath sounds: Normal breath sounds  No wheezing  Abdominal:      General: There is no distension  Palpations: Abdomen is soft  Tenderness: There is no abdominal tenderness  Neurological:      Mental Status: He is alert and oriented to person, place, and time  Additional Data:     Lab Results: I have personally reviewed pertinent reports        Results from last 7 days   Lab Units 20  1441 20  0759   WBC Thousand/uL  --  13 23*   HEMOGLOBIN g/dL  --  13 4   HEMATOCRIT %  --  40 5   PLATELETS Thousands/uL 254 303   NEUTROS PCT %  --  73   LYMPHS PCT %  --  16   MONOS PCT %  --  8   EOS PCT %  --  2     Results from last 7 days   Lab Units 20  1440 20  0759   POTASSIUM mmol/L 3 9 3 7   CHLORIDE mmol/L 99* 95*   CO2 mmol/L 22 22   BUN mg/dL 54* 48*   CREATININE mg/dL 6 44* 5 85*   CALCIUM mg/dL 8 4 8 6   ALK PHOS U/L  --  56 7   ALT U/L  --  8   AST U/L  --  10*           Imaging: I have personally reviewed pertinent reports  Xr Chest 1 View Portable    Result Date: 9/3/2020  Narrative: CHEST INDICATION:   dyspnea  COMPARISON:  7/31/2020 EXAM PERFORMED/VIEWS:  XR CHEST PORTABLE FINDINGS:  Hypoventilation is noted  Cardiomediastinal silhouette appears unremarkable  Linear atelectasis or scarring is slightly enhanced given lower level of inspiration at the left lung base and right lung base  There is no pneumothorax  Minimal blunting the costophrenic angles may be related to trace effusions or hypoventilation  Osseous structures appear within normal limits for patient age  Impression: Hypoventilation with possible trace effusions  Linear atelectasis at the bases  Workstation performed: [id number]     Nm Pet Ct Skull Base To Mid Thigh    Result Date: 8/31/2020  Narrative: PET/CT SCAN INDICATION:  C25 2: Malignant neoplasm of tail of pancreas   , initial staging for treatment management MODIFIER: PI COMPARISON: CT 7/31/2020 CELL TYPE:  Adenocarcinoma, pancreatic tail biopsy 8/13/2020 TECHNIQUE:   10 2 mCi F-18-FDG administered IV  Multiplanar attenuation corrected and non attenuation corrected PET images were acquired 60 minutes post injection  Contiguous, low dose, axial CT sections were obtained from the vertex through the femurs   Intravenous contrast material was not utilized  This examination, like all CT scans performed in the [facility], was performed utilizing techniques to minimize radiation dose exposure, including the use of iterative reconstruction and automated exposure control  Fasting serum glucose: 205 mg/dl FINDINGS: VISUALIZED BRAIN:   No acute abnormalities are seen  HEAD/NECK:   There is a physiologic distribution of FDG  No FDG avid cervical adenopathy is seen  CT images: Unremarkable  CHEST: Reticular nodular densities again noted in the lung bases bilaterally  These are too small for accurate PET evaluation but may be infectious/inflammatory  Otherwise no FDG avid soft tissue lesions are seen  CT images: Coronary atherosclerosis  ABDOMEN/PELVIS:   Pancreatic tail mass series 3 image 171 measuring 6 8 x 3 9 cm demonstrates mild heterogeneous FDG activity, SUV 4, compatible with known malignancy  This mass is contiguous with the adjacent spleen and posterior gastric wall  There has been interval progression of multiple peritoneal nodules with a large amount of abdominopelvic ascites, compatible with peritoneal carcinomatosis  Example left upper anterior nodule series 3 image 199 measures 1 5 x 1 5 cm, SUV 2 4  Prior measurement 1 1 x 1 1 cm  No discrete hypermetabolic liver lesion is visualized, though evaluation may be limited due to the mild uptake of the primary tumor  Consider contrast MR evaluation if clinically warranted  Bowel activity is likely physiologic  CT images: Cholecystectomy  Small fat-containing bilateral inguinal hernias  Prostate calcifications  OSSEOUS STRUCTURES: No FDG avid lesions are seen  CT images: No significant findings  Impression: 1  Enlarging pancreatic tail mass with mild heterogeneous FDG activity compatible with known malignancy  2   Interval progression of multiple peritoneal nodules with a large amount of abdominopelvic ascites, compatible with peritoneal carcinomatosis  3   No discrete hypermetabolic liver lesion is visualized, though evaluation may be limited due to the mild uptake of the primary tumor  Consider contrast MR evaluation if clinically warranted  4   No hypermetabolic lesions in the neck or chest  5   Reticular nodular densities again noted in the lung bases bilaterally  These are too small for accurate PET evaluation but may be infectious/inflammatory  Continued CT follow-up recommended   Workstation performed: [id number]       Epic / Care Everywhere Records Reviewed: Yes     ** Please Note: This note has been constructed using a voice recognition system   **

## 2020-09-03 NOTE — EMTALA/ACUTE CARE TRANSFER
[facility] EMERGENCY DEPARTMENT  [address] [address]  Dept: [phone]      EMTALA TRANSFER CONSENT    NAME Mary Amado                                         1945                              MRN [id number]    I have been informed of my rights regarding examination, treatment, and transfer   by Dr Sharifa Salgado MD    Benefits: Specialized equipment and/or services available at the receiving facility (Include comment)________________________    Risks: Potential for delay in receiving treatment, Potential deterioration of medical condition, Loss of IV, Increased discomfort during transfer, Possible worsening of condition or death during transfer      Transfer Request   I acknowledge that my medical condition has been evaluated and explained to me by the emergency department physician or other qualified medical person and/or my attending physician who has recommended and offered to me further medical examination and treatment  I understand the Hospital's obligation with respect to the treatment and stabilization of my emergency medical condition  I nevertheless request to be transferred  I release the Hospital, the doctor, and any other persons caring for me from all responsibility or liability for any injury or ill effects that may result from my transfer and agree to accept all responsibility for the consequences of my choice to transfer, rather than receive stabilizing treatment at the Hospital  I understand that because the transfer is my request, my insurance may not provide reimbursement for the services  The Hospital will assist and direct me and my family in how to make arrangements for transfer, but the hospital is not liable for any fees charged by the transport service    In spite of this understanding, I refuse to consent to further medical examination and treatment which has been offered to me, and request transfer to [address], [facility] [address]  I authorize the performance of emergency medical procedures and treatments upon me in both transit and upon arrival at the receiving facility  Additionally, I authorize the release of any and all medical records to the receiving facility and request they be transported with me, if possible  I authorize the performance of emergency medical procedures and treatments upon me in both transit and upon arrival at the receiving facility  Additionally, I authorize the release of any and all medical records to the receiving facility and request they be transported with me, if possible  I understand that the safest mode of transportation during a medical emergency is an ambulance and that the Hospital advocates the use of this mode of transport  Risks of traveling to the receiving facility by car, including absence of medical control, life sustaining equipment, such as oxygen, and medical personnel has been explained to me and I fully understand them  ([place] CORRECT BOX BELOW)  [  ]  I consent to the stated transfer and to be transported by ambulance/helicopter  [  ]  I consent to the stated transfer, but refuse transportation by ambulance and accept full responsibility for my transportation by car  I understand the risks of non-ambulance transfers and I exonerate the Hospital and its staff from any deterioration in my condition that results from this refusal     X___________________________________________    DATE  20  TIME________  Signature of patient or legally responsible individual signing on patient behalf           RELATIONSHIP TO PATIENT_________________________          Provider Certification    NAME Latanya Rizvi                                        [facility] 1945                              MRN [id number]    A medical screening exam was performed on the above named patient    Based on the examination:    Condition Necessitating Transfer The primary encounter diagnosis was Acute renal failure, unspecified acute renal failure type ([facility] [address] )  A diagnosis of Malignant neoplasm of tail of pancreas ([facility] [address] ) was also pertinent to this visit  Patient Condition: The patient has been stabilized such that within reasonable medical probability, no material deterioration of the patient condition or the condition of the unborn child(yolanda) is likely to result from the transfer    Reason for Transfer: Level of Care needed not available at this facility    Transfer Requirements: Anais Dean   · Space available and qualified personnel available for treatment as acknowledged by    · Agreed to accept transfer and to provide appropriate medical treatment as acknowledged by       [facility]  · Appropriate medical records of the examination and treatment of the patient are provided at the time of transfer   [address] [facility], [address] _______  · Transfer will be performed by qualified personnel from    and appropriate transfer equipment as required, including the use of necessary and appropriate life support measures  Provider Certification: I have examined the patient and explained the following risks and benefits of being transferred/refusing transfer to the patient/family:         Based on these reasonable risks and benefits to the patient and/or the unborn child(yolanda), and based upon the information available at the time of the patients examination, I certify that the medical benefits reasonably to be expected from the provision of appropriate medical treatments at another medical facility outweigh the increasing risks, if any, to the individuals medical condition, and in the case of labor to the unborn child, from effecting the transfer      X____________________________________________ DATE 09/03/20        TIME_______      ORIGINAL - SEND TO MEDICAL RECORDS   COPY - SEND WITH PATIENT DURING TRANSFER

## 2020-09-03 NOTE — ED PROVIDER NOTES
History  Chief Complaint   Patient presents with    Abdominal Pain     Patient has pancreatic cancer and spouse reports "He is holding fluid in his stomach, trouble urinating"  38-year-old male history of hypertension adult onset diabetes pancreatic cancer with metastasis recently diagnosed presents secondary to weakness increasing abdominal distension decreased urinary output  Patient is feeling generally weak  Patient was told to come to the emergency department by his treating physician this morning secondary to no urine output  I have reviewed the notes from Surgical Oncology and the telephone notes  Patient has been offered palliative care but at this point has been declined by his wife and himself  Patient denies any chest pain patient denies nausea or vomiting  Patient says he does not have an appetite just feels generally weak  Patient denies any blood in his stools patient denies any cough congestion sore throat  Prior to Admission Medications   Prescriptions Last Dose Informant Patient Reported? Taking?    amLODIPine (NORVASC) 10 mg tablet 9/2/2020 at Unknown time  Yes Yes   Sig: Take 10 mg by mouth daily   aspirin (ECOTRIN LOW STRENGTH) 81 mg EC tablet 9/3/2020 at Unknown time  Yes Yes   Sig: Take 81 mg by mouth daily   losartan (COZAAR) 100 MG tablet 9/3/2020 at Unknown time  Yes Yes   Sig: Take 50 mg by mouth daily    metFORMIN (GLUCOPHAGE-XR) 500 mg 24 hr tablet 9/3/2020 at Unknown time  No Yes   Sig: Take 1 tablet (500 mg total) by mouth 2 (two) times a day with meals   oxyCODONE-acetaminophen (PERCOCET) 5-325 mg per tablet 9/2/2020 at Unknown time  No Yes   Sig: Take 1 tablet by mouth every 4 (four) hours as needed for moderate painMax Daily Amount: 6 tablets      Facility-Administered Medications: None       Past Medical History:   Diagnosis Date    Abdominal aortic aneurysm (AAA) ([facility] [address] ) 05/26/2017    Per [place]     Anxiety state 05/26/2017    Per Karen Ibarra     Benign prostatic hyperplasia     Benign prostatic hyperplasia without lower urinary tract symptoms     Body mass index 33 0-33 9, adult 08/15/2019    Per Aretha     Chronic kidney disease, stage II (mild) 08/15/2019    Per Mills     Chronic kidney disease, unspecified 2017    Per Mills     Chronic obstructive pulmonary disease (COPD) ([facility] ) 2019    Per Alanna Flores     Diverticulosis of large intestine without perforation or abscess without bleeding 2019    Per Alanna Flores     Essential hypertension 2017    Per Alanna Flores     Hearing loss, bilateral 2017    Per Aretha     Mixed hyperlipidemia 2017    Per Aretha     Obesity, unspecified 2017    Per Alanna Flores     Type 2 diabetes mellitus without complication ([facility] )     Per Alanna Flores        Past Surgical History:   Procedure Laterality Date    CHOLECYSTECTOMY      Per Alanna Flores     COLECTOMY      removal of 8 inches of colon for diverticular disease  Per Alanna Flores        Family History   Problem Relation Age of Onset    Diabetes Father      I have reviewed and agree with the history as documented  E-Cigarette/Vaping    E-Cigarette Use Never User      E-Cigarette/Vaping Substances     Social History     Tobacco Use    Smoking status: Former Smoker     Last attempt to quit: 1983     Years since quittin 6    Smokeless tobacco: Never Used    Tobacco comment: quit 37 years ago   Substance Use Topics    Alcohol use: Yes     Comment: Occasional- Per [employer] Drug use: Never       Review of Systems   Constitutional: Positive for chills and fatigue  Negative for fever  HENT: Negative for congestion  Eyes: Negative for visual disturbance  Respiratory: Positive for shortness of breath (with exertion)  Cardiovascular: Negative for chest pain  Gastrointestinal: Positive for abdominal distention and nausea  Negative for abdominal pain  Endocrine: Negative for cold intolerance  Genitourinary: Negative for frequency  Musculoskeletal: Negative for gait problem  Skin: Negative for rash  Neurological: Positive for weakness  Negative for dizziness  Psychiatric/Behavioral: Negative for behavioral problems and confusion  Physical Exam  Physical Exam  Vitals signs and nursing note reviewed  Constitutional:       Appearance: He is ill-appearing  HENT:      Head: Normocephalic and atraumatic  Eyes:      Conjunctiva/sclera: Conjunctivae normal       Pupils: Pupils are equal, round, and reactive to light  Neck:      Musculoskeletal: Normal range of motion and neck supple  Cardiovascular:      Rate and Rhythm: Normal rate and regular rhythm  Heart sounds: Normal heart sounds  Pulmonary:      Effort: Pulmonary effort is normal       Breath sounds: Normal breath sounds  Abdominal:      General: Bowel sounds are normal       Palpations: Abdomen is soft  There is fluid wave  Comments: Distended secondary to ascites positive fluid wave noted   Musculoskeletal: Normal range of motion  Skin:     General: Skin is warm and dry  Capillary Refill: Capillary refill takes less than 2 seconds  Neurological:      Mental Status: He is alert and oriented to person, place, and time     Psychiatric:         Behavior: Behavior normal          Vital Signs  ED Triage Vitals   Temperature Pulse Respirations Blood Pressure SpO2   09/03/20 0753 09/03/20 0753 09/03/20 0753 09/03/20 0753 09/03/20 0753   97 9 °F (36 6 °C) (!) 109 (!) 30 111/57 95 %      Temp Source Heart Rate Source Patient Position - Orthostatic VS BP Location FiO2 (%)   09/03/20 0753 09/03/20 0753 09/03/20 0753 09/03/20 0753 --   Tympanic Monitor Sitting Right arm       Pain Score       09/03/20 0830       3           Vitals:    09/03/20 0753 09/03/20 0830   BP: 111/57 (!) 93/47   Pulse: (!) 109 (!) 111   Patient Position - Orthostatic VS: Sitting Sitting         Visual Acuity      ED Medications  Medications   HYDROmorphone (DILAUDID) injection 0 5 mg (has no administration in time range)   ondansetron (ZOFRAN) injection 4 mg (has no administration in time range)       Diagnostic Studies  Results Reviewed     Procedure Component Value Units Date/Time    Lipase [[id number]]  (Normal) Collected:  09/03/20 0759    Lab Status:  Final result Specimen:  Blood from Arm, Left Updated:  09/03/20 0828     Lipase 14 u/L     Comprehensive metabolic panel [[id number]]  (Abnormal) Collected:  09/03/20 0759    Lab Status:  Final result Specimen:  Blood from Arm, Left Updated:  09/03/20 0828     Sodium 133 mmol/L      Potassium 3 7 mmol/L      Chloride 95 mmol/L      CO2 22 mmol/L      ANION GAP 16 mmol/L      BUN 48 mg/dL      Creatinine 5 85 mg/dL      Glucose 177 mg/dL      Calcium 8 6 mg/dL      AST 10 U/L      ALT 8 U/L      Alkaline Phosphatase 56 7 U/L      Total Protein 7 2 g/dL      Albumin 3 5 g/dL      Total Bilirubin 0 53 mg/dL      eGFR 9 ml/min/1 73sq m     Narrative:       [place] guidelines for Chronic Kidney Disease (CKD):     Stage 1 with normal or high GFR (GFR > 90 mL/min/1 73 square meters)    Stage 2 Mild CKD (GFR = 60-89 mL/min/1 73 square meters)    Stage 3A Moderate CKD (GFR = 45-59 mL/min/1 73 square meters)    Stage 3B Moderate CKD (GFR = 30-44 mL/min/1 73 square meters)    Stage 4 Severe CKD (GFR = 15-29 mL/min/1 73 square meters)    Stage 5 End Stage CKD (GFR <15 mL/min/1 73 square meters)  Note: GFR calculation is accurate only with a steady state creatinine    Amylase [[id number]]     Lab Status:  No result Specimen:  Blood     UA w Reflex to Microscopic w Reflex to Culture [[id number]]     Lab Status:  No result Specimen:  Urine, Indwelling Dlegado Catheter     CBC and differential [[id number]]  (Abnormal) Collected:  09/03/20 0759    Lab Status:  Final result Specimen:  Blood from Arm, Left Updated:  09/03/20 0808     WBC 13 23 Thousand/uL      RBC 4 59 Million/uL      Hemoglobin 13 4 g/dL      Hematocrit 40 5 %      MCV 88 fL      MCH 29 2 pg      MCHC 33 1 g/dL      RDW 13 2 %      MPV 11 5 fL      Platelets 748 Thousands/uL      Neutrophils Relative 73 %      Immat GRANS % 1 %      Lymphocytes Relative 16 %      Monocytes Relative 8 %      Eosinophils Relative 2 %      Basophils Relative 0 %      Neutrophils Absolute 9 64 Thousands/µL      Immature Grans Absolute 0 07 Thousand/uL      Lymphocytes Absolute 2 11 Thousands/µL      Monocytes Absolute 1 04 Thousand/µL      Eosinophils Absolute 0 32 Thousand/µL      Basophils Absolute 0 05 Thousands/µL                  XR chest 1 view portable   Final Result by Shadi Reyez MD (09/03 4514)      Hypoventilation with possible trace effusions  Linear atelectasis at the bases  Workstation performed: [id number]                    Procedures  Procedures         ED Course                                             MDM  Number of Diagnoses or Management Options  Diagnosis management comments: Workup demonstrates what appears to be acute renal failure with a creatinine of 5 3 patient has a GFR however wanting 9 patient's white count slightly elevated normal H&H patient's potassium and sodium within normal limits  Delgado catheter was placed with minimal urine output  Case discussed with [facility] internal medicine on-call plan will be to transfer patient to the [facility] for further evaluation and care and treatment of his pancreatic cancer ascites and acute renal failure          Disposition  Final diagnoses:   Acute renal failure, unspecified acute renal failure type ([address] )   Malignant neoplasm of tail of pancreas ([address] )     Time reflects when diagnosis was documented in both MDM as applicable and the Disposition within this note     Time User Action Codes Description Comment    9/3/2020  8:48 AM Samir Reinoso Add [N17 9] Acute renal failure, unspecified acute renal failure type ([address] )     9/3/2020  8:48 AM Samir Reinoso Add [C25 2] Malignant neoplasm of tail of pancreas [facility])       ED Disposition     ED Disposition Condition Date/Time Comment    Transfer to Another Facility-In Network  Thu Sep 3, 2020  8:48 AM Garrett Herrera should be transferred out to [facility]elf[facility] Gregory MD Documentation      Most Recent Value   Patient Condition  The patient has been stabilized such that within reasonable medical probability, no material deterioration of the patient condition or the condition of the unborn child(yolanda) is likely to result from the transfer   Reason for Transfer  Level of Care needed not available at this facility   Benefits of Transfer  Specialized equipment and/or services available at the receiving facility (Include comment)________________________   Risks of Transfer  Potential for delay in receiving treatment, Potential deterioration of medical condition, Loss of IV, Increased discomfort during transfer, Possible worsening of condition or death during transfer   Accepting Physician  [facility] Name, Anita De Jesus MD  [facility] Name, Anita Neri      Follow-up Information    None         Patient's Medications   Discharge Prescriptions    No medications on file     No discharge procedures on file      PDMP Review       Value Time User    PDMP Reviewed  Yes 8/29/2020  4:27 PM Hipolito Gottlieb MD          ED Provider  Electronically Signed by           Brook Barajas MD  09/03/20 [address] Clemente Alcocer MD  09/03/20 7509

## 2020-09-03 NOTE — CASE MANAGEMENT
Met with pt and wife, explained CM program/CM role  LOS 0  Bundle-no  Unplanned readmission color-green  30 day readmission-yes-transfer [facility]-referred by MD to ER  Resides with wife in an apartment, [address], bedroom main floor  Required assistance with ADL's from wife/ambulates I, He drives, retired  PCP ANGÉLICA Aaron  Has prescription plan, uses [employer], can afford meds  [facility]  Denies utilization DME/IP Rehab  Denies MH illness, D&A abuse  Primary contact is wife Brittnynoel Cee, [phone]  No POA/LW  Family will transport home    CM reviewed d/c planning process including the following: identifying help at home, patient preference for d/c planning needs, Discharge Lounge, [facility] Meds to Bed program, availability of treatment team to discuss questions or concerns patient and/or family may have regarding understanding medications and recognizing signs and symptoms once discharged  CM also encouraged patient to follow up with all recommended appointments after discharge  Patient advised of importance for patient and family to participate in managing patients medical well being  Patient/caregiver received discharge checklist  Content reviewed  Patient/caregiver encouraged to participate in discharge plan of care prior to discharge home

## 2020-09-03 NOTE — PLAN OF CARE
Problem: Prexisting or High Potential for Compromised Skin Integrity  Goal: Skin integrity is maintained or improved  Description: INTERVENTIONS:  - Identify patients at risk for skin breakdown  - Assess and monitor skin integrity  - Assess and monitor nutrition and hydration status  - Monitor labs   - Assess for incontinence   - Turn and reposition patient  - Assist with mobility/ambulation  - Relieve pressure over bony prominences  - Avoid friction and shearing  - Provide appropriate hygiene as needed including keeping skin clean and dry  - Evaluate need for skin moisturizer/barrier cream  - Collaborate with interdisciplinary team   - Patient/family teaching  - Consider wound care consult   Outcome: Progressing     Problem: Potential for Falls  Goal: Patient will remain free of falls  Description: INTERVENTIONS:  - Assess patient frequently for physical needs  -  Identify cognitive and physical deficits and behaviors that affect risk of falls  -  [place] fall precautions as indicated by assessment   - Educate patient/family on patient safety including physical limitations  - Instruct patient to call for assistance with activity based on assessment  - Modify environment to reduce risk of injury  - Consider OT/PT consult to assist with strengthening/mobility  Outcome: Progressing     Problem: Nutrition/Hydration-ADULT  Goal: Nutrient/Hydration intake appropriate for improving, restoring or maintaining nutritional needs  Description: Monitor and assess patient's nutrition/hydration status for malnutrition  Collaborate with interdisciplinary team and initiate plan and interventions as ordered  Monitor patient's weight and dietary intake as ordered or per policy  Utilize nutrition screening tool and intervene as necessary  Determine patient's food preferences and provide high-protein, high-caloric foods as appropriate       INTERVENTIONS:  - Monitor oral intake, urinary output, labs, and treatment plans  - Assess nutrition and hydration status and recommend course of action  - Evaluate amount of meals eaten  - Assist patient with eating if necessary   - Allow adequate time for meals  - Recommend/ encourage appropriate diets, oral nutritional supplements, and vitamin/mineral supplements  - Order, calculate, and assess calorie counts as needed  - Recommend, monitor, and adjust tube feedings and TPN/PPN based on assessed needs  - Assess need for intravenous fluids  - Provide specific nutrition/hydration education as appropriate  - Include patient/family/caregiver in decisions related to nutrition  Outcome: Progressing

## 2020-09-03 NOTE — ASSESSMENT & PLAN NOTE
· Suspect pre-renal vs ATN 2/2 combination of poor PO intake + low BP from BP meds + ARB use + high intraabdominal pressure from ascites  · BL Cr 0 9- 1   Today 5 8  · Check UA  · stark inserted at [facility] ED with minimal output  · Stop BP meds, give IVF, gave 250 cc bolus, IV albumin, placed on NS @ 125/hr per nephro  · Appreciate nephro input, discussed extensively with them  · Check CT abdomen  · Midodrine added 5 TID  · No indication for emergent dialysis at this time

## 2020-09-04 PROBLEM — E87.1 HYPONATREMIA: Status: ACTIVE | Noted: 2020-01-01

## 2020-09-04 PROBLEM — I95.9 HYPOTENSION: Status: ACTIVE | Noted: 2020-01-01

## 2020-09-04 PROBLEM — K65.2 SPONTANEOUS BACTERIAL PERITONITIS (HCC): Status: ACTIVE | Noted: 2020-01-01

## 2020-09-04 PROBLEM — R65.20 SEVERE SEPSIS (HCC): Status: ACTIVE | Noted: 2020-01-01

## 2020-09-04 PROBLEM — I10 ESSENTIAL HYPERTENSION: Status: ACTIVE | Noted: 2020-01-01

## 2020-09-04 NOTE — UTILIZATION REVIEW
Initial Clinical Review    Admission: Date/Time/Statement:   Admission Orders (From admission, onward)     Ordered        09/03/20 1200  Inpatient Admission  Once                   Orders Placed This Encounter   Procedures    Inpatient Admission     Standing Status:   Standing     Number of Occurrences:   1     Order Specific Question:   Admitting Physician     Answer:   Nikki Nyhan [[id number]]     Order Specific Question:   Level of Care     Answer:   Med Surg [[id number]]     Order Specific Question:   Estimated length of stay     Answer:   More than 2 Midnights     Order Specific Question:   Certification     Answer:   I certify that inpatient services are medically necessary for this patient for a duration of greater than two midnights  See H&P and MD Progress Notes for additional information about the patient's course of treatment  Assessment/Plan:  Mr Delmi Roberts is a 65 yo male who presents as a transfer from the ED at [facility] to [facility] to [address] with c/o no urine output x 1 5 days  Pt was recently found to have Adeno CA of pancreas  And will be starting chemo on 9/10  He went to the ED with c/o worsening bilat lower rib and flank area pain, back pain, decreased appetite, nausea and abdominal distention  He was seen by PCP 9/3 and BP was 80/50  He recommended decrease in Losartan and sent him to ED for paracentesis  On day of admission his urine was dark before he was unable to urinate  He had + PIEDRA, no fever or other symptoms  He is admitted to INPATIENT status with LY suspected pre-renal vs ATN r/t combination of poor PO intake and low BP from continuing to take BP meds, ARB use and high intra-abdominal pressure from ascites - UA, continue strak, stop BPmeds, IV fluid bolus, IV Albumin and IV fluids @ 125/hr, CT abd, Midodrine TID, no indication for emergent dialysis at time of admission  SIRS - H/O ESBL in 7/2020, follow fevers and WBC  Cancer related pain - PRN Oxycodone    Ascites - seen on CT 8/31, IR consult for paracentesis  HTN hold meds  Type 2 DM - hold metformin and SSI cover  Pancreatic mass - Oncology consult  9/3 IR Paracentesis completed - 6400 ml serosanguineous ascites from RLQ       9/4 creat improving today  Some nausea with loose stools  Starting to make urine  Will check for obstructive uropathy  IV fluids to hypotension  No nephrotoxics, NSAIDs  Avoid hypotension and treat it  Hypotension r/t pre-renal and third spacing    Labs -  Urine for sodium 13 compatible with prerenal, Urine for osmolality 308, Serum osmolality:  296:  Rule out multiple myeloma, Thyroid profile:  Normal, Cortisol random given hypotension:  Normal       Pain Score       09/03/20 1152       5          Wt Readings from Last 1 Encounters:   09/04/20 116 kg (255 lb 4 7 oz)     Additional Vital Signs:   09/04/20 12:03:17   98 4 °F (36 9 °C)   89   20   108/56   73   94 %                09/04/20 0800                           None (Room air)       09/04/20 07:37:39   97 9 °F (36 6 °C)   98   18   101/50   67   94 %                09/04/20 05:43:11      87            97 %                09/04/20 0327            102/54                  Lying    09/04/20 03:22:18      102   20   97/49Abnormal     65   95 %                09/04/20 03:13:06   98 °F (36 7 °C)   110Abnormal     26Abnormal     105/47Abnormal     66   95 %                09/03/20 22:43:34   97 5 °F (36 4 °C)   100   20   101/60   74   96 %                09/03/20 19:22:54   98 °F (36 7 °C)   104   20   99/61   74   94 %                09/03/20 19:22:02   98 °F (36 7 °C)   102      99/61   74   94 %                09/03/20 18:22:31   97 6 °F (36 4 °C)   105   20   147/66   93   98 %                09/03/20 1758      95   20   118/56   80   99 %         Nasal cannula       09/03/20 1652      100   20   153/65   93   98 %         Nasal cannula       09/03/20 15:09:53   97 9 °F (36 6 °C) 103   20   101/52   68   96 %                09/03/20 1422            89/43Abnormal                        09/03/20 1200            84/58Abnormal                    Sitting    09/03/20 11:54:29   97 6 °F (36 4 °C)   108Abnormal     20   87/50Abnormal     62   97 %   28   2 L/min            Pertinent Labs/Diagnostic Test Results:     9/3 CXR - Hypoventilation with possible trace effusions  Linear atelectasis at the bases  9/3 IR Paracentesis - pending    9/3 CT abd, pelvis - Known pancreatic tail malignancy unchanged from the recent PET/CT  Persistent abdominal ascites and peritoneal nodularity in keeping with peritoneal carcinomatosis  9/3 ECG - Sinus tachycardia  Low voltage, extremity leads      Results from last 7 days   Lab Units 09/04/20  0444 09/03/20  1441 09/03/20  0759   WBC Thousand/uL 9 73  --  13 23*   HEMOGLOBIN g/dL 10 8*  --  13 4   HEMATOCRIT % 33 0*  --  40 5   PLATELETS Thousands/uL 196 254 303   NEUTROS ABS Thousands/µL 8 06*  --  9 64*         Results from last 7 days   Lab Units 09/04/20  0444 09/03/20  2311 09/03/20  1440 09/03/20  0759   SODIUM mmol/L 132* 131* 132* 133   POTASSIUM mmol/L 3 7 3 8 3 9 3 7   CHLORIDE mmol/L 100 98* 99* 95*   CO2 mmol/L 20* 23 22 22   ANION GAP mmol/L 12 10 11 16*   BUN mg/dL 59* 57* 54* 48*   CREATININE mg/dL 5 74* 6 21* 6 44* 5 85*   EGFR ml/min/1 73sq m 9 8 8 9   CALCIUM mg/dL 7 6* 7 6* 8 4 8 6   MAGNESIUM mg/dL 1 8  --   --   --    PHOSPHORUS mg/dL 6 9*  --   --   --      Results from last 7 days   Lab Units 09/04/20  0444 09/03/20  0759   AST U/L 17 10*   ALT U/L 12 8   ALK PHOS U/L 46 56 7   TOTAL PROTEIN g/dL 5 8* 7 2   ALBUMIN g/dL 2 6* 3 5   TOTAL BILIRUBIN mg/dL 0 60 0 53     Results from last 7 days   Lab Units 09/04/20  0830 09/03/20  2125 09/03/20  1825 09/03/20  1201 08/31/20  0930   POC GLUCOSE mg/dl 172* 133 170* 200* 205*     Results from last 7 days   Lab Units 09/04/20  0444 09/03/20  2311 09/03/20  1440 09/03/20  0759   GLUCOSE RANDOM mg/dL 164* 157* 147* 177*     Results from last 7 days   Lab Units 09/03/20  1440   OSMOLALITY, SERUM mmol/     Results from last 7 days   Lab Units 09/03/20  1440 09/03/20  1230   TSH 3RD GENERATON uIU/mL 2 450 3 150     Results from last 7 days   Lab Units 09/04/20  0444 09/03/20  1230   PROCALCITONIN ng/ml 0 54* 0 61*     Results from last 7 days   Lab Units 09/03/20  2311 09/03/20  1938 09/03/20  1441 09/03/20  1230   LACTIC ACID mmol/L 1 3 2 7* 2 4* 2 2*     Results from last 7 days   Lab Units 09/03/20  0759   LIPASE u/L 14     Results from last 7 days   Lab Units 09/03/20  1441   CLARITY UA  Turbid   COLOR UA  Dk Yellow   SPEC GRAV UA  1 026   PH UA  5 0   GLUCOSE UA mg/dl Negative   KETONES UA mg/dl 15 (1+)*   BLOOD UA  Negative   PROTEIN UA mg/dl Trace*   NITRITE UA  Negative   BILIRUBIN UA  Interference- unable to analyze*   UROBILINOGEN UA E U /dl 1 0   LEUKOCYTES UA  Small*   WBC UA /hpf 2-4*   RBC UA /hpf None Seen   BACTERIA UA /hpf Moderate*   EPITHELIAL CELLS WET PREP /hpf Occasional   MUCUS THREADS  Moderate*   SODIUM UR  13     Results from last 7 days   Lab Units 09/03/20  1758   GRAM STAIN RESULT  2+ Mononuclear Cells  Rare Polys  No bacteria seen   BODY FLUID CULTURE, STERILE  No growth     Results from last 7 days   Lab Units 09/03/20  1758   WBC FLUID /ul 917       Past Medical History:   Diagnosis Date    Abdominal aortic aneurysm (AAA) ([facility] ) 05/26/2017    Per Wilton     Anxiety state 05/26/2017    Per Wilton     Benign prostatic hyperplasia     Benign prostatic hyperplasia without lower urinary tract symptoms     Body mass index 33 0-33 9, adult 08/15/2019    Per Wilton     Chronic kidney disease, stage II (mild) 08/15/2019    Per Aretha     Chronic kidney disease, unspecified 05/26/2017    Per Shaka Neumann     Chronic obstructive pulmonary disease (COPD) ([facility] ) 09/23/2019    Per Shaak Neumann     Diverticulosis of large intestine without perforation or abscess without bleeding 04/18/2019    Per Eluterio Orris     Essential hypertension 05/26/2017    Per Eluterio Orris     Hearing loss, bilateral 02/26/2017    Per Eluterio Orris     Mixed hyperlipidemia 05/26/2017    Per Eluterio Orris     Obesity, unspecified 05/26/2017    Per Eluterio Orris     Type 2 diabetes mellitus without complication ([place] [address] ) 12/22/7089    Per Giouterio Orris      Present on Admission:   Pancreatic mass   Type 2 diabetes mellitus with hyperglycemia (HCC)   HTN (hypertension)   Malignant neoplasm of tail of pancreas (HCC)    Admitting Diagnosis: Pancreatic cancer ([place] [address] ) [C25 9]     Age/Sex: 66 y o  male     Admission Orders:    Scheduled Medications:  cefTRIAXone, 1,000 mg, Intravenous, Q24H  heparin (porcine), 5,000 Units, Subcutaneous, Q8H [facility]  insulin lispro, 1-5 Units, Subcutaneous, HS  insulin lispro, 1-5 Units, Subcutaneous, TID AC  iohexol, 50 mL, Oral, 90 min pre-procedure  midodrine, 10 mg, Oral, TID AC      Continuous IV Infusions:  sodium chloride, 125 mL/hr, Intravenous, Continuous      PRN Meds:  acetaminophen, 650 mg, Oral, Q6H PRN  ondansetron, 4 mg, Intravenous, Q4H PRN - x 1 9/4   oxyCODONE-acetaminophen, 1 tablet, Oral, Q8H PRN    Level 2 stepdown   SCDs  Up w/ assist   Daily wt  POC gluocse testing with SSI cover  IR consult for paracentesis  Blood culture  Diet renal, Lo phos with fluid restriction  Cardiac echo   IP CONSULT TO NEPHROLOGY    Network Utilization Review Department  [email or web address]  ATTENTION: Please call with any questions or concerns to [phone] and carefully listen to the prompts so that you are directed to the right person  All voicemails are confidential   Amy Valles all requests for admission clinical reviews, approved or denied determinations and any other requests to dedicated fax number below belonging to the campus where the patient is receiving treatment   List of dedicated fax numbers for the Facilities:  FACILITY NAME UR FAX NUMBER   ADMISSION DENIALS (Administrative/Medical Necessity) [address] [facility] (Maternity/NICU/Pediatrics) [place] [phone]   Chanell Cespedes [phone]   Cullen Opitz [phone]   [facility] [address] [place] [address] [phone]   [facility]  [phone]   [address] [facility]  [address] [phone]

## 2020-09-04 NOTE — BRIEF OP NOTE (RAD/CATH)
IR PARACENTESIS Procedure Note    PATIENT NAME: Meir Parr  : 1945  MRN: [id number]    Pre-op Diagnosis:   1  LY (acute kidney injury) ([facility] )      Post-op Diagnosis:   1   LY (acute kidney injury) [facility])        Provider:     Dayanna Colmenares    Assistants:     No qualified resident was available, Resident is only observing    Estimated Blood Loss: none  Findings: 6400 mL serosanguineous ascites removed from RLQ     Specimens: sent for labs as ordered    Complications:  none    [facility]est[facility]     Date: 2020  Time: 8:18 AM

## 2020-09-04 NOTE — ASSESSMENT & PLAN NOTE
Lab Results   Component Value Date    HGBA1C 6 5 (H) 08/02/2020     Hold oral hypoglycemics  On SSI coverage per [employer]

## 2020-09-04 NOTE — PROGRESS NOTES
Progress Note - Nephrology   Estrellita Loenard 66 y o  male MRN: [id number]  Unit/Bed#: [facility] 909-01 Encounter: [id number]    ASSESSMENT AND PLAN:  49-year-old male with history of diabetes mellitus type 2/hypertension/AAA with a recent diagnosis of pancreatic cancer with metastatic disease who we are asked to see for AK I on CKD stage 3:     1  CKD stage 3:  Baseline creatinine is approximately 1 0  Compatible diabetic nephropathy/hypertensive nephrosclerosis/arteriolar nephrosclerosis      2  AK I/POA:  · Current creatinine 5 85  · Peak creatinine:  6 21  · CURRENT CREATININE:  5 74 IMPROVED  · PATIENT WITH BEGINNING URINE OUTPUT WITH  MINUS FOUR 0 3 L BECAUSE OF THE PARACENTESIS  · Etiology:  Compatible with possible prerenal azotemia/3rd spacing with the ascites/possible compartment type syndrome with massive ascites/ATN certainly with the hypotension which may be related to possible sepsis  Certainly, rule out obstructive uropathy as well  Recommend:  Workup:  · UA with microscopic:  1+ ketones/trace proteinuria/no RBCs/2-4 wbc's/moderate bacteria  · Urine sodium:  13 compatible with prerenal  · CT scan of the abdomen pelvis with oral contrast only:  Known pancreatic tail malignancy unchanged from recent PET/CT, persist abdominal ascites with peritoneal nodularity compatible peritoneal carcinomatosis     Treatment:  · Support hypotension with IV fluids possible pressor if persistent  · IV fluids please see orders  · Avoid nephrotoxic agents such as NSAIDs  · Avoid and treat hypotension    · Continue monitoring for need for renal placement therapy it at this time there is no specific indication but will monitor closely      2   Hypotension:   prerenal with 3rd spacing plus:  Certainly sepsis/prerenal/rule out cardiac  Patient is improved with level of 1 1/50 this morning  Recommend:  · Cortisol:  Normal at 31 3  · Check TSH/free T4:  Normal  · Rule out sepsis:  Being evaluated and treated by primary service  · Check echocardiogram:  PENDING  Treatment:  · IV fluid challenge  · Midodrine for now     3  Hyponatremia:  Partly corrects for glucose to about 135  In part may be prerenal along with advance AK I:  and possibly other causes including hypothyroidism/adrenal insufficiency/SIADH malignancy  CURRENT SODIUM SLIGHTLY IMPROVED  CORRECTING FOR GLUCOSE   Recommend:  · Urine for sodium 13 compatible with prerenal  · Urine for osmolality 308  · Serum osmolality:  296:  Rule out multiple myeloma  · Thyroid profile:  Normal  · Cortisol random given hypotension:  Normal  Treat with isotonic fluids and monitor sodium closely  Fluid restriction       4  Mineral bone disorder of AK I:  Phosphorus 6 9  I will monitor his renal function improves but if persisting consider phosphate binder  Magnesium 1 8  I will give 1 g of magnesium sulfate  5  Anemia:  Hemoglobin acceptable at 10 8  6  Possible sepsis:  Lactic acid was elevated now improved at 1 3  Possible source includes  urosepsis along with possible SBP  Patient was recently in the hospital with urosepsis  -being managed by primary service    7  New diagnosis of pancreatic CA with metastatic to be followed by surgical oncology possibly oncology going forward  Subjective:   Patient is somewhat feels better  Status post 6 4 L paracentesis  No chest pain or shortness of breath this morning  He did have an episode of nausea associated loose stools earlier this morning which has resolved  Mild abdominal pain  Has a Delgado catheter in place  Objective:     Vitals: Blood pressure 101/50, pulse 98, temperature 97 9 °F (36 6 °C), resp  rate 18, height 6' (1 829 m), weight 116 kg (255 lb 4 7 oz), SpO2 94 %  ,Body mass index is 34 62 kg/m²      Weight (last 2 days)     Date/Time   Weight    09/04/20 0600   116 (255 29)    09/03/20 11:54:29   117 (258 6)                Intake/Output Summary (Last 24 hours) at 9/4/2020 0809  Last data filed at 9/4/2020 0455  Gross per 24 hour   Intake 2241 66 ml   Output 6600 ml   Net -4358 34 ml       Urethral Catheter Non-latex 16 Fr   (Active)   Amt returned on insertion(mL) 15 mL 09/03/20 0812   Reasons to continue Urinary Catheter  Accurate I&O assessment in critically ill patients (48 hr  max) 09/04/20 0347   Site Assessment Clean;Skin intact 09/04/20 0347   Collection Container Standard drainage bag 09/04/20 0347   Securement Method Securing device (Describe) 09/04/20 0347   Output (mL) 125 mL 09/04/20 0313       Physical Exam: General:  Obese, No acute distress  Skin:  No acute rash  Eyes:  No scleral icterus and noninjected  ENT:  Moist mucous membranes  Neck:  Supple, no jugular venous distention, trachea midline, overall appearance is normal  Chest:  Clear to auscultation  CVS:  Regular rate and rhythm, without a rub or gallops  Abdomen:  Normal bowel sounds, very minimal tenderness to deep palpation more so on the right side of his abdomen, no rebound tenderness, still very distended slightly tympanitic with a fluid wave  Extremities:  No edema, and no cyanosis, no significant arthritic changes  Neuro:  No gross focality  Psych:  Alert and oriented and appropriate                Medications:    Scheduled Meds:  Current Facility-Administered Medications   Medication Dose Route Frequency Provider Last Rate    acetaminophen  650 mg Oral Q6H PRN Traci Bauman MD      heparin (porcine)  5,000 Units Subcutaneous Q8H [facility] Traci Bauman MD      insulin lispro  1-5 Units Subcutaneous TID AC Traci Bauman MD      insulin lispro  1-5 Units Subcutaneous HS Traci Bauman MD      iohexol  50 mL Oral 90 min pre-procedure Anna Marlin Starch, DO      midodrine  10 mg Oral TID AC Yolande Patterson MD      ondansetron  4 mg Intravenous Q6H PRN Nicole De Paz MD      oxyCODONE-acetaminophen  1 tablet Oral Q8H PRN Nicole De Paz MD      sodium chloride  125 mL/hr Intravenous Continuous Yolande Patterson  mL/hr (09/03/20 2100)       PRN Meds:   acetaminophen    ondansetron    oxyCODONE-acetaminophen    Continuous Infusions:sodium chloride, 125 mL/hr, Last Rate: 125 mL/hr (09/03/20 2100)        Lab, Imaging and other studies: I have personally reviewed pertinent labs  Laboratory Results:  Results from last 7 days   Lab Units 09/04/20  0444 09/03/20  2311 09/03/20  1441 09/03/20  1440 09/03/20  0759   WBC Thousand/uL 9 73  --   --   --  13 23*   HEMOGLOBIN g/dL 10 8*  --   --   --  13 4   HEMATOCRIT % 33 0*  --   --   --  40 5   PLATELETS Thousands/uL 196  --  254  --  303   POTASSIUM mmol/L 3 7 3 8  --  3 9 3 7   CHLORIDE mmol/L 100 98*  --  99* 95*   CO2 mmol/L 20* 23  --  22 22   BUN mg/dL 59* 57*  --  54* 48*   CREATININE mg/dL 5 74* 6 21*  --  6 44* 5 85*   CALCIUM mg/dL 7 6* 7 6*  --  8 4 8 6   MAGNESIUM mg/dL 1 8  --   --   --   --    PHOSPHORUS mg/dL 6 9*  --   --   --   --      Urinalysis:   Lab Results   Component Value Date    COLORU Dk Yellow 09/03/2020    CLARITYU Turbid 09/03/2020    SPECGRAV 1 026 09/03/2020    PHUR 5 0 09/03/2020    LEUKOCYTESUR Small (A) 09/03/2020    NITRITE Negative 09/03/2020    GLUCOSEU Negative 09/03/2020    KETONESU 15 (1+) (A) 09/03/2020    BILIRUBINUR Interference- unable to analyze (A) 09/03/2020    BLOODU Negative 09/03/2020     ABGs: No results found for: [facility]  Radiology review:     Portions of the record may have been created with voice recognition software  Occasional wrong word or "sound a like" substitutions may have occurred due to the inherent limitations of voice recognition software  Read the chart carefully and recognize, using context, where substitutions have occurred

## 2020-09-04 NOTE — ASSESSMENT & PLAN NOTE
Possibly multifactorial secondary to acute tubular necrosis from hypotension, severe sepsis, and decreased oral intake coupled with elevated intra-abdominal pressure from ascites in the setting of chronic ARB use   Delgado catheter inserted at [facility] ER prior to transfer  [address] [employer] nephrology input -> no indication for emergent dialysis at this time  Baseline creatinine approximately 1 0 -> currently @ 5 74 from 6 44 peak   CT imaging unremarkable for hydronephrosis or other obstructive etiology

## 2020-09-04 NOTE — PHYSICAL THERAPY NOTE
Physical Therapy Evaluation     Patient's Name: Julee Rodriguez    Admitting Diagnosis  Pancreatic cancer [facility]) [C25 9]    Problem List  Patient Active Problem List   Diagnosis    Sepsis ([facility] )    Pancreatic mass    Type 2 diabetes mellitus with hyperglycemia ([facility] )    Obesity (BMI 30 0-34  9)    HTN (hypertension)    UTI due to extended-spectrum beta lactamase (ESBL) producing Escherichia coli    Pancreatic abnormality    Sleep apnea    Malignant neoplasm of tail of pancreas (HCC)    LY (acute kidney injury) ([facility] )    SIRS (systemic inflammatory response syndrome) (HCC)    Ascites    Cancer related pain       Past Medical History  Past Medical History:   Diagnosis Date    Abdominal aortic aneurysm (AAA) ([facility] ) 05/26/2017    Per [place]     Anxiety state 05/26/2017    Per [place]     Benign prostatic hyperplasia     Benign prostatic hyperplasia without lower urinary tract symptoms     Body mass index 33 0-33 9, adult 08/15/2019    Per [place]     Chronic kidney disease, stage II (mild) 08/15/2019    Per [place]     Chronic kidney disease, unspecified 05/26/2017    Per [place]     Chronic obstructive pulmonary disease (COPD) ([facility] ) 09/23/2019    Per [place]     Diverticulosis of large intestine without perforation or abscess without bleeding 04/18/2019    Per [place]     Essential hypertension 05/26/2017    Per [place]     Hearing loss, bilateral 02/26/2017    Per [place]     Mixed hyperlipidemia 05/26/2017    Per [place]     Obesity, unspecified 05/26/2017    Per [place]     Type 2 diabetes mellitus without complication ([facility] ) 25/30/2931    Per [place]Y [place]        Past Surgical History  Past Surgical History:   Procedure Laterality Date    CHOLECYSTECTOMY      Per [place]Y [place]     COLECTOMY      removal of 8 inches of colon for diverticular disease   Per [place]         09/04/20 0839   Note Type   Note type Eval only   Pain Assessment   Pain Assessment Tool 0-10   Pain Score No Pain   Home Living   Type of Home Apartment Home Layout One level;Stairs to enter with rails   Home Equipment   (denies DME)   Additional Comments Pt resides in 1st fl apt w/ 1 JULIEN  Pt was ambulating w/out AD PTA   Prior Function   Level of [place] Independent with ADLs and functional mobility   Lives With Spouse   Receives Help From Family   ADL Assistance Independent   IADLs Needs assistance   Falls in the last 6 months 0   Restrictions/Precautions   Weight Bearing Precautions Per Order No   Other Precautions Multiple lines; Fall Risk   General   Family/Caregiver Present No   Cognition   Overall Cognitive Status WFL   Arousal/Participation Alert   Orientation Level Oriented X4   Memory Within functional limits   Following Commands Follows all commands and directions without difficulty   Comments Pt pleasant and cooperative to work w/ therapy   RLE Assessment   RLE Assessment WFL   LLE Assessment   LLE Assessment WFL   Coordination   Movements are Fluid and Coordinated 1   Bed Mobility   Supine to Sit 5  Supervision   Additional items HOB elevated; Increased time required   Sit to Supine 5  Supervision   Additional items HOB elevated; Increased time required   Additional Comments Pt lying supine upon PT arrival  PT returned lying supine at end of session w/ all needs within reach   Transfers   Sit to Stand 5  Supervision   Additional items Increased time required   Stand to Sit 5  Supervision   Additional items Increased time required   Toilet transfer 5  Supervision   Additional items Standard toilet   Additional Comments Transfers w/ RW   Ambulation/Elevation   Gait pattern Excessively slow; Short stride; Inconsistent seema   Gait Assistance 5  Supervision   Additional items Verbal cues   Assistive Device Rolling walker   Distance 80ft x2   Balance   Static Sitting Fair +   Dynamic Sitting Fair   Static Standing Fair   Dynamic Standing Fair -   Ambulatory Fair -   Endurance Deficit   Endurance Deficit Yes   Endurance Deficit Description weakness, fatigue   Activity Tolerance   Activity Tolerance Patient tolerated treatment well;Patient limited by fatigue   Medical Staff Made Aware CM   Nurse Made Aware yes   Assessment   Prognosis Good   Problem List Decreased strength;Decreased endurance; Impaired balance;Decreased mobility;Pain   Assessment Pt is 66 y o  male seen for PT evaluation s/p admit to [facility] on 9/3/2020 w/ LY (acute kidney injury) ([facility] )  PT consulted to assess pt's functional mobility and d/c needs  Order placed for PT eval and tx, w/ up w/ A order  Comorbidities affecting pt's physical performance at time of assessment include: pancreatic mass, Type 2 DM, HTN, malignant neoplasm of pancreas, SIRS, cancer related pain  PTA, pt was independent w/ all functional mobility w/ no AD and lives w/ spouse in one level apt w/ 1 JULIEN  Pt reports being IND w/ ADLs and wife assists w/ IADLs  Personal factors affecting pt at time of IE include: stairs to enter home, inability to navigate community distances, inability to perform IADLs and inability to perform ADLs  Please find objective findings from PT assessment regarding body systems outlined above with impairments and limitations including weakness, impaired balance, decreased endurance, gait deviations, decreased activity tolerance, decreased functional mobility tolerance and fall risk  Pt performed bed mobility and functional transfers at supervision  Pt ambulated 80ft x2 w/RW at supervision  The following objective measures performed on IE also reveal limitations: Modified [place]: 3 (moderate disability)  Pt's clinical presentation is currently unstable/unpredictable seen in pt's presentation of recent admission for LY requiring medical attention, recent decline in function as compared to baseline, multiple lines, fall risk, pain  Pt to benefit from continued PT tx to address deficits as defined above and maximize level of functional independent mobility and consistency   From PT/mobility standpoint, recommendation at time of d/c would be home w/ family support pending progress in order to facilitate return to PLOF  Goals   Patient Goals to return home   STG Expiration Date 09/18/20   Short Term Goal #1 1  Pt will demonstrate the ability to perform all aspects of bed mobility at Mod I in onder to maximize functional independence and decrease burden on caregivers  2 Pt will demonstrate the ability to perform all functional transfers at Mod I in order to maximize functional independence and decrease burden on caregivers  3 Pt will demonstrate the ability to ambulate at least 150ft with least restrictive device at Mod I in order to maximize functional independence  4 Pt will demonstrate the ability to negotiate 1 steps at Mod I in order to return to household/community mobility  5 Pt will demonstrate improved balance by one grade in order to decrease risk of falls  6 Pt will increase b/l LE strength by 1 grade in order to increase ease of functional mobility and transfers   PT Treatment Day 0   Plan   Treatment/Interventions Functional transfer training;LE strengthening/ROM; Elevations; Therapeutic exercise; Endurance training;Patient/family training;Equipment eval/education; Bed mobility;Gait training;Spoke to nursing   PT Frequency   (3-5x/week)   Recommendation   PT Discharge Recommendation Return to previous environment with social support   Equipment Recommended Monique Hernandez   PT - OK to Discharge Yes   Modified [place]eline Scale   Modified [place] Scale 3     Effie Minor, PT, DPT

## 2020-09-04 NOTE — ASSESSMENT & PLAN NOTE
Presents with lactic acidosis coupled with tachycardia/leukocytosis  Procalcitonin improved from 0 61 -> 0 54 today  Await blood cultures and ascitic fluid culture - small pyuria noted on urinalysis, however, in the absence of urinary symptoms - CXR negative for infiltrative process -

## 2020-09-04 NOTE — ASSESSMENT & PLAN NOTE
Recently diagnosed last month as an adenocarcinoma on fine-needle aspiration  Oncology to follow -> may ultimately require a goals of care discussion in the setting of acute/chronic comorbidities  Supportive care - PRN pain control

## 2020-09-04 NOTE — NUTRITION
Full evaluation documented in flow sheet; pt deemed high nutrition risk     09/04/20 8671   Recommendations/Interventions   Summary inadequate PO intake noted; pt had reported he could eat a little bit better after paracentesis   Malnutrition/BMI Present No  (meets inadequate PO intake criteria; hx wt loss, however wt now skewed by ascites, therefore difficult to assess degree of wt loss; limited physical exam today did not reveal obivious signs of fat/muscle loss; will monitor further)   Interventions Diet: continued as ordered; Other (comment)  (discussed oral nutrition supplement option with pts wife, such as nepro but will defer for physician at this time)   Nutrition Recommendations Continue diet as ordered; Other (specify)  (in light of inadequate PO intake: recommend nepro supplement (vanilla) at 1 per day for low PO4 content w/ high aditya/ high protein features)

## 2020-09-04 NOTE — PLAN OF CARE
Problem: Prexisting or High Potential for Compromised Skin Integrity  Goal: Skin integrity is maintained or improved  Description: INTERVENTIONS:  - Identify patients at risk for skin breakdown  - Assess and monitor skin integrity  - Assess and monitor nutrition and hydration status  - Monitor labs   - Assess for incontinence   - Turn and reposition patient  - Assist with mobility/ambulation  - Relieve pressure over bony prominences  - Avoid friction and shearing  - Provide appropriate hygiene as needed including keeping skin clean and dry  - Evaluate need for skin moisturizer/barrier cream  - Collaborate with interdisciplinary team   - Patient/family teaching  - Consider wound care consult   Outcome: Progressing     Problem: Potential for Falls  Goal: Patient will remain free of falls  Description: INTERVENTIONS:  - Assess patient frequently for physical needs  -  Identify cognitive and physical deficits and behaviors that affect risk of falls  -  [place] fall precautions as indicated by assessment   - Educate patient/family on patient safety including physical limitations  - Instruct patient to call for assistance with activity based on assessment  - Modify environment to reduce risk of injury  - Consider OT/PT consult to assist with strengthening/mobility  Outcome: Progressing     Problem: Nutrition/Hydration-ADULT  Goal: Nutrient/Hydration intake appropriate for improving, restoring or maintaining nutritional needs  Description: Monitor and assess patient's nutrition/hydration status for malnutrition  Collaborate with interdisciplinary team and initiate plan and interventions as ordered  Monitor patient's weight and dietary intake as ordered or per policy  Utilize nutrition screening tool and intervene as necessary  Determine patient's food preferences and provide high-protein, high-caloric foods as appropriate       INTERVENTIONS:  - Monitor oral intake, urinary output, labs, and treatment plans  - Assess nutrition and hydration status and recommend course of action  - Evaluate amount of meals eaten  - Assist patient with eating if necessary   - Allow adequate time for meals  - Recommend/ encourage appropriate diets, oral nutritional supplements, and vitamin/mineral supplements  - Order, calculate, and assess calorie counts as needed  - Recommend, monitor, and adjust tube feedings and TPN/PPN based on assessed needs  - Assess need for intravenous fluids  - Provide specific nutrition/hydration education as appropriate  - Include patient/family/caregiver in decisions related to nutrition  Outcome: Progressing

## 2020-09-04 NOTE — NURSING NOTE
Multiple attempts to obtain the blood cultures  Dr vera  Picc team called  Will continue to monitor

## 2020-09-04 NOTE — ASSESSMENT & PLAN NOTE
PET-CT on 8/31 revealed enlarging peritoneal nodules with large ascites - CT imaging consistent peritoneal carcinomatosis  S/p IR guided paracentesis yesterday yielding approximately 6 4 L of serosanguineous fluid  Fluid cytology pending - preliminary pathology report notes: "Suspicious for malignancy  Scattered markedly atypical cells present, with enlarged, irregularly-shaped nuclei, large nucleoli, and abundant foamy, light blue cytoplasm  Correlate with concurrent cytology specimen for further characterization of these cells    Background reactive mesothelial cells, macrophages, and mixed inflammatory cells "  See plan for suspected SBP above - ascitic culture pending

## 2020-09-04 NOTE — PLAN OF CARE
Problem: PHYSICAL THERAPY ADULT  Goal: Performs mobility at highest level of function for planned discharge setting  See evaluation for individualized goals  Description: Treatment/Interventions: Functional transfer training, LE strengthening/ROM, Elevations, Therapeutic exercise, Endurance training, Patient/family training, Equipment eval/education, Bed mobility, Gait training, Spoke to nursing  Equipment Recommended: Ange Bianchi       See flowsheet documentation for full assessment, interventions and recommendations  Note: Prognosis: Good  Problem List: Decreased strength, Decreased endurance, Impaired balance, Decreased mobility, Pain  Assessment: Pt is 66 y o  male seen for PT evaluation s/p [facility] 9/[facility]/2020 w/ LY (acute kidney injury) ([facility] )  PT consulted to assess pt's functional mobility and d/c needs  Order placed for PT eval and tx, w/ up w/ A order  Comorbidities affecting pt's physical performance at time of assessment include: pancreatic mass, Type 2 DM, HTN, malignant neoplasm of pancreas, SIRS, cancer related pain  PTA, pt was independent w/ all functional mobility w/ no AD and lives w/ spouse in one level apt w/ 1 JULIEN  Pt reports being IND w/ ADLs and wife assists w/ IADLs  Personal factors affecting pt at time of IE include: stairs to enter home, inability to navigate community distances, inability to perform IADLs and inability to perform ADLs  Please find objective findings from PT assessment regarding body systems outlined above with impairments and limitations including weakness, impaired balance, decreased endurance, gait deviations, decreased activity tolerance, decreased functional mobility tolerance and fall risk  Pt performed bed mobility and functional transfers at supervision  Pt ambulated 80ft x2 w/RW at supervision  The following objective measures performed on IE also reveal limitations: Modified [place]: 3 (moderate disability)   Pt's clinical presentation is currently unstable/unpredictable seen in pt's presentation of recent admission for LY requiring medical attention, recent decline in function as compared to baseline, multiple lines, fall risk, pain  Pt to benefit from continued PT tx to address deficits as defined above and maximize level of functional independent mobility and consistency  From PT/mobility standpoint, recommendation at time of d/c would be home w/ family support pending progress in order to facilitate return to PLOF  PT Discharge Recommendation: Return to previous environment with social support     PT - OK to Discharge: Yes    See flowsheet documentation for full assessment

## 2020-09-04 NOTE — PROGRESS NOTES
[address] Internal Medicine - Progress Note  Patient: Jewel Thomposn 66 y o  male MRN: [id number]  Unit/Bed#: [facility] 909-01 Encounter: [id number]  Primary Care Provider: Alondra Ruvalcaba MD  Date Of Visit: 09/04/20        Assessment & Plan:    * Acute kidney injury - Chronic kidney disease stage 3  Assessment & Plan  Possibly multifactorial secondary to acute tubular necrosis from hypotension, severe sepsis, and decreased oral intake coupled with elevated intra-abdominal pressure from ascites in the setting of chronic ARB use   Delgado catheter inserted at [facility] ER prior to transfer  [address] nephrology input -> no indication for emergent dialysis at this time  Baseline creatinine approximately 1 0 -> currently @ 5 74 from 6 44 peak   CT imaging unremarkable for hydronephrosis or other obstructive etiology    Severe sepsis on admission  Assessment & Plan  Presents with lactic acidosis coupled with tachycardia/leukocytosis  Procalcitonin improved from 0 61 -> 0 54 today  Await blood cultures and ascitic fluid culture - small pyuria noted on urinalysis, however, in the absence of urinary symptoms - CXR negative for infiltrative process -     Possible developing spontaneous bacterial peritonitis  Assessment & Plan  Ascitic fluid analysis yielding a WBC count of 917 composed to 4% neutrophils in the setting of severe sepsis coupled with abdominal pain/discomfort  Empiric IV Rocephin initiated   Await final ascitic fluid culture    Ascites  Assessment & Plan  PET-CT on 8/31 revealed enlarging peritoneal nodules with large ascites - CT imaging consistent peritoneal carcinomatosis  S/p IR guided paracentesis yesterday yielding approximately 6 4 L of serosanguineous fluid  Fluid cytology pending - preliminary pathology report notes: "Suspicious for malignancy  Scattered markedly atypical cells present, with enlarged, irregularly-shaped nuclei, large nucleoli, and abundant foamy, light blue cytoplasm   Correlate with concurrent cytology specimen for further characterization of these cells  Background reactive mesothelial cells, macrophages, and mixed inflammatory cells "  See plan for suspected SBP above - ascitic culture pending    Cancer related pain  Assessment & Plan  PRN pain control    Pancreatic cancer  Assessment & Plan  Recently diagnosed last month as an adenocarcinoma on fine-needle aspiration  Oncology to follow -> may ultimately require a goals of care discussion in the setting of acute/chronic comorbidities  Supportive care - PRN pain control    Hypotension  Assessment & Plan  Holding oral antihypertensives  Given IV albumin s/p paracentesis  Continue Midodrine    Essential hypertension  Assessment & Plan  Currently off antihypertensives due to hypotension (see above)    Diabetes mellitus type 2  Assessment & Plan  Lab Results   Component Value Date    HGBA1C 6 5 (H) 08/02/2020     Hold oral hypoglycemics  On SSI coverage per Accu-Cheks    Hyponatremia  Assessment & Plan  Serum sodium @ 132 today  In the setting of pancreatic cancer         DVT Prophylaxis:  Heparin SC       Patient Centered Rounds:  I have performed bedside rounds and discussed plan of care with nursing today  Discussions with Specialists or Other Care Team Provider:  see above assessments if applicable    Education and Discussions with Family / Patient:  Patient at bedside, and wife, Princess Taylor, over the phone  Time Spent for Care:  32 minutes  More than 50% of total time spent on counseling and coordination of care as described above  Current Length of Stay: 1 day(s)    Current Patient Status: Inpatient   Certification Statement:  Patient will continue to require additional hospital stay due to assessments as noted above  Code Status: Level 1 - Full Code        Subjective:     Seen/examined earlier in the day  Resting bed fairly comfortably  Still has residual abdominal discomfort          Objective:     Vitals:   Temp (24hrs), Av 9 °F (36 6 °C), Min:97 5 °F (36 4 °C), Max:98 4 °F (36 9 °C)    Temp:  [97 5 °F (36 4 °C)-98 4 °F (36 9 °C)] 98 4 °F (36 9 °C)  HR:  [] 89  Resp:  [18-26] 20  BP: ()/(47-66) 108/56  SpO2:  [94 %-99 %] 94 %  Body mass index is 34 62 kg/m²  Input and Output Summary (last 24 hours):        Intake/Output Summary (Last 24 hours) at 2020 1515  Last data filed at 2020 1300  Gross per 24 hour   Intake 2481 66 ml   Output 6800 ml   Net -4318 34 ml       Physical Exam:     GENERAL:  Weak/fatigued  HEAD:  Normocephalic - atraumatic  EYES: PERRL - EOMI   MOUTH:  Mucosa moist  NECK:  Supple - full range of motion  CARDIAC:  Intermittently tachycardic - S1/S2 positive  PULMONARY:  Clear breath sounds bilaterally - nonlabored respirations at rest  ABDOMEN:  Soft - mild generalized tenderness to palpation - distended with ascites - active bowel sounds  MUSCULOSKELETAL:  Motor strength/range of motion intact  NEUROLOGIC:  Alert/oriented at baseline  SKIN:  Chronic wrinkles/blemishes   PSYCHIATRIC:  Mood/affect stable      Additional Data:     Labs & Recent Cultures:    Results from last 7 days   Lab Units 20  0444   WBC Thousand/uL 9 73   HEMOGLOBIN g/dL 10 8*   HEMATOCRIT % 33 0*   PLATELETS Thousands/uL 196   NEUTROS PCT % 83*   LYMPHS PCT % 7*   MONOS PCT % 9   EOS PCT % 1     Results from last 7 days   Lab Units 20  0444   POTASSIUM mmol/L 3 7   CHLORIDE mmol/L 100   CO2 mmol/L 20*   BUN mg/dL 59*   CREATININE mg/dL 5 74*   CALCIUM mg/dL 7 6*   ALK PHOS U/L 46   ALT U/L 12   AST U/L 17         Results from last 7 days   Lab Units 20  1206 20  0830 20  2125 20  1825 20  1201 20  0930   POC GLUCOSE mg/dl 178* 172* 133 170* 200* 205*         Results from last 7 days   Lab Units 20  0444 20  2311 20  1938 20  1441 20  1230   LACTIC ACID mmol/L  --  1 3 2 7* 2 4* 2 2*   PROCALCITONIN ng/ml 0 54*  --   --   --  0 61* Results from last 7 days   Lab Units 09/03/20  4725   GRAM STAIN RESULT  2+ Mononuclear Cells  Rare Polys  No bacteria seen   BODY FLUID CULTURE, STERILE  No growth         Last 24 Hours Medication List:   Current Facility-Administered Medications   Medication Dose Route Frequency Provider Last Rate    acetaminophen  650 mg Oral Q6H PRN Miladys Clemens MD      cefTRIAXone  1,000 mg Intravenous Q24H Miladys Clemens MD 1,000 mg (09/04/20 1135)    heparin (porcine)  5,000 Units Subcutaneous Q8H [address] Traci Bauman MD      insulin lispro  1-5 Units Subcutaneous HS Traci Bauman MD      insulin lispro  1-5 Units Subcutaneous TID AC Miladys Clemens MD      iohexol  50 mL Oral 90 min pre-procedure Anna Akbar DO      midodrine  10 mg Oral TID AC Louise Madden MD      ondansetron  4 mg Intravenous Q4H PRN Miladys Clemens MD      oxyCODONE-acetaminophen  1 tablet Oral Q8H PRN Rich Stallworth MD      sodium chloride  125 mL/hr Intravenous Continuous Louise Madden  mL/hr (09/04/20 1148)                  ** Please Note: This note is constructed using a voice recognition dictation system  An occasional wrong word/phrase or sound-a-like substitution may have been picked up by dictation device due to the inherent limitations of voice recognition software  Read the chart carefully and recognize, using reasonable context, where substitutions may have occurred  **

## 2020-09-04 NOTE — ASSESSMENT & PLAN NOTE
Ascitic fluid analysis yielding a WBC count of 917 composed to 4% neutrophils in the setting of severe sepsis coupled with abdominal pain/discomfort  Empiric IV Rocephin initiated   Await final ascitic fluid culture

## 2020-09-05 NOTE — ASSESSMENT & PLAN NOTE
Recently diagnosed last month as an adenocarcinoma on fine-needle aspiration  Oncology to follow -> may ultimately require a goals of care discussion in the setting of acute/chronic comorbidities if oncology determines that chemotherapy poses more risk than benefit this point  Supportive care - PRN pain control

## 2020-09-05 NOTE — OCCUPATIONAL THERAPY NOTE
Occupational Therapy Evaluation     Patient Name: Luis A Dixon  KOQMIJENNIFER Date: 9/5/2020  Problem List  Principal Problem:    Acute kidney injury - Chronic kidney disease stage 3  Active Problems:    Severe sepsis on admission    Diabetes mellitus type 2    Hypotension    Pancreatic cancer    Ascites    Cancer related pain    Possible developing spontaneous bacterial peritonitis    Essential hypertension    Hyponatremia    Past Medical History  Past Medical History:   Diagnosis Date    Abdominal aortic aneurysm (AAA) ([facility] ) 05/26/2017    Per [place]     Anxiety state 05/26/2017    Per [place]     Benign prostatic hyperplasia     Benign prostatic hyperplasia without lower urinary tract symptoms     Body mass index 33 0-33 9, adult 08/15/2019    Per [place]     Chronic kidney disease, stage II (mild) 08/15/2019    Per [place]     Chronic kidney disease, unspecified 05/26/2017    Per [place]     Chronic obstructive pulmonary disease (COPD) ([facility] ) 09/23/2019    Per [facility]     Diverticulosis of large intestine without perforation or abscess without bleeding 04/18/2019    Per [facility]     Essential hypertension 05/26/2017    Per [facility]     Hearing loss, bilateral 02/26/2017    Per [place]     Mixed hyperlipidemia 05/26/2017    Per [place]     Obesity, unspecified 05/26/2017    Per [place]     Type 2 diabetes mellitus without complication ([facility] ) 17/45/1689    Per [place]      Past Surgical History  Past Surgical History:   Procedure Laterality Date    CHOLECYSTECTOMY      Per [place]     COLECTOMY      removal of 8 inches of colon for diverticular disease  Per [place]     IR PARACENTESIS  9/3/2020             09/05/20 0952   Note Type   Note type Eval only   Restrictions/Precautions   Weight Bearing Precautions Per Order No   Other Precautions Multiple lines; Chair Alarm   Pain Assessment   Pain Assessment Tool 0-10   Pain Score No Pain   Home Living   Type of Home Apartment   Home Layout One level;Stairs to enter with rails   Bathroom Shower/Tub Tub/shower unit   Bathroom Equipment   (denies)   Home Equipment   (denies)   Additional Comments Pt lives in a 1 story apartment with 1 JULIEN  Prior Function   Level of [place] Independent with ADLs and functional mobility; Needs assistance with IADLs   Lives With Spouse   Receives Help From Family   ADL Assistance Independent   IADLs Needs assistance   Falls in the last 6 months 0   Vocational Retired   Lifestyle   Autonomy Pt reports being I with ADLS, receiving assistance with IADLS as needed, and ambulates without device PTA  (+)     Reciprocal Relationships Pt lives with his wife who is able to assist as needed upon d/c     Service to Others Retired   [address] Being on his phone and tablet    Subjective   Subjective Pt reports no concerns about returning home  ADL   Where Assessed Edge of bed   Eating Assistance 7  Independent   Grooming Assistance 5  Supervision/Setup   UB Bathing Assistance 5  Supervision/Setup   LB Bathing Assistance 4  Minimal Assistance   [address] 5  Supervision/Setup   LB Dressing Assistance 4  Minimal [address]  4  Minimal Assistance   Bed Mobility   Supine to Sit 5  Supervision   Additional items Increased time required   Additional Comments After OT session pt in chair with all needs within reach and alarm on  Transfers   Sit to Stand 4  Minimal assistance   Additional items Assist x 1; Increased time required;Verbal cues   Stand to Sit 5  Supervision   Additional items Assist x 1; Increased time required;Verbal cues   Functional Mobility   Functional Mobility 5  Supervision   Additional Comments Pt demonstrated long household mobility with no rest breaks      Additional items Rolling walker   Balance   Static Sitting Good   Dynamic Sitting Fair +   Static Standing Fair +   Dynamic Standing Fair   Ambulatory Fair -   Activity Tolerance   Activity Tolerance Patient tolerated treatment well Nurse Made Aware RN confirmed okay to see pt   RUE Assessment   RUE Assessment WFL   LUE Assessment   LUE Assessment WFL   Cognition   Overall Cognitive Status WFL   Arousal/Participation Alert; Cooperative   Attention Within functional limits   Orientation Level Oriented X4   Memory Within functional limits   Following Commands Follows one step commands without difficulty   Comments Pt is pleasant and cooperative to work with therapy  Assessment   Limitation Decreased ADL status; Decreased endurance;Decreased high-level ADLs   Prognosis Good   Assessment Pt is a 66 y o  male admitted [facility]/2020 w/ acute kidney injury  Comorbidities include a h/o Abdominal aortic aneurysm (AAA) ([facility] ) (05/26/2017), Anxiety state (05/26/2017), Benign prostatic hyperplasia, Body mass index 33 0-33 9, adult (08/15/2019), Chronic kidney disease, stage II (mild) (08/15/2019), Chronic kidney disease, unspecified (05/26/2017), Chronic obstructive pulmonary disease (COPD) ([facility] ) (09/23/2019), Diverticulosis of large intestine without perforation or abscess without bleeding (04/18/2019), Essential hypertension (05/26/2017), Hearing loss, bilateral (02/26/2017), Mixed hyperlipidemia (05/26/2017), Obesity, unspecified (05/26/2017), and Type 2 diabetes mellitus without complication ([place] [address] ) (75/97/7377)  Pt with active OT orders and up with assistance  orders  Pt resides in a 1 story apartment with 1 JULIEN  Pt is mostly independent but requires A as needed w/  ADLS and IADLS, (+) drove, & required no use of DME PTA  Currently pt is S-min A for functional transfers, supervision for functional mobility, supervision for UB ADLS and min A LB ADLS  Based on the aforementioned OT evaluation, functional performance deficits, and assessments, pt has been identified as a moderate complexity evaluation  From OT standpoint, anticipate d/c home with family support  Recommend continued participation in [facility] and functional mobility with staff   No further acute OT needs, d/c OT      Goals   Patient Goals To return home    Recommendation   OT Discharge Recommendation Return to previous environment with social support   OT - OK to Discharge Yes  (When medically appropriate)   Modified [place] Scale   Modified [place] Scale 3     WILLIAM Martinez, OTR/L

## 2020-09-05 NOTE — PLAN OF CARE
Problem: Prexisting or High Potential for Compromised Skin Integrity  Goal: Skin integrity is maintained or improved  Description: INTERVENTIONS:  - Identify patients at risk for skin breakdown  - Assess and monitor skin integrity  - Assess and monitor nutrition and hydration status  - Monitor labs   - Assess for incontinence   - Turn and reposition patient  - Assist with mobility/ambulation  - Relieve pressure over bony prominences  - Avoid friction and shearing  - Provide appropriate hygiene as needed including keeping skin clean and dry  - Evaluate need for skin moisturizer/barrier cream  - Collaborate with interdisciplinary team   - Patient/family teaching  - Consider wound care consult   Outcome: Progressing     Problem: Potential for Falls  Goal: Patient will remain free of falls  Description: INTERVENTIONS:  - Assess patient frequently for physical needs  -  Identify cognitive and physical deficits and behaviors that affect risk of falls  -  [place] fall precautions as indicated by assessment   - Educate patient/family on patient safety including physical limitations  - Instruct patient to call for assistance with activity based on assessment  - Modify environment to reduce risk of injury  - Consider OT/PT consult to assist with strengthening/mobility  Outcome: Progressing     Problem: Nutrition/Hydration-ADULT  Goal: Nutrient/Hydration intake appropriate for improving, restoring or maintaining nutritional needs  Description: Monitor and assess patient's nutrition/hydration status for malnutrition  Collaborate with interdisciplinary team and initiate plan and interventions as ordered  Monitor patient's weight and dietary intake as ordered or per policy  Utilize nutrition screening tool and intervene as necessary  Determine patient's food preferences and provide high-protein, high-caloric foods as appropriate       INTERVENTIONS:  - Monitor oral intake, urinary output, labs, and treatment plans  - Assess nutrition and hydration status and recommend course of action  - Evaluate amount of meals eaten  - Assist patient with eating if necessary   - Allow adequate time for meals  - Recommend/ encourage appropriate diets, oral nutritional supplements, and vitamin/mineral supplements  - Order, calculate, and assess calorie counts as needed  - Recommend, monitor, and adjust tube feedings and TPN/PPN based on assessed needs  - Assess need for intravenous fluids  - Provide specific nutrition/hydration education as appropriate  - Include patient/family/caregiver in decisions related to nutrition  Outcome: Progressing

## 2020-09-05 NOTE — PROGRESS NOTES
[address] Internal Medicine - Progress Note  Patient: Franny Macario 66 y o  male MRN: [id number]  Unit/Bed#: [facility] 909-01 Encounter: [id number]  Primary Care Provider: Israel De MD  Date Of Visit: 09/05/20        Assessment & Plan:    * Acute kidney injury - Chronic kidney disease stage 3  Assessment & Plan  Possibly multifactorial secondary to acute tubular necrosis from hypotension, severe sepsis, and decreased oral intake coupled with elevated intra-abdominal pressure from ascites in the setting of chronic ARB use   Delgado catheter inserted at the [facility] ER prior to transfer  [address] nephrology input -> no indication for emergent dialysis at this time  Baseline creatinine approximately 1 0 -> currently improved @ 3 28 from a 6 44 peak   CT imaging unremarkable for hydronephrosis or other obstructive etiology    Severe sepsis on admission  Assessment & Plan  Presents with lactic acidosis coupled with tachycardia/leukocytosis  Procalcitonin improved from 0 61 -> 0 54 yesterday -> check tomorrow  Await blood cultures and ascitic fluid culture - small pyuria noted on urinalysis, however, in the absence of urinary symptoms - CXR negative for infiltrative process     Possible developing spontaneous bacterial peritonitis  Assessment & Plan  Ascitic fluid analysis yielding a WBC count of 917 composed of just 4% neutrophils, however, in the setting of severe sepsis coupled with abdominal pain/discomfort  Empiric IV Rocephin initiated   Await final ascitic fluid culture - discontinue antibiotic course if negative    Ascites  Assessment & Plan  PET-CT on 8/31 revealed enlarging peritoneal nodules with large ascites - CT imaging consistent peritoneal carcinomatosis  S/p IR guided paracentesis yesterday yielding approximately 6 4 L of serosanguineous fluid  Fluid cytology pending - preliminary pathology report notes: "Suspicious for malignancy    Scattered markedly atypical cells present, with enlarged, irregularly-shaped nuclei, large nucleoli, and abundant foamy, light blue cytoplasm  Correlate with concurrent cytology specimen for further characterization of these cells  Background reactive mesothelial cells, macrophages, and mixed inflammatory cells "  See plan for possible SBP above - ascitic culture preliminarily negative    Cancer related pain  Assessment & Plan  PRN pain control    Pancreatic cancer  Assessment & Plan  Recently diagnosed last month as an adenocarcinoma on fine-needle aspiration  Oncology to follow -> may ultimately require a goals of care discussion in the setting of acute/chronic comorbidities if oncology determines that chemotherapy poses more risk than benefit this point  Supportive care - PRN pain control    Hypotension  Assessment & Plan  Holding oral antihypertensives  Given IV albumin s/p paracentesis  Continue Midodrine    Essential hypertension  Assessment & Plan  Currently off antihypertensives due to hypotension (see above)    Diabetes mellitus type 2  Assessment & Plan  Lab Results   Component Value Date    HGBA1C 6 5 (H) 08/02/2020     Hold oral hypoglycemics  On SSI coverage per Accu-Cheks    Hyponatremia  Assessment & Plan  Serum sodium @ 134 today  In the setting of pancreatic cancer         DVT Prophylaxis:  Heparin SC       Patient Centered Rounds:  I have performed bedside rounds and discussed plan of care with nursing today  Discussions with Specialists or Other Care Team Provider:  see above assessments if applicable    Education and Discussions with Family / Patient:  Patient, along with wife, at bedside today  Time Spent for Care:  32 minutes  More than 50% of total time spent on counseling and coordination of care as described above  Current Length of Stay: 2 day(s)    Current Patient Status: Inpatient   Certification Statement:  Patient will continue to require additional hospital stay due to assessments as noted above      Code Status: Level 1 - Full Code        Subjective:     Seen/examined earlier in the day with wife present bedside  He was sitting upright in a chair recliner  Denies worsening abdominal pain at this time although remains distended  Denying fever/chills as well  Was awaiting oncology evaluation at the time my encounter  Objective:     Vitals:   Temp (24hrs), Av 9 °F (36 6 °C), Min:97 7 °F (36 5 °C), Max:98 °F (36 7 °C)    Temp:  [97 7 °F (36 5 °C)-98 °F (36 7 °C)] 97 9 °F (36 6 °C)  HR:  [] 78  Resp:  [17-20] 18  BP: (112-135)/(60-67) 135/67  SpO2:  [92 %-94 %] 94 %  Body mass index is 35 58 kg/m²  Input and Output Summary (last 24 hours):        Intake/Output Summary (Last 24 hours) at 2020 1843  Last data filed at 2020 1822  Gross per 24 hour   Intake 2356 26 ml   Output 1750 ml   Net 606 26 ml       Physical Exam:     GENERAL:  Mildly improved weakness/fatigue today  HEAD:  Normocephalic - atraumatic  EYES: PERRL - EOMI   MOUTH:  Mucosa moist  NECK:  Supple - full range of motion  CARDIAC:  Regular rate/rhythm - S1/S2 positive  PULMONARY:  Clear to auscultation - nonlabored respirations  ABDOMEN:  Soft - improved tenderness - remains distended with ascites - active bowel sounds  MUSCULOSKELETAL:  Motor strength/range of motion intact  NEUROLOGIC:  Alert/oriented at baseline  SKIN:  Chronic wrinkles/blemishes   PSYCHIATRIC:  Mood/affect stable      Additional Data:     Labs & Recent Cultures:    Results from last 7 days   Lab Units 20  0612   WBC Thousand/uL 7 56   HEMOGLOBIN g/dL 10 6*   HEMATOCRIT % 33 5*   PLATELETS Thousands/uL 191   NEUTROS PCT % 73   LYMPHS PCT % 15   MONOS PCT % 10   EOS PCT % 2     Results from last 7 days   Lab Units 20  0612   POTASSIUM mmol/L 3 9   CHLORIDE mmol/L 103   CO2 mmol/L 24   BUN mg/dL 54*   CREATININE mg/dL 3 28*   CALCIUM mg/dL 7 5*   ALK PHOS U/L 53   ALT U/L 11*   AST U/L 21         Results from last 7 days   Lab Units 20  1703 20  7640 09/05/20  0816 09/04/20  2036 09/04/20  1648 09/04/20  1206 09/04/20  0830 09/03/20  2125 09/03/20  1825 09/03/20  1201 08/31/20  0930   POC GLUCOSE mg/dl 167* 216* 160* 192* 154* 178* 172* 133 170* 200* 205*         Results from last 7 days   Lab Units 09/04/20  0444 09/03/20  2311 09/03/20  1938 09/03/20  1441 09/03/20  1230   LACTIC ACID mmol/L  --  1 3 2 7* 2 4* 2 2*   PROCALCITONIN ng/ml 0 54*  --   --   --  0 61*         Results from last 7 days   Lab Units 09/04/20  1434 09/04/20  1433 09/03/20  1758   BLOOD CULTURE  Received in Microbiology Lab  Culture in Progress  Received in Microbiology Lab  Culture in Progress  --    GRAM STAIN RESULT   --   --  2+ Mononuclear Cells  Rare Polys  No bacteria seen   BODY FLUID CULTURE, STERILE   --   --  No growth         Last 24 Hours Medication List:   Current Facility-Administered Medications   Medication Dose Route Frequency Provider Last Rate    acetaminophen  650 mg Oral Q6H PRN Felisa Machuca MD      cefTRIAXone  1,000 mg Intravenous Q24H Felisa Machuca MD 1,000 mg (09/05/20 1230)    heparin (porcine)  5,000 Units Subcutaneous Q8H [facility] Traci Bauman MD      insulin lispro  1-5 Units Subcutaneous HS Traci Bauman MD      insulin lispro  1-5 Units Subcutaneous TID AC Felisa Machuca MD      iohexol  50 mL Oral 90 min pre-procedure Anna Burgos DO      midodrine  10 mg Oral TID AC Robert Segura MD      ondansetron  4 mg Intravenous Q4H PRN Felisa Machuca MD      oxyCODONE-acetaminophen  1 tablet Oral Q8H PRN Wendy Buckley MD      sodium chloride  75 mL/hr Intravenous Continuous Evan Krishna MD 75 mL/hr (09/05/20 1643)                ** Please Note: This note is constructed using a voice recognition dictation system  An occasional wrong word/phrase or sound-a-like substitution may have been picked up by dictation device due to the inherent limitations of voice recognition software    Read the chart carefully and recognize, using reasonable context, where substitutions may have occurred  **

## 2020-09-05 NOTE — PROGRESS NOTES
NEPHROLOGY PROGRESS NOTE   Alec Herzog 66 y o  male MRN: [id number]  Unit/Bed#: [facility] 909-01 Encounter: [id number]  Reason for Consult: LY/CKD    ASSESSMENT AND PLAN:  49-year-old male with history of diabetes mellitus type 2/hypertension/AAA with a recent diagnosis of pancreatic cancer with metastatic disease who we are asked to see for AK I on CKD stage 3:    LY POA on CKD stage 3 with baseline creatinine around 1 0  -LY with peak creatinine 6 2 now continue to significantly improved 3 2 today  -LY suspected to be prerenal/3rd spacing in the setting of ascites, intra-abdominal hypertension with significant ascites, ATN, hypotension  -workup as detailed in nephrology progress note from 9/4/20  -decrease IV fluid to 75 mL/hour  -BMP in a m   -CKD suspect in the setting of diabetes, hypertension  -urine output improved    Hyponatremia, likely hypovolemic, improving with IV fluid, serum sodium 134 today  Hyperphosphatemia, improving with serum phosphorus 4 6 with improving renal failure  Hypotension  -blood pressure seems to have improved with IV fluid challenge   -continue midodrine   -echo shows EF 27%, grade 1 diastolic dysfunction  New diagnosed pancreatic carcinoma with metastatic, oncology follow-up  Discussed above plan in detail with primary    SUBJECTIVE:  Patient seen and examined at bedside  No chest pain, shortness of breath, nausea, vomiting, abdominal pain       OBJECTIVE:  Current Weight: Weight - Scale: 119 kg (262 lb 5 6 oz)  Vitals:    09/05/20 1425   BP: 135/67   Pulse: 78   Resp: 18   Temp:    SpO2: 94%       Intake/Output Summary (Last 24 hours) at 9/5/2020 1438  Last data filed at 9/5/2020 1320  Gross per 24 hour   Intake 2715 43 ml   Output 650 ml   Net 2065 43 ml     Wt Readings from Last 3 Encounters:   09/05/20 119 kg (262 lb 5 6 oz)   09/03/20 112 kg (246 lb)   08/25/20 114 kg (252 lb 6 4 oz)     Temp Readings from Last 3 Encounters:   09/05/20 97 9 °F (36 6 °C)   09/03/20 97 9 °F (36 6 °C) (Tympanic)   08/25/20 98 4 °F (36 9 °C) (Temporal)     BP Readings from Last 3 Encounters:   09/05/20 135/67   09/03/20 (!) 91/47   08/25/20 120/82     Pulse Readings from Last 3 Encounters:   09/05/20 78   09/03/20 (!) 107   08/25/20 (!) 129        Physical Examination:  General:  Sitting in chair, no acute distress   Eyes:  Mild conjunctival pallor present  ENT:  External examination of ears and nose unremarkable  Neck:  No obvious lymphadenopathy appreciated  Respiratory:  Bilateral air entry present  CVS:  S1, S2 present  GI:  Soft, mild distended, nontender  CNS:  Active alert oriented x3  Skin:  No new rash in legs  Musculoskeletal:  No obvious gross deformity noted    Medications:    Current Facility-Administered Medications:     acetaminophen (TYLENOL) tablet 650 mg, 650 mg, Oral, Q6H PRN, Miko Zelaya MD    cefTRIAXone (ROCEPHIN) 1,000 mg in dextrose 5 % 50 mL IVPB, 1,000 mg, Intravenous, Q24H, Miko Zelaya MD, Last Rate: 100 mL/hr at 09/05/20 1230, 1,000 mg at 09/05/20 1230    heparin (porcine) subcutaneous injection 5,000 Units, 5,000 Units, Subcutaneous, Q8H [address], 5,000 Units at 09/05/20 1231 **AND** [COMPLETED] Platelet count, , , Once, Traci Bauman MD    insulin lispro (HumaLOG) 100 units/mL subcutaneous injection 1-5 Units, 1-5 Units, Subcutaneous, HS, Traci Bauman MD, 1 Units at 09/04/20 2308    insulin lispro (HumaLOG) 100 units/mL subcutaneous injection 1-5 Units, 1-5 Units, Subcutaneous, TID AC, 2 Units at 09/05/20 1102 **AND** Fingerstick Glucose (POCT), , , 4x Daily AC and at bedtime, Miko Zelaya MD    iohexol (OMNIPAQUE) 240 MG/ML solution 50 mL, 50 mL, Oral, 90 min pre-procedure, Anna Chambers DO    midodrine (PROAMATINE) tablet 10 mg, 10 mg, Oral, TID AC, Eleno Manzanares MD, 10 mg at 09/05/20 1231    ondansetron (ZOFRAN) injection 4 mg, 4 mg, Intravenous, Q4H PRN, Miko Zelaya MD    oxyCODONE-acetaminophen (PERCOCET) 5-325 mg per tablet 1 tablet, 1 tablet, Oral, Q8H PRN, Nasim Samayoa MD    sodium chloride 0 9 % infusion, 125 mL/hr, Intravenous, Continuous, Josh Owusu MD, Last Rate: 125 mL/hr at 09/05/20 0605, 125 mL/hr at 09/05/20 4689    Laboratory Results:  Results from last 7 days   Lab Units 09/05/20  0612 09/04/20  0444 09/03/20  2311 09/03/20  1441 09/03/20  1440 09/03/20  0759   WBC Thousand/uL 7 56 9 73  --   --   --  13 23*   HEMOGLOBIN g/dL 10 6* 10 8*  --   --   --  13 4   HEMATOCRIT % 33 5* 33 0*  --   --   --  40 5   PLATELETS Thousands/uL 191 196  --  254  --  303   SODIUM mmol/L 134* 132* 131*  --  132* 133   POTASSIUM mmol/L 3 9 3 7 3 8  --  3 9 3 7   CHLORIDE mmol/L 103 100 98*  --  99* 95*   CO2 mmol/L 24 20* 23  --  22 22   BUN mg/dL 54* 59* 57*  --  54* 48*   CREATININE mg/dL 3 28* 5 74* 6 21*  --  6 44* 5 85*   CALCIUM mg/dL 7 5* 7 6* 7 6*  --  8 4 8 6   MAGNESIUM mg/dL 2 0 1 8  --   --   --   --    PHOSPHORUS mg/dL 4 6* 6 9*  --   --   --   --        CT abdomen pelvis wo contrast   Final Result by Abigail Da Silva MD (09/03 1848)      Known pancreatic tail malignancy unchanged from the recent PET/CT  Persistent abdominal ascites and peritoneal nodularity in keeping with peritoneal carcinomatosis  See the recent PET/CT report  Workstation performed: [id number]         IR paracentesis   Final Result by Nissa Montero MD (09/04 1313)   Successful ultrasound-guided paracentesis  Signed, performed, and dictated by DK Gregorio under the supervision of Dr Hank Heck  Workstation performed: [id number]             Portions of the record may have been created with voice recognition software  Occasional wrong word or "sound a like" substitutions may have occurred due to the inherent limitations of voice recognition software  Read the chart carefully and recognize, using context, where substitutions have occurred

## 2020-09-05 NOTE — ASSESSMENT & PLAN NOTE
Presents with lactic acidosis coupled with tachycardia/leukocytosis  Procalcitonin improved from 0 61 -> 0 54 yesterday -> check tomorrow  Await blood cultures and ascitic fluid culture - small pyuria noted on urinalysis, however, in the absence of urinary symptoms - CXR negative for infiltrative process

## 2020-09-05 NOTE — ASSESSMENT & PLAN NOTE
PET-CT on 8/31 revealed enlarging peritoneal nodules with large ascites - CT imaging consistent peritoneal carcinomatosis  S/p IR guided paracentesis yesterday yielding approximately 6 4 L of serosanguineous fluid  Fluid cytology pending - preliminary pathology report notes: "Suspicious for malignancy  Scattered markedly atypical cells present, with enlarged, irregularly-shaped nuclei, large nucleoli, and abundant foamy, light blue cytoplasm  Correlate with concurrent cytology specimen for further characterization of these cells    Background reactive mesothelial cells, macrophages, and mixed inflammatory cells "  See plan for possible SBP above - ascitic culture preliminarily negative

## 2020-09-05 NOTE — ASSESSMENT & PLAN NOTE
Possibly multifactorial secondary to acute tubular necrosis from hypotension, severe sepsis, and decreased oral intake coupled with elevated intra-abdominal pressure from ascites in the setting of chronic ARB use   Delgado catheter inserted at the [facility] ER prior to transfer  [address] nephrology input -> no indication for emergent dialysis at this time  Baseline creatinine approximately 1 0 -> currently improved @ 3 28 from a 6 44 peak   CT imaging unremarkable for hydronephrosis or other obstructive etiology

## 2020-09-05 NOTE — ASSESSMENT & PLAN NOTE
Ascitic fluid analysis yielding a WBC count of 917 composed of just 4% neutrophils, however, in the setting of severe sepsis coupled with abdominal pain/discomfort  Empiric IV Rocephin initiated   Await final ascitic fluid culture - discontinue antibiotic course if negative

## 2020-09-05 NOTE — CONSULTS
Consultation - Medical Oncology   Uriel Patel 66 y o  male MRN: [id number]  Unit/Bed#: [facility] 909-01 Encounter: [id number]    Referring physician:  [facility]  Reason for Consult:   Cancer pancreas with abdominal carcinomatosis  HPI: Uriel Patel is a 66y o  year old male   Luna Stinson Patient's wife is in the room New diagnosis of cancer of tail of pancreas, adenocarcinoma with abdominal carcinomatosis and ascites on CT scan and PET scan status post paracentesis  Patient has weakness and tiredness  Exertional dyspnea  Abdominal distension  He had some diarrhea yesterday  Decreased appetite  Early satiety  ROS:  09/05/20 Reviewed 13 systems: See symptoms in HPI:  Patient has weakness and tiredness  Exertional dyspnea  Abdominal distension  He had some diarrhea yesterday  Decreased appetite  Early satiety  Presently no headaches, seizures, dizziness, diplopia, dysphagia, hoarseness, chest pain, palpitations,  cough, hemoptysis,  nausea, vomiting,  melena, hematuria, fever, chills, bleeding, bone pains, skin rash, weight loss, arthritic symptoms,   numbness, claudication and gait problem  No frequent infections  Not unusually sensitive to heat or cold  No swelling of the ankles  No swollen glands  Patient is anxious         Historical Information   Past Medical History:   Diagnosis Date    Abdominal aortic aneurysm (AAA) ([facility] ) 05/26/2017    Per Boley     Anxiety state 05/26/2017    Per Boley     Benign prostatic hyperplasia     Benign prostatic hyperplasia without lower urinary tract symptoms     Body mass index 33 0-33 9, adult 08/15/2019    Per Aretha     Chronic kidney disease, stage II (mild) 08/15/2019    Per Aretha     Chronic kidney disease, unspecified 05/26/2017    Per Boley     Chronic obstructive pulmonary disease (COPD) ([facility] ) 09/23/2019    Per Tejal Smith     Diverticulosis of large intestine without perforation or abscess without bleeding 04/18/2019    Per Radha Smith hypertension 2017    Per [facility]     Hearing loss, bilateral 2017    Per [place]     Mixed hyperlipidemia 2017    Per [place]     Obesity, unspecified 2017    Per [facility]     Type 2 diabetes mellitus without complication ([facility] )     Per [facility]      Past Surgical History:   Procedure Laterality Date    CHOLECYSTECTOMY      Per [facility]     COLECTOMY      removal of 8 inches of colon for diverticular disease   Per Osorio Zee IR PARACENTESIS  9/3/2020     Social History   Social History     Substance and Sexual Activity   Alcohol Use Yes    Comment: Occasional- Per [place]      Social History     Substance and Sexual Activity   Drug Use Never     Social History     Tobacco Use   Smoking Status Former Smoker    Last attempt to quit: Angeline Nola Years since quittin 7   Smokeless Tobacco Never Used   Tobacco Comment    quit 37 years ago     Family History:   Family History   Problem Relation Age of Onset    Diabetes Father          Current Facility-Administered Medications:     acetaminophen (TYLENOL) tablet 650 mg, 650 mg, Oral, Q6H PRN, Saba De La Garza MD    cefTRIAXone (ROCEPHIN) 1,000 mg in dextrose 5 % 50 mL IVPB, 1,000 mg, Intravenous, Q24H, Saba De La Garza MD, Last Rate: 100 mL/hr at 20 1230, 1,000 mg at 20 1230    heparin (porcine) subcutaneous injection 5,000 Units, 5,000 Units, Subcutaneous, Q8H [address], 5,000 Units at 20 1231 **AND** [COMPLETED] Platelet count, , , Once, Lico Velazco MD    insulin lispro (HumaLOG) 100 units/mL subcutaneous injection 1-5 Units, 1-5 Units, Subcutaneous, HS, Lico Velazco MD, 1 Units at 20 2308    insulin lispro (HumaLOG) 100 units/mL subcutaneous injection 1-5 Units, 1-5 Units, Subcutaneous, TID AC, 2 Units at 20 1102 **AND** Fingerstick Glucose (POCT), , , 4x Daily AC and at bedtime, Saba De La Garza MD    iohexol (OMNIPAQUE) 240 MG/ML solution 50 mL, 50 mL, Oral, 90 min pre-procedure, Anna Moran DO   midodrine (PROAMATINE) tablet 10 mg, 10 mg, Oral, TID AC, Gareth Pozo MD, 10 mg at 09/05/20 1231    ondansetron (ZOFRAN) injection 4 mg, 4 mg, Intravenous, Q4H PRN, Una Curry MD    oxyCODONE-acetaminophen (PERCOCET) 5-325 mg per tablet 1 tablet, 1 tablet, Oral, Q8H PRN, Cayla Valencia MD    sodium chloride 0 9 % infusion, 125 mL/hr, Intravenous, Continuous, Gareth Pozo MD, Last Rate: 125 mL/hr at 09/05/20 0605, 125 mL/hr at 09/05/20 0605    No Known Allergies  @ ROS@  Physical Exam:  Vitals:    09/04/20 2147 09/05/20 0244 09/05/20 0600 09/05/20 0827   BP: 117/62 119/62  124/61   BP Location:       Pulse: 86 81  83   Resp: 18 18  17   Temp: 98 °F (36 7 °C) 97 7 °F (36 5 °C)  97 9 °F (36 6 °C)   TempSrc:       SpO2: 94% 94%  94%   Weight:   119 kg (262 lb 5 6 oz)    Height:         Alert, oriented, not in distress, no icterus, no oral thrush, no palpable neck mass, decreased breath sound lung bases, regular heart rate, abdomen :  distension and ascites, no edema of ankles, no calf tenderness, no focal neurological deficit, no skin rash, no palpable lymphadenopathy, good arterial pulses, no clubbing  Patient is anxious  Performance status 2  Lab Results: I have reviewed all pertinent labs  LABS:  Results for orders placed or performed during the hospital encounter of 09/03/20   Albumin, fluid   Result Value Ref Range    Albumin, Fluid 2 4 g/dL   Body fluid culture and Gram stain    Specimen: Paracentesis; Body Fluid   Result Value Ref Range    Body Fluid Culture, Sterile No growth     Gram Stain Result 2+ Mononuclear Cells     Gram Stain Result Rare Polys     Gram Stain Result No bacteria seen    Total Protein, Fluid   Result Value Ref Range    Protein, Fluid 4 9 g/dL   Blood culture    Specimen: Arm, Right; Blood   Result Value Ref Range    Blood Culture Received in Microbiology Lab  Culture in Progress      Blood culture    Specimen: Arm, Left; Blood   Result Value Ref Range    Blood Culture Received in Microbiology Lab  Culture in Progress  Lactic acid, plasma   Result Value Ref Range    LACTIC ACID 2 2 (HH) 0 5 - 2 0 mmol/L   Procalcitonin with AM Reflex   Result Value Ref Range    Procalcitonin 0 61 (H) <=0 25 ng/ml   Body fluid white cell count with differential   Result Value Ref Range    Site paracentesis     WBC, Fluid 917 /ul   TSH, 3rd generation   Result Value Ref Range    TSH 3RD GENERATON 3 150 0 358 - 3 740 uIU/mL   Platelet count   Result Value Ref Range    Platelets 139 641 - 446 Thousands/uL    MPV 12 0 8 9 - 12 7 fL   Procalcitonin Reflex   Result Value Ref Range    Procalcitonin 0 54 (H) <=0 25 ng/ml   Lactic acid 2 Hours   Result Value Ref Range    LACTIC ACID 2 4 (HH) 0 5 - 2 0 mmol/L   Urinalysis with microscopic   Result Value Ref Range    Clarity, UA Turbid     Color, UA Dk Yellow     Specific [place], UA 1 026 1 003 - 1 030    pH, UA 5 0 4 5, 5 0, 5 5, 6 0, 6 5, 7 0, 7 5, 8 0    Glucose, UA Negative Negative mg/dl    Ketones, UA 15 (1+) (A) Negative mg/dl    Blood, UA Negative Negative    Protein, UA Trace (A) Negative mg/dl    Nitrite, UA Negative Negative    Bilirubin, UA Interference- unable to analyze (A) Negative    Urobilinogen, UA 1 0 0 2, 1 0 E U /dl E U /dl    Leukocytes, UA Small (A) Negative    WBC, UA 2-4 (A) None Seen, 0-5, 5-55, 5-65 /hpf    RBC, UA None Seen None Seen, 0-5 /hpf    Bacteria, UA Moderate (A) None Seen, Occasional /hpf    AMORPH URATES Moderate /hpf    Epithelial Cells Occasional None Seen, Occasional /hpf    MUCUS THREADS Moderate (A) None Seen   Cortisol   Result Value Ref Range    Cortisol, Random 31 3 ug/dL   TSH, 3rd generation   Result Value Ref Range    TSH 3RD GENERATON 2 450 0 358 - 3 740 uIU/mL   T4, free   Result Value Ref Range    Free T4 1 13 0 76 - 1 46 ng/dL   Osmolality-If this is regarding a toxic alcohol, STOP  Test is not routinely indicated  Please consult medical  on call for further guidance     Result Value Ref Range    Osmolality Serum 296 282 - 298 mmol/KG   Osmolality, urine   Result Value Ref Range    Osmolality, Ur 308 250 - 900 mmol/KG   Sodium, urine, random   Result Value Ref Range    Sodium, Ur 13    Uric acid   Result Value Ref Range    Uric Acid 11 9 (H) 4 2 - 8 0 mg/dL   Basic metabolic panel   Result Value Ref Range    Sodium 132 (L) 136 - 145 mmol/L    Potassium 3 9 3 5 - 5 3 mmol/L    Chloride 99 (L) 100 - 108 mmol/L    CO2 22 21 - 32 mmol/L    ANION GAP 11 4 - 13 mmol/L    BUN 54 (H) 5 - 25 mg/dL    Creatinine 6 44 (H) 0 60 - 1 30 mg/dL    Glucose 147 (H) 65 - 140 mg/dL    Calcium 8 4 8 3 - 10 1 mg/dL    eGFR 8 ml/min/1 73sq m   Lactic acid, plasma   Result Value Ref Range    LACTIC ACID 2 7 (HH) 0 5 - 2 0 mmol/L   Basic metabolic panel   Result Value Ref Range    Sodium 131 (L) 136 - 145 mmol/L    Potassium 3 8 3 5 - 5 3 mmol/L    Chloride 98 (L) 100 - 108 mmol/L    CO2 23 21 - 32 mmol/L    ANION GAP 10 4 - 13 mmol/L    BUN 57 (H) 5 - 25 mg/dL    Creatinine 6 21 (H) 0 60 - 1 30 mg/dL    Glucose 157 (H) 65 - 140 mg/dL    Calcium 7 6 (L) 8 3 - 10 1 mg/dL    eGFR 8 ml/min/1 73sq m   Lactic acid 2 Hours   Result Value Ref Range    LACTIC ACID 1 3 0 5 - 2 0 mmol/L   Body Fluid Diff   Result Value Ref Range    Total Counted 100     Neutrophils % (Fluid) 4 %    Lymphs % (Fluid) 25 %    Histiocyte % (Fluid) 15 %    Unclassified % (Fluid) 33 %    Monocytes % (Fluid) 23 %    Pathology Review Yes (A) No   Path Slide Review   Result Value Ref Range    Path Review       - Suspicious for malignancy  - Scattered markedly atypical cells present, with enlarged, irregularly-shaped nuclei, large nucleoli, and abundant foamy, light blue cytoplasm  Correlate with concurrent cytology specimen for further characterization of these cells  - Background reactive mesothelial cells, macrophages, and mixed inflammatory cells        Dr Mayur Perea metabolic panel   Result Value Ref Range    Sodium 132 (L) 136 - 145 mmol/L    Potassium 3 7 3 5 - 5 3 mmol/L    Chloride 100 100 - 108 mmol/L    CO2 20 (L) 21 - 32 mmol/L    ANION GAP 12 4 - 13 mmol/L    BUN 59 (H) 5 - 25 mg/dL    Creatinine 5 74 (H) 0 60 - 1 30 mg/dL    Glucose 164 (H) 65 - 140 mg/dL    Calcium 7 6 (L) 8 3 - 10 1 mg/dL    AST 17 5 - 45 U/L    ALT 12 12 - 78 U/L    Alkaline Phosphatase 46 46 - 116 U/L    Total Protein 5 8 (L) 6 4 - 8 2 g/dL    Albumin 2 6 (L) 3 5 - 5 0 g/dL    Total Bilirubin 0 60 0 20 - 1 00 mg/dL    eGFR 9 ml/min/1 73sq m   CBC and differential   Result Value Ref Range    WBC 9 73 4 31 - 10 16 Thousand/uL    RBC 3 67 (L) 3 88 - 5 62 Million/uL    Hemoglobin 10 8 (L) 12 0 - 17 0 g/dL    Hematocrit 33 0 (L) 36 5 - 49 3 %    MCV 90 82 - 98 fL    MCH 29 4 26 8 - 34 3 pg    MCHC 32 7 31 4 - 37 4 g/dL    RDW 12 7 11 6 - 15 1 %    MPV 11 9 8 9 - 12 7 fL    Platelets 323 907 - 989 Thousands/uL    nRBC 0 /100 WBCs    Neutrophils Relative 83 (H) 43 - 75 %    Immat GRANS % 0 0 - 2 %    Lymphocytes Relative 7 (L) 14 - 44 %    Monocytes Relative 9 4 - 12 %    Eosinophils Relative 1 0 - 6 %    Basophils Relative 0 0 - 1 %    Neutrophils Absolute 8 06 (H) 1 85 - 7 62 Thousands/µL    Immature Grans Absolute 0 04 0 00 - 0 20 Thousand/uL    Lymphocytes Absolute 0 64 0 60 - 4 47 Thousands/µL    Monocytes Absolute 0 84 0 17 - 1 22 Thousand/µL    Eosinophils Absolute 0 12 0 00 - 0 61 Thousand/µL    Basophils Absolute 0 03 0 00 - 0 10 Thousands/µL   Magnesium   Result Value Ref Range    Magnesium 1 8 1 6 - 2 6 mg/dL   Phosphorus   Result Value Ref Range    Phosphorus 6 9 (H) 2 3 - 4 1 mg/dL   CBC and differential   Result Value Ref Range    WBC 7 56 4 31 - 10 16 Thousand/uL    RBC 3 65 (L) 3 88 - 5 62 Million/uL    Hemoglobin 10 6 (L) 12 0 - 17 0 g/dL    Hematocrit 33 5 (L) 36 5 - 49 3 %    MCV 92 82 - 98 fL    MCH 29 0 26 8 - 34 3 pg    MCHC 31 6 31 4 - 37 4 g/dL    RDW 12 8 11 6 - 15 1 %    MPV 12 3 8 9 - 12 7 fL    Platelets 406 023 - 807 Thousands/uL    nRBC 0 /100 WBCs    Neutrophils Relative 73 43 - 75 %    Immat GRANS % 0 0 - 2 %    Lymphocytes Relative 15 14 - 44 %    Monocytes Relative 10 4 - 12 %    Eosinophils Relative 2 0 - 6 %    Basophils Relative 0 0 - 1 %    Neutrophils Absolute 5 49 1 85 - 7 62 Thousands/µL    Immature Grans Absolute 0 03 0 00 - 0 20 Thousand/uL    Lymphocytes Absolute 1 11 0 60 - 4 47 Thousands/µL    Monocytes Absolute 0 74 0 17 - 1 22 Thousand/µL    Eosinophils Absolute 0 17 0 00 - 0 61 Thousand/µL    Basophils Absolute 0 02 0 00 - 0 10 Thousands/µL   Comprehensive metabolic panel   Result Value Ref Range    Sodium 134 (L) 136 - 145 mmol/L    Potassium 3 9 3 5 - 5 3 mmol/L    Chloride 103 100 - 108 mmol/L    CO2 24 21 - 32 mmol/L    ANION GAP 7 4 - 13 mmol/L    BUN 54 (H) 5 - 25 mg/dL    Creatinine 3 28 (H) 0 60 - 1 30 mg/dL    Glucose 129 65 - 140 mg/dL    Calcium 7 5 (L) 8 3 - 10 1 mg/dL    AST 21 5 - 45 U/L    ALT 11 (L) 12 - 78 U/L    Alkaline Phosphatase 53 46 - 116 U/L    Total Protein 5 7 (L) 6 4 - 8 2 g/dL    Albumin 2 2 (L) 3 5 - 5 0 g/dL    Total Bilirubin 0 41 0 20 - 1 00 mg/dL    eGFR 17 ml/min/1 73sq m   Magnesium   Result Value Ref Range    Magnesium 2 0 1 6 - 2 6 mg/dL   Phosphorus   Result Value Ref Range    Phosphorus 4 6 (H) 2 3 - 4 1 mg/dL   Fingerstick Glucose (POCT)   Result Value Ref Range    POC Glucose 200 (H) 65 - 140 mg/dl   Fingerstick Glucose (POCT)   Result Value Ref Range    POC Glucose 170 (H) 65 - 140 mg/dl   Fingerstick Glucose (POCT)   Result Value Ref Range    POC Glucose 133 65 - 140 mg/dl   Fingerstick Glucose (POCT)   Result Value Ref Range    POC Glucose 172 (H) 65 - 140 mg/dl   Fingerstick Glucose (POCT)   Result Value Ref Range    POC Glucose 178 (H) 65 - 140 mg/dl   Fingerstick Glucose (POCT)   Result Value Ref Range    POC Glucose 154 (H) 65 - 140 mg/dl   Fingerstick Glucose (POCT)   Result Value Ref Range    POC Glucose 192 (H) 65 - 140 mg/dl   Fingerstick Glucose (POCT)   Result Value Ref Range    POC Glucose 160 (H) 65 - 140 mg/dl   Fingerstick Glucose (POCT)   Result Value Ref Range    POC Glucose 216 (H) 65 - 140 mg/dl     CA 19-9:  9017    Imaging Studies: I have personally reviewed pertinent reports  IMPRESSION:     Known pancreatic tail malignancy unchanged from the recent PET/CT      Persistent abdominal ascites and peritoneal nodularity in keeping with peritoneal carcinomatosis  See the recent PET/CT report            Workstation performed: [id number]      Imaging     CT abdomen pelvis wo contrast (Order: [id number]) - 9/3/2020   IMPRESSION:  1  Enlarging pancreatic tail mass with mild heterogeneous FDG activity compatible with known malignancy  2   Interval progression of multiple peritoneal nodules with a large amount of abdominopelvic ascites, compatible with peritoneal carcinomatosis  3   No discrete hypermetabolic liver lesion is visualized, though evaluation may be limited due to the mild uptake of the primary tumor  Consider contrast MR evaluation if clinically warranted  4   No hypermetabolic lesions in the neck or chest   5   Reticular nodular densities again noted in the lung bases bilaterally  These are too small for accurate PET evaluation but may be infectious/inflammatory  Continued CT follow-up recommended               Workstation performed: [id number]      Imaging     NM PET CT skull base to mid thigh (Order: [id number]) - 8/31/2020     Pathology, and Other Studies: I have personally reviewed pertinent reports  Tissue Exam  Final result  8/13/2020    Fine Needle Aspiration: [id number]   Order: [id number]   Collected:  8/13/2020  1:20 PM   Status:  Final result   Visible to patient:  No (inaccessible in MyChart)   Dx: Malignant neoplasm of tail of pancrea       Component     Case Report    Non-gynecologic Cytology                          Case: [id number]                                    Authorizing Provider: Ravin Bailey MD           Collected:           08/13/2020 1320                Ordering Location:     [facility]      Received:            08/13/2020 1413                                       [facility]                                                             Pathologist:           Narda Bob MD                                                          Specimens:   A) - Pancreas, Tail Mass                                                                              B) - Pancreas, Tail Mass                                                                              C) - Pancreas, Tail Mass, cell block                                                        Final Diagnosis    A -B -C  Pancreas, Tail Mass (Thin-prep, smears and cell block preparations): Malignant ([facility] Category VI) - See note  Adenocarcinoma      Satisfactory for evaluation      Note: The above diagnostic category is part of the six-tiered system proposed by The [employer] of Cyt[employer] for the reporting of pancreaticobiliary specimens  This proposed scheme provides terminology that correlates the cytologic diagnostic nomenclature with the 2010 WHO classification of pancreatic tumors and standardizes the categorization of the various diseases of the pancreas, some of which are difficult to diagnose specifically by cytology  *The [employer] Society of Cyt[employer] System for Reporting Pancreaticobiliary Cytology: Definitions, Criteria and Explanatory Notes  Martín Flores Gins; Cohen, 2015          Electronically signed by Narda Bob MD on 8/19/2020 at  8:15                Assessment and Plan:     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

## 2020-09-06 PROBLEM — I95.9 HYPOTENSION: Status: RESOLVED | Noted: 2020-01-01 | Resolved: 2020-01-01

## 2020-09-06 PROBLEM — R10.9 ABDOMINAL PAIN: Status: ACTIVE | Noted: 2020-01-01

## 2020-09-06 NOTE — PROGRESS NOTES
[facility] - Progress Note  Patient: Dell Councilman 66 y o  male MRN: [id number]  Unit/Bed#: [facility] 909-01 Encounter: [id number]  Primary Care Provider: Angelika Collier MD  Date Of Visit: 09/06/20        Assessment & Plan:    * Acute kidney injury - Chronic kidney disease stage 3  Assessment & Plan  Possibly multifactorial secondary to acute tubular necrosis from hypotension, severe sepsis, and decreased oral intake coupled with elevated intra-abdominal pressure from ascites in the setting of chronic ARB use   Delgado catheter inserted at the [address] [place] [address],[address] ER prior to transfer  [address] nephrology input -> no indication for emergent dialysis at this time -> IV fluids discontinued today  Baseline creatinine approximately 1 0 -> currently improved @ 1 59 from a 6 44 peak previously  CT imaging unremarkable for hydronephrosis or other obstructive etiology    Possible severe sepsis on admission versus SIRS  Assessment & Plan  Presents with lactic acidosis coupled with tachycardia/leukocytosis  Procalcitonin improved from 0 61 -> 0 54 -> normalized at 0 14 today  Await final blood cultures and ascitic fluid culture - small pyuria noted on urinalysis, however, in the absence of urinary symptoms - CXR negative for infiltrative process   Spontaneous bacterial peritonitis ruled out via fluid analysis -> on empiric IV Rocephin, however, due to risk (prophylaxis)    Abdominal pain  Assessment & Plan  Ascitic fluid analysis yielding a WBC count of 917 composed of just 4% neutrophils, however, in the setting of severe sepsis coupled with abdominal pain/discomfort, continue IV Rocephin (for prophylaxis)  Await final ascitic fluid culture     Ascites  Assessment & Plan  PET-CT on 8/31 revealed enlarging peritoneal nodules with large ascites - CT imaging consistent peritoneal carcinomatosis  S/p IR guided paracentesis yesterday yielding approximately 6 4 L of serosanguineous fluid  Fluid cytology pending - preliminary pathology report notes: "Suspicious for malignancy  Scattered markedly atypical cells present, with enlarged, irregularly-shaped nuclei, large nucleoli, and abundant foamy, light blue cytoplasm  Correlate with concurrent cytology specimen for further characterization of these cells  Background reactive mesothelial cells, macrophages, and mixed inflammatory cells "  Less likely SBP based on fluid analysis - ascitic culture remains preliminarily negative  If ascites reaccumulates, can consider a repeat therapeutic paracentesis    Cancer related pain  Assessment & Plan  PRN pain control    Pancreatic cancer  Assessment & Plan  Recently diagnosed last month as an adenocarcinoma on fine-needle aspiration  Oncology following -> may ultimately require a goals of care discussion in the setting of acute/chronic comorbidities if oncology determines that chemotherapy poses more risk than benefit this point -> await resolution of acute medical issues  Supportive care - PRN pain control    Hypotensionresolved as of 9/6/2020  Assessment & Plan  Resolved -> now intermittently hypertensive and resume oral antihypertensives  Discontinue Midodrine    Essential hypertension  Assessment & Plan  PRN IV Hydralazine on board  Resume Norvasc tomorrow - holding ARB in the setting of acute kidney injury    Diabetes mellitus type 2  Assessment & Plan  Lab Results   Component Value Date    HGBA1C 6 5 (H) 08/02/2020     Hold oral hypoglycemics  On SSI coverage per Accu-Cheks    Hyponatremia  Assessment & Plan  Serum sodium normalized today s/p fluid restriction  In the setting of pancreatic cancer         DVT Prophylaxis:  Heparin SC       Patient Centered Rounds:  I have performed bedside rounds and discussed plan of care with nursing today      Discussions with Specialists or Other Care Team Provider:  see above assessments if applicable    Education and Discussions with Family / Patient:  Patient, and wife, at bedside today     Time Spent for Care:  32 minutes  More than 50% of total time spent on counseling and coordination of care as described above  Current Length of Stay: 3 day(s)    Current Patient Status: Inpatient   Certification Statement:  Patient will continue to require additional hospital stay due to assessments as noted above  Code Status: Level 1 - Full Code        Subjective:     Seen/examined earlier in the day  Sitting upright in a chair denying worsening abdominal discomfort at this time  Remains generally weak/fatigued  Objective:     Vitals:   Temp (24hrs), Av 2 °F (36 8 °C), Min:97 8 °F (36 6 °C), Max:98 5 °F (36 9 °C)    Temp:  [97 8 °F (36 6 °C)-98 5 °F (36 9 °C)] 98 3 °F (36 8 °C)  HR:  [] 111  Resp:  [18-19] 18  BP: (129-171)/(65-88) 171/88  SpO2:  [93 %-97 %] 97 %  Body mass index is 35 55 kg/m²  Input and Output Summary (last 24 hours):        Intake/Output Summary (Last 24 hours) at 2020 1615  Last data filed at 2020 1017  Gross per 24 hour   Intake 742 5 ml   Output 1850 ml   Net -1107 5 ml       Physical Exam:     GENERAL:  Waxing/waning weakness/fatigue  HEAD:  Normocephalic - atraumatic  EYES: PERRL - EOMI   MOUTH:  Mucosa moist  NECK:  Supple - full range of motion  CARDIAC:  Regular rate/rhythm - S1/S2 positive  PULMONARY:  Clear breath sounds bilaterally - nonlabored respirations at rest  ABDOMEN:  Soft - improved tenderness - remains distended with ascites - active bowel sounds  MUSCULOSKELETAL:  Motor strength/range of motion deconditioned  NEUROLOGIC:  Alert/oriented at baseline  SKIN:  Chronic wrinkles/blemishes   PSYCHIATRIC:  Mood/affect stable      Additional Data:     Labs & Recent Cultures:    Results from last 7 days   Lab Units 20  0413   WBC Thousand/uL 8 14   HEMOGLOBIN g/dL 10 9*   HEMATOCRIT % 33 1*   PLATELETS Thousands/uL 201   NEUTROS PCT % 74   LYMPHS PCT % 13*   MONOS PCT % 9   EOS PCT % 3     Results from last 7 days   Lab Units 09/06/20  0413   POTASSIUM mmol/L 3 6   CHLORIDE mmol/L 107   CO2 mmol/L 22   BUN mg/dL 38*   CREATININE mg/dL 1 59*   CALCIUM mg/dL 7 9*   ALK PHOS U/L 48   ALT U/L 8*   AST U/L 5         Results from last 7 days   Lab Units 09/06/20  1139 09/06/20  0740 09/05/20  2036 09/05/20  1703 09/05/20  1228 09/05/20  0816 09/04/20  2036 09/04/20  1648 09/04/20  1206 09/04/20  0830 09/03/20  2125 09/03/20  1825   POC GLUCOSE mg/dl 193* 123 162* 167* 216* 160* 192* 154* 178* 172* 133 170*         Results from last 7 days   Lab Units 09/06/20  0803 09/06/20  0413 09/04/20  0444 09/03/20  2311 09/03/20  1938 09/03/20  1441 09/03/20  1230   LACTIC ACID mmol/L  --   --   --  1 3 2 7* 2 4* 2 2*   PROCALCITONIN ng/ml 0 14 0 21 0 54*  --   --   --  0 61*         Results from last 7 days   Lab Units 09/04/20  1434 09/04/20  1433 09/03/20  1758   BLOOD CULTURE  No Growth at 24 hrs   No Growth at 24 hrs   --    GRAM STAIN RESULT   --   --  2+ Mononuclear Cells  Rare Polys  No bacteria seen   BODY FLUID CULTURE, STERILE   --   --  No growth         Last 24 Hours Medication List:   Current Facility-Administered Medications   Medication Dose Route Frequency Provider Last Rate    acetaminophen  650 mg Oral Q6H PRN MD Noble Mckinnon Iveth Pleva ON 9/7/2020] amLODIPine  5 mg Oral Daily Miladys Clemens MD      cefTRIAXone  1,000 mg Intravenous Q24H Miladys Clemens MD 1,000 mg (09/06/20 1027)    heparin (porcine)  5,000 Units Subcutaneous Q8H [facility] Traci Bauman MD      hydrALAZINE  5 mg Intravenous Q6H PRN Miladys Clemens MD      insulin lispro  1-5 Units Subcutaneous HS Traci Bauman MD      insulin lispro  1-5 Units Subcutaneous TID AC Miladys Clemens MD      iohexol  50 mL Oral 90 min pre-procedure Anna Akbar DO      ondansetron  4 mg Intravenous Q4H PRN Miladys Clemens MD      oxyCODONE-acetaminophen  1 tablet Oral Q8H PRN Rich Stallworth MD                    ** Please Note: This note is constructed using a voice recognition dictation system  An occasional wrong word/phrase or sound-a-like substitution may have been picked up by dictation device due to the inherent limitations of voice recognition software  Read the chart carefully and recognize, using reasonable context, where substitutions may have occurred  **

## 2020-09-06 NOTE — ASSESSMENT & PLAN NOTE
PRN IV Hydralazine on board  Resume Norvasc tomorrow - holding ARB in the setting of acute kidney injury

## 2020-09-06 NOTE — ASSESSMENT & PLAN NOTE
Ascitic fluid analysis yielding a WBC count of 917 composed of just 4% neutrophils, however, in the setting of severe sepsis coupled with abdominal pain/discomfort, continue IV Rocephin (for prophylaxis)  Await final ascitic fluid culture

## 2020-09-06 NOTE — ASSESSMENT & PLAN NOTE
Possibly multifactorial secondary to acute tubular necrosis from hypotension, severe sepsis, and decreased oral intake coupled with elevated intra-abdominal pressure from ascites in the setting of chronic ARB use   Delgado catheter inserted at the [facility] ER prior to transfer  [address] nephrology input -> no indication for emergent dialysis at this time -> IV fluids discontinued today  Baseline creatinine approximately 1 0 -> currently improved @ 1 59 from a 6 44 peak previously  CT imaging unremarkable for hydronephrosis or other obstructive etiology

## 2020-09-06 NOTE — PROGRESS NOTES
NEPHROLOGY PROGRESS NOTE   Dhruv Ramirez 66 y o  male MRN: [id number]  [facility] Encounter: [id number]  Reason for Consult: LY/CKd    ASSESSMENT AND PLAN:  13-year-old male with history of diabetes mellitus type 2/hypertension/AAA with a recent diagnosis of pancreatic cancer with metastatic disease who we are asked to see for AK I on CKD stage 3:     LY POA on CKD stage 3 with baseline creatinine around 1 0  -LY with peak creatinine 6 2 now continue to significantly improved 1 5 today  -LY suspected to be prerenal/3rd spacing in the setting of ascites, intra-abdominal hypertension with significant ascites, ATN, hypotension  -workup as detailed in nephrology progress note from 9/4/20   -discontinue IV fluid  -BMP in a m   -CKD suspect in the setting of diabetes, hypertension  -urine output improved     Hyponatremia, likely hypovolemic, improving with IV fluid, serum sodium 138 today   -will discontinue IV fluid, increase fluid restriction to 1 5 L per day     Hyperphosphatemia, improving with serum phosphorus 3 0 with improving renal failure      Hypotension  -blood pressure seems to have improved with IV fluid challenge  -decreased midodrine to 5 mg p o  T i d   -echo shows EF 35%, grade 1 diastolic dysfunction      New diagnosed pancreatic carcinoma with metastatic, oncology follow-up      Discussed above plan in detail with primary team attending    SUBJECTIVE:  Patient seen and examined at bedside  No chest pain, shortness of breath, nausea, vomiting, abdominal pain      OBJECTIVE:  Current Weight: Weight - Scale: 119 kg (262 lb 2 oz)  Vitals:    09/06/20 1029   BP: 129/78   Pulse: 91   Resp:    Temp:    SpO2: 95%       Intake/Output Summary (Last 24 hours) at 9/6/2020 1242  Last data filed at 9/6/2020 1017  Gross per 24 hour   Intake 1102 5 ml   Output 1850 ml   Net -747 5 ml     Wt Readings from Last 3 Encounters:   09/06/20 119 kg (262 lb 2 oz)   09/03/20 112 kg (246 lb)   08/25/20 114 kg (252 lb 6 4 oz)     Temp Readings from Last 3 Encounters:   09/06/20 97 8 °F (36 6 °C)   09/03/20 97 9 °F (36 6 °C) (Tympanic)   08/25/20 98 4 °F (36 9 °C) (Temporal)     BP Readings from Last 3 Encounters:   09/06/20 129/78   09/03/20 (!) 91/47   08/25/20 120/82     Pulse Readings from Last 3 Encounters:   09/06/20 91   09/03/20 (!) 107   08/25/20 (!) 129        Physical Examination:  General:  Sitting in chair, no acute distress   Eyes:  Mild conjunctival pallor present  ENT:  External examination of ears and nose unremarkable  Neck:  No obvious lymphadenopathy appreciated  Respiratory:  Bilateral air entry present  CVS:  S1, S2 present  GI:  Soft, mild distended, nontender  CNS:  Active alert oriented x3  Skin:  No new rash in legs  Musculoskeletal:  No obvious gross deformity noted    Medications:    Current Facility-Administered Medications:     acetaminophen (TYLENOL) tablet 650 mg, 650 mg, Oral, Q6H PRN, Genny Andersen MD    cefTRIAXone (ROCEPHIN) 1,000 mg in dextrose 5 % 50 mL IVPB, 1,000 mg, Intravenous, Q24H, Genny Andersen MD, Last Rate: 100 mL/hr at 09/06/20 1027, 1,000 mg at 09/06/20 1027    heparin (porcine) subcutaneous injection 5,000 Units, 5,000 Units, Subcutaneous, Q8H [address], 5,000 Units at 09/06/20 0603 **AND** [COMPLETED] Platelet count, , , Once, Traci Bauman MD    insulin lispro (HumaLOG) 100 units/mL subcutaneous injection 1-5 Units, 1-5 Units, Subcutaneous, HS, Traci Bauman MD, 1 Units at 09/05/20 2134    insulin lispro (HumaLOG) 100 units/mL subcutaneous injection 1-5 Units, 1-5 Units, Subcutaneous, TID AC, 1 Units at 09/06/20 1209 **AND** Fingerstick Glucose (POCT), , , 4x Daily AC and at bedtime, Genny Andersen MD    iohexol (OMNIPAQUE) 240 MG/ML solution 50 mL, 50 mL, Oral, 90 min pre-procedure, Anna Chambers DO    midodrine (PROAMATINE) tablet 10 mg, 10 mg, Oral, TID AC, Kuldip Burgess MD, 10 mg at 09/05/20 1714    ondansetron (ZOFRAN) injection 4 mg, 4 mg, Intravenous, Q4H PRN, Roosevelt Canas MD    oxyCODONE-acetaminophen (PERCOCET) 5-325 mg per tablet 1 tablet, 1 tablet, Oral, Q8H PRN, Feliciano Fisher MD    sodium chloride 0 9 % infusion, 75 mL/hr, Intravenous, Continuous, Mika Tijerina MD, Stopped at 09/06/20 1215    Laboratory Results:  Results from last 7 days   Lab Units 09/06/20  0413 09/05/20  0612 09/04/20  0444 09/03/20  2311 09/03/20  1441 09/03/20  1440 09/03/20  0759   WBC Thousand/uL 8 14 7 56 9 73  --   --   --  13 23*   HEMOGLOBIN g/dL 10 9* 10 6* 10 8*  --   --   --  13 4   HEMATOCRIT % 33 1* 33 5* 33 0*  --   --   --  40 5   PLATELETS Thousands/uL 201 191 196  --  254  --  303   SODIUM mmol/L 138 134* 132* 131*  --  132* 133   POTASSIUM mmol/L 3 6 3 9 3 7 3 8  --  3 9 3 7   CHLORIDE mmol/L 107 103 100 98*  --  99* 95*   CO2 mmol/L 22 24 20* 23  --  22 22   BUN mg/dL 38* 54* 59* 57*  --  54* 48*   CREATININE mg/dL 1 59* 3 28* 5 74* 6 21*  --  6 44* 5 85*   CALCIUM mg/dL 7 9* 7 5* 7 6* 7 6*  --  8 4 8 6   MAGNESIUM mg/dL 1 9 2 0 1 8  --   --   --   --    PHOSPHORUS mg/dL 3 0 4 6* 6 9*  --   --   --   --        CT abdomen pelvis wo contrast   Final Result by Yasir England MD (09/03 1848)      Known pancreatic tail malignancy unchanged from the recent PET/CT  Persistent abdominal ascites and peritoneal nodularity in keeping with peritoneal carcinomatosis  See the recent PET/CT report  Workstation performed: [id number]         IR paracentesis   Final Result by Brad Alegria MD (09/04 1313)   Successful ultrasound-guided paracentesis  Signed, performed, and dictated by DK Cameron under the supervision of Dr Valery Potter  Workstation performed: [id number]             Portions of the record may have been created with voice recognition software  Occasional wrong word or "sound a like" substitutions may have occurred due to the inherent limitations of voice recognition software   Read the chart carefully and recognize, using context, where substitutions have occurred

## 2020-09-06 NOTE — ASSESSMENT & PLAN NOTE
PET-CT on 8/31 revealed enlarging peritoneal nodules with large ascites - CT imaging consistent peritoneal carcinomatosis  S/p IR guided paracentesis yesterday yielding approximately 6 4 L of serosanguineous fluid  Fluid cytology pending - preliminary pathology report notes: "Suspicious for malignancy  Scattered markedly atypical cells present, with enlarged, irregularly-shaped nuclei, large nucleoli, and abundant foamy, light blue cytoplasm  Correlate with concurrent cytology specimen for further characterization of these cells    Background reactive mesothelial cells, macrophages, and mixed inflammatory cells "  Less likely SBP based on fluid analysis - ascitic culture remains preliminarily negative  If ascites reaccumulates, can consider a repeat therapeutic paracentesis

## 2020-09-06 NOTE — ASSESSMENT & PLAN NOTE
Presents with lactic acidosis coupled with tachycardia/leukocytosis  Procalcitonin improved from 0 61 -> 0 54 -> normalized at 0 14 today  Await final blood cultures and ascitic fluid culture - small pyuria noted on urinalysis, however, in the absence of urinary symptoms - CXR negative for infiltrative process   Spontaneous bacterial peritonitis ruled out via fluid analysis -> on empiric IV Rocephin, however, due to risk (prophylaxis)

## 2020-09-06 NOTE — ASSESSMENT & PLAN NOTE
Resolved -> now intermittently hypertensive and resume oral antihypertensives  Discontinue Midodrine

## 2020-09-06 NOTE — ASSESSMENT & PLAN NOTE
Recently diagnosed last month as an adenocarcinoma on fine-needle aspiration  Oncology following -> may ultimately require a goals of care discussion in the setting of acute/chronic comorbidities if oncology determines that chemotherapy poses more risk than benefit this point -> await resolution of acute medical issues  Supportive care - PRN pain control

## 2020-09-07 NOTE — ASSESSMENT & PLAN NOTE
PET-CT on 8/31 revealed enlarging peritoneal nodules with large ascites - CT imaging consistent peritoneal carcinomatosis  S/p IR guided paracentesis yesterday yielding approximately 6 4 L of serosanguineous fluid  Pending fluid cytology - preliminary pathology report notes: "Suspicious for malignancy  Scattered markedly atypical cells present, with enlarged, irregularly-shaped nuclei, large nucleoli, and abundant foamy, light blue cytoplasm  Correlate with concurrent cytology specimen for further characterization of these cells    Background reactive mesothelial cells, macrophages, and mixed inflammatory cells "  Less likely SBP based on fluid analysis - ascitic culture is negative - remains on prophylactic IV antibiotics, however, due to higher risk  If ascites re-accumulates, can consider a repeat therapeutic paracentesis

## 2020-09-07 NOTE — PROGRESS NOTES
Follow up Consultation    Nephrology   Uriel Patel 66 y o  male MRN: [id number]  Unit/Bed#: [facility] 909-01 Encounter: [id number]      Physician Requesting Consult: Laya Christie MD        ASSESSMENT/PLAN:  13-year-old male with history of diabetes mellitus type 2/hypertension/AAA with a recent diagnosis of pancreatic cancer with metastatic disease who we are asked to see for AK I on CKD stage 3       Acute kidney injury (POA) on CKD stage 3:  - LY most likely secondary to prerenal azotemia due to 3rd spacing with intravascular volume depletion and increased intra-abdominal pressures due to significant ascites leading to ATN as well as some ischemic injury due to hemodynamic compromise  - please see detailed workup note from 09/04/2020  - continue to hold IV fluids for now  - After review of records it appears that the patient has a baseline Creatinine of 1 0 mg/dL  - patient's creatinine today is at 1 08 mg/dL, stable and improved  - check BMP, magnesium, phosphorus in a m   - Optimize hemodynamic status to avoid delay in renal recovery  - Place on a renal diet when allowed diet order    - Avoid nephrotoxins, adjust meds to appropriate GFR   - Strict I/O   - Daily weights  - Urinary retention protocol  - will need to set up patient for follow up with Nephrology as an outpatient post hospitalization   Blood pressure/hypertension:  - current medications:  Norvasc 5 mg p o  Q day  - recommendations:  No changes for now  - Optimize hemodynamics   - Maintain MAP > 65mmHg  - Avoid BP fluctuations       H/H/anemia:  - most recent hemoglobin at 11 grams/deciliter  - maintain hemoglobin greater than 8 grams/deciliter     Acid-base electrolytes:  o Hyponatremia:    - Likely due to decreased free water clearance  - Likely due to decreased distal sodium delivery  - Most recent sodium at 141 mEq, improved  o  Acid-base:    - Most recent bicarb stable at 26     Volume status:  o  Clinically appears to be euvolemic     Other medical problems:  o CHF:  Management per primary team   Most recent echo with EF of 12% grade 1 diastolic dysfunction  o Newly diagnosed pancreatic carcinoma with Mets:  Management per primary team   Follow-up with oncology      Thanks for the consult  Will continue to follow  Please call with questions/ concerns  Above-mentioned orders and Plan acute kidney injury was discussed with the team in [address] MD, ÁNGELA, 2020, 2:23 PM              Objective :   Patient seen and examined in his room no overnight events hemodynamically stable urine output close to 2 L last 24 hours  Remains afebrile  PHYSICAL EXAM  /79   Pulse 87   Temp 97 8 °F (36 6 °C)   Resp 18   Ht 6' (1 829 m)   Wt 118 kg (260 lb 2 3 oz)   SpO2 95%   BMI 35 28 kg/m²   Temp (24hrs), Av 2 °F (36 8 °C), Min:97 8 °F (36 6 °C), Max:98 4 °F (36 9 °C)        Intake/Output Summary (Last 24 hours) at 2020 1423  Last data filed at 2020 1239  Gross per 24 hour   Intake 800 ml   Output 1800 ml   Net -1000 ml       I/O last 24 hours: In: 001 [P O :920]  Out: 2150 [Urine:2150]      Current Weight: Weight - Scale: 118 kg (260 lb 2 3 oz)  First Weight: Weight - Scale: 117 kg (258 lb 9 6 oz)  Physical Exam  Vitals signs reviewed  Constitutional:       General: He is not in acute distress  Appearance: He is not ill-appearing, toxic-appearing or diaphoretic  HENT:      Head: Normocephalic and atraumatic  Mouth/Throat:      Pharynx: Oropharynx is clear  No posterior oropharyngeal erythema  Eyes:      General: No scleral icterus  Conjunctiva/sclera: Conjunctivae normal    Neck:      Musculoskeletal: Normal range of motion and neck supple  Cardiovascular:      Rate and Rhythm: Normal rate  Heart sounds: No friction rub  Pulmonary:      Effort: No respiratory distress  Breath sounds: Normal breath sounds  No wheezing  Abdominal:      General: There is no distension  Palpations: Abdomen is soft  Musculoskeletal:         General: No swelling  Skin:     General: Skin is warm  Coloration: Skin is not jaundiced  Neurological:      General: No focal deficit present  Mental Status: He is alert  Psychiatric:         Mood and Affect: Mood normal          Behavior: Behavior normal              Review of Systems   Constitutional: Positive for fatigue  Negative for appetite change  HENT: Negative for congestion  Respiratory: Negative for cough, shortness of breath and wheezing  Cardiovascular: Negative for leg swelling  Gastrointestinal: Negative for abdominal pain, constipation, diarrhea, nausea and vomiting  Genitourinary: Negative for dysuria  Musculoskeletal: Negative for back pain  Skin: Negative for rash  Neurological: Negative for dizziness and headaches  Psychiatric/Behavioral: Negative for agitation  All other systems reviewed and are negative  Scheduled Meds:  Current Facility-Administered Medications   Medication Dose Route Frequency Provider Last Rate    acetaminophen  650 mg Oral Q6H PRN Saba De La Garza MD      amLODIPine  5 mg Oral Daily Saba De La Garza MD      cefTRIAXone  1,000 mg Intravenous Q24H Saba De La Garza MD Stopped (09/07/20 1300)    heparin (porcine)  5,000 Units Subcutaneous Q8H [facility] Lico Velazco MD      hydrALAZINE  5 mg Intravenous Q6H PRN Saba De La Garza MD      insulin lispro  1-5 Units Subcutaneous HS Traci Bauman MD      insulin lispro  1-5 Units Subcutaneous TID AC Saba De La Garza MD      iohexol  50 mL Oral 90 min pre-procedure Anna Chambers DO      ondansetron  4 mg Intravenous Q4H PRN Saba De La Garza MD      oxyCODONE-acetaminophen  1 tablet Oral Q8H PRN Lico Velazco MD         PRN Meds:   acetaminophen    hydrALAZINE    ondansetron    oxyCODONE-acetaminophen    Continuous Infusions:       Invasive Devices:      Invasive Devices     Peripheral Intravenous Line            Peripheral IV 09/06/20 Left Forearm 1 day          Drain            Urethral Catheter Non-latex 16 Fr  4 days                  LABORATORY:    Results from last 7 days   Lab Units 09/07/20  0524 09/06/20  0413 09/05/20  0612 09/04/20  0444 09/03/20  2311 09/03/20  1441 09/03/20  1440 09/03/20  0759   WBC Thousand/uL 6 74 8 14 7 56 9 73  --   --   --  13 23*   HEMOGLOBIN g/dL 11 0* 10 9* 10 6* 10 8*  --   --   --  13 4   HEMATOCRIT % 34 1* 33 1* 33 5* 33 0*  --   --   --  40 5   PLATELETS Thousands/uL 185 201 191 196  --  254  --  303   POTASSIUM mmol/L 4 2 3 6 3 9 3 7 3 8  --  3 9 3 7   CHLORIDE mmol/L 110* 107 103 100 98*  --  99* 95*   CO2 mmol/L 26 22 24 20* 23  --  22 22   BUN mg/dL 21 38* 54* 59* 57*  --  54* 48*   CREATININE mg/dL 1 08 1 59* 3 28* 5 74* 6 21*  --  6 44* 5 85*   CALCIUM mg/dL 8 2* 7 9* 7 5* 7 6* 7 6*  --  8 4 8 6   MAGNESIUM mg/dL 1 9 1 9 2 0 1 8  --   --   --   --    PHOSPHORUS mg/dL 2 7 3 0 4 6* 6 9*  --   --   --   --       rest all reviewed    RADIOLOGY:  CT abdomen pelvis wo contrast   Final Result by Elsi Durbin MD (09/03 1848)      Known pancreatic tail malignancy unchanged from the recent PET/CT  Persistent abdominal ascites and peritoneal nodularity in keeping with peritoneal carcinomatosis  See the recent PET/CT report  Workstation performed: [id number]         IR paracentesis   Final Result by Grazyna Guerra MD (09/04 1313)   Successful ultrasound-guided paracentesis  Signed, performed, and dictated by DK Gambino under the supervision of Dr Kezia Kennedy  Workstation performed: [id number]           Rest all reviewed    Portions of the record may have been created with voice recognition software  Occasional wrong word or "sound a like" substitutions may have occurred due to the inherent limitations of voice recognition software  Read the chart carefully and recognize, using context, where substitutions have occurred  If you have any questions, please contact the dictating provider

## 2020-09-07 NOTE — ASSESSMENT & PLAN NOTE
Presents with lactic acidosis coupled with tachycardia/leukocytosis  Procalcitonin improved from 0 61 -> 0 54 -> normalized at 0 14 yesterday  Preliminary blood cultures from 9/4 remain negative thus far (discharge planning once final cultures negative) - ascitic fluid culture negative - small pyuria noted on urinalysis, however, in the absence of urinary symptoms - CXR negative for infiltrative process   Spontaneous bacterial peritonitis ruled out via fluid analysis -> on empiric IV Rocephin, however, due to risk (prophylaxis)

## 2020-09-07 NOTE — ASSESSMENT & PLAN NOTE
PRN IV Hydralazine on board - Norvasc resumed today  Holding ARB in the setting of acute kidney injury

## 2020-09-07 NOTE — ASSESSMENT & PLAN NOTE
Serum sodium normalized yesterday s/p fluid restriction and decreased free water clearance  In the setting of pancreatic cancer   Nephrology following

## 2020-09-07 NOTE — ASSESSMENT & PLAN NOTE
Ascitic fluid analysis yielding a WBC count of 917 composed of just 4% neutrophils, however, in the setting of severe sepsis coupled with abdominal pain/discomfort, continue IV Rocephin (for prophylaxis)  Final ascitic culture negative

## 2020-09-07 NOTE — ASSESSMENT & PLAN NOTE
Possibly multifactorial secondary to acute tubular necrosis from hypotension, severe sepsis, and decreased oral intake coupled with elevated intra-abdominal pressure from ascites in the setting of chronic ARB use   Delgado catheter inserted at the [facility] ER prior to transfer  [address] [employer] nephrology input -> no indication for emergent dialysis at this time due to improvement -> IV fluids discontinued yesterday  Baseline creatinine approximately 1 0 -> currently improved @ 1 08 today from a 6 44 peak previously  CT imaging unremarkable for hydronephrosis or other obstructive etiology

## 2020-09-07 NOTE — PROGRESS NOTES
[address] Internal Medicine - Progress Note  Patient: Estrellita Leonard 66 y o  male MRN: [id number]  Unit/Bed#: [facility] 909-01 Encounter: [id number]  Primary Care Provider: Yasir Claudio MD  Date Of Visit: 09/07/20        Assessment & Plan:    * Acute kidney injury - Chronic kidney disease stage 3  Assessment & Plan  Possibly multifactorial secondary to acute tubular necrosis from hypotension, severe sepsis, and decreased oral intake coupled with elevated intra-abdominal pressure from ascites in the setting of chronic ARB use   Delgado catheter inserted at the [facility] ER prior to transfer  [address] [employer] nephrology input -> no indication for emergent dialysis at this time due to improvement -> IV fluids discontinued yesterday  Baseline creatinine approximately 1 0 -> currently improved @ 1 08 today from a 6 44 peak previously  CT imaging unremarkable for hydronephrosis or other obstructive etiology    Possible severe sepsis on admission versus SIRS  Assessment & Plan  Presents with lactic acidosis coupled with tachycardia/leukocytosis  Procalcitonin improved from 0 61 -> 0 54 -> normalized at 0 14 yesterday  Preliminary blood cultures from 9/4 remain negative thus far (discharge planning once final cultures negative) - ascitic fluid culture negative - small pyuria noted on urinalysis, however, in the absence of urinary symptoms - CXR negative for infiltrative process   Spontaneous bacterial peritonitis ruled out via fluid analysis -> on empiric IV Rocephin, however, due to risk (prophylaxis)    Abdominal pain  Assessment & Plan  Ascitic fluid analysis yielding a WBC count of 917 composed of just 4% neutrophils, however, in the setting of severe sepsis coupled with abdominal pain/discomfort, continue IV Rocephin (for prophylaxis)  Final ascitic culture negative    Ascites  Assessment & Plan  PET-CT on 8/31 revealed enlarging peritoneal nodules with large ascites - CT imaging consistent peritoneal carcinomatosis  S/p IR guided paracentesis yesterday yielding approximately 6 4 L of serosanguineous fluid  Pending fluid cytology - preliminary pathology report notes: "Suspicious for malignancy  Scattered markedly atypical cells present, with enlarged, irregularly-shaped nuclei, large nucleoli, and abundant foamy, light blue cytoplasm  Correlate with concurrent cytology specimen for further characterization of these cells  Background reactive mesothelial cells, macrophages, and mixed inflammatory cells "  Less likely SBP based on fluid analysis - ascitic culture is negative - remains on prophylactic IV antibiotics, however, due to higher risk  If ascites re-accumulates, can consider a repeat therapeutic paracentesis    Cancer related pain  Assessment & Plan  PRN pain control    Pancreatic cancer  Assessment & Plan  Recently diagnosed last month as an adenocarcinoma on fine-needle aspiration  Oncology following -> may ultimately require a goals of care discussion in the setting of acute/chronic comorbidities if oncology determines that chemotherapy poses more risk than benefit this point -> await resolution of acute medical issues  Supportive care - PRN pain control    Essential hypertension  Assessment & Plan  PRN IV Hydralazine on board - Norvasc resumed today  Holding ARB in the setting of acute kidney injury    Diabetes mellitus type 2  Assessment & Plan  Lab Results   Component Value Date    HGBA1C 6 5 (H) 08/02/2020     Hold oral hypoglycemics  On SSI coverage per [employer]Cheks    Hyponatremia  Assessment & Plan  Serum sodium normalized yesterday s/p fluid restriction and decreased free water clearance  In the setting of pancreatic cancer   Nephrology following        DVT Prophylaxis:  Heparin SC       Patient Centered Rounds:  I have performed bedside rounds and discussed plan of care with nursing today      Discussions with Specialists or Other Care Team Provider:  see above assessments if applicable    Education and Discussions with Family / Patient:  Patient, and daughter, at bedside today  Time Spent for Care:  32 minutes  More than 50% of total time spent on counseling and coordination of care as described above  Current Length of Stay: 4 day(s)    Current Patient Status: Inpatient   Certification Statement:  Patient will continue to require additional hospital stay due to assessments as noted above  Code Status: Level 1 - Full Code        Subjective:     Seen/examined earlier in the day  He was sitting upright in a chair at the time my encounter  States that his weakness/fatigue has improved today  Denying worsening abdominal pain at this time  Overall, he remains in pleasant and hopeful spirits  Objective:     Vitals:   Temp (24hrs), Av 2 °F (36 8 °C), Min:97 8 °F (36 6 °C), Max:98 4 °F (36 9 °C)    Temp:  [97 8 °F (36 6 °C)-98 4 °F (36 9 °C)] 98 3 °F (36 8 °C)  HR:  [87-95] 95  Resp:  [18-22] 22  BP: (135-138)/(77-79) 138/78  SpO2:  [95 %-97 %] 97 %  Body mass index is 35 28 kg/m²  Input and Output Summary (last 24 hours):        Intake/Output Summary (Last 24 hours) at 2020 1655  Last data filed at 2020 1540  Gross per 24 hour   Intake 800 ml   Output 2000 ml   Net -1200 ml       Physical Exam:     GENERAL:  Well-developed/nourished - improved distress from pain  HEAD:  Normocephalic - atraumatic  EYES: PERRL - EOMI   MOUTH:  Mucosa moist  NECK:  Supple - full range of motion  CARDIAC:  Rate controlled - S1/S2 positive  PULMONARY:  Fairly clear to auscultation - nonlabored respirations  ABDOMEN:  Soft - improving tenderness - intermittent distention with ascites - active bowel sounds  MUSCULOSKELETAL:  Motor strength/range of motion deconditioned  NEUROLOGIC:  Alert/oriented at baseline  SKIN:  Chronic wrinkles/blemishes   PSYCHIATRIC:  Mood/affect stable      Additional Data:     Labs & Recent Cultures:    Results from last 7 days   Lab Units 20  0524   WBC Thousand/uL 6 74   HEMOGLOBIN g/dL 11 0*   HEMATOCRIT % 34 1*   PLATELETS Thousands/uL 185   NEUTROS PCT % 71   LYMPHS PCT % 15   MONOS PCT % 10   EOS PCT % 3     Results from last 7 days   Lab Units 09/07/20  0524   POTASSIUM mmol/L 4 2   CHLORIDE mmol/L 110*   CO2 mmol/L 26   BUN mg/dL 21   CREATININE mg/dL 1 08   CALCIUM mg/dL 8 2*   ALK PHOS U/L 50   ALT U/L 8*   AST U/L 7         Results from last 7 days   Lab Units 09/07/20  1651 09/07/20  1122 09/07/20  0744 09/06/20  2046 09/06/20  1613 09/06/20  1139 09/06/20  0740 09/05/20  2036 09/05/20  1703 09/05/20  1228 09/05/20  0816 09/04/20  2036   POC GLUCOSE mg/dl 147* 268* 141* 198* 207* 193* 123 162* 167* 216* 160* 192*         Results from last 7 days   Lab Units 09/06/20  0803 09/06/20  0413 09/04/20  0444 09/03/20  2311 09/03/20  1938 09/03/20  1441 09/03/20  1230   LACTIC ACID mmol/L  --   --   --  1 3 2 7* 2 4* 2 2*   PROCALCITONIN ng/ml 0 14 0 21 0 54*  --   --   --  0 61*         Results from last 7 days   Lab Units 09/04/20  1434 09/04/20  1433 09/03/20  1758   BLOOD CULTURE  No Growth at 48 hrs   No Growth at 48 hrs   --    GRAM STAIN RESULT   --   --  2+ Mononuclear Cells  Rare Polys  No bacteria seen   BODY FLUID CULTURE, STERILE   --   --  No growth         Last 24 Hours Medication List:   Current Facility-Administered Medications   Medication Dose Route Frequency Provider Last Rate    acetaminophen  650 mg Oral Q6H PRN Vaughn Mayorga MD      amLODIPine  5 mg Oral Daily Vaughn Mayorga MD      cefTRIAXone  1,000 mg Intravenous Q24H Vaughn Mayorga MD Stopped (09/07/20 1300)    heparin (porcine)  5,000 Units Subcutaneous Q8H [facility] Traci Bauman MD      hydrALAZINE  5 mg Intravenous Q6H PRN Vaughn Mayorga MD      insulin lispro  1-5 Units Subcutaneous HS Traci Bauman MD      insulin lispro  1-5 Units Subcutaneous TID AC Vaughn Mayorga MD      iohexol  50 mL Oral 90 min pre-procedure Anna Chambers DO      ondansetron  4 mg Intravenous Q4H PRN MD Maksim Clayton oxyCODONE-acetaminophen  1 tablet Oral Q8H PRN Feliciano Fihser MD                  ** Please Note: This note is constructed using a voice recognition dictation system  An occasional wrong word/phrase or sound-a-like substitution may have been picked up by dictation device due to the inherent limitations of voice recognition software  Read the chart carefully and recognize, using reasonable context, where substitutions may have occurred  **

## 2020-09-08 PROBLEM — R65.20 SEVERE SEPSIS (HCC): Status: RESOLVED | Noted: 2020-01-01 | Resolved: 2020-01-01

## 2020-09-08 PROBLEM — A41.9 SEVERE SEPSIS (HCC): Status: RESOLVED | Noted: 2020-01-01 | Resolved: 2020-01-01

## 2020-09-08 PROBLEM — N18.30 CHRONIC KIDNEY DISEASE (CKD), STAGE III (MODERATE) (HCC): Status: ACTIVE | Noted: 2020-01-01

## 2020-09-08 PROBLEM — E87.1 HYPONATREMIA: Status: RESOLVED | Noted: 2020-01-01 | Resolved: 2020-01-01

## 2020-09-08 NOTE — UTILIZATION REVIEW
Continued Stay Review    Date: 9/8/20                         Current Patient Class: IP  Current Level of Care: MS    HPI:75 y o  male initially admitted on 9/3/20 with LY, SIRS, new dx of AdenoCA Pancreatic tail with abd carcinomatosis, type 2 DM, ascites, HTN,     Assessment/Plan:   Procalcitonin normalized 9/6  SBP ruled out via ascites fluid culture but remains on IV Rocephin prophylactically because of his initial severe sepsis  Prelim blood cultures from 9/4 remain negative  Final ascites fluid culture is negative but suspicious for malignancy  If ascites re-accumulates will need repeat therapeutic paracentesis  LY is resolved  Hyponatremia stable  Has done well with PT and has increased activity tolerance and ambulating w/o assistive devices  Pertinent Labs/Diagnostic Results:     9/3 CT abd, pelvis - Known pancreatic tail malignancy unchanged from the recent PET/CT  Persistent abdominal ascites and peritoneal nodularity in keeping with peritoneal carcinomatosis  6400 mls removed with paracentesis         Results from last 7 days   Lab Units 09/08/20  0735 09/07/20  0524 09/06/20  0413 09/05/20  0612 09/04/20  0444   WBC Thousand/uL 7 06 6 74 8 14 7 56 9 73   HEMOGLOBIN g/dL 10 7* 11 0* 10 9* 10 6* 10 8*   HEMATOCRIT % 33 7* 34 1* 33 1* 33 5* 33 0*   PLATELETS Thousands/uL 185 185 201 191 196   NEUTROS ABS Thousands/µL 4 84 4 79 6 00 5 49 8 06*         Results from last 7 days   Lab Units 09/08/20  0735 09/07/20  0524 09/06/20  0413 09/05/20  0612 09/04/20  0444   SODIUM mmol/L 141 141 138 134* 132*   POTASSIUM mmol/L 4 4 4 2 3 6 3 9 3 7   CHLORIDE mmol/L 108 110* 107 103 100   CO2 mmol/L 28 26 22 24 20*   ANION GAP mmol/L 5 5 9 7 12   BUN mg/dL 15 21 38* 54* 59*   CREATININE mg/dL 0 94 1 08 1 59* 3 28* 5 74*   EGFR ml/min/1 73sq m 78 66 41 17 9   CALCIUM mg/dL 7 9* 8 2* 7 9* 7 5* 7 6*   MAGNESIUM mg/dL 1 7 1 9 1 9 2 0 1 8   PHOSPHORUS mg/dL 3 4 2 7 3 0 4 6* 6 9*     Results from last 7 days Lab Units 09/08/20  0735 09/07/20  0524 09/06/20  0413 09/05/20  0612 09/04/20  0444   AST U/L 11 7 5 21 17   ALT U/L 9* 8* 8* 11* 12   ALK PHOS U/L 49 50 48 53 46   TOTAL PROTEIN g/dL 5 4* 5 8* 6 0* 5 7* 5 8*   ALBUMIN g/dL 2 2* 2 1* 2 3* 2 2* 2 6*   TOTAL BILIRUBIN mg/dL 0 29 0 33 0 26 0 41 0 60     Results from last 7 days   Lab Units 09/08/20  1157 09/08/20  0831 09/07/20  2104 09/07/20  1651 09/07/20  1122 09/07/20  0744 09/06/20  2046 09/06/20  1613 09/06/20  1139 09/06/20  0740 09/05/20  2036 09/05/20  1703   POC GLUCOSE mg/dl 195* 187* 182* 147* 268* 141* 198* 207* 193* 123 162* 167*     Results from last 7 days   Lab Units 09/08/20  0735 09/07/20  0524 09/06/20  0413 09/05/20  0612 09/04/20  0444 09/03/20  2311 09/03/20  1440 09/03/20  0759   GLUCOSE RANDOM mg/dL 129 132 128 129 164* 157* 147* 177*     Results from last 7 days   Lab Units 09/03/20  1440   OSMOLALITY, SERUM mmol/     Results from last 7 days   Lab Units 09/03/20  1440 09/03/20  1230   TSH 3RD GENERATON uIU/mL 2 450 3 150     Results from last 7 days   Lab Units 09/06/20  0803 09/06/20  0413 09/04/20  0444 09/03/20  1230   PROCALCITONIN ng/ml 0 14 0 21 0 54* 0 61*     Results from last 7 days   Lab Units 09/03/20  2311 09/03/20  1938 09/03/20  1441 09/03/20  1230   LACTIC ACID mmol/L 1 3 2 7* 2 4* 2 2*     Results from last 7 days   Lab Units 09/03/20  0759   LIPASE u/L 14     Results from last 7 days   Lab Units 09/03/20  1441   CLARITY UA  Turbid   COLOR UA  Dk Yellow   SPEC GRAV UA  1 026   PH UA  5 0   GLUCOSE UA mg/dl Negative   KETONES UA mg/dl 15 (1+)*   BLOOD UA  Negative   PROTEIN UA mg/dl Trace*   NITRITE UA  Negative   BILIRUBIN UA  Interference- unable to analyze*   UROBILINOGEN UA E U /dl 1 0   LEUKOCYTES UA  Small*   WBC UA /hpf 2-4*   RBC UA /hpf None Seen   BACTERIA UA /hpf Moderate*   EPITHELIAL CELLS WET PREP /hpf Occasional   MUCUS THREADS  Moderate*   SODIUM UR  13     Results from last 7 days   Lab Units 09/04/20  1434 09/04/20  1433 09/03/20  1758   BLOOD CULTURE  No Growth at 72 hrs  No Growth at 72 hrs   --    GRAM STAIN RESULT   --   --  2+ Mononuclear Cells  Rare Polys  No bacteria seen   BODY FLUID CULTURE, STERILE   --   --  No growth     Results from last 7 days   Lab Units 09/03/20  1758   TOTAL COUNTED  100   WBC FLUID /ul 917     Vital Signs:   09/08/20 08:31:39      104   19   157/86   110   94 %       09/07/20 22:15:30   98 8 °F (37 1 °C)   103   20   150/83   105   95 %       09/07/20 15:40:27   98 3 °F (36 8 °C)   95   22   138/78   98   97 %       09/07/20 0830                     None (Room air)    09/07/20 07:41:26   97 8 °F (36 6 °C)   87   18   135/79   98   95 %       09/06/20 22:11:53   98 4 °F (36 9 °C)   94   18   137/77   97   95 %       09/06/20 17:22:06      95      137/78   98   96 %       09/06/20 15:07:35   98 3 °F (36 8 °C)   111Abnormal     18   171/88Abnormal     116   97 %       09/06/20 10:29:16      91      129/78   95   95 %   None (Room air)    09/06/20 08:37:19      96      135/72   93   95 %       09/06/20 07:09:06   97 8 °F (36 6 °C)   79   19   133/65   88   93 %             Medications:   Scheduled Medications:  amLODIPine, 5 mg, Oral, Daily  cefTRIAXone, 1,000 mg, Intravenous, Q24H  heparin (porcine), 5,000 Units, Subcutaneous, Q8H DOROTHY  insulin lispro, 1-5 Units, Subcutaneous, HS  insulin lispro, 1-5 Units, Subcutaneous, TID AC  iohexol, 50 mL, Oral, 90 min pre-procedure      Continuous IV Infusions:     PRN Meds:  acetaminophen, 650 mg, Oral, Q6H PRN  hydrALAZINE, 5 mg, Intravenous, Q6H PRN  ondansetron, 4 mg, Intravenous, Q4H PRN  oxyCODONE-acetaminophen, 1 tablet, Oral, Q8H PRN    Discharge Plan: D     Network Utilization Review Department  [email or web address]  ATTENTION: Please call with any questions or concerns to [phone] and carefully listen to the prompts so that you are directed to the right person   All voicemails are confidential   [facility] all requests for admission clinical reviews, approved or denied determinations and any other requests to dedicated fax number below belonging to the campus where the patient is receiving treatment   List of dedicated fax numbers for the Facilities:  [address] [place] [address] DENIALS (Administrative/Medical Necessity) [phone]   [address]  (Maternity/NICU/Pediatrics) [phone]   Donald Valencia [phone]   Pauline Sanders [phone]   Андрей Hardy [phone]   Radha Olea [phone]   [address] [place] [address] [phone]   [facility]  [phone]   [address] [facility] [address]  [address] [address] [address] [facility] [phone]

## 2020-09-08 NOTE — ASSESSMENT & PLAN NOTE
Ascitic fluid analysis yielding a WBC count of 917 composed of just 4% neutrophils, however, in the setting of severe sepsis coupled with abdominal pain/discomfort, IV rocephin   Final ascitic culture negative   Gram stain shows 2+ mononuclear cells, rare polys and no bacteria   BC negative   Patient reports pain has improved

## 2020-09-08 NOTE — PLAN OF CARE
Problem: PHYSICAL THERAPY ADULT  Goal: Performs mobility at highest level of function for planned discharge setting  See evaluation for individualized goals  Description: Treatment/Interventions: Functional transfer training, LE strengthening/ROM, Elevations, Therapeutic exercise, Endurance training, Patient/family training, Equipment eval/education, Bed mobility, Gait training, Spoke to nursing  Equipment Recommended: Kadie Christianson       See flowsheet documentation for full assessment, interventions and recommendations  Outcome: Completed  Note: Prognosis: Good  Problem List: Decreased strength, Decreased endurance, Impaired balance, Decreased mobility, Pain  Assessment: Pt with significant improvements in activity tolerance today, able to ambulate with and without AD, no overt LOB without AD  Pt mostly limited by tachycardia  Pt educated on seated LE HEP, issued written handout  No concerns over potential discharge home within the next few days  Pt educated on energy conservation techniques, use of RW for community access, pt in agreement  No further acute PT needs identified, will discontinue orders  Recommend home with family care upon hospital D/C  PT Discharge Recommendation: Return to previous environment with social support(home with family care)     PT - OK to Discharge: Yes    See flowsheet documentation for full assessment

## 2020-09-08 NOTE — CASE MANAGEMENT
PT recommended walker for patient  Patient agreeable    Referral entered in [facility] to [facility] for delivery to room

## 2020-09-08 NOTE — ASSESSMENT & PLAN NOTE
Possibly multifactorial secondary to acute tubular necrosis from hypotension, severe sepsis, and decreased oral intake coupled with elevated intra-abdominal pressure from ascites in the setting of chronic ARB use   Delgado catheter [facility] at the [facility] ER prior to transfer  [address] [facility] nephrology input -> no indication for emergent dialysis at this time due to improvement -> IV fluids discontinued yesterday  Baseline creatinine approximately 1 0 -> currently improved @ 0 94 today from a 6 44 peak previously  CT imaging unremarkable for hydronephrosis or other obstructive etiology  LY resolved   Nephrology signed off, recommend outpatient follow up

## 2020-09-08 NOTE — PLAN OF CARE
Problem: Prexisting or High Potential for Compromised Skin Integrity  Goal: Skin integrity is maintained or improved  Description: INTERVENTIONS:  - Identify patients at risk for skin breakdown  - Assess and monitor skin integrity  - Assess and monitor nutrition and hydration status  - Monitor labs   - Assess for incontinence   - Turn and reposition patient  - Assist with mobility/ambulation  - Relieve pressure over bony prominences  - Avoid friction and shearing  - Provide appropriate hygiene as needed including keeping skin clean and dry  - Evaluate need for skin moisturizer/barrier cream  - Collaborate with interdisciplinary team   - Patient/family teaching  - Consider wound care consult   Outcome: Progressing     Problem: Potential for Falls  Goal: Patient will remain free of falls  Description: INTERVENTIONS:  - Assess patient frequently for physical needs  -  Identify cognitive and physical deficits and behaviors that affect risk of falls  -  [place] fall precautions as indicated by assessment   - Educate patient/family on patient safety including physical limitations  - Instruct patient to call for assistance with activity based on assessment  - Modify environment to reduce risk of injury  - Consider OT/PT consult to assist with strengthening/mobility  Outcome: Progressing     Problem: Nutrition/Hydration-ADULT  Goal: Nutrient/Hydration intake appropriate for improving, restoring or maintaining nutritional needs  Description: Monitor and assess patient's nutrition/hydration status for malnutrition  Collaborate with interdisciplinary team and initiate plan and interventions as ordered  Monitor patient's weight and dietary intake as ordered or per policy  Utilize nutrition screening tool and intervene as necessary  Determine patient's food preferences and provide high-protein, high-caloric foods as appropriate       INTERVENTIONS:  - Monitor oral intake, urinary output, labs, and treatment plans  - Assess nutrition and hydration status and recommend course of action  - Evaluate amount of meals eaten  - Assist patient with eating if necessary   - Allow adequate time for meals  - Recommend/ encourage appropriate diets, oral nutritional supplements, and vitamin/mineral supplements  - Order, calculate, and assess calorie counts as needed  - Recommend, monitor, and adjust tube feedings and TPN/PPN based on assessed needs  - Assess need for intravenous fluids  - Provide specific nutrition/hydration education as appropriate  - Include patient/family/caregiver in decisions related to nutrition  Outcome: Progressing

## 2020-09-08 NOTE — PHYSICAL THERAPY NOTE
Physical Therapy Treatment Note    Patient's Name: Jeane Skelton  : 1945       09/08/20 0936   PT Last Visit   PT Visit Date 20   Pain Assessment   Pain Assessment Tool Pain Assessment not indicated - pt denies pain   Pain Score No Pain   Restrictions/Precautions   Weight Bearing Precautions Per Order No   Other Precautions Multiple lines; Fall Risk;Contact/isolation   General   Chart Reviewed Yes   Additional Pertinent History s/p paracentesis    Family/Caregiver Present No   Cognition   Overall Cognitive Status WFL   Arousal/Participation Alert   Attention Within functional limits   Orientation Level Oriented X4   Memory Within functional limits   Following Commands Follows all commands and directions without difficulty   Comments Pt pleasant and motivated to participate with therapy   Bed Mobility   Additional Comments Pt OOB in bathroom upon PT arrival    Transfers   Sit to Stand 6  Modified independent   Stand to Sit 6  Modified independent   Additional Comments with RW   Ambulation/Elevation   Gait pattern Foward flexed;Decreased foot clearance; Wide ARMINDA   Gait Assistance 5  Supervision   Additional items Verbal cues   Assistive Device Rolling walker   Distance 500 ft with RW, cues for proximity to device, erect posture, pacing for energy conservation  Additional 250' without AD, no overt LOB, slight increase in lateral sway without AD  Educated on use of RW for community access, verbalized understanding  HRmax 136 bpm, recovered with seated rest to baseline 125 bpm     Balance   Static Sitting Good   Dynamic Sitting Good   Static Standing Fair +   Dynamic Standing Fair   Ambulatory Fair -   Activity Tolerance   Activity Tolerance Patient limited by fatigue  (tachycardia)   Nurse Made Aware RN updated  Exercises   Hamstring Sets Sitting;10 reps;AROM; Bilateral   Knee AROM Long Arc Quad Sitting;10 reps;AROM; Bilateral  (5 second hold) Ankle Pumps Sitting;20 reps;AROM; Bilateral  (heel/toe raise)   Marching Sitting;10 reps;AROM; Bilateral   Assessment   Assessment Pt with significant improvements in activity tolerance today, able to ambulate with and without AD, no overt LOB without AD  Pt mostly limited by tachycardia  Pt educated on seated LE HEP, issued written handout  No concerns over potential discharge home within the next few days  Pt educated on energy conservation techniques, use of RW for community access, pt in agreement  No further acute PT needs identified, will discontinue orders  Recommend home with family care upon hospital D/C     Goals   Patient Goals to go home   PT Treatment Day 1   Plan   Progress Discontinue PT   Recommendation   PT Discharge Recommendation Return to previous environment with social support  (home with family care)   Equipment Recommended   (pt owns all DME)   PT - OK to Discharge Yes       Alina Carl, PT, DPT

## 2020-09-08 NOTE — PROGRESS NOTES
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, stage IV disease  Patient had paracentesis  He has distended abdomen with ascites  He has generalized weakness and tiredness  Decreased appetite  Early satiety  Exertional dyspnea  Abdominal distension  Patient is alert and oriented  He is not in acute distress  There is no icterus  No palpable neck mass  Decreased breath sounds at lung bases  Regular heart rate  Abdominal distension and ascites  Trace edema of ankles  No calf tenderness  Ambulatory  No skin rash  No palpable lymphadenopathy  Performance status is 2 on the ECOG scale  Patient has appointment with Dr Rosemary Lopez this coming Friday and he wants to find out from him his treatment options  They understand that overall his picture does not look promising

## 2020-09-08 NOTE — ASSESSMENT & PLAN NOTE
Serum sodium normalized yesterday s/p fluid restriction and decreased free water clearance  In the setting of pancreatic cancer   Nephrology recommends outpatient follow up

## 2020-09-08 NOTE — ASSESSMENT & PLAN NOTE
· Diagnosed last month, found incidentally on CT when he was admitted for urosepsis  · Underwent outpatient EGD/EUS shows adenocarcinoma  · Followed up with Surg Onc Dr José Chávez who recommended Med Onc consultation as he first needs chemotherapy   hasnt seen them yet, appt with Dr Johana Barrera on 9/10  · Will benefit from Med Onc consultation this hospitalization to explain him the severity of his disease & prognosis & treatment options

## 2020-09-08 NOTE — TREATMENT PLAN
Nephrology    Acute kidney injury resolved  Creatinine down to 0 94 mg/dL    Hyponatremia resolved sodium stable at 141  Hyperphosphatemia resolved  Nothing further to add from renal standpoint  We sign off call for any questions or concerns  Follow-up as an outpatient

## 2020-09-08 NOTE — PROGRESS NOTES
Progress Note - Samanta Yu 1945, 66 y o  male MRN: [id number]    Unit/Bed#: [facility] 909-01 Encounter: [id number]    Primary Care Provider: Bryce Jimenez MD   Date and time admitted to hospital: 9/3/2020 11:24 AM    Abdominal pain  Assessment & Plan  Ascitic fluid analysis yielding a WBC count of 917 composed of just 4% neutrophils, however, in the setting of severe sepsis coupled with abdominal pain/discomfort, IV rocephin   Final ascitic culture negative   Gram stain shows 2+ mononuclear cells, rare polys and no bacteria   BC negative   Patient reports pain has improved     * Chronic kidney disease (CKD), stage III (moderate) ([facility])  Assessment & Plan  Possibly multifactorial secondary to acute tubular necrosis from hypotension, severe sepsis, and decreased oral intake coupled with elevated intra-abdominal pressure from ascites in the setting of chronic ARB use   Delgado catheter inserted at the [address] [place] [address],[address] ER prior to transfer  [address] nephrology input -> no indication for emergent dialysis at this time due to improvement -> IV fluids discontinued yesterday  Baseline creatinine approximately 1 0 -> currently improved @ 0 94 today from a 6 44 peak previously  CT imaging unremarkable for hydronephrosis or other obstructive etiology  LY resolved   Nephrology signed off, recommend outpatient follow up    Essential hypertension  Assessment & Plan  PRN IV Hydralazine on board - Norvasc resumed today  Holding ARB in the setting of acute kidney injury    Cancer related pain  Assessment & Plan  PRN pain control    Ascites  Assessment & Plan  PET-CT on 8/31 revealed enlarging peritoneal nodules with large ascites - CT imaging consistent peritoneal carcinomatosis  S/p IR guided paracentesis yesterday yielding approximately 6 4 L of serosanguineous fluid  Pending fluid cytology - preliminary pathology report notes: "Suspicious for malignancy    Scattered markedly atypical cells present, with enlarged, irregularly-shaped nuclei, large nucleoli, and abundant foamy, light blue cytoplasm  Correlate with concurrent cytology specimen for further characterization of these cells  Background reactive mesothelial cells, macrophages, and mixed inflammatory cells "  Less likely SBP based on fluid analysis - ascitic culture is negative - remains on prophylactic IV antibiotics, however, due to higher risk  If ascites re-accumulates, can consider a repeat therapeutic paracentesis    Pancreatic cancer  Assessment & Plan  Recently diagnosed last month as an adenocarcinoma on fine-needle aspiration  Oncology following -> may ultimately require a goals of care discussion in the setting of acute/chronic comorbidities if oncology determines that chemotherapy poses more risk than benefit this point -> await resolution of acute medical issues  Supportive care - PRN pain control    Diabetes mellitus type 2  Assessment & Plan  Lab Results   Component Value Date    HGBA1C 6 5 (H) 08/02/2020     Hold oral hypoglycemics  On SSI coverage per [employer]    Pancreatic mass  Assessment & Plan  · Diagnosed last month, found incidentally on CT when he was admitted for urosepsis  · Underwent outpatient EGD/EUS shows adenocarcinoma  · Followed up with Surg Onc Dr Davin Sow who recommended Med Onc consultation as he first needs chemotherapy   hasnt seen them yet, appt with Dr Denis Maldonado on 9/10  · Will benefit from Med Onc consultation this hospitalization to explain him the severity of his disease & prognosis & treatment options    Hyponatremiaresolved as of 9/8/2020  Assessment & Plan  Serum sodium normalized yesterday s/p fluid restriction and decreased free water clearance  In the setting of pancreatic cancer   Nephrology recommends outpatient follow up    Possible severe sepsis on admission versus SIRSresolved as of 9/8/2020  Assessment & Plan  Presents with lactic acidosis coupled with tachycardia/leukocytosis  Procalcitonin improved from 0 61 -> 0 54 -> normalized at 0 14 yesterday  Preliminary blood cultures from  remain negative at 72 hrs  - ascitic fluid culture negative - small pyuria noted on urinalysis, however, in the absence of urinary symptoms - CXR negative for infiltrative process   Spontaneous bacterial peritonitis ruled out via fluid analysis   BC and ascitic fluid culture both negative -- will d/c rocephin     VTE Pharmacologic Prophylaxis:   Pharmacologic: Heparin  Mechanical VTE Prophylaxis in Place: No    Patient Centered Rounds: I have performed bedside rounds with nursing staff today  Discussions with Specialists or Other Care Team Provider: Nursing    Education and Discussions with Family / Patient: Patient declined family contact     Time Spent for Care: 20 minutes  More than 50% of total time spent on counseling and coordination of care as described above  Current Length of Stay: 5 day(s)    Current Patient Status: Inpatient   Certification Statement: The patient will continue to require additional inpatient hospital stay due to management of ascites    Discharge Plan: Pending hospital course, anticpate d/c tomorrow     Code Status: Level 1 - Full Code      Subjective:   Patient states abdominal pain has improved, denies chest pain, SOB, HA or dizziness     Objective:   Patient resting comfortably, in no acute distress     Vitals:   Temp (24hrs), Av 7 °F (37 1 °C), Min:98 5 °F (36 9 °C), Max:98 8 °F (37 1 °C)    Temp:  [98 5 °F (36 9 °C)-98 8 °F (37 1 °C)] 98 5 °F (36 9 °C)  HR:  [103-104] 104  Resp:  [18-20] 18  BP: (124-157)/(76-86) 124/76  SpO2:  [94 %-95 %] 95 %  Body mass index is 35 34 kg/m²  Input and Output Summary (last 24 hours): Intake/Output Summary (Last 24 hours) at 2020 1600  Last data filed at 2020 1410  Gross per 24 hour   Intake 640 ml   Output 625 ml   Net 15 ml       Physical Exam:     Physical Exam  Vitals signs reviewed  Constitutional:       General: He is not in acute distress  Appearance: He is not ill-appearing, toxic-appearing or diaphoretic  HENT:      Head: Normocephalic  Neck:      Musculoskeletal: Normal range of motion  Cardiovascular:      Rate and Rhythm: Normal rate and regular rhythm  Pulses: Normal pulses  Heart sounds: Normal heart sounds  No murmur  No friction rub  No gallop  Pulmonary:      Effort: Pulmonary effort is normal  No respiratory distress  Breath sounds: Normal breath sounds  No wheezing, rhonchi or rales  Abdominal:      General: Bowel sounds are normal  There is distension  Tenderness: There is no abdominal tenderness  Comments: Large obese abdomen, distended but patient states this is improved   Musculoskeletal: Normal range of motion  Right lower leg: No edema  Left lower leg: No edema  Skin:     General: Skin is warm and dry  Neurological:      Mental Status: He is alert and oriented to person, place, and time             Additional Data:     Labs:    Results from last 7 days   Lab Units 09/08/20  0735   WBC Thousand/uL 7 06   HEMOGLOBIN g/dL 10 7*   HEMATOCRIT % 33 7*   PLATELETS Thousands/uL 185   NEUTROS PCT % 68   LYMPHS PCT % 17   MONOS PCT % 10   EOS PCT % 4     Results from last 7 days   Lab Units 09/08/20  0735   SODIUM mmol/L 141   POTASSIUM mmol/L 4 4   CHLORIDE mmol/L 108   CO2 mmol/L 28   BUN mg/dL 15   CREATININE mg/dL 0 94   ANION GAP mmol/L 5   CALCIUM mg/dL 7 9*   ALBUMIN g/dL 2 2*   TOTAL BILIRUBIN mg/dL 0 29   ALK PHOS U/L 49   ALT U/L 9*   AST U/L 11   GLUCOSE RANDOM mg/dL 129         Results from last 7 days   Lab Units 09/08/20  1157 09/08/20  0831 09/07/20  2104 09/07/20  1651 09/07/20  1122 09/07/20  0744 09/06/20  2046 09/06/20  1613 09/06/20  1139 09/06/20  0740 09/05/20  2036 09/05/20  1703   POC GLUCOSE mg/dl 195* 187* 182* 147* 268* 141* 198* 207* 193* 123 162* 167*         Results from last 7 days   Lab Units 09/06/20  0803 09/06/20  0413 09/04/20  0444 09/03/20  2311 09/03/20  1938 09/03/20  1441 09/03/20  1230   LACTIC ACID mmol/L  --   --   --  1 3 2 7* 2 4* 2 2*   PROCALCITONIN ng/ml 0 14 0 21 0 54*  --   --   --  0 61*           * I Have Reviewed All Lab Data Listed Above  * Additional Pertinent Lab Tests Reviewed: [address] 64 Admission Reviewed    Imaging:    Imaging Reports Reviewed Today Include: None  Imaging Personally Reviewed by Myself Includes:  None    Recent Cultures (last 7 days):     Results from last 7 days   Lab Units 09/04/20  1434 09/04/20  1433 09/03/20  1758   BLOOD CULTURE  No Growth at 72 hrs  No Growth at 72 hrs   --    GRAM STAIN RESULT   --   --  2+ Mononuclear Cells  Rare Polys  No bacteria seen   BODY FLUID CULTURE, STERILE   --   --  No growth       Last 24 Hours Medication List:   Current Facility-Administered Medications   Medication Dose Route Frequency Provider Last Rate    acetaminophen  650 mg Oral Q6H PRN Kush Slaughter MD      amLODIPine  5 mg Oral Daily Kush Slaughter MD      cefTRIAXone  1,000 mg Intravenous Q24H Kush Slaughter MD 1,000 mg (09/08/20 1220)    heparin (porcine)  5,000 Units Subcutaneous Q8H [facility] Traci Bauman MD      hydrALAZINE  5 mg Intravenous Q6H PRN Kush Slaughter MD      insulin lispro  1-5 Units Subcutaneous HS Traci Bauman MD      insulin lispro  1-5 Units Subcutaneous TID AC Kush Slaughter MD      iohexol  50 mL Oral 90 min pre-procedure Anna Chambers DO      ondansetron  4 mg Intravenous Q4H PRN Kuhs Slaughter MD      oxyCODONE-acetaminophen  1 tablet Oral Q8H PRN Smita Hunter MD          Today, Patient Was Seen By: Aiden Lema PA-C    ** Please Note: Dictation voice to text software may have been used in the creation of this document   **

## 2020-09-08 NOTE — ASSESSMENT & PLAN NOTE
Presents with lactic acidosis coupled with tachycardia/leukocytosis  Procalcitonin improved from 0 61 -> 0 54 -> normalized at 0 14 yesterday  Preliminary blood cultures from 9/4 remain negative at 72 hrs  - ascitic fluid culture negative - small pyuria noted on urinalysis, however, in the absence of urinary symptoms - CXR negative for infiltrative process   Spontaneous bacterial peritonitis ruled out via fluid analysis   BC and ascitic fluid culture both negative -- will d/c rocephin

## 2020-09-09 PROBLEM — R10.9 ABDOMINAL PAIN: Status: RESOLVED | Noted: 2020-01-01 | Resolved: 2020-01-01

## 2020-09-09 NOTE — PLAN OF CARE
Problem: Prexisting or High Potential for Compromised Skin Integrity  Goal: Skin integrity is maintained or improved  Description: INTERVENTIONS:  - Identify patients at risk for skin breakdown  - Assess and monitor skin integrity  - Assess and monitor nutrition and hydration status  - Monitor labs   - Assess for incontinence   - Turn and reposition patient  - Assist with mobility/ambulation  - Relieve pressure over bony prominences  - Avoid friction and shearing  - Provide appropriate hygiene as needed including keeping skin clean and dry  - Evaluate need for skin moisturizer/barrier cream  - Collaborate with interdisciplinary team   - Patient/family teaching  - Consider wound care consult   Outcome: Progressing     Problem: Potential for Falls  Goal: Patient will remain free of falls  Description: INTERVENTIONS:  - Assess patient frequently for physical needs  -  Identify cognitive and physical deficits and behaviors that affect risk of falls  -  [place] fall precautions as indicated by assessment   - Educate patient/family on patient safety including physical limitations  - Instruct patient to call for assistance with activity based on assessment  - Modify environment to reduce risk of injury  - Consider OT/PT consult to assist with strengthening/mobility  Outcome: Progressing     Problem: Nutrition/Hydration-ADULT  Goal: Nutrient/Hydration intake appropriate for improving, restoring or maintaining nutritional needs  Description: Monitor and assess patient's nutrition/hydration status for malnutrition  Collaborate with interdisciplinary team and initiate plan and interventions as ordered  Monitor patient's weight and dietary intake as ordered or per policy  Utilize nutrition screening tool and intervene as necessary  Determine patient's food preferences and provide high-protein, high-caloric foods as appropriate       INTERVENTIONS:  - Monitor oral intake, urinary output, labs, and treatment plans  - Assess nutrition and hydration status and recommend course of action  - Evaluate amount of meals eaten  - Assist patient with eating if necessary   - Allow adequate time for meals  - Recommend/ encourage appropriate diets, oral nutritional supplements, and vitamin/mineral supplements  - Order, calculate, and assess calorie counts as needed  - Recommend, monitor, and adjust tube feedings and TPN/PPN based on assessed needs  - Assess need for intravenous fluids  - Provide specific nutrition/hydration education as appropriate  - Include patient/family/caregiver in decisions related to nutrition  Outcome: Progressing

## 2020-09-09 NOTE — DISCHARGE SUMMARY
Discharge- Carry Sarath 1945, 66 y o  male MRN: [id number]    Unit/Bed#: [facility] 909-01 Encounter: [id number]    Primary Care Provider: Adarsh Friend MD   Date and time admitted to hospital: 9/3/2020 11:24 AM      * Chronic kidney disease (CKD), stage III (moderate) ([address] )  Assessment & Plan  Possibly multifactorial secondary to acute tubular necrosis from hypotension, severe sepsis, and decreased oral intake coupled with elevated intra-abdominal pressure from ascites in the setting of chronic ARB use   Delgado catheter inserted at the [facility] ER prior to transfer  No indication for emergent dialysis at this time due to improvement   Baseline creatinine approximately 1 0 -> currently improved @ 0 93 today from a 6 44 peak previously  CT imaging unremarkable for hydronephrosis or other obstructive etiology  LY resolved   Outpatient nephrology follow up    Abdominal painresolved as of 9/9/2020  Assessment & Plan  Ascitic fluid analysis yielding a WBC count of 917 composed of just 4% neutrophils, however, in the setting of severe sepsis coupled with abdominal pain/discomfort, IV rocephin   Final ascitic culture negative   Gram stain shows 2+ mononuclear cells, rare polys and no bacteria   BC negative   Patient reports pain has improved     Essential hypertension  Assessment & Plan  PRN IV Hydralazine on board - Norvasc resumed today  Holding ARB in the setting of acute kidney injury  Continue Norvasc 5 mg QD per nephrology recommendation  Adjustments to be made as appropriate outpatient     Cancer related pain  Assessment & Plan  PRN pain control  PDMP reviewed     Ascites  Assessment & Plan  PET-CT on 8/31 revealed enlarging peritoneal nodules with large ascites - CT imaging consistent peritoneal carcinomatosis  S/p IR guided paracentesis yielding approximately 6 4 L of serosanguineous fluid  Pending fluid cytology - preliminary pathology report notes: "Suspicious for malignancy    Scattered markedly atypical cells present, with enlarged, irregularly-shaped nuclei, large nucleoli, and abundant foamy, light blue cytoplasm  Correlate with concurrent cytology specimen for further characterization of these cells    Background reactive mesothelial cells, macrophages, and mixed inflammatory cells "  Less likely SBP based on fluid analysis - ascitic culture is negative   If ascites re-accumulates, can consider a repeat therapeutic paracentesis outpatient    Pancreatic cancer  Assessment & Plan  Recently diagnosed last month as an adenocarcinoma on fine-needle aspiration  Oncology following -> may ultimately require a goals of care discussion in the setting of acute/chronic comorbidities if oncology determines that chemotherapy poses more risk than benefit this point -> await resolution of acute medical issues  Supportive care - PRN pain control  Recommend outpatient oncology follow up, patient states he has an appointment tomorrow to see Dr Gary Guzman    Diabetes mellitus type 2  Assessment & Plan  Lab Results   Component Value Date    HGBA1C 6 5 (H) 08/02/2020     May resume metformin on discharge as kidney function has returned to baseline     Hyponatremiaresolved as of 9/8/2020  Assessment & Plan  Serum sodium normalized yesterday s/p fluid restriction and decreased free water clearance  In the setting of pancreatic cancer   Nephrology recommends outpatient follow up  Current sodium level 140    Hypotensionresolved as of 9/6/2020  Assessment & Plan  Resolved -> now intermittently hypertensive and resume oral antihypertensives  Discontinue Midodrine    Possible severe sepsis on admission versus SIRSresolved as of 9/8/2020  Assessment & Plan  Presents with lactic acidosis coupled with tachycardia/leukocytosis  Procalcitonin improved from 0 61 -> 0 54 -> normalized at 0 14 yesterday  Preliminary blood cultures from 9/4 remain negative at 72 hrs  - ascitic fluid culture negative - small pyuria noted on urinalysis, however, in the absence of urinary symptoms - CXR negative for infiltrative process   Spontaneous bacterial peritonitis ruled out via fluid analysis   BC and ascitic fluid culture both negative       Discharging Physician / Practitioner: Pat Holman PA-C  PCP: Estiven Cole MD  Admission Date:   Admission Orders (From admission, onward)     Ordered        09/03/20 1200  Inpatient Admission  Once                   Discharge Date: 09/09/20    Resolved Problems  Date Reviewed: 9/9/2020          Resolved    Possible severe sepsis on admission versus SIRS 9/8/2020     Resolved by  Pat Holman PA-C    Hypotension 9/6/2020     Resolved by  Nanette Em MD    Abdominal pain 9/9/2020     Resolved by  Pat Holman PA-C    Hyponatremia 9/8/2020     Resolved by  Pat Holman PA-C          Consultations During Hospital Stay:  · Nephrology, Oncology, IR, PT/OT    Procedures Performed:   · IR Paracentesis   · CXR  · CT abd/pelvis  · Echo    Significant Findings / Test Results:   · Known pancreatic tail malignancy unchanged from previous imaging  · Persistent abdominal ascites and peritoneal nodularity in keeping with peritoneal carcinomatosis    Incidental Findings:   · None     Test Results Pending at Discharge (will require follow up): · None     Outpatient Tests Requested:  · None    Complications:  None    Reason for Admission: Abdominal pain     Hospital Course:     Guillaume Fox is a 66 y o  male patient who originally presented to the hospital on 9/3/2020 due to abdominal pain with complaints of fluid in his stomach and trouble urinating  Patient has a history of HTN, type 2 diabetes, CKD stage 3 and pancreatic cancer with recently diagnosed peritoneal metastasis  Patient was found to have SIRS with ascites, hypotension as well as an LY superimposed on CKD with a peak creatinine of 6 44  BP meds discontinued, IVF given with albumin initially then placed on NS with stark catheter placed   IV abx initiated but discontinued after cultures returned negative  IR paracentesis performed, fluid yielded a negative culture  Blood cultures were also negative  Nephrology followed along with oncology during hospitalization  CT abd/pelvis showed no changes from previous imaging  Echo on 9/4/20 showed EF 60%  Patient had resolution of LY with creatine returning to baseline at 0 93  Vitals improved along with abdominal distension and pain  Norvasc 5 mg QD continued per nephrology recommendation, further adjustments to medication as appropriate can be done outpatient  Please see above list of diagnoses and related plan for additional information  Condition at Discharge: good     Discharge Day Visit / Exam:     Subjective:  Patient reports feeling well today and would like to discharge home  He denies chest pain, SOB, abdominal pain, HA or dizziness  Vitals: Blood Pressure: 151/88 (09/09/20 0800)  Pulse: 89 (09/09/20 0800)  Temperature: 98 2 °F (36 8 °C) (09/09/20 0800)  Temp Source: Oral (09/03/20 1154)  Respirations: 18 (09/09/20 0800)  Height: 6' (182 9 cm) (09/03/20 1154)  Weight - Scale: 114 kg (250 lb 10 6 oz) (09/09/20 0713)  SpO2: 95 % (09/09/20 0800)  Exam:   Physical Exam  Constitutional:       General: He is not in acute distress  Appearance: He is not ill-appearing, toxic-appearing or diaphoretic  HENT:      Head: Normocephalic  Neck:      Musculoskeletal: Normal range of motion  Cardiovascular:      Rate and Rhythm: Normal rate and regular rhythm  Pulses: Normal pulses  Heart sounds: Normal heart sounds  No murmur  No friction rub  No gallop  Pulmonary:      Effort: Pulmonary effort is normal  No respiratory distress  Breath sounds: Normal breath sounds  No wheezing, rhonchi or rales  Abdominal:      General: Bowel sounds are normal  There is distension  Tenderness: There is no abdominal tenderness        Comments: Large obese abdomen, distended but patient states this is improved Musculoskeletal: Normal range of motion  Right lower leg: No edema  Left lower leg: No edema  Skin:     General: Skin is warm and dry  Neurological:      Mental Status: He is alert and oriented to person, place, and time  Discussion with Family: Patient declined family contact stating they are aware of his treatment/discharge plan     Discharge instructions/Information to patient and family:   See after visit summary for information provided to patient and family  Provisions for Follow-Up Care:  See after visit summary for information related to follow-up care and any pertinent home health orders  Disposition:     Home    For Discharges to [facility] SNF:   · Not Applicable to this Patient - Not Applicable to this Patient    Planned Readmission: None     Discharge Statement:  I spent 45 minutes discharging the patient  This time was spent on the day of discharge  I had direct contact with the patient on the day of discharge  Greater than 50% of the total time was spent examining patient, answering all patient questions, arranging and discussing plan of care with patient as well as directly providing post-discharge instructions  Additional time then spent on discharge activities  Discharge Medications:  See after visit summary for reconciled discharge medications provided to patient and family        ** Please Note: This note has been constructed using a voice recognition system **

## 2020-09-09 NOTE — ASSESSMENT & PLAN NOTE
Possibly multifactorial secondary to acute tubular necrosis from hypotension, severe sepsis, and decreased oral intake coupled with elevated intra-abdominal pressure from ascites in the setting of chronic ARB use   Delgado catheter inserted at the [facility] ER prior to transfer  No indication for emergent dialysis at this time due to improvement   Baseline creatinine approximately 1 0 -> currently improved @ 0 93 today from a 6 44 peak previously  CT imaging unremarkable for hydronephrosis or other obstructive etiology  LY resolved   Outpatient nephrology follow up

## 2020-09-09 NOTE — ASSESSMENT & PLAN NOTE
PET-CT on 8/31 revealed enlarging peritoneal nodules with large ascites - CT imaging consistent peritoneal carcinomatosis  S/p IR guided paracentesis yielding approximately 6 4 L of serosanguineous fluid  Pending fluid cytology - preliminary pathology report notes: "Suspicious for malignancy  Scattered markedly atypical cells present, with enlarged, irregularly-shaped nuclei, large nucleoli, and abundant foamy, light blue cytoplasm  Correlate with concurrent cytology specimen for further characterization of these cells    Background reactive mesothelial cells, macrophages, and mixed inflammatory cells "  Less likely SBP based on fluid analysis - ascitic culture is negative   If ascites re-accumulates, can consider a repeat therapeutic paracentesis outpatient

## 2020-09-09 NOTE — PLAN OF CARE
Problem: Prexisting or High Potential for Compromised Skin Integrity  Goal: Skin integrity is maintained or improved  Description: INTERVENTIONS:  - Identify patients at risk for skin breakdown  - Assess and monitor skin integrity  - Assess and monitor nutrition and hydration status  - Monitor labs   - Assess for incontinence   - Turn and reposition patient  - Assist with mobility/ambulation  - Relieve pressure over bony prominences  - Avoid friction and shearing  - Provide appropriate hygiene as needed including keeping skin clean and dry  - Evaluate need for skin moisturizer/barrier cream  - Collaborate with interdisciplinary team   - Patient/family teaching  - Consider wound care consult   Outcome: Progressing     Problem: Potential for Falls  Goal: Patient will remain free of falls  Description: INTERVENTIONS:  - Assess patient frequently for physical needs  -  Identify cognitive and physical deficits and behaviors that affect risk of falls  -  [place] fall precautions as indicated by assessment   - Educate patient/family on patient safety including physical limitations  - Instruct patient to call for assistance with activity based on assessment  - Modify environment to reduce risk of injury  - Consider OT/PT consult to assist with strengthening/mobility  Outcome: Progressing     Problem: Nutrition/Hydration-ADULT  Goal: Nutrient/Hydration intake appropriate for improving, restoring or maintaining nutritional needs  Description: Monitor and assess patient's nutrition/hydration status for malnutrition  Collaborate with interdisciplinary team and initiate plan and interventions as ordered  Monitor patient's weight and dietary intake as ordered or per policy  Utilize nutrition screening tool and intervene as necessary  Determine patient's food preferences and provide high-protein, high-caloric foods as appropriate       INTERVENTIONS:  - Monitor oral intake, urinary output, labs, and treatment plans  - Assess nutrition and hydration status and recommend course of action  - Evaluate amount of meals eaten  - Assist patient with eating if necessary   - Allow adequate time for meals  - Recommend/ encourage appropriate diets, oral nutritional supplements, and vitamin/mineral supplements  - Order, calculate, and assess calorie counts as needed  - Recommend, monitor, and adjust tube feedings and TPN/PPN based on assessed needs  - Assess need for intravenous fluids  - Provide specific nutrition/hydration education as appropriate  - Include patient/family/caregiver in decisions related to nutrition  Outcome: Progressing

## 2020-09-09 NOTE — ASSESSMENT & PLAN NOTE
Serum sodium normalized yesterday s/p fluid restriction and decreased free water clearance  In the setting of pancreatic cancer   Nephrology recommends outpatient follow up  Current sodium level 140

## 2020-09-09 NOTE — ASSESSMENT & PLAN NOTE
Lab Results   Component Value Date    HGBA1C 6 5 (H) 08/02/2020     May resume metformin on discharge as kidney function has returned to baseline

## 2020-09-09 NOTE — DISCHARGE INSTRUCTIONS
Abdominal Paracentesis     WHAT YOU NEED TO KNOW:   Abdominal paracentesis is a procedure to remove abnormal fluid buildup in your abdomen  Fluid builds up because of liver problems, such as swelling and scarring  Heart failure, kidney disease, a mass, or problems with your pancreas may also cause fluid buildup  DISCHARGE INSTRUCTIONS:     Abdominal Pain   WHAT YOU NEED TO KNOW:   Abdominal pain can be dull, achy, or sharp  You may have pain in one area of your abdomen, or in your entire abdomen  Your pain may be caused by a condition such as constipation, food sensitivity or poisoning, infection, or a blockage  Abdominal pain can also be from a hernia, appendicitis, or an ulcer  Liver, gallbladder, or kidney conditions can also cause abdominal pain  The cause of your abdominal pain may be unknown  DISCHARGE INSTRUCTIONS:   Return to the emergency department if:   · You have new chest pain or shortness of breath  · You have pulsing pain in your upper abdomen or lower back that suddenly becomes constant  · Your pain is in the right lower abdominal area and worsens with movement  · You have a fever over 100 4°F (38°C) or shaking chills  · You are vomiting and cannot keep food or liquids down  · Your pain does not improve or gets worse over the next 8 to 12 hours  · You see blood in your vomit or bowel movements, or they look black and tarry  · Your skin or the whites of your eyes turn yellow  · You are a woman and have a large amount of vaginal bleeding that is not your monthly period  Contact your healthcare provider if:   · You have pain in your lower back  · You are a man and have pain in your testicles  · You have pain when you urinate  · You have questions or concerns about your condition or care  Follow up with your healthcare provider within 24 hours or as directed:  Write down your questions so you remember to ask them during your visits     Medicines: · Medicines  may be given to calm your stomach and prevent vomiting or to decrease pain  Ask how to take pain medicine safely  · Take your medicine as directed  Contact your healthcare provider if you think your medicine is not helping or if you have side effects  Tell him of her if you are allergic to any medicine  Keep a list of the medicines, vitamins, and herbs you take  Include the amounts, and when and why you take them  Bring the list or the pill bottles to follow-up visits  Carry your medicine list with you in case of an emergency  © 2017 [address] Information is for End User's use only and may not be sold, redistributed or otherwise used for commercial purposes  All illustrations and images included in CareNotes® are the copyrighted property of [employer]  or [employer]  The above information is an  only  It is not intended as medical advice for individual conditions or treatments  Talk to your doctor, nurse or pharmacist before following any medical regimen to see if it is safe and effective for you  Ascites   WHAT YOU NEED TO KNOW:   Ascites is excess fluid in the lower abdomen  The fluid causes swelling in the abdomen  DISCHARGE INSTRUCTIONS:   Medicines:   · Diuretics: This medicine helps decrease the fluid in your abdomen  You may urinate more often when you take diuretics  You will lose weight as the fluid decreases  · Antibiotics: These medicines are used to prevent or fight infections caused by bacteria  · Antiviral medicines:  When ascites is caused by hepatitis (a virus that attacks your liver), antiviral medicines may help prevent more damage to your liver  · Take your medicine as directed  Contact your healthcare provider if you think your medicine is not helping or if you have side effects  Tell him or her if you are allergic to any medicine  Keep a list of the medicines, vitamins, and herbs you take   Include the amounts, and when and why you take them  Bring the list or the pill bottles to follow-up visits  Carry your medicine list with you in case of an emergency  Follow up with your healthcare provider as directed: Your healthcare provider will want to see you again in 1 to 2 weeks to measure your weight and electrolyte level  He will check how your body has responded to treatment  Write down your questions so you remember to ask them during your visits  Self-care:   · Do not drink alcohol:  Alcohol worsens the damage to your liver  Your ascites may improve or even go away after you stop drinking  You must also avoid medicines that contain alcohol  Ask your healthcare provider for information if you need help to stop drinking  · Follow your low-sodium diet:  A dietitian can help you create a low-sodium diet  He may suggest lemon juice or herbs to flavor your food  Avoid salted butter or margarine, milk, cheese, and canned or frozen foods that are not made for low-salt diets  Ask your healthcare provider or before you use salt substitutes  · Write down your daily weight:  Your healthcare provider will need you to track your weight at home to see if the diet changes and medicines are working  Weigh yourself each day  Ask how much weight you should be losing  He will want to know if you are losing too much or too little weight  · Ask about NSAIDs:  NSAIDs are over-the-counter pain and fever medicines  You may not urinate enough to take NSAIDs safely  Ask your healthcare provider if NSAIDs are safe for you  Follow directions carefully  These medicines can cause stomach bleeding or kidney problems if they are not taken correctly  · Limit activity as directed: Too much physical activity may make your ascites worse  Ask your healthcare provider if you have any limits to your normal daily activities  Rarely, bedrest is needed if other health problems develop with ascites    For more information:   · [employer]  [address] , [address]  Phone: [phone]  Web Address: [employer]  Contact your healthcare provider if:   · You are losing more or less weight than expected  · You are urinating less than usual      · You feel dizzy or lightheaded  · You develop tiredness, dry mouth, nausea, or vomiting  · You have muscle cramps or twitches  · You have questions or concerns about your condition or care  Seek care immediately or call 911 if:   · You have a fever  · You feel pain in your abdomen  · You have trouble breathing  · You feel confused, faint, or lose consciousness  · You vomit blood or see blood in your bowel movement  © 2017 [facility] Information is for End User's use only and may not be sold, redistributed or otherwise used for commercial purposes  All illustrations and images included in CareNotes® are the copyrighted property of [employer]  or [employer] Umanzor  The above information is an  only  It is not intended as medical advice for individual conditions or treatments  Talk to your doctor, nurse or pharmacist before following any medical regimen to see if it is safe and effective for you  Chronic Kidney Disease, Ambulatory Care   GENERAL INFORMATION:   Chronic kidney disease  is the gradual and permanent loss of kidney function  Normally, the kidneys turn fluid, chemicals, and waste from your blood into urine  When you have chronic kidney disease (CKD), your kidneys do not function properly  CKD may worsen over time and lead to kidney failure    Common symptoms include the following:   · Changes in how often you need to urinate    · Swelling in your arms, legs, or feet    · Shortness of breath    · Fatigue or weakness    · Bad or bitter taste in your mouth    · Nausea, vomiting, or loss of appetite  Seek immediate care for the following symptoms:   · Heart is beating faster than normal for you    · Confused and drowsiness    · Seizure    · Sudden chest pain or shortness of breath  Treatment for chronic kidney disease:  Medicines may be given to decrease blood pressure and get rid of extra fluid  You may also receive medicine to manage health conditions that may occur with CKD  Dialysis is a treatment to remove chemicals and waste from your blood when your kidneys can no longer do this  Surgery may be needed to create an arteriovenous fistula (AVF) in your arm or insert a catheter into your abdomen so that you can receive dialysis  A kidney transplant may be done if your CKD becomes severe  Manage chronic kidney disease:   · Maintain a healthy weight  Ask your healthcare provider how much you should weigh  Ask him to help you create a weight loss plan if you are overweight  · Exercise 30 to 60 minutes a day, 4 to 7 times a week, or as directed  Ask about the best exercise plan for you  Regular exercise can help you manage CKD, high blood pressure, and diabetes  · Follow your healthcare provider's advice about what to eat and drink  He may tell you to eat food low in sodium (salt), potassium, phosphorus, or protein  You may need to see a dietitian if you need help planning meals  · Limit alcohol  Ask how much alcohol is safe for you to drink  A drink of alcohol is 12 ounces of beer, 5 ounces of wine, or 1½ ounces of liquor  · Do not smoke  Smoking harms your kidneys  If you smoke, it is never too late to quit  Ask for information if you need help quitting  · Ask your healthcare provider if you need vaccines  Infections such as pneumonia, influenza, and hepatitis can be more harmful or more likely to occur when you have CKD  Vaccines reduce your risk of infection with these viruses  Follow up with your healthcare provider as directed:  Write down your questions so you remember to ask them during your visits     CARE AGREEMENT:   You have the right to help plan your care  Learn about your health condition and how it may be treated  Discuss treatment options with your caregivers to decide what care you want to receive  You always have the right to refuse treatment  The above information is an  only  It is not intended as medical advice for individual conditions or treatments  Talk to your doctor, nurse or pharmacist before following any medical regimen to see if it is safe and effective for you  © 2014 [address] is for End User's use only and may not be sold, redistributed or otherwise used for commercial purposes  All illustrations and images included in CareNotes® are the copyrighted property of [employer]  or [employer]  Follow up with your healthcare provider as directed: Write down your questions so you remember to ask them during your visits  Wound care: Remove dressing after 24 hours  Leave glue in place  Return to your normal activities    Contact Interventional Radiology at [phone] [place] PATIENTS: Contact Interventional Radiology at [phone]) Leela Crews PATIENTS: Contact Interventional Radiology at [phone]) if:  · You have a fever and your wound is red and swollen  · You have yellow, green, or bad-smelling discharge coming from your wound  · You have pain or swelling in your abdomen  · You have an upset stomach or you vomit  · You have sudden, sharp pain in your abdomen  · You urinate very little or not at all  · You feel confused and more tired than usual    · Your arm or leg feels warm, tender, and painful  It may look swollen and red  · You suddenly feel lightheaded and have trouble breathing

## 2020-09-09 NOTE — ASSESSMENT & PLAN NOTE
Presents with lactic acidosis coupled with tachycardia/leukocytosis  Procalcitonin improved from 0 61 -> 0 54 -> normalized at 0 14 yesterday  Preliminary blood cultures from 9/4 remain negative at 72 hrs  - ascitic fluid culture negative - small pyuria noted on urinalysis, however, in the absence of urinary symptoms - CXR negative for infiltrative process   Spontaneous bacterial peritonitis ruled out via fluid analysis   BC and ascitic fluid culture both negative

## 2020-09-09 NOTE — ASSESSMENT & PLAN NOTE
PRN IV Hydralazine on board - Norvasc resumed today  Holding ARB in the setting of acute kidney injury  Continue Norvasc 5 mg QD per nephrology recommendation  Adjustments to be made as appropriate outpatient

## 2020-09-09 NOTE — ASSESSMENT & PLAN NOTE
Recently diagnosed last month as an adenocarcinoma on fine-needle aspiration  Oncology following -> may ultimately require a goals of care discussion in the setting of acute/chronic comorbidities if oncology determines that chemotherapy poses more risk than benefit this point -> await resolution of acute medical issues  Supportive care - PRN pain control  Recommend outpatient oncology follow up, patient states he has an appointment tomorrow to see Dr Neli Ramírez

## 2020-09-10 NOTE — PROGRESS NOTES
Oncology Outpatient Consult Note  Samanta Yu 66 y o  male MRN: @ Encounter: [id number]        Date:  9/10/2020        CC:  Metastatic pancreatic cancer      HPI:  Samanta Yu is seen for initial consultation 9/10/2020 regarding biopsy-proven metastatic pancreatic cancer  The patient had a PET-CT scan done on the 31st of August which showed enlarging pancreatic tail mass with mild heterogeneous FDG activity compatible with known malignancy with interval progression of multiple peritoneal nodules a large amount of abdominopelvic ascites compatible with peritoneal carcinomatosis  The patient also had reticular nodular densities noted in the lung bases bilaterally which were too small for accurate evaluation but may be infectious/inflammatory patient has ascites  He does had this drain  He was recently in the hospital   As far symptoms today are concerned he does feel fatigued  Denies any nausea denies any vomiting denies any diarrhea  He has lost some weight but has retained a lot of fluid in his abdomen from the carcinomatosis  He is not set up with palliative care and I will arrange this  The rest of his 14 point review of systems today was negative  Previous Hematologic/ Oncologic History:    Oncology History   Pancreatic cancer   8/7/2020 Initial Diagnosis    Pancreatic adenocarcinoma ([address] )     8/7/2020 Biopsy    EUS  - adenocarcinoma            Test Results:    Imaging: Ct Abdomen Pelvis Wo Contrast    Result Date: 9/3/2020  Narrative: CT ABDOMEN AND PELVIS WITHOUT IV CONTRAST INDICATION:   Abdominal distension Abdominal pain, acute, nonlocalized  COMPARISON:  CT abdomen pelvis 7/31/2020  PET CT 8/31/2020 TECHNIQUE:  CT examination of the abdomen and pelvis was performed without intravenous contrast   Axial, sagittal, and coronal 2D reformatted images were created from the source data and submitted for interpretation  Radiation dose length product (DLP) for this visit:  1025 91 mGy-cm     This examination, like all CT scans performed in the [facility], was performed utilizing techniques to minimize radiation dose exposure, including the use of iterative reconstruction and automated exposure control  Enteric contrast was administered  FINDINGS: ABDOMEN LOWER CHEST:  Areas of peripheral scarring at the lung bases  Chronic elevation the of right hemidiaphragm LIVER/BILIARY TREE:  Unremarkable  GALLBLADDER:  Gallbladder is surgically absent  SPLEEN:  Unremarkable  PANCREAS:  Known cystic pancreatic tail mass now measuring 6 3 x 3 4 cm unchanged in size from the PET/CT  ADRENAL GLANDS:  Unremarkable  KIDNEYS/URETERS:  Unremarkable  No hydronephrosis  STOMACH AND BOWEL:  Unremarkable  APPENDIX:  No findings to suggest appendicitis  ABDOMINOPELVIC CAVITY:  Small amount of abdominal/pelvic ascites much improved from the prior study  Diffuse thickening of the peritoneum throughout the left upper quadrant in keeping with known peritoneal carcinomatosis  Stable 15 mm left upper lobe peritoneal nodule #2/50  Nonspecific diffuse mesenteric edema  VESSELS:  Unremarkable for patient's age  PELVIS REPRODUCTIVE ORGANS:  Unremarkable for patient's age  URINARY BLADDER:  Decompressed with a Delgado catheter  ABDOMINAL WALL/INGUINAL REGIONS:  Small uncomplicated bilateral fat-containing inguinal hernias  OSSEOUS STRUCTURES:  No acute fracture or destructive osseous lesion  Impression: Known pancreatic tail malignancy unchanged from the recent PET/CT  Persistent abdominal ascites and peritoneal nodularity in keeping with peritoneal carcinomatosis  See the recent PET/CT report  Workstation performed: [id number]     Xr Chest 1 View Portable    Result Date: 9/3/2020  Narrative: CHEST INDICATION:   dyspnea  COMPARISON:  7/31/2020 EXAM PERFORMED/VIEWS:  XR CHEST PORTABLE FINDINGS:  Hypoventilation is noted  Cardiomediastinal silhouette appears unremarkable   Linear atelectasis or scarring is slightly enhanced given lower level of inspiration at the left lung base and right lung base  There is no pneumothorax  Minimal blunting the costophrenic angles may be related to trace effusions or hypoventilation  Osseous structures appear within normal limits for patient age  Impression: Hypoventilation with possible trace effusions  Linear atelectasis at the bases  Workstation performed: [id number]     Ir Paracentesis    Result Date: 9/4/2020  Narrative: EXAMINATION: right sided paracentesis with sonographic guidance  HISTORY: Ascites refractory to diuretic therapy  Here for routine, interval, large volume paracentesis     Abdomen distention and discomfort  TECHNIQUE: The patient was brought to Radiology  Informed consent was obtained  The right upper quadrant was prepped and draped in the usual sterile fashion  Timeout was performed  Lidocaine was given as local anesthesia  Using sonographic guidance, a 5 English centesis catheter was advanced into the fluid  The catheter was connected to a vacuum bottle  The fluid was drained in its entirety  A total volume of 6400 cc of Translucent appearing, serosanguineous colored fluid was removed  The catheter was removed  Albumin was transfused  Specimens were  sent to the laboratory for evaluation  Impression: Successful ultrasound-guided paracentesis  Signed, performed, and dictated by DK Foley under the supervision of Dr Shantal Toussaint  Workstation performed: [id number]     Nm Pet Ct Skull Base To Mid Thigh    Result Date: 8/31/2020  Narrative: PET/CT SCAN INDICATION:  C25 2: Malignant neoplasm of tail of pancreas   , initial staging for treatment management MODIFIER: PI COMPARISON: CT 7/31/2020 CELL TYPE:  Adenocarcinoma, pancreatic tail biopsy 8/13/2020 TECHNIQUE:   10 2 mCi F-18-FDG administered IV  Multiplanar attenuation corrected and non attenuation corrected PET images were acquired 60 minutes post injection   Contiguous, low dose, axial CT sections were obtained from the vertex through the femurs   Intravenous contrast material was not utilized  This examination, like all CT scans performed in the [facility], was performed utilizing techniques to minimize radiation dose exposure, including the use of iterative reconstruction and automated exposure control  Fasting serum glucose: 205 mg/dl FINDINGS: VISUALIZED BRAIN:   No acute abnormalities are seen  HEAD/NECK:   There is a physiologic distribution of FDG  No FDG avid cervical adenopathy is seen  CT images: Unremarkable  CHEST: Reticular nodular densities again noted in the lung bases bilaterally  These are too small for accurate PET evaluation but may be infectious/inflammatory  Otherwise no FDG avid soft tissue lesions are seen  CT images: Coronary atherosclerosis  ABDOMEN/PELVIS:   Pancreatic tail mass series 3 image 171 measuring 6 8 x 3 9 cm demonstrates mild heterogeneous FDG activity, SUV 4, compatible with known malignancy  This mass is contiguous with the adjacent spleen and posterior gastric wall  There has been interval progression of multiple peritoneal nodules with a large amount of abdominopelvic ascites, compatible with peritoneal carcinomatosis  Example left upper anterior nodule series 3 image 199 measures 1 5 x 1 5 cm, SUV 2 4  Prior measurement 1 1 x 1 1 cm  No discrete hypermetabolic liver lesion is visualized, though evaluation may be limited due to the mild uptake of the primary tumor  Consider contrast MR evaluation if clinically warranted  Bowel activity is likely physiologic  CT images: Cholecystectomy  Small fat-containing bilateral inguinal hernias  Prostate calcifications  OSSEOUS STRUCTURES: No FDG avid lesions are seen  CT images: No significant findings  Impression: 1  Enlarging pancreatic tail mass with mild heterogeneous FDG activity compatible with known malignancy   2   Interval progression of multiple peritoneal nodules with a large amount of abdominopelvic ascites, compatible with peritoneal carcinomatosis  3   No discrete hypermetabolic liver lesion is visualized, though evaluation may be limited due to the mild uptake of the primary tumor  Consider contrast MR evaluation if clinically warranted  4   No hypermetabolic lesions in the neck or chest  5   Reticular nodular densities again noted in the lung bases bilaterally  These are too small for accurate PET evaluation but may be infectious/inflammatory  Continued CT follow-up recommended  Workstation performed: [id number]       Labs:   Lab Results   Component Value Date    WBC 6 62 09/09/2020    HGB 10 5 (L) 09/09/2020    HCT 33 0 (L) 09/09/2020    MCV 92 09/09/2020     09/09/2020     Lab Results   Component Value Date    K 3 9 09/09/2020     09/09/2020    CO2 28 09/09/2020    BUN 14 09/09/2020    CREATININE 0 93 09/09/2020    CALCIUM 7 8 (L) 09/09/2020    AST 10 09/09/2020    ALT 9 (L) 09/09/2020    ALKPHOS 48 09/09/2020    EGFR 79 09/09/2020         ROS: As stated in history of present illness otherwise her 14 point review of systems today was negative  Active Problems:   Patient Active Problem List   Diagnosis    Pancreatic mass    Diabetes mellitus type 2    Obesity (BMI 30 0-34  9)    UTI due to extended-spectrum beta lactamase (ESBL) producing Escherichia coli    Pancreatic abnormality    Sleep apnea    Pancreatic cancer    Chronic kidney disease (CKD), stage III (moderate) (HCC)    SIRS (systemic inflammatory response syndrome) (HCC)    Ascites    Cancer related pain    Essential hypertension       Past Medical History:   Past Medical History:   Diagnosis Date    Abdominal aortic aneurysm (AAA) ([facility] ) 05/26/2017    Per [place]     Anxiety state 05/26/2017    Per [place]     Benign prostatic hyperplasia     Benign prostatic hyperplasia without lower urinary tract symptoms     Body mass index 33 0-33 9, adult 08/15/2019    Per [place]     Chronic kidney disease, stage II (mild) 08/15/2019    Per [place]     Chronic kidney disease, unspecified 2017    Per [place]     Chronic obstructive pulmonary disease (COPD) ([facility] ) 2019    Per [facility]     Diverticulosis of large intestine without perforation or abscess without bleeding 2019    Per [facility]     Essential hypertension 2017    Per [place]     Hearing loss, bilateral 2017    Per [place]     Mixed hyperlipidemia 2017    Per [place]     Obesity, unspecified 2017    [place]     Type 2 diabetes mellitus without complication ([facility] )     Per [place]        Surgical History:   Past Surgical History:   Procedure Laterality Date    CHOLECYSTECTOMY      Per [place]     COLECTOMY      removal of 8 inches of colon for diverticular disease  Per Kervin Lyon IR PARACENTESIS  9/3/2020       Family History:    Family History   Problem Relation Age of Onset    Diabetes Father        Cancer-related family history is not on file      Social History:   Social History     Socioeconomic History    Marital status: /Civil Union     Spouse name: Not on file    Number of children: Not on file    Years of education: Not on file    Highest education level: Not on file   Occupational History    Not on file   Social Needs    Financial resource strain: Not on file    Food insecurity     Worry: Not on file     Inability: Not on file   [employer] needs     Medical: Not on file     Non-medical: Not on file   Tobacco Use    Smoking status: Former Smoker     Last attempt to quit: 1983     Years since quittin 7    Smokeless tobacco: Never Used    Tobacco comment: quit 37 years ago   Substance and Sexual Activity    Alcohol use: Yes     Comment: Occasional- Per Kervin Lyon Drug use: Never    Sexual activity: Not on file   Lifestyle    Physical activity     Days per week: Not on file     Minutes per session: Not on file    Stress: Not on file   Relationships    Social connections Talks on phone: Not on file     Gets together: Not on file     Attends [employer]ism service: Not on file     Active member of club or organization: Not on file     Attends meetings of clubs or organizations: Not on file     Relationship status: Not on file    Intimate partner violence     Fear of current or ex partner: Not on file     Emotionally abused: Not on file     Physically abused: Not on file     Forced sexual activity: Not on file   Other Topics Concern    Not on file   Social History Narrative    · Do you currently or have you served in [employer]: Yes      · If Yes, What branch of service:   [employer]      · Were you activated, into active duty, as a member of the [employer] or as a Reservist:   No      · Passive smoke exposure: Yes      · Alcohol intake:   Occasional      · Marital status:         · Number of children:   7      · Live alone or with others:   with others      · Are you currently employed:   No      · Asbestos exposure:   No      · TB exposure:   No      · Environmental exposure:    Yes      were in [employer]     · Animal exposure:   Yes      cat     Per [place]       Current Medications:   Current Outpatient Medications   Medication Sig Dispense Refill    acetaminophen (TYLENOL) 325 mg tablet Take 2 tablets (650 mg total) by mouth every 6 (six) hours as needed for mild pain, headaches or fever 30 tablet 0    amLODIPine (NORVASC) 5 mg tablet Take 1 tablet (5 mg total) by mouth daily (Patient taking differently: Take 5 mg by mouth 2 (two) times a day ) 30 tablet 0    aspirin (ECOTRIN LOW STRENGTH) 81 mg EC tablet Take 81 mg by mouth daily      metFORMIN (GLUCOPHAGE-XR) 500 mg 24 hr tablet Take 1 tablet (500 mg total) by mouth 2 (two) times a day with meals 180 tablet 0    oxyCODONE-acetaminophen (PERCOCET) 5-325 mg per tablet Take 1 tablet by mouth every 4 (four) hours as needed for moderate painMax Daily Amount: 6 tablets 12 tablet 0    pantoprazole (PROTONIX) 40 mg tablet Take 40 mg by mouth daily       No current facility-administered medications for this visit  Allergies: No Known Allergies      Physical Exam:    Body surface area is 2 3 meters squared  Wt Readings from Last 3 Encounters:   09/10/20 114 kg (251 lb)   09/09/20 114 kg (250 lb 10 6 oz)   09/03/20 112 kg (246 lb)        Temp Readings from Last 3 Encounters:   09/10/20 98 5 °F (36 9 °C) (Tympanic)   09/09/20 98 2 °F (36 8 °C)   09/03/20 97 9 °F (36 6 °C) (Tympanic)        BP Readings from Last 3 Encounters:   09/10/20 170/92   09/09/20 151/88   09/03/20 (!) 91/47         Pulse Readings from Last 3 Encounters:   09/10/20 (!) 114   09/09/20 89   09/03/20 (!) 107       Physical Exam     Constitutional   General appearance: No acute distress, well appearing and well nourished  Eyes   Conjunctiva and lids: No swelling, erythema or discharge  Pupils and irises: Equal, round and reactive to light  Ears, Nose, Mouth, and Throat   External inspection of ears and nose: Normal     Nasal mucosa, septum, and turbinates: Normal without edema or erythema  Oropharynx: Normal with no erythema, edema, exudate or lesions  Pulmonary   Respiratory effort: No increased work of breathing or signs of respiratory distress  Auscultation of lungs: Clear to auscultation  Cardiovascular   Palpation of heart: Normal PMI, no thrills  Auscultation of heart: Normal rate and rhythm, normal S1 and S2, without murmurs  Examination of extremities for edema and/or varicosities: Normal     Carotid pulses: Normal     Abdomen   Abdomen:   Ascites with distention of abdomen   Liver and spleen: No hepatomegaly or splenomegaly  Lymphatic   Palpation of lymph nodes in neck: No lymphadenopathy  Musculoskeletal   Gait and station: Normal     Digits and nails: Normal without clubbing or cyanosis      Inspection/palpation of joints, bones, and muscles: Normal     Skin   Skin and subcutaneous tissue: Normal without rashes or lesions  Neurologic   Cranial nerves: Cranial nerves 2-12 intact  Sensation: No sensory loss  Psychiatric   Orientation to person, place, and time: Normal     Mood and affect: Normal       Assessment/ Plan:                                      The patient is a pleasant 49-year-old male with metastatic pancreatic cancer with carcinomatosis  We went over various options including hospice with the patient  After discussing various chemotherapy regimens the patient was agreeable to giving treatment chance  He is agreeable to having a port placed  We will treat him with modified FOLFOX 6  I went over the risks benefits and alternatives of therapy  I explained the possible side effects to include but not limited to nausea, vomiting, cold sensitivity, mouth sores, mucositis, diarrhea, low blood counts, risk of infection and even death  The patient agreed to proceed  I will set him up to start  I will set him up to see our colleagues in palliative care  I will set him up to have a port placed  He just had imaging so he will not need to be re-staged at this point  He understands if his ascites gets uncomfortable he can have it drained again  I will see him back in a month  He will have started treatment by then  He will sign a consent form today  Goals and Barriers:  Current Goal: Prolong Survival from pancreatic Cancer  Barriers: None  Patient's Capacity to Self Care:  Patient able to self care  Portions of the record may have been created with voice recognition software   Occasional wrong word or "sound a like" substitutions may have occurred due to the inherent limitations of voice recognition software   Read the chart carefully and recognize, using context, where substitutions have occurred

## 2020-09-15 NOTE — TELEPHONE ENCOUNTER
Patient daughter Gina Delatorre is requesting the [facility] paperwork completed yesterday faxed to [phone]

## 2020-09-15 NOTE — TELEPHONE ENCOUNTER
Called and spoke with Jennifer to verify the fax number as it does not match the number on the forms  She confirmed the number that she gave is the correct one  I faxed over the paperwork as requested

## 2020-09-16 NOTE — SEDATION DOCUMENTATION
Port successfully implanted  Patient tolerated well and transferred back to [facility] in stable condition  Report and care assumed by primary RN

## 2020-09-16 NOTE — DISCHARGE INSTRUCTIONS
Implanted Venous Access Port     WHAT YOU NEED TO KNOW:   An implanted venous access port is a device used to give treatments and take blood  It may also be called a central venous access device (CVAD)  The port is a small container that is placed under your skin, usually in your upper chest  The port is attached to a catheter that enters a large vein  DISCHARGE INSTRUCTIONS:   Resume your normal diet  Small sips of flat soda will help with mild nausea  Prevent an infection:   · Wash your hands often  Use soap and water  Clean your hands before and after you care for your port  Remind everyone who cares for your port to wash their hands  · Check your skin for infection every day  Look for redness, swelling, or fluid oozing from the port site  Care for your port:   1  You may shower beginning 48 hours after procedure  2  Change dressing if it becomes wet  3  Remove dressing after 24 hours  Leave glue in place  4  It is normal for some bruising to occur  5  Use Tylenol for pain  6  Limit use of arm on the side that your port was placed  Lift nothing heavier than 5 pounds for 1 week, and then gradually increase activity as tolerated  7  DO NOT apply ointment, lotion or cream to port site until incision is healed  Allow glue to fall off  DO NOT attempt to peel glue from skin even it it begins to flake  8, After the port incision is healed you may swim, bathe  Notify the Interventional Radiologist if you have any of the followin  Fever above 101 F    2  Increased redness or swelling after 1st day  3  Increased pain after 1st day  4  Any sign of infection (drainage from port site, skin separation, hot to touch)  5  Persistent nausea or vomiting  Contact Interventional Radiology at [phone] [facility] PATIENTS: Contact Interventional Radiology at [phone]) ([address]: Contact Interventional Radiology at [phone])

## 2020-09-16 NOTE — TELEPHONE ENCOUNTER
A message was left with Rita Oreilly for Triston Enciso to call the office back to schedule a consult from a referral the office received

## 2020-09-16 NOTE — BRIEF OP NOTE (RAD/CATH)
INTERVENTIONAL RADIOLOGY PROCEDURE NOTE    Date: 9/16/2020    Procedure: IR PORT PLACEMENT     Preoperative diagnosis:   1  Pancreatic carcinoma (HCC)         Postoperative diagnosis: Same  Surgeon: Re Wong MD     Assistant: None  No qualified resident was available  Blood loss: minimal    Specimens: none    Findings: Successful port placment  May use immediately  Complications: None immediate      Anesthesia: conscious sedation

## 2020-09-21 NOTE — PROGRESS NOTES
Patient tolerated treatment today without issues  Patient educated on oxaliplatin and to refrain from cold exposure  Good blood return before, during, and after 5FU push  Patient connected to CADD pump for 46 hour infusion of 5FU  All connections secured with tape  Green indicator light verified flashing before D/C  Patient given 1st time CADD pump bag and educated on information contained in it  Patient also educated on pump  Patient verified to return on Wednesday at 200 for disconnect  AVS provided

## 2020-09-23 NOTE — PROGRESS NOTES
Pt tolerated treatment well  Stated that he had some retching and nausea this morning that started while he was having a bowel movement  Took antinausea med with good success  No difficulties eating or drinking  Sisters volume 0  Cadd pump disconnected  Good blood return noted from port  Port saline locked and deaccessed without issue  Aware of next appt  AVS declined

## 2020-09-23 NOTE — PLAN OF CARE
Problem: Potential for Falls  Goal: Patient will remain free of falls  Description: INTERVENTIONS:  - Assess patient frequently for physical needs  -  Identify cognitive and physical deficits and behaviors that affect risk of falls    -  [place] fall precautions as indicated by assessment   - Educate patient/family on patient safety including physical limitations  - Instruct patient to call for assistance with activity based on assessment  - Modify environment to reduce risk of injury  - Consider OT/PT consult to assist with strengthening/mobility  Outcome: Progressing

## 2020-09-30 NOTE — PROGRESS NOTES
Late Entry  DT was assessed 9/10/20  Distress level noted to be a 2 with pain and diarrhea noted to be problems  There appears no need for additional psychosocial assessment as of this time  Please contact Case Management if additional assistance is needed

## 2020-10-14 PROBLEM — K86.89 PANCREATIC MASS: Status: RESOLVED | Noted: 2020-01-01 | Resolved: 2020-01-01

## 2020-10-14 PROBLEM — Q45.3 PANCREATIC ABNORMALITY: Status: RESOLVED | Noted: 2020-01-01 | Resolved: 2020-01-01

## 2020-11-11 PROBLEM — Z51.5 PALLIATIVE CARE PATIENT: Status: ACTIVE | Noted: 2020-01-01

## 2020-11-11 PROBLEM — E46 PROTEIN-CALORIE MALNUTRITION (HCC): Status: ACTIVE | Noted: 2020-01-01

## 2020-11-11 PROBLEM — R11.0 NAUSEA: Status: ACTIVE | Noted: 2020-01-01

## 2020-11-15 PROBLEM — E83.42 HYPOMAGNESEMIA: Status: ACTIVE | Noted: 2020-01-01

## 2020-11-16 PROBLEM — C25.9 PANCREATIC CANCER (HCC): Status: ACTIVE | Noted: 2020-01-01

## 2020-11-16 PROBLEM — T45.1X5A PANCYTOPENIA DUE TO ANTINEOPLASTIC CHEMOTHERAPY (HCC): Status: ACTIVE | Noted: 2020-01-01

## 2020-11-16 PROBLEM — A41.9 SEPSIS (HCC): Status: RESOLVED | Noted: 2020-01-01 | Resolved: 2020-01-01

## 2020-11-16 PROBLEM — Z79.899 PATIENT ON ANTINEOPLASTIC CHEMOTHERAPY REGIMEN: Status: ACTIVE | Noted: 2020-01-01

## 2020-11-16 PROBLEM — N39.0 URINARY TRACT INFECTION: Status: ACTIVE | Noted: 2020-01-01

## 2020-11-16 PROBLEM — D61.810 PANCYTOPENIA DUE TO ANTINEOPLASTIC CHEMOTHERAPY (HCC): Status: ACTIVE | Noted: 2020-01-01

## 2020-11-16 PROBLEM — E11.65 TYPE 2 DIABETES MELLITUS WITH HYPERGLYCEMIA (HCC): Status: RESOLVED | Noted: 2020-01-01 | Resolved: 2020-01-01

## 2020-11-16 PROBLEM — E87.6 HYPOKALEMIA: Status: RESOLVED | Noted: 2020-01-01 | Resolved: 2020-01-01

## 2020-11-16 NOTE — CASE MANAGEMENT
LOS: 1 GMLOS: N/A    Pt is not a 30 day readmission or a bundle  Pt admitted for tx of Sepsis 2' UTI  Pt has previous hospital admission 8/2020 for UTI and went home to finish course of IVABX  Pt noted to also have a PMHx of pancreatic CA on chemo  SW met with pt to complete assessment  Pt identifies his wife Violette Mahmood ([phone]) and dtr Manjinder Tate ([phone]) as emergency contacts  He has no advanced directive  Pt lives with his wife and another disabled dtr in a 1st floor apartment with no JULIEN  Pt reports to be independent with ambulation w/o DME (does own a RW) and be independent with self-care ADLs  Pt is able to drive but has been transporting to his MD/chemo appointments via dtr Jennifer  Pt has a hx of [facility]  No current in-home services or recent SNF admissions  No substance abuse or MH dx hx  PCP is Dr Aileen Lucio  Preferred pharmacy is [facility] on [address]  Pt confirms he is insured through [facility] and denies any barriers to obtaining home medications  When medically cleared for discharge, family to transport home  No questions or concerns form pt at this time re discharge  SW will continue to follow

## 2020-11-16 NOTE — H&P
H&P- Ratna Noble 1945, 66 y o  male MRN: [id number]    Unit/Bed#: -01 Encounter: [id number]    Primary Care Provider: Jaswinder Cabrera MD   Date and time admitted to hospital: 11/15/2020  9:37 PM        * Sepsis [facility])  Assessment & Plan  49-year-old male PMH-pancreatic cancer; currently on chemotherapy  Currently meeting sepsis criteria with fever (home temp:  101 4°), tachycardia ( heart rate:  110 at presentation), leukopenia (WBC:  3 1), UA and urine microscopy concerning for UTI with innumerable bacteria, leukocyte esterase positive, nitrite positive  Per patient's daughter, no change in mental status Previously admitted on 08/2020, also for UTI with cultures growing ESBL E coli, resistant to levofloxacin and ceftriaxone, sensitive to carbapenems and Zosyn    Two blood cultures taken; pending  Lactic acid level of 1 9  Procalcitonin with a m   Reflex pending  Urine cultures pending    Received 3 L of normal saline bolus in the ED; currently on 75 mL NS; will consider reconsider IV fluids based on fluid status in the morning  Received Levaquin 750 mg in the ED; switched to IV Zosyn on floors; consider switching to meropenem based upon culture results  ID consult placed; appreciate recommendations          Urinary tract infection  Assessment & Plan  -patient complaining of tingling sensation/pain at the end of urination for the last 4 days  -denies any hematuria, difficulty urinating, foul-smelling urine, abdominal pain over baseline, flank pain, chest pain, shortness of breath, night sweats or chills  -has felt feverish since a m  11/15/2020; temperature high of 101 4 at home  -physical exam:  distended abdomen but per the patient is at his baseline, nontender to palpation,, bowel sounds present, no CVA tenderness  -previous admission was due to UTI caused by ESBL E coli, with cultures returning with resistance to ceftriaxone and levofloxacin, and sensitivity to carbapenems and Zosyn    -patient received 1 dose of Levaquin in the ED;  receiving IV Zosyn on floors      Hypomagnesemia  Assessment & Plan  Magnesium level of 1 5 in the ED    -receiving magnesium sulfate 2gm  -will reassess in a m  Essential hypertension  Assessment & Plan  -per patient taking amlodipine 10 mg BD, along with losartan 50 mg OD  -blood pressure at presentation 143/83    -amlodipine dose adjusted to 10 mg OD, losartan 50 mg OD    Chronic kidney disease (CKD), stage III (moderate)  Assessment & Plan  Lab Results   Component Value Date    EGFR 84 11/15/2020    EGFR 79 09/09/2020    EGFR 78 09/08/2020    CREATININE 0 85 11/15/2020    CREATININE 1 02 10/30/2020    CREATININE 0 94 10/16/2020     -when continue to monitor in the a m   -currently on heparin for anticoagulation    Pancreatic cancer [facility])  Assessment & Plan  -known diagnosis of pancreatic cancer; currently on chemotherapy follows with oncologist Dr Kaitlyn Hollis  -next oncology/chemotherapy appointment on Tuesday, 11/17/2020  -on home medication of oxycodone 5 mg Q4 p r n  For pain management    -continuing oxycodone 5 mg Q 4 p r n  Hypokalemia  Assessment & Plan  -potassium level of 3 2 at presentation to the ED  -received 40 mEq of potassium chloride oral    -will monitor with CMP in the a m  Diabetes mellitus type 2  Assessment & Plan  Lab Results   Component Value Date    HGBA1C 6 5 (H) 08/02/2020       Recent Labs     11/16/20  0103   POCGLU 141*       Blood Sugar Average: Last 72 hrs:  (P) 141     -per patient on metformin at home    -holding metformin while inpatient  -on sliding scale      VTE Prophylaxis: Heparin  / sequential compression device   Code Status:  Full code  POLST: POLST form is not discussed and not completed at this time  Discussion with family:  Discussed with daughter    Anticipated Length of Stay:  Patient will be admitted on an Inpatient basis with an anticipated length of stay of  more than 2 midnights     Justification for Hospital Stay: Sepsis    Total Time for Visit, including Counseling / Coordination of Care: 45 minutes  Greater than 50% of this total time spent on direct patient counseling and coordination of care  Chief Complaint:   Tingling sensation/pain at end of urination; fever    History of Present Illness:    Christa Badillo is a 66 y o  male with PMH-pancreatic cancer, currently on chemotherapy; diabetes mellitus; hypertension who presents with C/O tingling/pain at the end of urination X 4 days  Patient also complaining of fever since the morning of 11/15/2020, with temperature high of 101 4  Patient denies any night sweats, chills, abdominal pain over baseline, flank pain, chest pain, shortness of breath    patient follows with with hematology oncology (Dr Sharon Boxer); next appointment on 11/17/2020  Patient was admitted few months ago with UTI caused by ESBL E coli, cultures at that time returned with resistance to ceftriaxone, fluoroquinolones, and susceptibility to carbapenems and Zosyn  Per chart review, at that time patient received meropenem  In the ED  UA and urine microscopy: Innumerable bacteria, leukocyte esterase positive, nitrites positive  Received 3 L of normal saline bolus  Received 40 mEq of oral potassium chloride for hypokalemia  Received 1 dose of Levaquin 750 mg      Review of Systems:    Review of Systems   Constitutional: Negative for activity change, appetite change, chills and fever  HENT: Positive for hearing loss  Negative for congestion, ear pain, rhinorrhea, sinus pain and sore throat  Eyes: Negative for photophobia, pain and visual disturbance  Respiratory: Negative for cough, chest tightness and shortness of breath  Cardiovascular: Negative for chest pain and palpitations  Gastrointestinal: Positive for abdominal pain (At baseline) and nausea ( at baseline)  Negative for constipation, diarrhea and vomiting  Genitourinary: Positive for dysuria   Negative for difficulty urinating, flank pain, hematuria and urgency  Musculoskeletal: Negative for arthralgias, back pain and myalgias  Skin: Negative for rash and wound  Neurological: Negative for dizziness, numbness and headaches  Psychiatric/Behavioral: Negative for agitation, behavioral problems and confusion  Past Medical and Surgical History:     Past Medical History:   Diagnosis Date    Abdominal aortic aneurysm (AAA) ([facility] ) 05/26/2017    Per [place]     Anxiety state 05/26/2017    Per [place]     Benign prostatic hyperplasia     Benign prostatic hyperplasia without lower urinary tract symptoms     Body mass index 33 0-33 9, adult 08/15/2019    Per [place]     Chronic kidney disease, stage II (mild) 08/15/2019    Per [place]     Chronic kidney disease, unspecified 05/26/2017    Per [place]     Chronic obstructive pulmonary disease (COPD) ([facility] ) 09/23/2019    Per [employer]     Diabetes mellitus ([facility] )     Diverticulosis of large intestine without perforation or abscess without bleeding 04/18/2019    Per [employer]     Essential hypertension 05/26/2017    Per [employer]     Hearing loss, bilateral 02/26/2017    Per [place]     Hypertension     Mixed hyperlipidemia 05/26/2017    Per [place]     Obesity, unspecified 05/26/2017    Per [employer]     Type 2 diabetes mellitus without complication ([facility] ) 73/40/9918    Per [employer]        Past Surgical History:   Procedure Laterality Date    CHOLECYSTECTOMY      Per [employer]     COLECTOMY      removal of 8 inches of colon for diverticular disease  Per [employer]     IR PARACENTESIS  9/3/2020    IR PORT PLACEMENT  9/16/2020       Meds/Allergies:    Prior to Admission medications    Medication Sig Start Date End Date Taking?  Authorizing Provider   acetaminophen (TYLENOL) 500 mg tablet Take 1,000 mg by mouth every 6 (six) hours as needed   Yes Historical Provider, MD   amLODIPine (NORVASC) 2 5 mg tablet take 1 tablet by mouth once daily with AMLODIPINE 5 MG 10/1/20  Yes Jocy Lopez MD   amLODIPine (NORVASC) 5 mg tablet Take 1 tablet (5 mg total) by mouth daily  Patient taking differently: Take 5 mg by mouth 2 (two) times a day  20  Yes Lauro Orellana PA-C   aspirin (ECOTRIN LOW STRENGTH) 81 mg EC tablet Take 81 mg by mouth daily   Yes Historical Provider, MD   Lancets (freestyle) lancets use 1 LANCET to TEST BLOOD SUGAR once daily 20  Yes Historical Provider, MD   losartan (COZAAR) 50 mg tablet Take 50 mg by mouth daily   Yes Historical Provider, MD   metFORMIN (GLUCOPHAGE-XR) 500 mg 24 hr tablet Take 1 tablet (500 mg total) by mouth 2 (two) times a day with meals 20  Yes Stephani High MD   oxyCODONE (ROXICODONE) 5 mg immediate release tablet Take 1 tablet (5 mg total) by mouth every 4 (four) hours as needed (cancer-related pain)Max Daily Amount: 30 mg 20  Yes Payton Velasquez MD   pantoprazole (PROTONIX) 40 mg tablet Take 40 mg by mouth daily 20  Yes Historical Provider, MD   fluorouracil 5,450 mg in CADD infusion pump Infuse 5,450 mg (1,200 mg/m2/day x 2 27 m2 (Treatment plan recorded BSA)) into a venous catheter over 46 hours for 2 days 20  Charly Lu MD   prochlorperazine (COMPAZINE) 10 mg tablet Take 1 tablet (10 mg total) by mouth every 6 (six) hours as needed for nausea or vomiting 20   Charly Lu MD     I have reviewed home medications with patient personally      Allergies: No Known Allergies    Social History:     Marital Status: /Civil Union   Patient Pre-hospital Living Situation:  Lives with wife  Patient Pre-hospital Diet Restrictions:  Diabetic  Substance Use History:   Social History     Substance and Sexual Activity   Alcohol Use Yes    Frequency: Monthly or less    Binge frequency: Never    Comment: Occasional- Per Aretha      Social History     Tobacco Use   Smoking Status Former Smoker    Quit date: 26    Years since quittin 9   Smokeless Tobacco Never Used   Tobacco Comment    quit 37 years ago     Social History Substance and Sexual Activity   Drug Use Never       Family History:    non-contributory    Physical Exam:     Vitals:   Blood Pressure: 125/72 (11/16/20 0111)  Pulse: (!) 111 (11/16/20 0111)  Temperature: 99 5 °F (37 5 °C) (11/16/20 0100)  Temp Source: Tympanic (11/16/20 0100)  Respirations: 20 (11/16/20 0100)  Height: 6' (182 9 cm) (11/15/20 2141)  Weight - Scale: 103 kg (226 lb) (11/15/20 2141)  SpO2: 98 % (11/16/20 0100)    Physical Exam  Vitals signs reviewed  Constitutional:       General: He is not in acute distress  Appearance: Normal appearance  He is obese  He is not ill-appearing, toxic-appearing or diaphoretic  HENT:      Head: Normocephalic and atraumatic  Nose: Nose normal       Mouth/Throat:      Mouth: Mucous membranes are moist    Eyes:      General: No scleral icterus  Right eye: No discharge  Left eye: No discharge  Extraocular Movements: Extraocular movements intact  Neck:      Musculoskeletal: Normal range of motion  Cardiovascular:      Rate and Rhythm: Normal rate and regular rhythm  Pulses: Normal pulses  Pulmonary:      Effort: Pulmonary effort is normal  No respiratory distress  Breath sounds: Normal breath sounds  Abdominal:      General: Bowel sounds are normal  There is distension  Tenderness: There is no abdominal tenderness  There is no right CVA tenderness, left CVA tenderness or guarding  Musculoskeletal: Normal range of motion  Skin:     General: Skin is warm and dry  Neurological:      General: No focal deficit present  Mental Status: He is alert and oriented to person, place, and time  Psychiatric:         Mood and Affect: Mood normal          Behavior: Behavior normal              Additional Data:     Lab Results: I have personally reviewed pertinent reports        Results from last 7 days   Lab Units 11/15/20  2154   WBC Thousand/uL 3 11*   HEMOGLOBIN g/dL 11 7*   HEMATOCRIT % 35 2*   PLATELETS Thousands/uL 57*   NEUTROS PCT % 47   LYMPHS PCT % 37   MONOS PCT % 13*   EOS PCT % 3     Results from last 7 days   Lab Units 11/15/20  2154   SODIUM mmol/L 139   POTASSIUM mmol/L 3 2*   CHLORIDE mmol/L 104   CO2 mmol/L 25   BUN mg/dL 11   CREATININE mg/dL 0 85   ANION GAP mmol/L 10   CALCIUM mg/dL 8 9   ALBUMIN g/dL 3 8   TOTAL BILIRUBIN mg/dL 0 63   ALK PHOS U/L 60 0   ALT U/L 14   AST U/L 14*   GLUCOSE RANDOM mg/dL 144*     Results from last 7 days   Lab Units 11/15/20  2155   INR  0 99     Results from last 7 days   Lab Units 11/16/20  0103   POC GLUCOSE mg/dl 141*         Results from last 7 days   Lab Units 11/15/20  2154   LACTIC ACID mmol/L 1 9       Imaging: I have personally reviewed pertinent films in PACS    XR chest portable   ED Interpretation by Michelle Stevens MD (11/15 2317)   No acute findings          EKG, Pathology, and Other Studies Reviewed on Admission:   · EKG:  Sinus rhythm, no acute ST-T wave changes    Allscripts / Epic Records Reviewed: Yes     ** Please Note: This note has been constructed using a voice recognition system   **

## 2020-11-16 NOTE — PHYSICAL THERAPY NOTE
PHYSICAL THERAPY EVALUATION    NAME:  Meir Parr  DATE: 11/16/20    AGE:   66 y o  Mrn:   [id number]  Length Of Stay: 0    ADMIT DX:  Hypokalemia [E87 6]  Hypomagnesemia [E83 42]  Pancreatic cancer ([facility] ) [C25 9]  Urinary tract infection [N39 0]  Fever [R50 9]  Patient on antineoplastic chemotherapy regimen [Z79 899]  Sepsis ([facility] ) [A41 9]    Past Medical History:   Diagnosis Date    Abdominal aortic aneurysm (AAA) ([facility] ) 05/26/2017    Per [place]     Anxiety state 05/26/2017    Per [place]     Benign prostatic hyperplasia     Benign prostatic hyperplasia without lower urinary tract symptoms     Body mass index 33 0-33 9, adult 08/15/2019    Per [place]     Chronic kidney disease, stage II (mild) 08/15/2019    Per [place]     Chronic kidney disease, unspecified 05/26/2017    Per [place]     Chronic obstructive pulmonary disease (COPD) ([facility] ) 09/23/2019    Per [employer]     Diabetes mellitus ([facility] )     Diverticulosis of large intestine without perforation or abscess without bleeding 04/18/2019    Per [employer]     Essential hypertension 05/26/2017    Per [employer]     Hearing loss, bilateral 02/26/2017    Per [place]     Hypertension     Mixed hyperlipidemia 05/26/2017    Per [place]     Obesity, unspecified 05/26/2017    Per [employer]     Type 2 diabetes mellitus without complication ([facility] ) 50/00/2958    Per [employer]      Past Surgical History:   Procedure Laterality Date    CHOLECYSTECTOMY      Per [employer]     COLECTOMY      removal of 8 inches of colon for diverticular disease   Per [employer]     IR PARACENTESIS  9/3/2020    IR PORT PLACEMENT  9/16/2020       Performed at least 2 patient identifiers during session: Name and ID bracelet        11/16/20 1217   PT Last Visit   PT Visit Date 11/16/20   Note Type   Note type Evaluation   Pain Assessment   Pain Assessment Tool 0-10   Pain Score No Pain   Home Living   Type of Home Apartment  (first floor apartment)   Home Layout One level  (pt denies any JULIEN )   Bathroom Shower/Tub Tub/shower unit   Bathroom Toilet Standard   Bathroom Equipment Shower chair;Grab bars in shower   Bathroom Accessibility Accessible   Prior Function   Level of [place] Independent with ADLs and functional mobility; Needs assistance with IADLs   Lives With Spouse   Receives Help From Family   ADL Assistance Independent   IADLs Needs assistance   Falls in the last 6 months 0   Vocational Retired   Comments Pt reports living at home with his wife in a first floor apartment, no stairs  Pt reports being indep with all ADLs, ambulation of household and community distances with no AD  Dtr drives pt to chemotherapy appts  Denies h/o falls  Restrictions/Precautions   Weight Bearing Precautions Per Order No   Other Precautions Multiple lines;Telemetry;Hard of hearing;Contact/isolation   General   Family/Caregiver Present No   Cognition   Overall Cognitive Status WFL   Arousal/Participation Cooperative   Orientation Level Oriented X4   Following Commands Follows multistep commands with increased time or repetition   RUE Assessment   RUE Assessment WFL   LUE Assessment   LUE Assessment WFL   RLE Assessment   RLE Assessment WFL   LLE Assessment   LLE Assessment WFL   Coordination   Movements are Fluid and Coordinated 1   Sensation X   Light Touch   RLE Light Touch Impaired   RLE Light Touch Comments +neuropathy   LLE Light Touch Impaired   LLE Light Touch Comments +neuropathy   Bed Mobility   Supine to Sit 6  Modified independent   Additional items Bedrails   Sit to Supine Unable to assess   Transfers   Sit to Stand 6  Modified independent   Stand to Sit 6  Modified independent   Ambulation/Elevation   Gait pattern Decreased foot clearance; Short stride   Gait Assistance 6  Modified independent   Additional items Assist x 1   Assistive Device Other (Comment)  (+IV pole)   Distance 40ft   Stair Management Assistance Not tested   Balance   Static Sitting Good   Dynamic Sitting Good   Static Standing Fair + Dynamic Standing Fair +   Ambulatory Fair +   Endurance Deficit   Endurance Deficit Yes   Endurance Deficit Description Pt easily SOB with minimal activity  Recovers quickly with functional seated rest break  Activity Tolerance   Activity Tolerance Patient limited by fatigue   Medical Staff Made Aware Spoke with Des[address] during pt rounds  Nurse Made Aware Spoke with NICO [place] pre/post session    Assessment   Prognosis Good   Problem List Decreased endurance; Impaired hearing;Obesity; Impaired sensation   Assessment Pt seen for PT evaluation, cleared by NICO [place], activity orders of "up with assistance"  Pt admitted 11/15/2020 sent by MD with fever, tingling with urination, dx Urinary tract infection  Comorbidities affecting pt's fnxl performance include: anxiety, CKD, COPD, diabetes, hypertension, hyperlipidemia, neuropathy and obesity, pancreatic cancer (on chemotherapy)  Personal factors affecting pt at time of PT IE include: inability to navigate community distances and limited home support  PTA, pt was independent with functional mobility without assistive device, independent with ADLS, requiring assist for IADLS, living with spouse in a single level home with 0 steps to enter, ambulating household distance and home with family assist  Pt scores 50 88 on AM-PAC objective tool, indicating pt safe for indoor/familiar environment mobility  The Barthel Index outcome tool was used & pt scores 75 indicating moderate limitations of fnxl mobility/ADLS  Pt is at risk of falls d/t multiple comorbidities, impaired sensation, impaired insight/safety awareness and advanced age  Pt's clinical presentation is currently evolving seen in pt's presentation of changing level of pain, varying levels of cognitive performance, increased fall risk and decreased endurance   This PT IE requires high complexity clinical decision making, based on multiple medical/physiological/fnxl/social factors which affect pt's performance w/ evaluation & therapist judgement for future PT sessions  Based on pt presentation & impairments, pt appears to be close to his baseline level of functional mobility and would most appropriately benefit from return to home with family support upon d/c     Goals   Patient Goals none stated at time of IE   PT Treatment Day 0   Recommendation   PT Discharge Recommendation Return to previous environment with social support   PT - OK to Discharge Yes  (once medically cleared )   AM-PAC Basic Mobility Inpatient   Turning in Bed Without Bedrails 4   Lying on Back to Sitting on Edge of Flat Bed 4   Moving Bed to Chair 4   Standing Up From Chair 4   Walk in Room 4   Climb 3-5 Stairs 3   Basic Mobility Inpatient Raw Score 23   Basic Mobility Standardized Score 50 88   Modified Jacqueline Scale   Modified Jacqueline Scale 3   Barthel Index   Feeding 10   Bathing 0   Grooming Score 5   Dressing Score 10   Bladder Score 10   Bowels Score 10   Toilet Use Score 10   Transfers (Bed/Chair) Score 15   Mobility (Level Surface) Score 0   Stairs Score 5   Barthel Index Score 75       Valentino Fordyce, PT, DPT  11/16/2020

## 2020-11-16 NOTE — CONSULTS
Consultation - Infectious Disease   Estrellita Leonard 66 y o  male MRN: [id number]  Unit/Bed#: -01 Encounter: [id number]    Extensive review of the medical records in epic including review of the notes, radiographs, and laboratory results  IMPRESSION:    Complicated UTI : Prior urine cx from 8/2020 with growth of ESBL E coli  CT abd from 9/3/20 did not demonstrate hydronephrosis   SIRS with manifestations of fever 102 F, tachycardia, leukopenia  Noted Lactate 1 9   CKD stage 3   DM2 with hyperglycemia:  Noted A1C of 6 5 from August 2020   Pancreatic cancer:  S/p chemotherapy last week   Immunocompromised patient   Pancytopenia likely acquired from chemotherapy    RECOMMENDATIONS:    Follow blood culture and urine culture results   Check bladder post-void residual  If elevated, check renal/bladder ultrasound   Discontinue Zosyn IV   Start meropenem 500mg IV q6 hours for empiric ESBL E coli coverage   Avoid nephrotoxins   Glycemic control per primary team   Monitor clinical response, temperature curve, CBC  My recommendations were discussed with the patient in detail who verbalized understanding  Thank you for allowing me to participate in the care of this patient  The ID service will follow  HISTORY OF PRESENT ILLNESS:  Reason for Consult: UTI, sepsis  HPI: Estrellita Leonard is a 66y o  year old man with PMH of pancreatic cancer on chemotherapy, diabetes mellitus type 2 (non-insulin dependent), hypertension who was admitted overnight with pain and tingling at the end of urination  He reports urine frequency  Symptoms have been present for the past 4 days  He reports fevers which began on the morning of November 5th with temperature as high as 101 4° F  He denies chills or night sweats  He denies hematuria, suprapubic pain  Initial UA was concerning for UTI and he was given levofloxacin 750 mg IV in the emergency room  Upon admission he was treated with Zosyn IV      REVIEW OF SYSTEMS:  Constitutional: +fever, negative for chills  HENT:  positive for hearing loss  Negative runny nose, sore throat, congestion  Eyes: Negative for change in vision  Respiratory: + dry cough  Neg for shortness of breath  Cardiovascular: Negative for chest pain, palpitations  Gastrointestinal:  Positive for abdominal pain, nausea  Negative for vomiting, diarrhea  Genitourinary:  Positive for dysuria, urine frequency  Negative for hematuria  Musculoskeletal: Negative for arthritis, myalgias  Skin: Negative for rash, wound  Neurological: Negative for numbness, tingling  Hematological: Negative for excessive bruising/bleeding  Psychiatric/Behavioral: Negative for depression, anxiety  PAST MEDICAL HISTORY:  Past Medical History:   Diagnosis Date    Abdominal aortic aneurysm (AAA) ([facility] [address] ) 05/26/2017    Per Cord     Anxiety state 05/26/2017    Per Aretha     Benign prostatic hyperplasia     Benign prostatic hyperplasia without lower urinary tract symptoms     Body mass index 33 0-33 9, adult 08/15/2019    Per Cord     Chronic kidney disease, stage II (mild) 08/15/2019    Per Aretha     Chronic kidney disease, unspecified 05/26/2017    Per Aretha     Chronic obstructive pulmonary disease (COPD) ([facility] [address] ) 09/23/2019    Per [employer]     Diabetes mellitus ([facility] [address] )     Diverticulosis of large intestine without perforation or abscess without bleeding 04/18/2019    Per [employer]     Essential hypertension 05/26/2017    Per [employer]     Hearing loss, bilateral 02/26/2017    Per Cord     Hypertension     Mixed hyperlipidemia 05/26/2017    Per Aretha     Obesity, unspecified 05/26/2017    Per [employer]     Type 2 diabetes mellitus without complication ([facility] [address] ) 38/50/4113    Per [employer]      Past Surgical History:   Procedure Laterality Date    CHOLECYSTECTOMY      Per [employer]     COLECTOMY      removal of 8 inches of colon for diverticular disease   Per [employer]     IR PARACENTESIS  9/3/2020    IR PORT PLACEMENT  2020     FAMILY HISTORY:  Negative for recurrent infection    SOCIAL HISTORY:  Social History   Social History     Substance and Sexual Activity   Alcohol Use Yes    Frequency: Monthly or less    Binge frequency: Never    Comment: Occasional- Per Aretha      Social History     Substance and Sexual Activity   Drug Use Never     Social History     Tobacco Use   Smoking Status Former Smoker    Quit date: 26    Years since quittin 9   Smokeless Tobacco Never Used   Tobacco Comment    quit 37 years ago   Lives at home with his wife  His dtr has special needs and lives at home  Pets: cat, fish tank    ALLERGIES:  No Known Allergies    MEDICATIONS:  All current active medications have been reviewed    Antibiotics: Zosyn IV  Scheduled Meds:  Current Facility-Administered Medications   Medication Dose Route Frequency Provider Last Rate    acetaminophen  650 mg Oral Q6H PRN Luis E Colorado MD      amLODIPine  10 mg Oral Daily Meghana Reid MD      aspirin  81 mg Oral Daily Meghana Reid MD      heparin (porcine)  5,000 Units Subcutaneous [facility] Meghana Reid MD      insulin lispro  1-5 Units Subcutaneous TID AC MD Marylu Rodríguez ON 2020] losartan  50 mg Oral Daily Meghana Reid MD      ondansetron  4 mg Intravenous Q6H PRN Meghana Reid MD      oxyCODONE  5 mg Oral Q4H PRN Meghana Reid MD      pantoprazole  40 mg Oral Daily Meghana Reid MD      piperacillin-tazobactam  3 375 g Intravenous Q6H Meghana Reid MD 3 375 g (20 2108)     Continuous Infusions:   PRN Meds:   acetaminophen    ondansetron    oxyCODONE     PHYSICAL EXAM:  Temp:  [99 5 °F (37 5 °C)-102 1 °F (38 9 °C)] 100 3 °F (37 9 °C)  HR:  [] 101  Resp:  [18-20] 18  BP: (121-149)/(64-84) 121/64  SpO2:  [96 %-98 %] 97 %  Temp (24hrs), Av 7 °F (38 2 °C), Min:99 5 °F (37 5 °C), Max:102 1 °F (38 9 °C)  Current: Temperature: 100 3 °F (37 9 °C)    Intake/Output Summary (Last 24 hours) at 11/16/2020 1757  Last data filed at 11/16/2020 1044  Gross per 24 hour   Intake 3250 ml   Output 1500 ml   Net 1750 ml     General Appearance:  Appearing well, nontoxic, and in no acute distress   Head:  Normocephalic, without obvious abnormality, atraumatic   Eyes:  EOMI, Conjunctiva pink and sclera anicteric, both eyes   Throat: Deferred as patient is wearing a face mask   Neck: Supple   Lungs:  Clear to auscultation bilaterally, respirations unlabored   Heart:   S1, S2, tachycardic, no murmur   Abdomen: Soft, non-tender, non-distended   :  No suprapubic, flank or CVA tenderness  No Delgado catheter present   Extremities:   No distal leg edema b/l   Skin: RT ACW port intact  No rash   Neurologic: Pt is hard of hearing  Alert and oriented x  3, conversant, fluent speech   Psychiatric: Calm, cooperative with exam and interview     LABS, IMAGING, & OTHER STUDIES:  Lab Results:  I have personally reviewed pertinent labs  Results from last 7 days   Lab Units 11/16/20  0500 11/15/20  2154   WBC Thousand/uL 2 36* 3 11*   HEMOGLOBIN g/dL 11 2* 11 7*   PLATELETS Thousands/uL 47* 57*     Results from last 7 days   Lab Units 11/16/20  0500 11/15/20  2154   SODIUM mmol/L 140 139   POTASSIUM mmol/L 3 6 3 2*   CHLORIDE mmol/L 104 104   CO2 mmol/L 26 25   BUN mg/dL 8 11   CREATININE mg/dL 0 75 0 85   EGFR ml/min/1 73sq m 88 84   CALCIUM mg/dL 8 4 8 9   AST U/L 13* 14*   ALT U/L 13 14   ALK PHOS U/L 56 1 60 0     Results from last 7 days   Lab Units 11/15/20  2209 11/15/20  2155   BLOOD CULTURE  Received in Microbiology Lab  Culture in Progress  Received in Microbiology Lab  Culture in Progress  Results from last 7 days   Lab Units 11/15/20  2154   PROCALCITONIN ng/ml 0 08     Imaging Studies:   11/15 pCXR: No acute cardiopulmonary disease  I have personally reviewed pertinent imaging study reports

## 2020-11-16 NOTE — ED PROCEDURE NOTE
PROCEDURE  ECG 12 Lead Documentation Only    Date/Time: 11/15/2020 10:30 PM  Performed by: Sheila Allen MD  Authorized by:  Sheila Allen MD     Indications / Diagnosis:  Sepsis  ECG reviewed by me, the ED Provider: yes    Patient location:  ED and bedside  Interpretation:     Interpretation: non-specific    Rate:     ECG rate:  91    ECG rate assessment: normal    Rhythm:     Rhythm: sinus rhythm    Ectopy:     Ectopy: none    QRS:     QRS axis:  Left    QRS intervals:  Normal  Conduction:     Conduction: normal    ST segments:     ST segments:  Normal  T waves:     T waves: normal    Comments:      No acute ischemia or infarction         Sheila Allen MD  11/15/20 8694

## 2020-11-16 NOTE — OCCUPATIONAL THERAPY NOTE
Occupational Therapy Evaluation       11/16/20 1216   Note Type   Note type Evaluation   Pain Assessment   Pain Assessment Tool 0-10   Pain Score No Pain   Home Living   Type of Home Apartment   Home Layout One level  (No JULIEN)   Bathroom Shower/Tub Tub/shower unit   Bathroom Toilet Standard   Bathroom Equipment Shower chair;Grab bars in shower   [address] Other (Comment)  (None)   Prior Function   Level of [place] Independent with ADLs and functional mobility   Lives With Spouse   Receives Help From Family   ADL Assistance Independent   IADLs Needs assistance   Falls in the last 6 months 0   Vocational Retired   Comments Per patient, PTA independent in all ADLs and mobility without AD, has family assist with driving to chemo appointments   ADL   Eating Assistance 7  Independent   Grooming Assistance 7  [address] 7  Independent   LB Bathing Assistance 7  Independent   [address] 7  Independent   LB Dressing Assistance 7  [address]  7  Independent   Bed Mobility   Supine to Sit 6  Modified independent   Transfers   Sit to Stand 6  Modified independent   Stand to Sit 6  Modified independent   Toilet transfer 6  Modified independent   Additional items Standard toilet  (Ambulatory transfer, no AD)   Functional Mobility   Functional Mobility 6  Modified independent   Additional Comments Patient ambulated short household distance in room to/from bathroom with IV pole for support   Balance   Static Sitting Good   Dynamic Sitting Good   Static Standing Fair +   Dynamic Standing Fair +   Activity Tolerance   Activity Tolerance Patient tolerated treatment well   RUE Assessment   RUE Assessment WFL   LUE Assessment   LUE Assessment WFL   Cognition   Overall Cognitive Status WFL   Arousal/Participation Alert; Cooperative   Attention Within functional limits   Orientation Level Oriented X4   Following Commands Follows multistep commands with increased time or repetition   Assessment   Prognosis Good   Assessment Patient evaluated by Occupational Therapy  Patient admitted with Urinary tract infection  The patients occupational profile, medical and therapy history includes a brief history with review of medical and/or therapy records related to the presenting problem  Comorbidities affecting functional mobility and ADLS include: AAA, anxiety, CKD, HTN, cancer on chemotherapy  Prior to admission, patient was independent with functional mobility without assistive device, independent with ADLS and independent with IADLS  The evaluation identifies the following performance deficits: no deficits, that result in activity limitations and/or participation restrictions  This evaluation requires clinical decision making of low complexity, because the patients presents with no comorbidities that affect occupational performance and required no modification of tasks or assistance with consideration of a limited number of treatment options  The Barthel Index was used as a functional outcome tool presenting with a score of 80, indicating minimal limitations of functional mobility and ADLS  Patient currently presents at baseline functional status for ADLs and functional mobility, no skilled inpatient OT services indicated at this time     Goals   Patient Goals To go home   Plan   OT Frequency Eval only   Recommendation   OT Discharge Recommendation Return to previous environment with social support   Barthel Index   Feeding 10   Bathing 5   Grooming Score 5   Dressing Score 10   Bladder Score 10   Bowels Score 10   Toilet Use Score 10   Transfers (Bed/Chair) Score 15   Mobility (Level Surface) Score 0   Stairs Score 5   Barthel Index Score 80     Feliciano Saul OTR/L

## 2020-11-16 NOTE — PLAN OF CARE
Problem: Potential for Falls  Goal: Patient will remain free of falls  Description: INTERVENTIONS:  - Assess patient frequently for physical needs  -  Identify cognitive and physical deficits and behaviors that affect risk of falls    -  [place] fall precautions as indicated by assessment   - Educate patient/family on patient safety including physical limitations  - Instruct patient to call for assistance with activity based on assessment  - Modify environment to reduce risk of injury  - Consider OT/PT consult to assist with strengthening/mobility  Outcome: Progressing     Problem: PAIN - ADULT  Goal: Verbalizes/displays adequate comfort level or baseline comfort level  Description: Interventions:  - Encourage patient to monitor pain and request assistance  - Assess pain using appropriate pain scale  - Administer analgesics based on type and severity of pain and evaluate response  - Implement non-pharmacological measures as appropriate and evaluate response  - Consider cultural and social influences on pain and pain management  - Notify physician/advanced practitioner if interventions unsuccessful or patient reports new pain  Outcome: Progressing     Problem: INFECTION - ADULT  Goal: Absence or prevention of progression during hospitalization  Description: INTERVENTIONS:  - Assess and monitor for signs and symptoms of infection  - Monitor lab/diagnostic results  - Monitor all insertion sites, i e  indwelling lines, tubes, and drains  - Monitor endotracheal if appropriate and nasal secretions for changes in amount and color  - [place] appropriate cooling/warming therapies per order  - Administer medications as ordered  - Instruct and encourage patient and family to use good hand hygiene technique  - Identify and instruct in appropriate isolation precautions for identified infection/condition  Outcome: Progressing  Goal: Absence of fever/infection during neutropenic period  Description: INTERVENTIONS:  - Monitor WBC    Outcome: Progressing     Problem: SAFETY ADULT  Goal: Patient will remain free of falls  Description: INTERVENTIONS:  - Assess patient frequently for physical needs  -  Identify cognitive and physical deficits and behaviors that affect risk of falls    -  [place] fall precautions as indicated by assessment   - Educate patient/family on patient safety including physical limitations  - Instruct patient to call for assistance with activity based on assessment  - Modify environment to reduce risk of injury  - Consider OT/PT consult to assist with strengthening/mobility  Outcome: Progressing  Goal: Maintain or return to baseline ADL function  Description: INTERVENTIONS:  -  Assess patient's ability to carry out ADLs; assess patient's baseline for ADL function and identify physical deficits which impact ability to perform ADLs (bathing, care of mouth/teeth, toileting, grooming, dressing, etc )  - Assess/evaluate cause of self-care deficits   - Assess range of motion  - Assess patient's mobility; develop plan if impaired  - Assess patient's need for assistive devices and provide as appropriate  - Encourage maximum independence but intervene and supervise when necessary  - Involve family in performance of ADLs  - Assess for home care needs following discharge   - Consider OT consult to assist with ADL evaluation and planning for discharge  - Provide patient education as appropriate  Outcome: Progressing  Goal: Maintain or return mobility status to optimal level  Description: INTERVENTIONS:  - Assess patient's baseline mobility status (ambulation, transfers, stairs, etc )    - Identify cognitive and physical deficits and behaviors that affect mobility  - Identify mobility aids required to assist with transfers and/or ambulation (gait belt, sit-to-stand, lift, walker, cane, etc )  - [place] fall precautions as indicated by assessment  - Record patient progress and toleration of activity level on Mobility SBAR; progress patient to next Phase/Stage  - Instruct patient to call for assistance with activity based on assessment  - Consider rehabilitation consult to assist with strengthening/weightbearing, etc   Outcome: Progressing     Problem: DISCHARGE PLANNING  Goal: Discharge to home or other facility with appropriate resources  Description: INTERVENTIONS:  - Identify barriers to discharge w/patient and caregiver  - Arrange for needed discharge resources and transportation as appropriate  - Identify discharge learning needs (meds, wound care, etc )  - Arrange for interpretive services to assist at discharge as needed  - Refer to Case Management Department for coordinating discharge planning if the patient needs post-hospital services based on physician/advanced practitioner order or complex needs related to functional status, cognitive ability, or social support system  Outcome: Progressing     Problem: Knowledge Deficit  Goal: Patient/family/caregiver demonstrates understanding of disease process, treatment plan, medications, and discharge instructions  Description: Complete learning assessment and assess knowledge base    Interventions:  - Provide teaching at level of understanding  - Provide teaching via preferred learning methods  Outcome: Progressing

## 2020-11-16 NOTE — ED PROVIDER NOTES
History  Chief Complaint   Patient presents with    Fever - 76 years or older     Patient reports fever of 101 at home and "tingling" upon urination, states he is currently on chemo and is doctor sent him here for evaluation     This is a 66year old male that ambulated to the ED this evening accompanied by his daughter  He has a current history of pancreatic cancer and is undergoing chemotherapy - he has had 4 treatments  The current plan of treatment was for him to have 6 chemo treatments and then repeat the scan however his daughter reports that his tumor markers were recently checked and are increasing so the scan is now scheduled for next week  He is scheduled for his next chemo dose in 2 days    Denies cough  Denies dysuria  Denies chest pain or shortness of breath    Reports that he "felt warm" this afternoon and had his wife take his temperature which was 101 at home - he did take Tylenol at  That time  Denies contact with known COVID positivi individuals  Denies N/V/D          Prior to Admission Medications   Prescriptions Last Dose Informant Patient Reported? Taking?    Lancets (freestyle) lancets 11/15/2020 at Unknown time  Yes Yes   Sig: use 1 LANCET to TEST BLOOD SUGAR once daily   acetaminophen (TYLENOL) 500 mg tablet 11/15/2020 at Unknown time  Yes Yes   Sig: Take 1,000 mg by mouth every 6 (six) hours as needed   amLODIPine (NORVASC) 2 5 mg tablet 11/15/2020 at Unknown time  No Yes   Sig: take 1 tablet by mouth once daily with AMLODIPINE 5 MG   amLODIPine (NORVASC) 5 mg tablet 11/15/2020 at Unknown time  No Yes   Sig: Take 1 tablet (5 mg total) by mouth daily   Patient taking differently: Take 5 mg by mouth 2 (two) times a day    aspirin (ECOTRIN LOW STRENGTH) 81 mg EC tablet 11/15/2020 at Unknown time  Yes Yes   Sig: Take 81 mg by mouth daily   fluorouracil 5,450 mg in CADD infusion pump Unknown at Unknown time  No No   Sig: Infuse 5,450 mg (1,200 mg/m2/day x 2 27 m2 (Treatment plan recorded BSA)) into a venous catheter over 46 hours for 2 days   metFORMIN (GLUCOPHAGE-XR) 500 mg 24 hr tablet 11/15/2020 at Unknown time  No Yes   Sig: Take 1 tablet (500 mg total) by mouth 2 (two) times a day with meals   oxyCODONE (ROXICODONE) 5 mg immediate release tablet 11/15/2020 at Unknown time  No Yes   Sig: Take 1 tablet (5 mg total) by mouth every 4 (four) hours as needed (cancer-related pain)Max Daily Amount: 30 mg   pantoprazole (PROTONIX) 40 mg tablet 11/15/2020 at Unknown time  Yes Yes   Sig: Take 40 mg by mouth daily   prochlorperazine (COMPAZINE) 10 mg tablet Unknown at Unknown time  No No   Sig: Take 1 tablet (10 mg total) by mouth every 6 (six) hours as needed for nausea or vomiting      Facility-Administered Medications: None       Past Medical History:   Diagnosis Date    Abdominal aortic aneurysm (AAA) ([facility] ) 05/26/2017    [facility] [place]     Anxiety state 05/26/2017    [facility] [place]     Benign prostatic hyperplasia     Benign prostatic hyperplasia without lower urinary tract symptoms     Body mass index 33 0-33 9, adult 08/15/2019    [facility] [place]     Chronic kidney disease, stage II (mild) 08/15/2019    [facility] [place]     Chronic kidney disease, unspecified 05/26/2017    [facility] [place]     Chronic obstructive pulmonary disease (COPD) ([facility] ) 09/23/2019    Per [facility]     Diverticulosis of large intestine without perforation or abscess without bleeding 04/18/2019    Per [facility]     Essential hypertension 05/26/2017    [facility]     Hearing loss, bilateral 02/26/2017    [facility] [place]     Mixed hyperlipidemia 05/26/2017    [facility] [place]     Obesity, unspecified 05/26/2017    [facility]     Type 2 diabetes mellitus without complication ([facility] ) 51/59/1087    Per [facility]        Past Surgical History:   Procedure Laterality Date    CHOLECYSTECTOMY      Per [facility]     COLECTOMY      removal of 8 inches of colon for diverticular disease   [facility] [place]     IR PARACENTESIS  9/3/2020    IR PORT PLACEMENT  9/16/2020       Family History Problem Relation Age of Onset    Diabetes Father      I have reviewed and agree with the history as documented  E-Cigarette/Vaping    E-Cigarette Use Never User      E-Cigarette/Vaping Substances     Social History     Tobacco Use    Smoking status: Former Smoker     Quit date:      Years since quittin 9    Smokeless tobacco: Never Used    Tobacco comment: quit 37 years ago   Substance Use Topics    Alcohol use: Yes     Comment: Occasional- Per [facility] Drug use: Never       Review of Systems   Constitutional: Positive for fever  Negative for activity change, appetite change, chills, diaphoresis, fatigue and unexpected weight change  HENT: Negative for congestion, ear discharge, ear pain, mouth sores, sinus pressure, sinus pain, sneezing, sore throat, trouble swallowing and voice change  Eyes: Negative for photophobia, pain, discharge, redness, itching and visual disturbance  Respiratory: Negative for cough, chest tightness and shortness of breath  Cardiovascular: Negative for chest pain, palpitations and leg swelling  Gastrointestinal: Negative for abdominal pain, constipation, nausea and vomiting  Reports that he has abdominal pain at baseline due to his pancreatic cancer - has taken 3 pain pain pills today which he reports is not abnormal   Endocrine: Negative for cold intolerance, heat intolerance, polydipsia, polyphagia and polyuria  Genitourinary: Negative for decreased urine volume, difficulty urinating, dysuria, frequency, hematuria and urgency  Musculoskeletal: Negative for arthralgias, back pain, gait problem, joint swelling, myalgias, neck pain and neck stiffness  Skin: Negative for color change and rash  Allergic/Immunologic: Negative for immunocompromised state  Neurological: Negative for dizziness, tremors, seizures, syncope, speech difficulty, weakness, light-headedness, numbness and headaches  Hematological: Does not bruise/bleed easily  Psychiatric/Behavioral: Negative for behavioral problems and suicidal ideas  Physical Exam  Physical Exam  Vitals signs and nursing note reviewed  Constitutional:       General: He is not in acute distress  Appearance: Normal appearance  He is well-developed and normal weight  He is not ill-appearing, toxic-appearing or diaphoretic  HENT:      Head: Normocephalic and atraumatic  Right Ear: Tympanic membrane, ear canal and external ear normal  There is no impacted cerumen  Left Ear: Tympanic membrane, ear canal and external ear normal  There is no impacted cerumen  Nose: Nose normal  No congestion or rhinorrhea  Mouth/Throat:      Mouth: Mucous membranes are moist       Pharynx: Oropharynx is clear  No oropharyngeal exudate or posterior oropharyngeal erythema  Eyes:      General: No scleral icterus  Right eye: No discharge  Left eye: No discharge  Extraocular Movements: Extraocular movements intact  Conjunctiva/sclera: Conjunctivae normal       Pupils: Pupils are equal, round, and reactive to light  Neck:      Musculoskeletal: Normal range of motion and neck supple  No neck rigidity or muscular tenderness  Vascular: No JVD  Trachea: No tracheal deviation  Cardiovascular:      Rate and Rhythm: Normal rate and regular rhythm  Heart sounds: Normal heart sounds  No murmur  Pulmonary:      Effort: Pulmonary effort is normal  No respiratory distress  Breath sounds: Normal breath sounds  No wheezing or rales  Chest:      Chest wall: No tenderness  Abdominal:      General: Bowel sounds are normal       Palpations: Abdomen is soft  There is no mass  Tenderness: There is no abdominal tenderness  There is no right CVA tenderness, left CVA tenderness or guarding  Hernia: No hernia is present  Musculoskeletal: Normal range of motion  General: No swelling, tenderness, deformity or signs of injury        Right lower leg: No edema  Left lower leg: No edema  Lymphadenopathy:      Cervical: No cervical adenopathy  Skin:     General: Skin is warm and dry  Capillary Refill: Capillary refill takes less than 2 seconds  Findings: No bruising, erythema or rash  Neurological:      General: No focal deficit present  Mental Status: He is alert and oriented to person, place, and time  Mental status is at baseline  Cranial Nerves: No cranial nerve deficit  Sensory: No sensory deficit  Motor: No weakness or abnormal muscle tone  Coordination: Coordination normal       Gait: Gait normal       Deep Tendon Reflexes: Reflexes normal    Psychiatric:         Mood and Affect: Mood normal          Behavior: Behavior normal          Thought Content:  Thought content normal          Judgment: Judgment normal          Vital Signs  ED Triage Vitals [11/15/20 2141]   Temperature Pulse Respirations Blood Pressure SpO2   100 °F (37 8 °C) (!) 110 18 143/83 96 %      Temp Source Heart Rate Source Patient Position - Orthostatic VS BP Location FiO2 (%)   Oral Monitor Lying Left arm --      Pain Score       --           Vitals:    11/15/20 2145 11/15/20 2200 11/15/20 2215 11/15/20 2230   BP: 149/75 132/70 149/73 149/73   Pulse: (!) 112 (!) 109 97 96   Patient Position - Orthostatic VS: Lying Lying Lying Lying         Visual Acuity      ED Medications  Medications   magnesium sulfate 2 g/50 mL IVPB (premix) 2 g (2 g Intravenous New Bag 11/15/20 2326)   sodium chloride 0 9 % bolus 1,000 mL (0 mL Intravenous Stopped 11/15/20 2229)     Followed by   sodium chloride 0 9 % bolus 1,000 mL (0 mL Intravenous Stopped 11/15/20 2252)     Followed by   sodium chloride 0 9 % bolus 1,000 mL (0 mL Intravenous Stopped 11/15/20 2324)   levofloxacin (LEVAQUIN) IVPB (premix in dextrose) 750 mg 150 mL (0 mg Intravenous Stopped 11/15/20 2356)   potassium chloride (K-DUR,KLOR-CON) CR tablet 40 mEq (40 mEq Oral Given 11/15/20 2325) Diagnostic Studies  Results Reviewed     Procedure Component Value Units Date/Time    Urine Microscopic [[id number]]  (Abnormal) Collected: 11/15/20 2325    Lab Status: Final result Specimen: Urine, Clean Catch Updated: 11/15/20 2355     RBC, UA 0-5 /hpf      WBC, UA Innumerable /hpf      Epithelial Cells Occasional /hpf      Bacteria, UA Innumerable /hpf     Urine culture [[id number]] Collected: 11/15/20 2325    Lab Status: In process Specimen: Urine, Clean Catch Updated: 11/15/20 2355    UA w Reflex to Microscopic w Reflex to Culture [[id number]]  (Abnormal) Collected: 11/15/20 2325    Lab Status: Final result Specimen: Urine, Clean Catch Updated: 11/15/20 2341     Color, UA Yellow     Clarity, UA Clear     Specific Gravity, UA 1 015     pH, UA 6 0     Leukocytes, UA 1+     Nitrite, UA Positive     Protein, UA Negative mg/dl      Glucose, UA Negative mg/dl      Ketones, UA Negative mg/dl      Urobilinogen, UA 0 2 E U /dl      Bilirubin, UA Negative     Blood, UA Trace-Intact    COVID19, Influenza A/B, RSV PCR, [facility] [[id number]]  (Normal) Collected: 11/15/20 2154    Lab Status: Final result Specimen: Nares from Nasopharyngeal Swab Updated: 11/15/20 2316     SARS-CoV-2 Negative     INFLUENZA A PCR Negative     INFLUENZA B PCR Negative     RSV PCR Negative    Narrative: This test has been authorized by FDA under an EUA (Emergency Use Assay) for use by authorized laboratories  Clinical caution and judgement should be used with the interpretation of these results with consideration of the clinical impression and other laboratory testing  Testing reported as "Positive" or "Negative" has been proven to be accurate according to standard laboratory validation requirements  All testing is performed with control materials showing appropriate reactivity at standard intervals      CBC and differential [[id number]]  (Abnormal) Collected: 11/15/20 2154    Lab Status: Final result Specimen: Blood from Arm, Left Updated: 11/15/20 2252     WBC 3 11 Thousand/uL      RBC 3 96 Million/uL      Hemoglobin 11 7 g/dL      Hematocrit 35 2 %      MCV 89 fL      MCH 29 5 pg      MCHC 33 2 g/dL      RDW 17 3 %      MPV 12 6 fL      Platelets 57 Thousands/uL      Neutrophils Relative 47 %      Immat GRANS % 0 %      Lymphocytes Relative 37 %      Monocytes Relative 13 %      Eosinophils Relative 3 %      Basophils Relative 0 %      Neutrophils Absolute 1 47 Thousands/µL      Immature Grans Absolute 0 00 Thousand/uL      Lymphocytes Absolute 1 14 Thousands/µL      Monocytes Absolute 0 40 Thousand/µL      Eosinophils Absolute 0 09 Thousand/µL      Basophils Absolute 0 01 Thousands/µL     Comprehensive metabolic panel [[id number]]  (Abnormal) Collected: 11/15/20 2154    Lab Status: Final result Specimen: Blood from Arm, Left Updated: 11/15/20 2238     Sodium 139 mmol/L      Potassium 3 2 mmol/L      Chloride 104 mmol/L      CO2 25 mmol/L      ANION GAP 10 mmol/L      BUN 11 mg/dL      Creatinine 0 85 mg/dL      Glucose 144 mg/dL      Calcium 8 9 mg/dL      AST 14 U/L      ALT 14 U/L      Alkaline Phosphatase 60 0 U/L      Total Protein 6 8 g/dL      Albumin 3 8 g/dL      Total Bilirubin 0 63 mg/dL      eGFR 84 ml/min/1 73sq m     Narrative:      [place] guidelines for Chronic Kidney Disease (CKD):     Stage 1 with normal or high GFR (GFR > 90 mL/min/1 73 square meters)    Stage 2 Mild CKD (GFR = 60-89 mL/min/1 73 square meters)    Stage 3A Moderate CKD (GFR = 45-59 mL/min/1 73 square meters)    Stage 3B Moderate CKD (GFR = 30-44 mL/min/1 73 square meters)    Stage 4 Severe CKD (GFR = 15-29 mL/min/1 73 square meters)    Stage 5 End Stage CKD (GFR <15 mL/min/1 73 square meters)  Note: GFR calculation is accurate only with a steady state creatinine    Magnesium [[id number]]  (Abnormal) Collected: 11/15/20 2154    Lab Status: Final result Specimen: Blood from Arm, Left Updated: 11/15/20 2238     Magnesium 1 5 mg/dL Lactic acid [[id number]]  (Normal) Collected: 11/15/20 2154    Lab Status: Final result Specimen: Blood from Arm, Left Updated: 11/15/20 2237     LACTIC ACID 1 9 mmol/L     Narrative:      Result may be elevated if tourniquet was used during collection  APTT [[id number]]  (Normal) Collected: 11/15/20 2155    Lab Status: Final result Specimen: Blood from Arm, Left Updated: 11/15/20 2234     PTT 24 seconds     Protime-INR [[id number]]  (Normal) Collected: 11/15/20 2155    Lab Status: Final result Specimen: Blood from Arm, Left Updated: 11/15/20 2234     Protime 11 2 seconds      INR 0 99    Narrative:      INR Reference Ranges:  No Anticoagulant, Normal:           0 9-1 1  Standard Dose, Oral Anticoagulant:  2 0-3 0  High Dose, Oral Anticoagulant:      2 5-3 5    Blood culture #1 [[id number]] Collected: 11/15/20 2209    Lab Status: In process Specimen: Blood from Arm, Right Updated: 11/15/20 2219    Blood culture #2 [[id number]] Collected: 11/15/20 2155    Lab Status: In process Specimen: Blood from Arm, Left Updated: 11/15/20 2219    Procalcitonin with AM Reflex [[id number]] Collected: 11/15/20 2154    Lab Status: In process Specimen: Blood from Arm, Left Updated: 11/15/20 2218                 XR chest portable   ED Interpretation by Jean Claude Londono MD (11/15 2317)   No acute findings                 Procedures  Procedures         ED Course  ED Course as of Nov 16 0009   Mon Nov 16, 2020   0003 This is a 66year old male that presented to the ED with a fever that began today - denies any symptoms  He has pancreatic cancer and is undergoing chemotherapy  Is septic - tachycardia and fever at home  Sepsis protocol started upon examining patient  Sepsis fluids given - no periods of hypotension  Ordered Levaquin for possible urinary or resp source  UTI shows UTI  Decreased WBC at 3 11  Hg stable at 11 7  Hypokalemia at 2 1 - supplemented with oral potassium chloride 40 Meq   Magnesium decreased at1 5 - order Mag Sulfate 2 grams IV  COVID and Flu negative  CXR with no acute findings  Kidney fx normal at 11/0 85  Will admit for sepsis of urinary source as pt  is high risk due to his underlying hx of cancer  Discussed findings with patient and his daughter at the bedside - in agreement with the plan                              SBIRT 20yo+      Most Recent Value   SBIRT (22 yo +)   In order to provide better care to our patients, we are screening all of our patients for alcohol and drug use  Would it be okay to ask you these screening questions? Yes Filed at: 11/15/2020 2233   Initial Alcohol Screen: US AUDIT-C    1  How often do you have a drink containing alcohol?  0 Filed at: 11/15/2020 2233   2  How many drinks containing alcohol do you have on a typical day you are drinking? 0 Filed at: 11/15/2020 2233   3a  Male UNDER 65: How often do you have five or more drinks on one occasion? 0 Filed at: 11/15/2020 2233   Audit-C Score  0 Filed at: 11/15/2020 2233   WENDY: How many times in the past year have you    Used an illegal drug or used a prescription medication for non-medical reasons?   Never Filed at: 11/15/2020 2233                  MDM  Number of Diagnoses or Management Options  Hypokalemia: new and requires workup  Hypomagnesemia: new and requires workup  Diagnosis management comments: Viral syndrome, PNA,UTI,FUO,COVID, influenza       Amount and/or Complexity of Data Reviewed  Clinical lab tests: ordered and reviewed  Tests in the radiology section of CPT®: ordered and reviewed  Obtain history from someone other than the patient: (daughter)  Review and summarize past medical records: yes        Disposition  Final diagnoses:   Hypokalemia   Hypomagnesemia   Sepsis ([facility] )   Urinary tract infection   Patient on antineoplastic chemotherapy regimen   Pancreatic cancer ([facility] )     Time reflects when diagnosis was documented in both MDM as applicable and the Disposition within this note     Time User Action Codes Description Comment    11/15/2020 10:44 PM Misha Zohreh Add [E87 6] Hypokalemia     11/15/2020 10:44 PM Misha Zohreh Add [E83 42] Hypomagnesemia     11/16/2020 12:02 AM Misha Zohreh Add [A41 9] Sepsis ([facility] )     11/16/2020 12:02 AM Misha Zohreh Add [N39 0] Urinary tract infection     11/16/2020 12:02 AM Misha Zohreh Modify [E87 6] Hypokalemia     11/16/2020 12:02 AM Misha Zohreh Modify [A41 9] Sepsis ([facility] )     11/16/2020 12:02 AM Misha Zohreh Add [Z79 899] Patient on antineoplastic chemotherapy regimen     11/16/2020 12:03 AM Misha Zohreh Add [C25 9] Pancreatic cancer [facility])       ED Disposition     ED Disposition Condition Date/Time Comment    Admit Stable Mon Nov 16, 2020 12:02 AM Case was discussed with resident for hospitalist and the patient's admission status was agreed to be Admission Status: inpatient status to the service of Dr Valerio Magdaleno   Follow-up Information    None         Patient's Medications   Discharge Prescriptions    No medications on file     No discharge procedures on file      PDMP Review       Value Time User    PDMP Reviewed  Yes 11/11/2020 10:55 AM Td Keller MD          ED Provider  Electronically Signed by           Geri Carpenter MD  11/16/20 0381

## 2020-11-17 PROBLEM — E44.0 MODERATE PROTEIN-CALORIE MALNUTRITION (HCC): Status: ACTIVE | Noted: 2020-01-01

## 2020-11-17 PROBLEM — R78.81 BACTEREMIA: Status: ACTIVE | Noted: 2020-01-01

## 2020-11-17 NOTE — UTILIZATION REVIEW
Notification of Inpatient Admission/Inpatient Authorization Request   This is a Notification of Inpatient Admission for [address]  Be advised that this patient was admitted to our facility under Inpatient Status  Contact Krysten Moreno at [phone] for additional admission information  [address] [facility]  DEDICATED [phone]  Patient Name:   Marjan Hernandez   YOB: 1945       [address]   [address]:   [address] [facility]  Tax ID: [id number]  NPI: [id number] Attending Provider/NPI:  Phone:  Address: [address] [facility]  [phone]  Same as the facility   Place of Service Code: 24 Place of Service Name:  [facility]   Start Date: 11/16/20 0003 Discharge Date & Time: No discharge date for patient encounter  Type of Admission: Inpatient Status Discharge Disposition   (if discharged): Home/Self Care   Patient Diagnoses: Hypokalemia [E87 6]  Hypomagnesemia [E83 42]  Pancreatic cancer ([address] ) [C25 9]  Urinary tract infection [N39 0]  Fever [R50 9]  Patient on antineoplastic chemotherapy regimen [Z79 899]  Sepsis ([address] ) [A41 9]   Orders: Admission Orders (From admission, onward)     Ordered        11/16/20 0003  Inpatient Admission  Once                    Assigned Utilization Review Contact: Krysten Moreno  Utilization   Network Utilization Review Department  Phone: [phone]; Fax [phone]  Email: [email or web address]   ATTENTION PAYERS: Please call the assigned Utilization  directly with any questions or concerns ALL voicemails in the department are confidential  Send all requests for admission clinical reviews, approved or denied determinations and any other requests to dedicated fax number belonging to the campus where the patient is receiving treatment

## 2020-11-17 NOTE — UTILIZATION REVIEW
Initial Clinical Review    Admission: Date/Time/Statement:   Admission Orders (From admission, onward)     Ordered        11/16/20 0003  Inpatient Admission  Once                   Orders Placed This Encounter   Procedures    Inpatient Admission     Standing Status:   Standing     Number of Occurrences:   1     Order Specific Question:   Admitting Physician     Answer:   Christen Juarez [[id number]]     Order Specific Question:   Level of Care     Answer:   Med Surg [16]     Order Specific Question:   Bed Type     Answer:   Rohith [4]     Order Specific Question:   Estimated length of stay     Answer:   More than 2 Midnights     Order Specific Question:   Certification     Answer:   I certify that inpatient services are medically necessary for this patient for a duration of greater than two midnights  See H&P and MD Progress Notes for additional information about the patient's course of treatment  ED Arrival Information     Expected Arrival Acuity Means of Arrival Escorted By Service Admission Type    - 11/15/2020 21:30 Urgent Walk-In Family Member Hospitalist Urgent    Arrival Complaint    chemo patient - tingling on urination-fever        Chief Complaint   Patient presents with    Fever - 75 years or older     Patient reports fever of 101 at home and "tingling" upon urination, states he is currently on chemo and is doctor sent him here for evaluation     Assessment/Plan: 73 yo male presented to ED from home as inpatient admission for sepsis secondary to UTI on an immunosuppressed patient  Patient states has a current history of pancreatic cancer and is undergoing chemotherapy - he has had 4 treatments out of 6  PMHX HTN,CKD stage 3 DM 2   Patient felt warm at home and temp 101  C/o tingling, burning with urination, weakness decreased appetite Plan IV antibiotics, monitor WBC Consider Neupogen if WBCs are trending down vicente   Consult ID   ED Triage Vitals   Temperature Pulse Respirations Blood Pressure SpO2 11/15/20 2141 11/15/20 2141 11/15/20 2141 11/15/20 2141 11/15/20 2141   100 °F (37 8 °C) (!) 110 18 143/83 96 %      Temp Source Heart Rate Source Patient Position - Orthostatic VS BP Location FiO2 (%)   11/15/20 2141 11/15/20 2141 11/15/20 2141 11/15/20 2141 --   Oral Monitor Lying Left arm       Pain Score       11/16/20 0100       No Pain          Wt Readings from Last 1 Encounters:   11/16/20 102 kg (225 lb 5 oz)     Additional Vital Signs:   Date/Time  Temp  Pulse  Resp  BP  SpO2  O2 Device  Patient Position - Orthostatic VS   11/17/20 0755  100 3 °F (37 9 °C)  92  20  141/78  96 %    Lying   11/17/20 0700  99 °F (37 2 °C)               11/16/20 1900  101 9 °F (38 8 °C)Abnormal   103  18  136/67  95 %  None (Room air)     11/16/20 1851  102 1 °F (38 9 °C)Abnormal                11/16/20 1300  100 3 °F (37 9 °C)  101  18  121/64      Lying   11/16/20 1036  102 1 °F (38 9 °C)Abnormal                11/16/20 0812  101 4 °F (38 6 °C)Abnormal   108Abnormal   18  132/84  97 %  None (Room air)     11/16/20 0111    111Abnormal     125/72      Lying   11/16/20 0100  99 5 °F (37 5 °C)  120Abnormal   20  141/82  98 %  None (Room air)  Lying   11/15/20 2230    96  18  149/73  96 %  None (Room air)  Lying   11/15/20 2215    97  18  149/73  97 %  None (Room air)  Lying   11/15/20 2200    109Abnormal   18               Pertinent Labs/Diagnostic Test Results:   Results from last 7 days   Lab Units 11/15/20  2154   SARS-COV-2  Negative     Results from last 7 days   Lab Units 11/17/20  0508 11/16/20  0500 11/15/20  2154   WBC Thousand/uL 3 02* 2 36* 3 11*   HEMOGLOBIN g/dL 11 2* 11 2* 11 7*   HEMATOCRIT % 34 7* 34 5* 35 2*   PLATELETS Thousands/uL 60* 47* 57*   NEUTROS ABS Thousands/µL  --  1 19* 1 47*         Results from last 7 days   Lab Units 11/17/20  0508 11/16/20  0500 11/15/20  2154   SODIUM mmol/L 136 140 139   POTASSIUM mmol/L 3 6 3 6 3 2*   CHLORIDE mmol/L 101 104 104   CO2 mmol/L 28 26 25   ANION GAP mmol/L 7 10 10   BUN mg/dL 8 8 11   CREATININE mg/dL 0 86 0 75 0 85   EGFR ml/min/1 73sq m 84 88 84   CALCIUM mg/dL 8 5 8 4 8 9   MAGNESIUM mg/dL  --  2 3 1 5*     Results from last 7 days   Lab Units 11/16/20  0500 11/15/20  2154   AST U/L 13* 14*   ALT U/L 13 14   ALK PHOS U/L 56 1 60 0   TOTAL PROTEIN g/dL 6 3* 6 8   ALBUMIN g/dL 3 5 3 8   TOTAL BILIRUBIN mg/dL 0 79 0 63     Results from last 7 days   Lab Units 11/17/20  1106 11/17/20  0733 11/16/20  1710 11/16/20  1232 11/16/20  0906 11/16/20  0103   POC GLUCOSE mg/dl 262* 145* 164* 190* 180* 141*     Results from last 7 days   Lab Units 11/17/20  0508 11/16/20  0500 11/15/20  2154   GLUCOSE RANDOM mg/dL 126 163* 144*       Results from last 7 days   Lab Units 11/15/20  2155   PROTIME seconds 11 2   INR  0 99   PTT seconds 24         Results from last 7 days   Lab Units 11/15/20  2154   PROCALCITONIN ng/ml 0 08     Results from last 7 days   Lab Units 11/16/20  0500 11/15/20  2154   LACTIC ACID mmol/L 1 6 1 9       Results from last 7 days   Lab Units 11/15/20  2325   CLARITY UA  Clear   COLOR UA  Yellow   SPEC GRAV UA  1 015   PH UA  6 0   GLUCOSE UA mg/dl Negative   KETONES UA mg/dl Negative   BLOOD UA  Trace-Intact*   PROTEIN UA mg/dl Negative   NITRITE UA  Positive*   BILIRUBIN UA  Negative   UROBILINOGEN UA E U /dl 0 2   LEUKOCYTES UA  1+*   WBC UA /hpf Innumerable*   RBC UA /hpf 0-5   BACTERIA UA /hpf Innumerable*   EPITHELIAL CELLS WET PREP /hpf Occasional     Results from last 7 days   Lab Units 11/15/20  2154   INFLUENZA A PCR  Negative   INFLUENZA B PCR  Negative   RSV PCR  Negative     Results from last 7 days   Lab Units 11/15/20  2325 11/15/20  2209 11/15/20  2155   BLOOD CULTURE   --  Received in Microbiology Lab  Culture in Progress   Gram Negative Aidan*   GRAM STAIN RESULT   --   --  Gram negative rods*   URINE CULTURE  >100,000 cfu/ml Gram Negative Aidan Enteric Like*  --   --        US KIDNEY and BLADDER  11-17-20  No evidence of perinephric collection or hydronephrosis       CXR 11-16-20  NAD    EKG 11-15-20  Sinus rhythm  Borderline left axis deviation  Baseline wander in lead(s) V4    ED Treatment:   Medication Administration from 11/15/2020 2130 to 11/16/2020 0035       Date/Time Order Dose Route Action     11/15/2020 2210 sodium chloride 0 9 % bolus 1,000 mL 1,000 mL Intravenous New Bag     11/15/2020 2229 sodium chloride 0 9 % bolus 1,000 mL 1,000 mL Intravenous New Bag     11/15/2020 2252 sodium chloride 0 9 % bolus 1,000 mL 1,000 mL Intravenous New Bag     11/15/2020 2223 levofloxacin (LEVAQUIN) IVPB (premix in dextrose) 750 mg 150 mL 750 mg Intravenous New Bag     11/15/2020 2326 magnesium sulfate 2 g/50 mL IVPB (premix) 2 g 2 g Intravenous New Bag     11/15/2020 2325 potassium chloride (K-DUR,KLOR-CON) CR tablet 40 mEq 40 mEq Oral Given        Past Medical History:   Diagnosis Date    Abdominal aortic aneurysm (AAA) ([facility] ) 05/26/2017    Per [facility]     Anxiety state 05/26/2017    [facility]     Benign prostatic hyperplasia     Benign prostatic hyperplasia without lower urinary tract symptoms     Body mass index 33 0-33 9, adult 08/15/2019    Per [facility]     Chronic kidney disease, stage II (mild) 08/15/2019    [facility]     Chronic kidney disease, unspecified 05/26/2017    [facility]     Chronic obstructive pulmonary disease (COPD) ([facility] ) 09/23/2019    Per [facility]     Diabetes mellitus ([facility] )     Diverticulosis of large intestine without perforation or abscess without bleeding 04/18/2019    Per [facility]     Essential hypertension 05/26/2017    Per [facility]     Hearing loss, bilateral 02/26/2017    [facility]     Hypertension     Mixed hyperlipidemia 05/26/2017    [facility]     Obesity, unspecified 05/26/2017    Per [facility]     Type 2 diabetes mellitus without complication ([facility] ) 66/79/9545    Per [facility]      Present on Admission:   Hypomagnesemia   Sepsis secondary to UTI on an immunosuppressed patient   Diabetes mellitus type 2   Essential hypertension   Chronic kidney disease (CKD), stage III (moderate)      Admitting Diagnosis: Hypokalemia [E87 6]  Hypomagnesemia [E83 42]  Pancreatic cancer (HCC) [C25 9]  Urinary tract infection [N39 0]  Fever [R50 9]  Patient on antineoplastic chemotherapy regimen [Z79 899]  Sepsis ([facility] ) [A41 9]  Age/Sex: 66 y o  male  Admission Orders:  Scheduled Medications:  amLODIPine, 10 mg, Oral, Daily  aspirin, 81 mg, Oral, Daily  insulin lispro, 1-5 Units, Subcutaneous, TID AC  losartan, 50 mg, Oral, Daily  meropenem, 500 mg, Intravenous, Q6H  pantoprazole, 40 mg, Oral, Daily      Continuous IV Infusions:     PRN Meds:  acetaminophen, 650 mg, Oral, Q6H PRN  ondansetron, 4 mg, Intravenous, Q6H PRN  oxyCODONE, 5 mg, Oral, Q4H PRN        IP CONSULT TO INFECTIOUS DISEASES  SCD  Blood sugar ac     Network Utilization Review Department  [email or web address]  ATTENTION: Please call with any questions or concerns to [phone] and carefully listen to the prompts so that you are directed to the right person  All voicemails are confidential   [facility] all requests for admission clinical reviews, approved or denied determinations and any other requests to dedicated fax number below belonging to the campus where the patient is receiving treatment   List of dedicated fax numbers for the Facilities:  [address] DENIALS (Administrative/Medical Necessity) [phone]   [address] (Maternity/NICU/Pediatrics) [phone]   Donald Valencia [phone]   [facility] [phone]   Андрей Hardy [phone]   Radha Olea [phone]   [address] [facility] [address] [place] [address] [phone]   [facility]  [phone]   [address] [facility]  [address] [facility] [address] [facility] [phone]

## 2020-11-17 NOTE — CONSULTS
Consultation - General Surgery   Vignesh Howell 66 y o  male MRN: [id number]  Unit/Bed#: S -01 Encounter: [id number]    Assessment/Plan     Assessment:  Vignesh Howell is a 66 y o  male w/ hx pancreatic cancer on chemotherapy and s/p IR port (9/2020) now with E  Coli bacteremia, likely 2/2 UTI    Plan:  · Plan for OR tomorrow 11/18 for port removal  · NPO pMN  · Abx per ID, currently on Meropenem  · Trend WBC/monitor fever curve  · Care per primary    History of Present Illness     HPI:  Vignesh Howell is a 66 y o  male who initially presented to [facility] w/ fevers and weakness  Patient was subsequently found to have sepsis, UTI and E  coli bacteremia  Patient was seen in consultation by infectious disease with recommendation for port removal (patient on chemotherapy for pancreatic cancer)  Patient currently on ASA81, no other AP/AC agents noted  Inpatient consult to Acute Care Surgery     Performed by  Joanne Molina MD     Authorized by Zaira Casas MD              Review of Systems   HENT: Negative  Eyes: Negative  Respiratory: Negative  Cardiovascular: Negative  Gastrointestinal: Negative  Endocrine: Negative  Genitourinary: Negative  Musculoskeletal: Negative  Skin: Negative  Allergic/Immunologic: Negative  Neurological: Negative  Hematological: Negative  Negative for adenopathy  Psychiatric/Behavioral: Negative  All other systems reviewed and are negative        Historical Information   Past Medical History:   Diagnosis Date    Abdominal aortic aneurysm (AAA) ([facility] ) 05/26/2017    Per [place]     Anxiety state 05/26/2017    Per [facility]     Benign prostatic hyperplasia     Benign prostatic hyperplasia without lower urinary tract symptoms     Body mass index 33 0-33 9, adult 08/15/2019    [facility]     Chronic kidney disease, stage II (mild) 08/15/2019    Per [facility]     Chronic kidney disease, unspecified 05/26/2017    Per [facility]     [facility]) [facility] 2019    [facility]     Diabetes mellitus ([facility] )     Diverticulosis of large intestine without perforation or abscess without bleeding 2019    Per [facility]     Essential hypertension 2017    Per [facility]     Hearing loss, bilateral 2017    Per [facility]     Hypertension     Mixed hyperlipidemia 2017    Per [facility]     Obesity, unspecified 2017    Per [facility]     Type 2 diabetes mellitus without complication ([facility] )     Per [facility]      Past Surgical History:   Procedure Laterality Date    CHOLECYSTECTOMY      Per [facility]     COLECTOMY      removal of 8 inches of colon for diverticular disease   Per [facility]     IR PARACENTESIS  9/3/2020    IR PORT PLACEMENT  2020     Social History   Social History     Substance and Sexual Activity   Alcohol Use Yes    Frequency: Monthly or less    Binge frequency: Never    Comment: Occasional- Per [facility]      Social History     Substance and Sexual Activity   Drug Use Never     E-Cigarette/Vaping    E-Cigarette Use Never User      E-Cigarette/Vaping Substances     Social History     Tobacco Use   Smoking Status Former Smoker    Quit date: 26    Years since quittin 9   Smokeless Tobacco Never Used   Tobacco Comment    quit 37 years ago     Family History:   Family History   Problem Relation Age of Onset    Diabetes Father     No Known Problems Mother        Meds/Allergies   current meds:   Current Facility-Administered Medications   Medication Dose Route Frequency    acetaminophen (TYLENOL) tablet 650 mg  650 mg Oral Q6H PRN    [START ON 2020] amLODIPine (NORVASC) tablet 10 mg  10 mg Oral Daily    [START ON 2020] aspirin (ECOTRIN LOW STRENGTH) EC tablet 81 mg  81 mg Oral Daily    [START ON 2020] insulin lispro (HumaLOG) 100 units/mL subcutaneous injection 1-5 Units  1-5 Units Subcutaneous TID AC    [START ON 2020] losartan (COZAAR) tablet 50 mg  50 mg Oral Daily    meropenem (MERREM) 500 mg in sodium chloride 0 9 % 50 mL IVPB  500 mg Intravenous Q6H    ondansetron (ZOFRAN) injection 4 mg  4 mg Intravenous Q6H PRN    oxyCODONE (ROXICODONE) IR tablet 5 mg  5 mg Oral Q4H PRN    [START ON 11/18/2020] pantoprazole (PROTONIX) EC tablet 40 mg  40 mg Oral Daily Before Breakfast     No Known Allergies    Objective   First Vitals:   Blood Pressure: 126/66 (11/17/20 1820)  Pulse: 91 (11/17/20 1820)  Temperature: 99 5 °F (37 5 °C) (11/17/20 1820)  Temp Source: Oral (11/17/20 1820)  Respirations: 20 (11/17/20 1820)  Height: 6' (182 9 cm) (11/17/20 1820)  SpO2: 94 % (11/17/20 1820)    Current Vitals:   Blood Pressure: 126/66 (11/17/20 1820)  Pulse: 91 (11/17/20 1820)  Temperature: 99 5 °F (37 5 °C) (11/17/20 1820)  Temp Source: Oral (11/17/20 1820)  Respirations: 20 (11/17/20 1820)  Height: 6' (182 9 cm) (11/17/20 1820)  SpO2: 94 % (11/17/20 1820)    No intake or output data in the 24 hours ending 11/17/20 1850    Invasive Devices     Central Venous Catheter Line            Port A Cath 09/16/20 Right Chest 62 days          Peripheral Intravenous Line            Peripheral IV 11/15/20 Left Wrist 1 day                Physical Exam  Constitutional:       General: He is not in acute distress  Appearance: Normal appearance  He is not ill-appearing or toxic-appearing  HENT:      Head: Normocephalic and atraumatic  Nose: Nose normal    Neck:      Musculoskeletal: Normal range of motion  Cardiovascular:      Comments: Warm/well perfused  Pulmonary:      Effort: Pulmonary effort is normal  No respiratory distress  Comments: Port in right chest  No erythema or tenderness appreciated  Abdominal:      General: Abdomen is flat  Palpations: Abdomen is soft  Genitourinary:     Comments: Deferred  Musculoskeletal:         General: No swelling or deformity  Skin:     General: Skin is warm and dry  Neurological:      General: No focal deficit present        Mental Status: He is alert and oriented to person, place, and time  Psychiatric:         Mood and Affect: Mood normal          Behavior: Behavior normal          Lab Results:   CBC:   Lab Results   Component Value Date    WBC 3 02 (L) 11/17/2020    HGB 11 2 (L) 11/17/2020    HCT 34 7 (L) 11/17/2020    MCV 90 11/17/2020    PLT 60 (L) 11/17/2020    MCH 29 1 11/17/2020    MCHC 32 3 11/17/2020    RDW 17 8 (H) 11/17/2020    MPV 12 3 11/17/2020   , CMP:   Lab Results   Component Value Date    SODIUM 136 11/17/2020    K 3 6 11/17/2020     11/17/2020    CO2 28 11/17/2020    BUN 8 11/17/2020    CREATININE 0 86 11/17/2020    CALCIUM 8 5 11/17/2020    EGFR 84 11/17/2020   , Coagulation: No results found for: PT, INR, APTT  Imaging: I have personally reviewed pertinent reports  and I have personally reviewed pertinent films in PACS  Procedure: Xr Chest Portable    Result Date: 11/16/2020  Narrative: CHEST INDICATION:   fever  Sepsis COMPARISON:  09/03/2020 EXAM PERFORMED/VIEWS:  XR CHEST PORTABLE Images: 2 FINDINGS: Cardiomediastinal silhouette appears unremarkable  MediPort enters on the right with its tip in the region of the SVC  Minimal left base scarring  Pulmonary vessels are normal   No pneumothorax or pleural effusion  Osseous structures appear within normal limits for patient age  Impression: No acute cardiopulmonary disease  Workstation performed: [id number]     Procedure: Us Kidney And Bladder    Result Date: 11/17/2020  Narrative: RENAL ULTRASOUND INDICATION:   kidney abcess ;  UTI; History of pancreas tumor; sepsis; shortness of breath COMPARISON: CT from 9/3/2020 TECHNIQUE:   Ultrasound of the retroperitoneum was performed with a curvilinear transducer utilizing volumetric sweeps and still imaging techniques  FINDINGS: KIDNEYS: Symmetric and normal size  Right kidney:  11 1 cm  Left kidney:  11 7 cm  Right kidney Normal echogenicity and contour  No suspicious masses detected  No hydronephrosis  No shadowing calculi   No perinephric fluid collections  Left kidney Normal echogenicity and contour  No suspicious masses detected  No hydronephrosis  No shadowing calculi  No perinephric fluid collections  URETERS: Nonvisualized  BLADDER: Not well distended No focal thickening or mass lesions  Bilateral ureteral jets detected       Impression: No evidence of perinephric collection or hydronephrosis Workstation performed: [id number]

## 2020-11-17 NOTE — DISCHARGE SUMMARY
Discharge- Mary Amado 1945, 66 y o  male MRN: [id number]    Unit/Bed#: -01 Encounter: [id number]    Primary Care Provider: Lin Galeas MD   Date and time admitted to hospital: 11/15/2020  9:37 PM        * Sepsis secondary to UTI on an immunosuppressed patient  Assessment & Plan  -patient complains of dysuria of 4 days of evaluation associated with fever, no CVA tenderness or hematuria  -meet sepsis criteria in the context of leukopenia, fever, tachycardia, and low platelets  --previous admission was due to UTI caused by ESBL E coli  -will continue treatment with Zosyn started on 11/16 -->change to meropenen 11/17 by id  -follow urine cultures GNR/ BC positive GNR for sensitivity  - follow US Kidney      Bacteremia  Assessment & Plan  -blood cultures are growing Gram-negative rods  -patient on antibiotics see sepsis  -he has a Port-A-Cath for chemotherapy    Pancreatic cancer [facility])  Assessment & Plan  -known diagnosis of pancreatic cancer; currently on chemotherapy follows with oncologist Dr Lucas Gordon  -next oncology/chemotherapy appointment on Tuesday, 11/17/2020 they were told PT is here   -patient is on Oxaplatin , 5 fluorouracil and leucovorin  -on home medication of oxycodone 5 mg Q4 p r n  For pain management  -continuing oxycodone 5 mg Q 4 p r n  Pancytopenia due to antineoplastic chemotherapy [facility])  Assessment & Plan  -patient has hemoglobin 11 2 which is stable, white blood cells 2 3 and platelets 47  -no acute signs of bleedings  -continue DVT prophylaxis  -follow CBC in the a m  Hypomagnesemia  Assessment & Plan  Magnesium level of 1 5 in the ED  -receiving magnesium sulfate 2gm  -will reassess in a m      Essential hypertension  Assessment & Plan  -per patient taking amlodipine 10 mg BD, along with losartan 50 mg OD  -blood pressure at presentation 143/83  -amlodipine dose adjusted to 10 mg OD, losartan 50 mg OD    Chronic kidney disease (CKD), stage III (moderate)  Assessment & Plan  Lab Results   Component Value Date    EGFR 84 11/17/2020    EGFR 88 11/16/2020    EGFR 84 11/15/2020    CREATININE 0 86 11/17/2020    CREATININE 0 75 11/16/2020    CREATININE 0 85 11/15/2020     -when continue to monitor in the a m   -currently on heparin for anticoagulation    Hypokalemia  Assessment & Plan  -potassium level of 3 2 at presentation to the ED  -received 40 mEq of potassium chloride oral  -will monitor with CMP in the a m  Diabetes mellitus type 2  Assessment & Plan  Lab Results   Component Value Date    HGBA1C 6 5 (H) 08/02/2020       Recent Labs     11/16/20  1232 11/16/20  1710 11/17/20  0733 11/17/20  1106   POCGLU 190* 164* 145* 262*       Blood Sugar Average: Last 72 hrs:  (P) 778 [id number]     -per patient on metformin at home  -holding metformin while inpatient  -on sliding scale    Discharging Resident Physician: Javier Glez MD  Attending: Leigha Oh MD  PCP: Tianna Noel MD  Admission Date: 11/15/2020  Discharge Date: 11/17/20      Hospital Course:     Jim Edwards is a 66 y o  male patient with past medical history of hypertension, CKD stage 3, type 2 diabetes mellitus, pancreatic cancer on chemotherapy last session on 11/11  He is on a regimen with occiput oxaplatin, 5 fluorouracil and Leucovorin by Dr Quan Wright  Whom originally presented to the hospital on 11/15/2020 due to UTI symptoms  Due to this patient history of recurrent UTIs and history of ESBL he was initially placed on Zosyn will then switch to meropenem by our ID specialist   Ultrasound of the kidney was order results to be follow  Urine cultures are growing Gram-negative rods  Subsequently blood cultures were positive for Gram-negative rods  Reason why patient needs to have a Port-A-Cath removed by General surgery  Patient will be transferred to a nurse and looks unit for this procedure  Currently patient is stable, alert and oriented x3, on room air    He did have several episodes of fever with a T-max of a 102 4°  Currently denies any further urinary symptoms  Discharge Day Visit / Exam:   Vitals: Blood Pressure: 141/78 (11/17/20 0755)  Pulse: 92 (11/17/20 0755)  Temperature: 100 3 °F (37 9 °C) (11/17/20 0755)  Temp Source: Tympanic (11/17/20 0755)  Respirations: 20 (11/17/20 0755)  Height: 6' (182 9 cm) (11/15/20 2141)  Weight - Scale: 102 kg (225 lb 5 oz) (11/16/20 0722)  SpO2: 96 % (11/17/20 0755)     Exam:   Physical Exam  Constitutional:       Appearance: Normal appearance  He is normal weight  HENT:      Head: Normocephalic and atraumatic  Neck:      Musculoskeletal: Normal range of motion  Cardiovascular:      Rate and Rhythm: Normal rate  Pulses: Normal pulses  Heart sounds: No murmur  No gallop  Pulmonary:      Effort: Pulmonary effort is normal  No respiratory distress  Breath sounds: Normal breath sounds  Abdominal:      General: Abdomen is flat  Bowel sounds are normal       Palpations: Abdomen is soft  Musculoskeletal: Normal range of motion  Skin:     General: Skin is warm  Capillary Refill: Capillary refill takes less than 2 seconds  Coloration: Skin is not pale  Findings: No erythema or rash  Comments: Michael cap with no signs of skin inflammation or infection   Neurological:      General: No focal deficit present  Mental Status: He is alert and oriented to person, place, and time  Mental status is at baseline  Discharge instructions/Information to patient and family:   See after visit summary for information provided to patient and family  Provisions for Follow-Up Care:  See after visit summary for information related to follow-up care and any pertinent home health orders  Planned Readmission:   [place] facility     Discharge Medications:  See after visit summary for reconciled discharge medications provided to patient and family        ** Please Note: This note has been constructed using a voice recognition system **

## 2020-11-17 NOTE — H&P
H&P- Ferrer Splinter 1945, 66 y o  male MRN: [id number]    Unit/Bed#: S -01 Encounter: [id number]    Primary Care Provider: Jennifer Matson MD   Date and time admitted to hospital: 11/17/2020  6:26 PM        Bacteremia  Assessment & Plan  · Blood culture 1/2 positive for E coli, awaiting susceptibility  · Infectious Disease input appreciated-will continue meropenem as patient has a history of ESBL  · Monitor fever curve  · Deescalate antibiotics as per cultures    Pancytopenia due to antineoplastic chemotherapy ([facility] )  Assessment & Plan  · Monitor CBC  · Monitor for evidence of bleeding  · Transfuse for hemoglobin less than 7 or platelets less than 23,965    Sepsis secondary to UTI on an immunosuppressed patient  Assessment & Plan  · Present on arrival as evidence by leukopenia, fever, tachycardia  · Infectious disease consulted, appreciate input  · Patient with a history of ESBL E coli, continue with meropenem  · Deescalate antibiotics as per cultures  · Urine culture currently with E coli and 1/2 blood cultures with E coli, awaiting susceptibilities    Moderate protein-calorie malnutrition (HCC)  Assessment & Plan  Malnutrition Findings:           BMI Findings: Body mass index is 30 56 kg/m²     Nutrition consulted    Essential hypertension  Assessment & Plan  · Controlled  · Continue amlodipine and losartan    Chronic kidney disease (CKD), stage III (moderate)  Assessment & Plan  Lab Results   Component Value Date    EGFR 84 11/17/2020    EGFR 88 11/16/2020    EGFR 84 11/15/2020    CREATININE 0 86 11/17/2020    CREATININE 0 75 11/16/2020    CREATININE 0 85 11/15/2020   · Creatinine currently baseline  · Monitor BUN/creatinine  · Avoid nephrotoxic agents and hypotension    Pancreatic cancer (HCC)  Assessment & Plan  · Currently on chemotherapy with Dr Manjeet Arredondo, on 5 Leucovorin, oxaplatin, Leucovorin  · Continue symptom management with oxycodone and Zofran p r n   · Outpatient follow-up on discharge    Diabetes mellitus type 2  Assessment & Plan  Lab Results   Component Value Date    HGBA1C 6 5 (H) 08/02/2020       Recent Labs     11/16/20  1710 11/17/20  0733 11/17/20  1106 11/17/20  1649   POCGLU 164* 145* 262* 141*       Blood Sugar Average: Last 72 hrs:  · hold oral hypoglycemics  · Diabetic diet, SSI, Accu Cheks    VTE Prophylaxis: Pharmacologic VTE Prophylaxis contraindicated due to Thrombocytopenia  / sequential compression device   Code Status:  Full  POLST: There is no POLST form on file for this patient (pre-hospital)  Discussion with family:  Discussed plan with patient, declined offer to call family    Anticipated Length of Stay:  Patient will be admitted on an Inpatient basis with an anticipated length of stay of  more than 2 midnights  Justification for Hospital Stay:  IV antibiotics, port removal    Total Time for Visit, including Counseling / Coordination of Care: 45 minutes  Greater than 50% of this total time spent on direct patient counseling and coordination of care  Chief Complaint:   Burning with urination    History of Present Illness:    Nery Zuleta is a 66 y o  male who presents with burning with urination  Patient was initially admitted to [facility] in found to have a UTI and eventually bacteremia  Currently on meropenem as patient has a history of ESBL E coli  Infectious Disease has been following  Patient states that he feels he has been improving  Given that urine culture and blood culture are both growing E coli, infectious disease recommended that patient have removal of his Port-A-Cath which was placed about 3 months ago  Patient has a history of pancreatic cancer currently undergoing chemotherapy with Dr Johana Barrera  Review of Systems:    Review of Systems   Constitutional: Negative for activity change, appetite change, chills, diaphoresis, fatigue and fever  HENT: Negative for congestion, rhinorrhea, sinus pressure, sinus pain and sore throat  Eyes: Negative  Respiratory: Negative for cough, chest tightness and shortness of breath  Cardiovascular: Negative for chest pain, palpitations and leg swelling  Gastrointestinal: Negative for abdominal distention, abdominal pain, constipation, diarrhea, nausea and vomiting  Endocrine: Negative  Genitourinary: Negative for difficulty urinating, dysuria, flank pain, frequency, hematuria and urgency  Musculoskeletal: Negative for back pain, gait problem and neck pain  Skin: Negative  Allergic/Immunologic: Negative  Neurological: Negative for dizziness, syncope, speech difficulty, weakness, light-headedness and headaches  Hematological: Negative  Psychiatric/Behavioral: Negative  All other systems reviewed and are negative  Past Medical and Surgical History:     Past Medical History:   Diagnosis Date    Abdominal aortic aneurysm (AAA) ([facility] ) 05/26/2017    Per [place]     Anxiety state 05/26/2017    Per [place]     Benign prostatic hyperplasia     Benign prostatic hyperplasia without lower urinary tract symptoms     Body mass index 33 0-33 9, adult 08/15/2019    Per [place]     Chronic kidney disease, stage II (mild) 08/15/2019    [facility] [place]     Chronic kidney disease, unspecified 05/26/2017    Per [place]     [facility] ) [facility]     Diabetes mellitus ([facility] )     Diverticulosis of large intestine without perforation or abscess without bleeding 04/18/2019    [facility] [place]     Essential hypertension 05/26/2017    [facility] [place]     Hearing loss, bilateral 02/26/2017    [facility] [place]     Hypertension     Mixed hyperlipidemia 05/26/2017    Per [place]     Obesity, unspecified 05/26/2017    Per [facility]     Type 2 diabetes mellitus without complication ([facility] ) 07[facility]60/5352    [facility]        Past Surgical History:   Procedure Laterality Date    CHOLECYSTECTOMY      [facility] [place]     COLECTOMY      removal of 8 inches of colon for diverticular disease   [facility] [place]     IR PARACENTESIS  9/3/2020    IR PORT PLACEMENT  9/16/2020       Meds/Allergies:    Prior to Admission medications    Medication Sig Start Date End Date Taking? Authorizing Provider   acetaminophen (TYLENOL) 500 mg tablet Take 1,000 mg by mouth every 6 (six) hours as needed    Historical Provider, MD   amLODIPine (NORVASC) 2 5 mg tablet take 1 tablet by mouth once daily with AMLODIPINE 5 MG 10/1/20   Chaya Greene MD   amLODIPine (NORVASC) 5 mg tablet Take 1 tablet (5 mg total) by mouth daily  Patient taking differently: Take 5 mg by mouth 2 (two) times a day  9/9/20   Gabby Butler PA-C   aspirin (ECOTRIN LOW STRENGTH) 81 mg EC tablet Take 81 mg by mouth daily    Historical Provider, MD   fluorouracil 5,450 mg in CADD infusion pump Infuse 5,450 mg (1,200 mg/m2/day x 2 27 m2 (Treatment plan recorded BSA)) into a venous catheter over 46 hours for 2 days 11/17/20 11/19/20  Precious Rivero MD   Lancets (freestyle) lancets use 1 LANCET to TEST BLOOD SUGAR once daily 9/12/20   Historical Provider, MD   losartan (COZAAR) 50 mg tablet Take 50 mg by mouth daily    Historical Provider, MD   metFORMIN (GLUCOPHAGE-XR) 500 mg 24 hr tablet Take 1 tablet (500 mg total) by mouth 2 (two) times a day with meals 6/17/20   Chaya Greene MD   oxyCODONE (ROXICODONE) 5 mg immediate release tablet Take 1 tablet (5 mg total) by mouth every 4 (four) hours as needed (cancer-related pain)Max Daily Amount: 30 mg 11/11/20   Frida Petit MD   pantoprazole (PROTONIX) 40 mg tablet Take 40 mg by mouth daily 9/7/20   Historical Provider, MD   prochlorperazine (COMPAZINE) 10 mg tablet Take 1 tablet (10 mg total) by mouth every 6 (six) hours as needed for nausea or vomiting 9/11/20   Precious Rivero MD     I have reviewed home medications with patient personally      Allergies: No Known Allergies    Social History:     Marital Status: /Civil Union   Occupation:  Retired  Patient Pre-hospital Living Situation:  With family  Patient Pre-hospital Level of Mobility:  Independent  Patient Pre-hospital Diet Restrictions:  Diabetic  Substance Use History:   Social History     Substance and Sexual Activity   Alcohol Use Yes    Frequency: Monthly or less    Binge frequency: Never    Comment: Occasional- Per [place]      Social History     Tobacco Use   Smoking Status Former Smoker    Quit date: 26    Years since quittin 9   Smokeless Tobacco Never Used   Tobacco Comment    quit 37 years ago     Social History     Substance and Sexual Activity   Drug Use Never       Family History:    Family History   Problem Relation Age of Onset    Diabetes Father     No Known Problems Mother        Physical Exam:     Vitals:   Blood Pressure: 126/66 (20)  Pulse: 91 (20)  Temperature: 99 5 °F (37 5 °C) (20)  Temp Source: Oral (20)  Respirations: 20 (20)  Height: 6' (182 9 cm) (20)  SpO2: 94 % (20)    Physical Exam  Vitals signs and nursing note reviewed  Constitutional:       Appearance: Normal appearance  He is normal weight  HENT:      Head: Normocephalic and atraumatic  Right Ear: External ear normal       Left Ear: External ear normal       Nose: Nose normal       Mouth/Throat:      Mouth: Mucous membranes are moist       Pharynx: Oropharynx is clear  Eyes:      Conjunctiva/sclera: Conjunctivae normal       Pupils: Pupils are equal, round, and reactive to light  Neck:      Musculoskeletal: Neck supple  No muscular tenderness  Cardiovascular:      Rate and Rhythm: Normal rate and regular rhythm  Pulses: Normal pulses  Heart sounds: Normal heart sounds  Comments: Right-sided Port-A-Cath without any erythema, swelling, tenderness  Pulmonary:      Effort: Pulmonary effort is normal       Breath sounds: Normal breath sounds  Abdominal:      General: Abdomen is flat  Bowel sounds are normal       Palpations: Abdomen is soft  Musculoskeletal:         General: No swelling or tenderness  Skin:     General: Skin is warm and dry  Capillary Refill: Capillary refill takes less than 2 seconds  Neurological:      General: No focal deficit present  Mental Status: He is alert and oriented to person, place, and time  Mental status is at baseline  Psychiatric:         Mood and Affect: Mood normal          Behavior: Behavior normal          Thought Content: Thought content normal          Judgment: Judgment normal          Additional Data:     Lab Results: I have personally reviewed pertinent reports  Results from last 7 days   Lab Units 11/17/20  0508 11/16/20  0500   WBC Thousand/uL 3 02* 2 36*   HEMOGLOBIN g/dL 11 2* 11 2*   HEMATOCRIT % 34 7* 34 5*   PLATELETS Thousands/uL 60* 47*   NEUTROS PCT %  --  51   LYMPHS PCT %  --  30   MONOS PCT %  --  16*   EOS PCT %  --  2     Results from last 7 days   Lab Units 11/17/20  0508 11/16/20  0500   SODIUM mmol/L 136 140   POTASSIUM mmol/L 3 6 3 6   CHLORIDE mmol/L 101 104   CO2 mmol/L 28 26   BUN mg/dL 8 8   CREATININE mg/dL 0 86 0 75   ANION GAP mmol/L 7 10   CALCIUM mg/dL 8 5 8 4   ALBUMIN g/dL  --  3 5   TOTAL BILIRUBIN mg/dL  --  0 79   ALK PHOS U/L  --  56 1   ALT U/L  --  13   AST U/L  --  13*   GLUCOSE RANDOM mg/dL 126 163*     Results from last 7 days   Lab Units 11/15/20  2155   INR  0 99     Results from last 7 days   Lab Units 11/17/20  1649 11/17/20  1106 11/17/20  0733 11/16/20  1710 11/16/20  1232 11/16/20  0906 11/16/20  0103   POC GLUCOSE mg/dl 141* 262* 145* 164* 190* 180* 141*         Results from last 7 days   Lab Units 11/17/20  0508 11/16/20  0500 11/15/20  2154   LACTIC ACID mmol/L  --  1 6 1 9   PROCALCITONIN ng/ml 0 24  --  0 08       Imaging: I have personally reviewed pertinent reports        No orders to display       EKG, Pathology, and Other Studies Reviewed on Admission:   · EKG:  n/a    Allscripts / Epic Records Reviewed: Yes     ** Please Note: This note has been constructed using a voice recognition system   **

## 2020-11-17 NOTE — PROGRESS NOTES
Progress Note - Marjan Hernandez 1945, 66 y o  male MRN: [id number]    Unit/Bed#: -01 Encounter: [id number]    Primary Care Provider: Juni Gonzalez MD   Date and time admitted to hospital: 11/15/2020  9:37 PM        * Sepsis secondary to UTI on an immunosuppressed patient  Assessment & Plan  -patient complains of dysuria of 4 days of evaluation associated with fever, no CVA tenderness or hematuria  -meet sepsis criteria in the context of leukopenia, fever, tachycardia, and low platelets  --previous admission was due to UTI caused by ESBL E coli  -will continue treatment with Zosyn started on 11/16 -->change to meropenen 11/17 by id  -follow urine cultures GNR/ BC positive GNR for sensitivity  - follow US Kidney      Bacteremia  Assessment & Plan  -blood cultures are growing Gram-negative rods  -patient on antibiotics see sepsis  -he has a Port-A-Cath for chemotherapy    Pancytopenia due to antineoplastic chemotherapy [facility])  Assessment & Plan  -patient has hemoglobin 11 2 which is stable, white blood cells 2 3 and platelets 47  -no acute signs of bleedings  -continue DVT prophylaxis  -follow CBC in the a m  Hypomagnesemia  Assessment & Plan  Magnesium level of 1 5 in the ED  -receiving magnesium sulfate 2gm  -will reassess in a m      Essential hypertension  Assessment & Plan  -per patient taking amlodipine 10 mg BD, along with losartan 50 mg OD  -blood pressure at presentation 143/83  -amlodipine dose adjusted to 10 mg OD, losartan 50 mg OD    Chronic kidney disease (CKD), stage III (moderate)  Assessment & Plan  Lab Results   Component Value Date    EGFR 88 11/16/2020    EGFR 84 11/15/2020    EGFR 79 09/09/2020    CREATININE 0 75 11/16/2020    CREATININE 0 85 11/15/2020    CREATININE 1 02 10/30/2020     -when continue to monitor in the a m   -currently on heparin for anticoagulation    Pancreatic cancer [facility])  Assessment & Plan  -known diagnosis of pancreatic cancer; currently on chemotherapy follows with oncologist Dr Fabby Garcia  -next oncology/chemotherapy appointment on Tuesday, 2020 they were told PT is here   -patient is on Oxaplatin , 5 fluorouracil and leucovorin  -on home medication of oxycodone 5 mg Q4 p r n  For pain management  -continuing oxycodone 5 mg Q 4 p r n  Hypokalemia  Assessment & Plan  -potassium level of 3 2 at presentation to the ED  -received 40 mEq of potassium chloride oral  -will monitor with CMP in the a m  Diabetes mellitus type 2  Assessment & Plan  Lab Results   Component Value Date    HGBA1C 6 5 (H) 2020       Recent Labs     20  0103   POCGLU 141*       Blood Sugar Average: Last 72 hrs:  (P) 141     -per patient on metformin at home  -holding metformin while inpatient  -on sliding scale      Current Length of Stay: 1 day(s)    Current Patient Status: Inpatient   Code Status: Level 1 - Full Code      Subjective:   Patient was seen and evaluated by me at bedside today  He reports having chills overnight  But he denies any chest pain, shortness of breath, cough, upper respiratory symptoms  Denies abdominal pain change in his urinary habits or bowel movements  No overnight events  Patient had a temperature T-max 102° 2     Objective:     Vitals:   Temp (24hrs), Av 7 °F (38 2 °C), Min:99 °F (37 2 °C), Max:102 1 °F (38 9 °C)    Temp:  [99 °F (37 2 °C)-102 1 °F (38 9 °C)] 100 3 °F (37 9 °C)  HR:  [] 92  Resp:  [18-20] 20  BP: (121-141)/(64-78) 141/78  SpO2:  [95 %-96 %] 96 %  Body mass index is 30 56 kg/m²  Input and Output Summary (last 24 hours): Intake/Output Summary (Last 24 hours) at 2020 1152  Last data filed at 2020 0901  Gross per 24 hour   Intake    Output 1350 ml   Net -1350 ml       Physical Exam:     Physical Exam  Constitutional:       General: He is not in acute distress  Appearance: Normal appearance  He is normal weight  He is not ill-appearing  HENT:      Head: Normocephalic and atraumatic     Neck: Musculoskeletal: Normal range of motion  Cardiovascular:      Rate and Rhythm: Normal rate  Pulses: Normal pulses  Heart sounds: Normal heart sounds  No murmur  No gallop  Pulmonary:      Effort: Pulmonary effort is normal  No respiratory distress  Breath sounds: Normal breath sounds  Abdominal:      General: Abdomen is flat  Bowel sounds are normal  There is no distension  Palpations: Abdomen is soft  There is no mass  Tenderness: There is no abdominal tenderness  There is no guarding or rebound  Hernia: No hernia is present  Musculoskeletal: Normal range of motion  Skin:     General: Skin is warm  Capillary Refill: Capillary refill takes less than 2 seconds  Neurological:      General: No focal deficit present  Mental Status: He is alert and oriented to person, place, and time  Mental status is at baseline  Additional Data:     Labs:    Results from last 7 days   Lab Units 11/17/20  0508 11/16/20  0500   WBC Thousand/uL 3 02* 2 36*   HEMOGLOBIN g/dL 11 2* 11 2*   HEMATOCRIT % 34 7* 34 5*   PLATELETS Thousands/uL 60* 47*   NEUTROS PCT %  --  51   LYMPHS PCT %  --  30   MONOS PCT %  --  16*   EOS PCT %  --  2     Results from last 7 days   Lab Units 11/17/20  0508 11/16/20  0500   POTASSIUM mmol/L 3 6 3 6   CHLORIDE mmol/L 101 104   CO2 mmol/L 28 26   BUN mg/dL 8 8   CREATININE mg/dL 0 86 0 75   CALCIUM mg/dL 8 5 8 4   ALK PHOS U/L  --  56 1   ALT U/L  --  13   AST U/L  --  13*     Results from last 7 days   Lab Units 11/15/20  2155   INR  0 99       * I Have Reviewed All Lab Data Listed Above  * Additional Pertinent Lab Tests Reviewed: All [facility] Admission Reviewed        Recent Cultures (last 7 days):     Results from last 7 days   Lab Units 11/15/20  2325 11/15/20  2209 11/15/20  2155   BLOOD CULTURE   --  Received in Microbiology Lab  Culture in Progress   Gram Negative Aidan*   GRAM STAIN RESULT   --   --  Gram negative rods* URINE CULTURE  >100,000 cfu/ml Gram Negative Aidan Enteric Like*  --   --        Last 24 Hours Medication List:   Current Facility-Administered Medications   Medication Dose Route Frequency Provider Last Rate    acetaminophen  650 mg Oral Q6H PRN Reji Donis MD      amLODIPine  10 mg Oral Daily Kimo Ruiz MD      aspirin  81 mg Oral Daily Kimo Ruiz MD      insulin lispro  1-5 Units Subcutaneous TID AC Kimo Ruiz MD      losartan  50 mg Oral Daily Kimo Ruiz MD      meropenem  500 mg Intravenous Q6H Mandeep Angulo  mg (11/17/20 0847)    ondansetron  4 mg Intravenous Q6H PRN Kimo Ruiz MD      oxyCODONE  5 mg Oral Q4H PRN Kimo Ruiz MD      pantoprazole  40 mg Oral Daily Kimo Ruiz MD          Today, Patient Was Seen By: Reji Donis MD    ** Please Note: This note has been constructed using a voice recognition system   **

## 2020-11-17 NOTE — ASSESSMENT & PLAN NOTE
-blood cultures are growing Gram-negative rods  -patient on antibiotics see sepsis  -he has a Port-A-Cath for chemotherapy
-blood cultures are growing Gram-negative rods  -patient on antibiotics see sepsis  -he has a Port-A-Cath for chemotherapy
-known diagnosis of pancreatic cancer; currently on chemotherapy follows with oncologist Dr Anastasia Severe  -next oncology/chemotherapy appointment on Tuesday, 11/17/2020 they were told PT is here   -patient is on Oxaplatin , 5 fluorouracil and leucovorin  -on home medication of oxycodone 5 mg Q4 p r n  For pain management  -continuing oxycodone 5 mg Q 4 p r n 
-known diagnosis of pancreatic cancer; currently on chemotherapy follows with oncologist Dr Carole Casillas  -next oncology/chemotherapy appointment on Tuesday, 11/17/2020  -on home medication of oxycodone 5 mg Q4 p r n  For pain management    -continuing oxycodone 5 mg Q 4 p r n 
-known diagnosis of pancreatic cancer; currently on chemotherapy follows with oncologist Dr Leilani Melgoza  -next oncology/chemotherapy appointment on Tuesday, 11/17/2020 they were told PT is here   -patient is on Oxaplatin , 5 fluorouracil and leucovorin  -on home medication of oxycodone 5 mg Q4 p r n  For pain management  -continuing oxycodone 5 mg Q 4 p r n 
-patient complaining of tingling sensation/pain at the end of urination for the last 4 days  -denies any hematuria, difficulty urinating, foul-smelling urine, abdominal pain over baseline, flank pain, chest pain, shortness of breath, night sweats or chills  -has felt feverish since a m  11/15/2020; temperature high of 101 4 at home  -physical exam:  distended abdomen but per the patient is at his baseline, nontender to palpation,, bowel sounds present, no CVA tenderness  -previous admission was due to UTI caused by ESBL E coli, with cultures returning with resistance to ceftriaxone and levofloxacin, and sensitivity to carbapenems and Zosyn    -patient received 1 dose of Levaquin in the ED;  receiving IV Zosyn on floors
-patient complains of dysuria of 4 days of evaluation associated with fever, no CVA tenderness or hematuria    -meet sepsis criteria in the context of leukopenia, fever, tachycardia, and low platelets  --previous admission was due to UTI caused by ESBL E coli  -will continue treatment with Zosyn started on 11/16 -->change to meropenen 11/17 by id  -follow urine cultures GNR/ BC positive GNR for sensitivity  - follow US Kidney
-patient complains of dysuria of 4 days of evaluation associated with fever, no CVA tenderness or hematuria    -meet sepsis criteria in the context of leukopenia, fever, tachycardia, and low platelets  --previous admission was due to UTI caused by ESBL E coli  -will continue treatment with Zosyn started on 11/16 -->change to meropenen 11/17 by id  -follow urine cultures GNR/ BC positive GNR for sensitivity  - follow US Kidney
-patient has hemoglobin 11 2 which is stable, white blood cells 2 3 and platelets 47  -no acute signs of bleedings  -continue DVT prophylaxis  -follow CBC in the a m 
-patient has hemoglobin 11 2 which is stable, white blood cells 2 3 and platelets 47  -no acute signs of bleedings  -continue DVT prophylaxis  -follow CBC in the a m 
-per patient taking amlodipine 10 mg BD, along with losartan 50 mg OD  -blood pressure at presentation 143/83    -amlodipine dose adjusted to 10 mg OD, losartan 50 mg OD
-potassium level of 3 2 at presentation to the ED  -received 40 mEq of potassium chloride oral    -will monitor with CMP in the a m 
49-year-old male PMH-pancreatic cancer; currently on chemotherapy  Currently meeting sepsis criteria with fever (home temp:  101 4°), tachycardia ( heart rate:  110 at presentation), leukopenia (WBC:  3 1), UA and urine microscopy concerning for UTI with innumerable bacteria, leukocyte esterase positive, nitrite positive  Per patient's daughter, no change in mental status Previously admitted on 08/2020, also for UTI with cultures growing ESBL E coli, resistant to levofloxacin and ceftriaxone, sensitive to carbapenems and Zosyn    Two blood cultures taken; pending  Lactic acid level of 1 9  Procalcitonin with a m   Reflex pending  Urine cultures pending    Received 3 L of normal saline bolus in the ED; currently on 75 mL NS; will consider reconsider IV fluids based on fluid status in the morning  Received Levaquin 750 mg in the ED; switched to IV Zosyn on floors; consider switching to meropenem based upon culture results  ID consult placed; appreciate recommendations
Lab Results   Component Value Date    EGFR 84 11/15/2020    EGFR 79 09/09/2020    EGFR 78 09/08/2020    CREATININE 0 85 11/15/2020    CREATININE 1 02 10/30/2020    CREATININE 0 94 10/16/2020     -when continue to monitor in the a m   -currently on heparin for anticoagulation
Lab Results   Component Value Date    EGFR 84 11/17/2020    EGFR 88 11/16/2020    EGFR 84 11/15/2020    CREATININE 0 86 11/17/2020    CREATININE 0 75 11/16/2020    CREATININE 0 85 11/15/2020     -when continue to monitor in the a m   -currently on heparin for anticoagulation
Lab Results   Component Value Date    EGFR 88 11/16/2020    EGFR 84 11/15/2020    EGFR 79 09/09/2020    CREATININE 0 75 11/16/2020    CREATININE 0 85 11/15/2020    CREATININE 1 02 10/30/2020     -when continue to monitor in the a m   -currently on heparin for anticoagulation
Lab Results   Component Value Date    HGBA1C 6 5 (H) 08/02/2020       Recent Labs     11/16/20  0103   POCGLU 141*       Blood Sugar Average: Last 72 hrs:  (P) 141     -per patient on metformin at home    -holding metformin while inpatient  -on sliding scale
Lab Results   Component Value Date    HGBA1C 6 5 (H) 08/02/2020       Recent Labs     11/16/20  0103   POCGLU 141*       Blood Sugar Average: Last 72 hrs:  (P) 141     -per patient on metformin at home  -holding metformin while inpatient  -on sliding scale
Lab Results   Component Value Date    HGBA1C 6 5 (H) 08/02/2020       Recent Labs     11/16/20  1232 11/16/20  1710 11/17/20  0733 11/17/20  1106   POCGLU 190* 164* 145* 262*       Blood Sugar Average: Last 72 hrs:  (P) 368 [id number]     -per patient on metformin at home  -holding metformin while inpatient  -on sliding scale
Magnesium level of 1 5 in the ED    -receiving magnesium sulfate 2gm  -will reassess in a m 
Yes

## 2020-11-17 NOTE — QUICK NOTE
ID brief note:  Patient has E coli bacteremia, likely source is urinary tract  There is no way for me to be certain that the patient's chemo port has not been seeded in the setting of bacteremia  Plan:  · Follow urine culture and blood culture results  · Advised general surgery consultation for port removal which may require hospital transfer  · Continue meropenem iv  My recommendations were securely texted to hospitalist, Dr Malinda Loyd

## 2020-11-18 NOTE — PROGRESS NOTES
Progress Note - Garrett Herrera 1945, 66 y o  male MRN: [id number]    Unit/Bed#: S -01 Encounter: [id number]    Primary Care Provider: John Fuller MD   Date and time admitted to hospital: 11/17/2020  6:26 PM  * ESBL bacteremia due to UTI  Assessment & Plan  · With sepsis POA at [facility] as evidence by leukopenia, fever, tachycardia  · Infectious disease consulted, appreciate input-no indication to remove port  · continue with Jem Jha, can likely be arranged through outpatient infusion center to be dosed through port  · Urine culture currently with E coli and 1/2 blood cultures with E coli due to ESBL    Pancytopenia due to antineoplastic chemotherapy [facility])  Assessment & Plan  · Monitor CBC, currently stable/improved  · Monitor for evidence of bleeding  · Transfuse for hemoglobin less than 7 or platelets less than 47,501    Pancreatic cancer ([facility] )  Assessment & Plan  · Currently on chemotherapy with Dr Conchis Lucas, on 5 Leucovorin, oxaplatin, Leucovorin  · Continue symptom management with oxycodone and Zofran p r n   · Outpatient follow-up on discharge    Diabetes mellitus type 2  Assessment & Plan  Lab Results   Component Value Date    HGBA1C 6 5 (H) 08/02/2020       Recent Labs     11/17/20  1649 11/17/20  2205 11/18/20  0755 11/18/20  1028   POCGLU 141* 141* 178* 156*       Blood Sugar Average: Last 72 hrs:  · (P) 870 [id number] oral hypoglycemics  · Diabetic diet, SSI, Accu Cheks  · Due for recheck A1c    Moderate protein-calorie malnutrition ([facility] )  Assessment & Plan  Malnutrition Findings:           BMI Findings: Body mass index is 30 56 kg/m²  Nutrition consulted    VTE Pharmacologic Prophylaxis:   Pharmacologic: Pharmacologic VTE Prophylaxis contraindicated due to low platelet count  Mechanical VTE Prophylaxis in Place: Yes    Patient Centered Rounds: I have performed bedside rounds with nursing staff today      Discussions with Specialists or Other Care Team Provider:  Infectious Disease, case management, my attending    Education and Discussions with Family / Patient: offered to call family, patient already updated them; 2:40 pm called wife on home phone, left voicemail    Time Spent for Care: 30 minutes  More than 50% of total time spent on counseling and coordination of care as described above  Current Length of Stay: 1 day(s)    Current Patient Status: Inpatient   Certification Statement: The patient will continue to require additional inpatient hospital stay due to ongoing IV antibiotics    Discharge Plan: home when table ?friday    Code Status: Level 1 - Full Code    Subjective:   Patient offers no specific complaints or concerns at this time  No increased pain or dysuria, fever or chills  He has been out of bed and ambulating without any difficulty  Objective:     Vitals:   Temp (24hrs), Av 3 °F (37 4 °C), Min:98 8 °F (37 1 °C), Max:99 7 °F (37 6 °C)    Temp:  [98 8 °F (37 1 °C)-99 7 °F (37 6 °C)] 98 8 °F (37 1 °C)  HR:  [90-91] 90  Resp:  [18-20] 18  BP: (125-126)/(66-75) 125/75  SpO2:  [94 %-100 %] 97 %  Body mass index is 30 56 kg/m²  Input and Output Summary (last 24 hours): Intake/Output Summary (Last 24 hours) at 2020 1300  Last data filed at 2020 0336  Gross per 24 hour   Intake 50 ml   Output --   Net 50 ml       Physical Exam:     Physical Exam  Vitals signs reviewed  Constitutional:       General: He is not in acute distress  Appearance: Normal appearance  He is not ill-appearing, toxic-appearing or diaphoretic  HENT:      Nose: No congestion or rhinorrhea  Eyes:      General:         Right eye: No discharge  Left eye: No discharge  Cardiovascular:      Rate and Rhythm: Normal rate and regular rhythm  Heart sounds: No murmur  Pulmonary:      Effort: No respiratory distress  Breath sounds: No stridor  No wheezing or rhonchi  Abdominal:      General: Bowel sounds are normal  There is no distension  Tenderness: There is no abdominal tenderness  There is no guarding  Musculoskeletal:      Right lower leg: No edema  Left lower leg: No edema  Skin:     General: Skin is warm and dry  Coloration: Skin is not jaundiced or pale  Findings: No bruising, erythema, lesion or rash  Neurological:      General: No focal deficit present  Mental Status: He is alert  Comments: Awake alert interactive he is a good historian   Psychiatric:         Mood and Affect: Mood normal          Behavior: Behavior normal          Thought Content: Thought content normal          Judgment: Judgment normal          Additional Data:     Labs:    Results from last 7 days   Lab Units 11/18/20  0747  11/16/20  0500   WBC Thousand/uL 4 27*   < > 2 36*   HEMOGLOBIN g/dL 12 4   < > 11 2*   HEMATOCRIT % 38 9   < > 34 5*   PLATELETS Thousands/uL 100*   < > 47*   NEUTROS PCT %  --   --  51   LYMPHS PCT %  --   --  30   MONOS PCT %  --   --  16*   EOS PCT %  --   --  2    < > = values in this interval not displayed  Results from last 7 days   Lab Units 11/18/20  0747  11/16/20  0500   SODIUM mmol/L 135*   < > 140   POTASSIUM mmol/L 3 7   < > 3 6   CHLORIDE mmol/L 101   < > 104   CO2 mmol/L 23   < > 26   BUN mg/dL 10   < > 8   CREATININE mg/dL 1 01   < > 0 75   ANION GAP mmol/L 11   < > 10   CALCIUM mg/dL 9 5   < > 8 4   ALBUMIN g/dL  --   --  3 5   TOTAL BILIRUBIN mg/dL  --   --  0 79   ALK PHOS U/L  --   --  56 1   ALT U/L  --   --  13   AST U/L  --   --  13*   GLUCOSE RANDOM mg/dL 174*   < > 163*    < > = values in this interval not displayed       Results from last 7 days   Lab Units 11/15/20  2155   INR  0 99     Results from last 7 days   Lab Units 11/18/20  1028 11/18/20  0755 11/17/20  2205 11/17/20  1649 11/17/20  1106 11/17/20  0733 11/16/20  1710 11/16/20  1232 11/16/20  0906 11/16/20  0103   POC GLUCOSE mg/dl 156* 178* 141* 141* 262* 145* 164* 190* 180* 141*         Results from last 7 days   Lab Units 11/17/20  0508 11/16/20  0500 11/15/20  2154   LACTIC ACID mmol/L  --  1 6 1 9   PROCALCITONIN ng/ml 0 24  --  0 08       * I Have Reviewed All Lab Data Listed Above  * Additional Pertinent Lab Tests Reviewed: [facility] Admission Reviewed    Imaging:    Imaging Reports Reviewed Today Include:   Imaging Personally Reviewed by Myself Includes:      Recent Cultures (last 7 days):     Results from last 7 days   Lab Units 11/15/20  2325 11/15/20  2209 11/15/20  2155   BLOOD CULTURE   --  No Growth at 24 hrs  Escherichia coli ESBL*   GRAM STAIN RESULT   --   --  Gram negative rods*   URINE CULTURE  >100,000 cfu/ml Escherichia coli ESBL*  --   --        Last 24 Hours Medication List:   Current Facility-Administered Medications   Medication Dose Route Frequency Provider Last Rate    acetaminophen  650 mg Oral Q6H PRN Angel Rivera MD      amLODIPine  10 mg Oral Daily Angel Rivera MD      aspirin  81 mg Oral Daily Angel Rivera MD      ertapenem  1,000 mg Intravenous Q24H Driss Castro PA-C      insulin lispro  1-5 Units Subcutaneous TID AC Angel Rivera MD      losartan  50 mg Oral Daily Angel Rivera MD      ondansetron  4 mg Intravenous Q6H PRN Angel Rivera MD      oxyCODONE  5 mg Oral Q4H PRN Angel Rivera MD      pantoprazole  40 mg Oral Daily Before Nolvia Martínez MD          Today, Patient Was Seen By: Ana Herzog PA-C    ** Please Note: Dictation voice to text software may have been used in the creation of this document   **

## 2020-11-18 NOTE — ASSESSMENT & PLAN NOTE
· Monitor CBC  · Monitor for evidence of bleeding  · Transfuse for hemoglobin less than 7 or platelets less than 42,503

## 2020-11-18 NOTE — ASSESSMENT & PLAN NOTE
Lab Results   Component Value Date    HGBA1C 6 5 (H) 08/02/2020       Recent Labs     11/16/20  1710 11/17/20  0733 11/17/20  1106 11/17/20  1649   POCGLU 164* 145* 262* 141*       Blood Sugar Average: Last 72 hrs:  · hold oral hypoglycemics  · Diabetic diet, SSI, Accu Cheks

## 2020-11-18 NOTE — PROGRESS NOTES
Progress Note - General Surgery   Mary Amado 66 y o  male MRN: [id number]  Unit/Bed#: S -01 Encounter: [id number]    Assessment/Plan:  66 y o  male w/ hx pancreatic cancer on chemotherapy and s/p IR port (9/2020) now with E  Coli bacteremia, likely 2/2 UTI    NPO for OR  Plan for port removal today  Continue abx  Care per primary    Subjective/Objective     Subjective:  No acute events overnight  T-max 100 3°  Offers no new complaints  Objective:     Vitals: Temp:  [99 5 °F (37 5 °C)-100 3 °F (37 9 °C)] 99 5 °F (37 5 °C)  HR:  [91-92] 91  Resp:  [20] 20  BP: (126-141)/(66-78) 126/66  Body mass index is 30 56 kg/m²  I/O       11/16 0701 - 11/17 0700 11/17 0701 - 11/18 0700 11/18 0701 - 11/19 0700    IV Piggyback  50     Total Intake  50     Net  +50                  Physical Exam:  GEN: NAD, nontoxic  HEENT: MMM  CV:  Warm/well perfused  Lung: Normal effort  Right chest with port in place  No surrounding erythema  Nontender  Ab: Soft, NT/ND  Extrem: No CCE  Neuro:  A+Ox3    Lab, Imaging and other studies: CBC with diff: No results found for: WBC, HGB, HCT, MCV, PLT, ADJUSTEDWBC, MCH, MCHC, RDW, MPV, NRBC, BMP/CMP: No results found for: SODIUM, K, CL, CO2, ANIONGAP, BUN, CREATININE, GLUCOSE, CALCIUM, AST, ALT, ALKPHOS, PROT, BILITOT, EGFR, Magnesium: No components found for: MAG, Coags: No results found for: PT, PTT, INR, Blood Culture: No results found for: BLOODCX  VTE Pharmacologic Prophylaxis: Sequential compression device (Venodyne)   VTE Mechanical Prophylaxis: sequential compression device

## 2020-11-18 NOTE — ASSESSMENT & PLAN NOTE
Malnutrition Findings:           BMI Findings: Body mass index is 30 56 kg/m²     Nutrition consulted

## 2020-11-18 NOTE — ASSESSMENT & PLAN NOTE
· With sepsis POA at [facility] as evidence by leukopenia, fever, tachycardia  · Infectious disease consulted, appreciate input-no indication to remove port  · continue with Iris Canes, can likely be arranged through outpatient infusion center to be dosed through port  · Urine culture currently with E coli and 1/2 blood cultures with E coli due to ESBL

## 2020-11-18 NOTE — CONSULTS
Consultation - Infectious Disease   Meir Parr 66 y o  male MRN: [id number]  Unit/Bed#: S -01 Encounter: [id number]      IMPRESSION & RECOMMENDATIONS:     1  Sepsis  POA  With fever to 101 4 at home, tachycardia, leukopenia  Admission Blood cultures 1 of 2 ESBL E coli with matching >100,000 ESBL E coli in urine  Likely urinary source  Port placed 9/2020, being used for chemo and functioning well  Clinically improving  Rec:  · On Meropenem at present  · Transition to Ertapenem 1 g IV daily now  · Monitor temperature and hemodynamics  · Monitor labs    2  ESBL E coli Bacteremia  Admission Blood cultures 1 of 2 ESBL E coli  Likely urinary source in setting of dysuria preadmission and + matching urine culture  Rec:  · Transition to Ertapenem 1 g IV daily now  · Monitor temperature and hemodynamics  · Will treat with IV antibiotic and attempt to preserve PORT as below due to #4  · Discussed in detail with surgery, , patient and daughter at bedside, infusion center and Dr Rios Crespo  · [place] orders ordered for Ertapenem 1 g IV daily starting 11/20/20 through 11/25/20  · Script for home infusion of Ertapenem also written  Patient's wife would prefer home infusion if possible  · Check CBC, BMP 11/23/20    3  ESBL E coli UTI  In setting "tingling/pain" at the end of urination X 4 days prior to admission  U/A showed heavy pyuria and bacteruria, >100,000 ESBL E coli in urine  Likely urinary source  ESBL Ecoli UTI +/- pyelonephritis August 2020 s/p 10 day antibiotic course finished with Ertapenem 8/11/20  Rec:  · Transition to Ertapenem 1 g IV daily now  · Monitor temperature and hemodynamics  · Monitor urinary output  · Monitor urinary symptoms  · Antibiotic plan as above  4  Pancreatic Cancer  On Chemotherapy with 2 more cycles planned now  Will try to preserve PORT as urinary source infection likely with clinical improvement on IV antibiotics  5  BPH   Follows with Dr Cas Henry outpatient  Rec:  · Monitor urinary output  · Monitor urinary symptoms  · Check PVR and monitor for urinary retention  Antibiotics:  Meropenem D 3    Detailed review of medical records including all notes, imaging and labs  Above impression and plan discussed in detail with patient and daughter at bedside, RN, surgery, , infusion center and Dr Marylu Hardin  All of any questions answered reassurance given  Thank you for this consultation  We will follow along with you  HISTORY OF PRESENT ILLNESS:  Reason for Consult: Bacteremia    HPI: Alec Herzog is a 66 y o  male with diabetes mellitus, hypertension, BPH, and pancreatic cancer diagnosed July 2020, currently on chemotherapy via Port placed Sept 2020, and ESBL E  Coli left pyelonephritis August 2020 s/p 10 day antibiotic course finished with Ertapenem 8/11/20 who presented to [facility] ER 11/15/20 with fever to 101 4 F at home that day and complaints of "tingling/pain" at the end of urination X 4 days  In the ER, patient was tachycardic, WBC count 3 11  Blood cultures and a urine specimen were obtained  U/A showed heavy pyuria and bacteruria  Patient was admitted on IV Zosyn  Urine Cx and 1 of 2 blood cultures grew ESBL Klebsiella and patient was switched to Meropenem and transferred to [facility] for possible removal of Port  Infectious Diease consultation is now being requested regarding evaluation and management of bactermia  Patient is NPO for possible Port removal today at 3 pm  The port has been functioning well for chemotherapy every other week and he was due for chemo 2 days ago  He denies pain at the port  He denies any night sweats, chills, or further fever  He has bandlike lower back to abdominal pain that he thinks is related to the pancreatic cancer  He denies further dysuria, hematuria or urinary frequency/urgency  His urologist is Dr Aileen Wooten  He was nauseous some with chemo, but no vomiting  No diarrhea, CP, SOB or cough   His appetite is fair  REVIEW OF SYSTEMS:  As above in HPI, as well as,  A complete system-based review of systems is otherwise negative  PAST MEDICAL HISTORY:  Past Medical History:   Diagnosis Date    Abdominal aortic aneurysm (AAA) ([facility] ) 2017    Per [facility]     Anxiety state 2017    Per [facility]     Benign prostatic hyperplasia     Benign prostatic hyperplasia without lower urinary tract symptoms     Body mass index 33 0-33 9, adult 08/15/2019    Per [place]     Chronic kidney disease, stage II (mild) 08/15/2019    Per [facility]     Chronic kidney disease, unspecified 2017    Per [facility]     [facility] pulmonary disease [facility]COPD) [facility] ) 2019    Per [facility]     Diabetes mellitus ([facility] )     Diverticulosis of large intestine without perforation or abscess without bleeding 2019    Per [place]     Essential hypertension 2017    [facility] [place]     Hearing loss, bilateral 2017    Per [facility]     Hypertension     Mixed hyperlipidemia 2017    Per [facility]     Obesity, unspecified 2017    [facility] [place]     Type 2 diabetes mellitus without complication ([facility] )     Per [place]      Past Surgical History:   Procedure Laterality Date    CHOLECYSTECTOMY      [facility] [place]     COLECTOMY      removal of 8 inches of colon for diverticular disease   [facility] [place]     IR PARACENTESIS  9/3/2020    IR PORT PLACEMENT  2020       FAMILY HISTORY:  Non-contributory    SOCIAL HISTORY:  Social History     Substance and Sexual Activity   Alcohol Use Yes    Frequency: Monthly or less    Binge frequency: Never    Comment: Occasional- Per [place]      Social History     Substance and Sexual Activity   Drug Use Never     Social History     Tobacco Use   Smoking Status Former Smoker    Quit date: 26    Years since quittin 9   Smokeless Tobacco Never Used   Tobacco Comment    quit 37 years ago       ALLERGIES:  No Known Allergies    MEDICATIONS:  All current active medications have been reviewed  PHYSICAL EXAM:  Vitals:  Temp:  [98 8 °F (37 1 °C)-99 7 °F (37 6 °C)] 98 8 °F (37 1 °C)  HR:  [90-91] 90  Resp:  [18-20] 18  BP: (125-126)/(66-75) 125/75  SpO2:  [94 %-100 %] 97 %  Temp (24hrs), Av 3 °F (37 4 °C), Min:98 8 °F (37 1 °C), Max:99 7 °F (37 6 °C)  Current: Temperature: 98 8 °F (37 1 °C)     Physical Exam:  General:  66year old, elderly male resting in recliner, well-nourished, well-developed, in no acute distress  Eyes:  Conjunctive clear with no hemorrhages or effusions  Oropharynx:  No ulcers, no lesions  Neck:  Supple, no lymphadenopathy  Chest: right anterior chest wall PORT site nontender, not erythematous or swelling with bandaid in place  Lungs:  Clear to auscultation bilaterally, no accessory muscle use  Cardiac:  Regular rate and rhythm, no murmurs  Abdomen:  Soft, no focal palpable tenderness, protuberant, + bowel sounds  Extremities:  No peripheral cyanosis, clubbing, or edema, arm IV site nontender  : no stark, no SPT, no CVAT, no urethral drainage/scrotal edema  Skin:  No rashes, no ulcers  Neurological:  Moves all four extremities spontaneously, sensation grossly intact    LABS, IMAGING, & OTHER STUDIES:  Lab Results:  I have personally reviewed pertinent labs    Results from last 7 days   Lab Units 20  0508 20  0500 11/15/20  2154   POTASSIUM mmol/L 3 7 3 6 3 6 3 2*   CHLORIDE mmol/L 101 101 104 104   CO2 mmol/L 23 28 26 25   BUN mg/dL 10 8 8 11   CREATININE mg/dL 1 01 0 86 0 75 0 85   EGFR ml/min/1 73sq m 71 84 88 84   CALCIUM mg/dL 9 5 8 5 8 4 8 9   AST U/L  --   --  13* 14*   ALT U/L  --   --  13 14   ALK PHOS U/L  --   --  56 1 60 0     Results from last 7 days   Lab Units 20  0747 20  0508 20  0500   WBC Thousand/uL 4 27* 3 02* 2 36*   HEMOGLOBIN g/dL 12 4 11 2* 11 2*   PLATELETS Thousands/uL 100* 60* 47*     Results from last 7 days   Lab Units 11/15/20  2325 11/15/20  2209 11/15/20  2155   BLOOD CULTURE   --  No Growth at 24 hrs  Escherichia coli ESBL*   GRAM STAIN RESULT   --   --  Gram negative rods*   URINE CULTURE  >100,000 cfu/ml Escherichia coli ESBL*  --   --      Results from last 7 days   Lab Units 11/17/20  0508 11/15/20  2154   PROCALCITONIN ng/ml 0 24 0 08       Imaging Studies:   11/17/20 Renal US: No perinephric collection or hydronephrosis  No stones  7/31/20 CT A/P: bladder wall thickening and Left upper pole kidney lesion possible focal pyelonephritis  EKG, Pathology, and Other Studies:   I have personally reviewed pertinent reports

## 2020-11-18 NOTE — ASSESSMENT & PLAN NOTE
Lab Results   Component Value Date    HGBA1C 6 5 (H) 08/02/2020       Recent Labs     11/17/20  1649 11/17/20  2205 11/18/20  0755 11/18/20  1028   POCGLU 141* 141* 178* 156*       Blood Sugar Average: Last 72 hrs:  · (P) 750 [id number] oral hypoglycemics  · Diabetic diet, SSI, Accu Cheks  · Due for recheck A1c

## 2020-11-18 NOTE — CASE MANAGEMENT
LOS 1  Patient is not a bundle or a readmission  CM spoke with patient and wife at the bedside  CM introduced self and role  CM discussed with patient and wife, outpatient infusion center at [facility] is able to provide IV ABX daily  Patient's wife expressed concerns to CM  Patient's wife stated she is not comfortable with him coming to the hospital  Patient had IV ABX at home in the past with nursing  Per patient and wife, they used [facility] pharmacy in the past  Patient did not have a copay cost at that time  Patient and wife requesting Home IV ABX and [facility] for SN  CM updated ID and SLIM  CM requested copy of IV ABX script to send to [address] [place] [address] Whitney Amador  Patient transfer from [address] [place] [address] open noted on 11/16/2020  CM sent referral to [facility]  Waiting for response

## 2020-11-18 NOTE — UTILIZATION REVIEW
Initial Clinical Review    Admission: Date/Time/Statement:   Admission Orders (From admission, onward)     Ordered        11/17/20 1832  Inpatient Admission  Once                   Orders Placed This Encounter   Procedures    Inpatient Admission     Standing Status:   Standing     Number of Occurrences:   1     Order Specific Question:   Admitting Physician     Answer:   Gabi Lainez [[id number]]     Order Specific Question:   Level of Care     Answer:   Med Surg [[id number]]     Order Specific Question:   Estimated length of stay     Answer:   More than 2 Midnights     Order Specific Question:   Certification     Answer:   I certify that inpatient services are medically necessary for this patient for a duration of greater than two midnights  See H&P and MD Progress Notes for additional information about the patient's course of treatment  Assessment/Plan: 65 yo male transfer from [facility] to [facility]   initially admitted to [facility] in found to have a UTI and eventually bacteremia  Currently on meropenem as patient has a history of ESBL E coli  Infectious Disease has been following  Patient states that he feels he has been improving  Given that urine culture and blood culture are both growing E coli, infectious disease recommended that patient have removal of his Port-A-Cath which was placed about 3 months ago  Pt w/ hx of pancreatic cancer  Admitted IP status w/ sepsis sec to UTI , bacteremia in immunosuppressed pt   Cont abx  For port removal       11/17 General surgery consult   hx pancreatic cancer on chemotherapy and s/p IR port (9/2020) now with E  Coli bacteremia, likely 2/2 UTI  Plan for OR tomorrow , NPO , abx per ID , on meropenem , trend wbc and fever   11/18 General surgery note  NPO for OR today for port removal   Cont abx and care per primary   11/18 IM note   ESBL bacteremia d/t UTI  ID consulted   Cont ivanz   Monitor cbc and for evidence of bleeding        Temperature Pulse Respirations Blood Pressure SpO2   11/17/20 1820 11/17/20 1820 11/17/20 1820 11/17/20 1820 11/17/20 1820   99 5 °F (37 5 °C) 91 20 126/66 94 %      Temp Source Heart Rate Source Patient Position - Orthostatic VS BP Location FiO2 (%)   11/17/20 1820 11/18/20 0700 11/17/20 1820 11/17/20 1820 --   Oral Monitor Sitting Right arm       Pain Score       11/17/20 1900       No Pain          Wt Readings from Last 1 Encounters:   11/16/20 102 kg (225 lb 5 oz)     Additional Vital Signs:   11/18/20 0700  98 8 °F (37 1 °C)  90  18  125/75  97 %  None (Room air)  Sitting   11/17/20 1900  --  --  --  --  --  None (Room air)         Pertinent Labs/Diagnostic Test Results:   11/17 US kidney and bladder No evidence of perinephric collection or hydronephrosis  Results from last 7 days   Lab Units 11/15/20  2154   SARS-COV-2  Negative     Results from last 7 days   Lab Units 11/18/20  0747 11/17/20  0508 11/16/20  0500 11/15/20  2154   WBC Thousand/uL 4 27* 3 02* 2 36* 3 11*   HEMOGLOBIN g/dL 12 4 11 2* 11 2* 11 7*   HEMATOCRIT % 38 9 34 7* 34 5* 35 2*   PLATELETS Thousands/uL 100* 60* 47* 57*   NEUTROS ABS Thousands/µL  --   --  1 19* 1 47*     Results from last 7 days   Lab Units 11/18/20  0747 11/17/20  0508 11/16/20  0500 11/15/20  2154   SODIUM mmol/L 135* 136 140 139   POTASSIUM mmol/L 3 7 3 6 3 6 3 2*   CHLORIDE mmol/L 101 101 104 104   CO2 mmol/L 23 28 26 25   ANION GAP mmol/L 11 7 10 10   BUN mg/dL 10 8 8 11   CREATININE mg/dL 1 01 0 86 0 75 0 85   EGFR ml/min/1 73sq m 71 84 88 84   CALCIUM mg/dL 9 5 8 5 8 4 8 9   MAGNESIUM mg/dL  --   --  2 3 1 5*     Results from last 7 days   Lab Units 11/16/20  0500 11/15/20  2154   AST U/L 13* 14*   ALT U/L 13 14   ALK PHOS U/L 56 1 60 0   TOTAL PROTEIN g/dL 6 3* 6 8   ALBUMIN g/dL 3 5 3 8   TOTAL BILIRUBIN mg/dL 0 79 0 63     Results from last 7 days   Lab Units 11/18/20  0755 11/17/20  2205 11/17/20  1649 11/17/20  1106 11/17/20  0733 11/16/20  1710 11/16/20  1232 11/16/20  6640 11/16/20  0103   POC GLUCOSE mg/dl 178* 141* 141* 262* 145* 164* 190* 180* 141*     Results from last 7 days   Lab Units 11/18/20  0747 11/17/20  0508 11/16/20  0500 11/15/20  2154   GLUCOSE RANDOM mg/dL 174* 126 163* 144*     Results from last 7 days   Lab Units 11/15/20  2155   PROTIME seconds 11 2   INR  0 99   PTT seconds 24     Results from last 7 days   Lab Units 11/17/20  0508 11/15/20  2154   PROCALCITONIN ng/ml 0 24 0 08     Results from last 7 days   Lab Units 11/16/20  0500 11/15/20  2154   LACTIC ACID mmol/L 1 6 1 9     Results from last 7 days   Lab Units 11/15/20  2325   CLARITY UA  Clear   COLOR UA  Yellow   SPEC GRAV UA  1 015   PH UA  6 0   GLUCOSE UA mg/dl Negative   KETONES UA mg/dl Negative   BLOOD UA  Trace-Intact*   PROTEIN UA mg/dl Negative   NITRITE UA  Positive*   BILIRUBIN UA  Negative   UROBILINOGEN UA E U /dl 0 2   LEUKOCYTES UA  1+*   WBC UA /hpf Innumerable*   RBC UA /hpf 0-5   BACTERIA UA /hpf Innumerable*   EPITHELIAL CELLS WET PREP /hpf Occasional     Results from last 7 days   Lab Units 11/15/20  2154   INFLUENZA A PCR  Negative   INFLUENZA B PCR  Negative   RSV PCR  Negative     Results from last 7 days   Lab Units 11/15/20  2325 11/15/20  2209 11/15/20  2155   BLOOD CULTURE   --  No Growth at 24 hrs   Escherichia coli ESBL*   GRAM STAIN RESULT   --   --  Gram negative rods*   URINE CULTURE  >100,000 cfu/ml Escherichia coli*  --   --      Past Medical History:   Diagnosis Date    Abdominal aortic aneurysm (AAA) ([facility] ) 05/26/2017    Per [facility]     Anxiety state 05/26/2017    Per [place]     Benign prostatic hyperplasia     Benign prostatic hyperplasia without lower urinary tract symptoms     Body mass index 33 0-33 9, adult 08/15/2019    Per [facility]     Chronic kidney disease, stage II (mild) 08/15/2019    Per [facility]     Chronic kidney disease, unspecified 05/26/2017    Per [facility]     Chronic obstructive pulmonary disease (COPD) ([facility] ) 09/23/2019    Per Lalit Armendariz     Diabetes mellitus ([place] [address] )     Diverticulosis of large intestine without perforation or abscess without bleeding 04/18/2019    Per [place]     Essential hypertension 05/26/2017    [facility] [place]     Hearing loss, bilateral 02/26/2017    [facility] [place]     Hypertension     Mixed hyperlipidemia 05/26/2017    Per [place]     Obesity, unspecified 05/26/2017    Per [place]     Type 2 diabetes mellitus without complication ([place] [address] ) 56/57/8860    Per [place]      Present on Admission:   Bacteremia   Pancytopenia due to antineoplastic chemotherapy ([place] [address] )   Sepsis secondary to UTI on an immunosuppressed patient   Moderate protein-calorie malnutrition ([place] [address] )   Essential hypertension   Diabetes mellitus type 2   Pancreatic cancer ([place] [address] )   Chronic kidney disease (CKD), stage III (moderate)      Admitting Diagnosis: Bacteremia [R78 81]  Age/Sex: 66 y o  male  Admission Orders:  Scheduled Medications:  amLODIPine, 10 mg, Oral, Daily  aspirin, 81 mg, Oral, Daily  insulin lispro, 1-5 Units, Subcutaneous, TID AC  losartan, 50 mg, Oral, Daily  meropenem, 500 mg, Intravenous, Q6H  pantoprazole, 40 mg, Oral, Daily Before Breakfast      Continuous IV Infusions:     PRN Meds:  acetaminophen, 650 mg, Oral, Q6H PRN  ondansetron, 4 mg, Intravenous, Q6H PRN  oxyCODONE, 5 mg, Oral, Q4H PRN        IP CONSULT TO ACUTE CARE SURGERY  IP CONSULT TO NUTRITION SERVICES  IP CONSULT TO INFECTIOUS DISEASES    Network Utilization Review Department  [email or web address]  ATTENTION: Please call with any questions or concerns to [phone] and carefully listen to the prompts so that you are directed to the right person  All voicemails are confidential   [place] all requests for admission clinical reviews, approved or denied determinations and any other requests to dedicated fax number below belonging to the campus where the patient is receiving treatment   List of dedicated fax numbers for the Facilities:  [address] DENIALS (Administrative/Medical Necessity) 45[address] [facility] (Maternity/NICU/Pediatrics) [phone]   ST  [address] [facility] [phone]     [address]  [phone]   Georges Cruz [phone]   [facility] 1525 [facility] [phone]   [facility]  [phone]   [address] [place] [address], [facility]  [address] [place] [address] [phone]

## 2020-11-18 NOTE — ASSESSMENT & PLAN NOTE
· Present on arrival as evidence by leukopenia, fever, tachycardia  · Infectious disease consulted, appreciate input  · Patient with a history of ESBL E coli, continue with meropenem  · Deescalate antibiotics as per cultures  · Urine culture currently with E coli and 1/2 blood cultures with E coli, awaiting susceptibilities

## 2020-11-18 NOTE — ASSESSMENT & PLAN NOTE
· Currently on chemotherapy with Dr Rosemary Lopez, on 5 Leucovorin, oxaplatin, Leucovorin  · Continue symptom management with oxycodone and Zofran p r n   · Outpatient follow-up on discharge

## 2020-11-18 NOTE — MALNUTRITION/BMI
This medical record reflects one or more clinical indicators suggestive of malnutrition     Malnutrition Findings:   Adult Malnutrition type: Chronic illness(Related to catabolic illness as evidenced by 10% weight loss over the past month (some weight loss is fluid related) and <75% energy intake needs met >1 month treated with diet and nutrition supplements)  Adult Degree of Malnutrition: Malnutrition of moderate degree  Malnutrition Characteristics: Inadequate energy, Weight loss        See Nutrition note dated 11/18/20 for additional details  Completed nutrition assessment is viewable in the nutrition documentation

## 2020-11-18 NOTE — ASSESSMENT & PLAN NOTE
Lab Results   Component Value Date    EGFR 84 11/17/2020    EGFR 88 11/16/2020    EGFR 84 11/15/2020    CREATININE 0 86 11/17/2020    CREATININE 0 75 11/16/2020    CREATININE 0 85 11/15/2020   · Creatinine currently baseline  · Monitor BUN/creatinine  · Avoid nephrotoxic agents and hypotension

## 2020-11-18 NOTE — CASE MANAGEMENT
CM sent prescription to [facility]  Waiting for response  CM sent updated referral to [facility]  Waiting for response

## 2020-11-18 NOTE — ASSESSMENT & PLAN NOTE
· Monitor CBC, currently stable/improved  · Monitor for evidence of bleeding  · Transfuse for hemoglobin less than 7 or platelets less than 45,362

## 2020-11-18 NOTE — ASSESSMENT & PLAN NOTE
· Currently on chemotherapy with Dr Quentin Mancuso, on 5 Leucovorin, oxaplatin, Leucovorin  · Continue symptom management with oxycodone and Zofran p r n   · Outpatient follow-up on discharge

## 2020-11-18 NOTE — UTILIZATION REVIEW
Notification of Inpatient Admission/Inpatient Authorization Request   This is a Notification of Inpatient Admission for Jacquecolleen  Be advised that this patient was admitted to our facility under Inpatient Status  Contact Jagdish Jean at [phone] for additional admission information  [facility] DEPT  DEDICATED [phone]  Patient Name:   Uriel Patel   YOB: 1945       [address]   [address]:   Naila Person  Tax ID: [id number]  NPI: [id number] Attending Provider/NPI:  Address:  Phone: Dom Palma Md [[id number]]  Same as the facility  [phone]   Place of Service Code: 24 Place of Service Name:  [address] [place] [address]   Start Date: 11/17/20 1826     Discharge Date & Time: No discharge date for patient encounter  Type of Admission: Inpatient Status Discharge Disposition   (if discharged): [address] [place] [address]   Patient Diagnoses: Bacteremia [R78 81]     Orders: Admission Orders (From admission, onward)     Ordered        11/17/20 1832  Inpatient Admission  Once                    Assigned Utilization Review Contact: Jagdish Jean  Utilization   Network Utilization Review Department  Phone: [phone]; Fax [phone]  Email: [email or web address]   ATTENTION PAYERS: Please call the assigned Utilization  directly with any questions or concerns ALL voicemails in the department are confidential  Send all requests for admission clinical reviews, approved or denied determinations and any other requests to dedicated fax number belonging to the campus where the patient is receiving treatment

## 2020-11-18 NOTE — ASSESSMENT & PLAN NOTE
· Blood culture 1/2 positive for E coli, awaiting susceptibility  · Infectious Disease input appreciated-will continue meropenem as patient has a history of ESBL  · Monitor fever curve  · Deescalate antibiotics as per cultures

## 2020-11-19 NOTE — CASE MANAGEMENT
RICARDA sent update to [facility]  Patient's wife is here to transport him home  [facility] will need to deliver IV ABX to the home setting

## 2020-11-19 NOTE — CASE MANAGEMENT
CM updated by [facility] is able to provide a home nurse tomorrow 11/20/2020 at 1300  CM spoke with patient at the bedside  CM introduced self and role  Patient updated [facility] is able to accept for a first home visit on 11/20/2020 at 1300  Patient's IV ABX is covered 100%  CM waiting to confirm if [facility] will deliver to the hospital versus home  Patient requesting CM update his wife Abril Banks with plan of care  Patient is anxious to discharge home today  Patient's daughter/wife will transport at discharge  CM spoke with patient's wife Abril Banks on the phone, [phone]  CM introduced self and role  Patient's wife updated IV ABX is covered 100% at [facility]  Patient's wife updated CM waiting for [facility] to confirm delivery of IV ABX  Patient's wife updated [facility] is able to see patient on 11/20/2020 at 56 for first home visit  Patient's wife stated she will be at the hospital later  Wife aware Varinder Blakely will call her with a medical update  CM updated SLIM and ID

## 2020-11-19 NOTE — ASSESSMENT & PLAN NOTE
· Currently on chemotherapy with Dr Sander Ac, on 5 Leucovorin, oxaplatin, Leucovorin  · Continue symptom management with oxycodone and Zofran p r n   · Outpatient follow-up on discharge

## 2020-11-19 NOTE — QUICK NOTE
Patient seen and examined this morning  Port-A-Cath was not removed after a change in heart by the infectious disease team which I agree with  On examination, the port site looks wonderful  There is no evidence of infection whatsoever  I told the patient that I was going to sign off given the fact that infectious disease does not want the port removed    I am available to see him if the need arise

## 2020-11-19 NOTE — DISCHARGE SUMMARY
Discharge- Dell Councilman 1945, 66 y o  male MRN: [id number]    Unit/Bed#: S -01 Encounter: [id number]    Primary Care Provider: Angelika Collier MD   Date and time admitted to hospital: 11/17/2020  6:26 PM  * ESBL bacteremia due to UTI  Assessment & Plan  · With sepsis POA at [facility] as evidence by leukopenia, fever, tachycardia  · Infectious disease consulted, appreciate input---no indication to remove port  · continue with Dhaval El to be given through November 25th, to be dosed through port at home with visiting nurses  · Urine culture final sensitivities reviewed with E coli and 1/2 blood cultures with E coli due to ESBL    Pancytopenia due to antineoplastic chemotherapy [facility])  Assessment & Plan  · Monitor CBC outpatient  · Monitor for evidence of bleeding      Pancreatic cancer [facility])  Assessment & Plan  · Currently on chemotherapy with Dr Carole Casillas, on 5 Leucovorin, oxaplatin, Leucovorin  · Continue symptom management with oxycodone and Zofran p r n   · Outpatient follow-up on discharge    Diabetes mellitus type 2  Assessment & Plan  Lab Results   Component Value Date    HGBA1C 7 2 (H) 11/19/2020       Recent Labs     11/18/20  1610 11/18/20  2100 11/19/20  0701 11/19/20  1052   POCGLU 146* 169* 130 225*       Blood Sugar Average: Last 72 hrs:  · (P) 163 [id number]  · Diabetic diet, SSI, Accu Cheks  · A1c 7 2  · Resume metformin at discharge    Moderate protein-calorie malnutrition (HCC)  Assessment & Plan  Malnutrition Findings:   Adult Malnutrition type: Chronic illness(Related to catabolic illness as evidenced by 10% weight loss over the past month (some weight loss is fluid related) and <75% energy intake needs met >1 month treated with diet and nutrition supplements)  Adult Degree of Malnutrition: Malnutrition of moderate degree    BMI Findings: Body mass index is 30 53 kg/m²     Nutrition consulted      Discharging Physician / Practitioner: Kurt Chen PA-C  PCP: Angelika Collier MD  Admission Date:   Admission Orders (From admission, onward)     Ordered        11/17/20 1832  Inpatient Admission  Once                   Discharge Date: 11/19/20    Resolved Problems  Date Reviewed: 11/19/2020    None          Consultations During Hospital Stay:  · General surgery  · Infectious disease    Procedures Performed:   · Renal ultrasound= no evidence of perinephric collection or hydronephrosis    Significant Findings / Test Results:   · As above    Incidental Findings:   · None     Test Results Pending at Discharge (will require follow up): · None     Outpatient Tests Requested:  · CBC, BMP    Complications:  None    Reason for Admission:  Transferred from [place] [facility] Course:     Marjan Hernandez is a 66 y o  male patient with history of pancreatic cancer on chemotherapy with associated pancytopenia who was originally transferred to this hospital on 11/17/2020 from [facility] for anticipated need for removal of his port by our surgery team in the context of patient having E coli bacteremia and UTI  However, after consultation with our Infectious Disease team, it was not felt that his port required removal   He was clinically improved with use of Aruna Glassing and will complete a course at home through November 25th through his port  Renal ultrasound was stable without any perinephric collection or hydronephrosis and vital signs were stable  He will be discharged home today to complete his course of antibiotics as directed    Please see above list of diagnoses and related plan for additional information  Condition at Discharge: stable     Discharge Day Visit / Exam:     Subjective:  Patient is feeling well and offers no complaints or concerns    He was unsure whether he was going to need a PICC line or whether he was allowed to have the antibiotics through his port and was happy to hear that he is able to have them through his port  Vitals: Blood Pressure: 136/75 (11/19/20 0700)  Pulse: 100 (11/19/20 0700)  Temperature: 97 6 °F (36 4 °C) (11/19/20 0700)  Temp Source: Oral (11/19/20 0700)  Respirations: 18 (11/19/20 0700)  Height: 6' (182 9 cm) (11/17/20 1820)  Weight - Scale: 102 kg (225 lb 1 4 oz) (11/19/20 0600)  SpO2: 95 % (11/19/20 0700)  Exam:   Physical Exam  Vitals signs reviewed  Exam conducted with a chaperone present  Constitutional:       General: He is not in acute distress  Appearance: Normal appearance  He is not ill-appearing, toxic-appearing or diaphoretic  Comments: Well-appearing gentleman seen sitting up in bedside chair watching a movie, not in any distress   HENT:      Head: Normocephalic  Nose: No congestion or rhinorrhea  Mouth/Throat:      Pharynx: No oropharyngeal exudate or posterior oropharyngeal erythema  Eyes:      General: No scleral icterus  Right eye: No discharge  Left eye: No discharge  Conjunctiva/sclera: Conjunctivae normal    Cardiovascular:      Rate and Rhythm: Normal rate and regular rhythm  Heart sounds: No murmur  Pulmonary:      Effort: Pulmonary effort is normal  No respiratory distress  Breath sounds: No stridor  No wheezing, rhonchi or rales  Abdominal:      General: Bowel sounds are normal  There is distension  Palpations: Abdomen is soft  There is no mass  Tenderness: There is no abdominal tenderness  There is no guarding  Musculoskeletal:      Comments: Minimal bilateral lower extremity edema   Skin:     General: Skin is warm and dry  Coloration: Skin is not jaundiced or pale  Findings: No bruising, erythema or rash  Comments: Port site looks good with no surrounding erythema noted   Neurological:      General: No focal deficit present  Mental Status: He is alert  Comments: Awake alert interactive with no confusion   Psychiatric:         Mood and Affect: Mood normal          Behavior: Behavior normal          Thought Content:  Thought content normal  Judgment: Judgment normal       Comments: Very pleasant and cooperative         Discussion with Family:  Called patient's wife and left a message    Discharge instructions/Information to patient and family:   See after visit summary for information provided to patient and family  Provisions for Follow-Up Care:  See after visit summary for information related to follow-up care and any pertinent home health orders  Disposition:  Home with [facility]    Planned Readmission: none     Discharge Statement:  I spent 30 minutes discharging the patient  This time was spent on the day of discharge  I had direct contact with the patient on the day of discharge  Greater than 50% of the total time was spent examining patient, answering all patient questions, arranging and discussing plan of care with patient as well as directly providing post-discharge instructions  Additional time then spent on discharge activities  Case was discussed with nursing, case management, and Infectious Disease  Discharge Medications:  See after visit summary for reconciled discharge medications provided to patient and family        ** Please Note: This note has been constructed using a voice recognition system **

## 2020-11-19 NOTE — CASE MANAGEMENT
Per [facility], patient's port does not need to be accessed prior to discharge  [facility] spoke with patient  Patient's IV ABX will be delivered to the bedside  If patient discharges before  arrives, IV ABX will be delivered to the home tomorrow morning

## 2020-11-19 NOTE — ASSESSMENT & PLAN NOTE
Lab Results   Component Value Date    HGBA1C 7 2 (H) 11/19/2020       Recent Labs     11/18/20  1610 11/18/20  2100 11/19/20  0701 11/19/20  1052   POCGLU 146* 169* 130 225*       Blood Sugar Average: Last 72 hrs:  · (P) 163 [id number]  · Diabetic diet, SSI, Accu Cheks  · A1c 7 2  · Resume metformin at discharge

## 2020-11-19 NOTE — ASSESSMENT & PLAN NOTE
Malnutrition Findings:   Adult Malnutrition type: Chronic illness(Related to catabolic illness as evidenced by 10% weight loss over the past month (some weight loss is fluid related) and <75% energy intake needs met >1 month treated with diet and nutrition supplements)  Adult Degree of Malnutrition: Malnutrition of moderate degree    BMI Findings: Body mass index is 30 53 kg/m²     Nutrition consulted

## 2020-11-19 NOTE — ASSESSMENT & PLAN NOTE
· With sepsis POA at [facility] as evidence by leukopenia, fever, tachycardia  · Infectious disease consulted, appreciate input---no indication to remove port  · continue with Lopez Ray to be given through November 25th, to be dosed through port at home with visiting nurses  · Urine culture final sensitivities reviewed with E coli and 1/2 blood cultures with E coli due to ESBL

## 2020-11-19 NOTE — PLAN OF CARE
Problem: PAIN - ADULT  Goal: Verbalizes/displays adequate comfort level or baseline comfort level  Description: Interventions:  - Encourage patient to monitor pain and request assistance  - Assess pain using appropriate pain scale  - Administer analgesics based on type and severity of pain and evaluate response  - Implement non-pharmacological measures as appropriate and evaluate response  - Consider cultural and social influences on pain and pain management  - Notify physician/advanced practitioner if interventions unsuccessful or patient reports new pain  Outcome: Progressing     Problem: INFECTION - ADULT  Goal: Absence or prevention of progression during hospitalization  Description: INTERVENTIONS:  - Assess and monitor for signs and symptoms of infection  - Monitor lab/diagnostic results  - Monitor all insertion sites, i e  indwelling lines, tubes, and drains  - Monitor endotracheal if appropriate and nasal secretions for changes in amount and color  - [place] appropriate cooling/warming therapies per order  - Administer medications as ordered  - Instruct and encourage patient and family to use good hand hygiene technique  - Identify and instruct in appropriate isolation precautions for identified infection/condition  Outcome: Progressing  Goal: Absence of fever/infection during neutropenic period  Description: INTERVENTIONS:  - Monitor WBC    Outcome: Progressing     Problem: SAFETY ADULT  Goal: Patient will remain free of falls  Description: INTERVENTIONS:  - Assess patient frequently for physical needs  -  Identify cognitive and physical deficits and behaviors that affect risk of falls    -  [place] fall precautions as indicated by assessment   - Educate patient/family on patient safety including physical limitations  - Instruct patient to call for assistance with activity based on assessment  - Modify environment to reduce risk of injury  - Consider OT/PT consult to assist with strengthening/mobility  Outcome: Progressing  Goal: Maintain or return to baseline ADL function  Description: INTERVENTIONS:  -  Assess patient's ability to carry out ADLs; assess patient's baseline for ADL function and identify physical deficits which impact ability to perform ADLs (bathing, care of mouth/teeth, toileting, grooming, dressing, etc )  - Assess/evaluate cause of self-care deficits   - Assess range of motion  - Assess patient's mobility; develop plan if impaired  - Assess patient's need for assistive devices and provide as appropriate  - Encourage maximum independence but intervene and supervise when necessary  - Involve family in performance of ADLs  - Assess for home care needs following discharge   - Consider OT consult to assist with ADL evaluation and planning for discharge  - Provide patient education as appropriate  Outcome: Progressing  Goal: Maintain or return mobility status to optimal level  Description: INTERVENTIONS:  - Assess patient's baseline mobility status (ambulation, transfers, stairs, etc )    - Identify cognitive and physical deficits and behaviors that affect mobility  - Identify mobility aids required to assist with transfers and/or ambulation (gait belt, sit-to-stand, lift, walker, cane, etc )  - [place] fall precautions as indicated by assessment  - Record patient progress and toleration of activity level on Mobility SBAR; progress patient to next Phase/Stage  - Instruct patient to call for assistance with activity based on assessment  - Consider rehabilitation consult to assist with strengthening/weightbearing, etc   Outcome: Progressing     Problem: DISCHARGE PLANNING  Goal: Discharge to home or other facility with appropriate resources  Description: INTERVENTIONS:  - Identify barriers to discharge w/patient and caregiver  - Arrange for needed discharge resources and transportation as appropriate  - Identify discharge learning needs (meds, wound care, etc )  - Arrange for interpretive services to assist at discharge as needed  - Refer to Case Management Department for coordinating discharge planning if the patient needs post-hospital services based on physician/advanced practitioner order or complex needs related to functional status, cognitive ability, or social support system  Outcome: Progressing     Problem: Knowledge Deficit  Goal: Patient/family/caregiver demonstrates understanding of disease process, treatment plan, medications, and discharge instructions  Description: Complete learning assessment and assess knowledge base  Interventions:  - Provide teaching at level of understanding  - Provide teaching via preferred learning methods  Outcome: Progressing     Problem: Nutrition/Hydration-ADULT  Goal: Nutrient/Hydration intake appropriate for improving, restoring or maintaining nutritional needs  Description: Monitor and assess patient's nutrition/hydration status for malnutrition  Collaborate with interdisciplinary team and initiate plan and interventions as ordered  Monitor patient's weight and dietary intake as ordered or per policy  Utilize nutrition screening tool and intervene as necessary  Determine patient's food preferences and provide high-protein, high-caloric foods as appropriate       INTERVENTIONS:  - Monitor oral intake, urinary output, labs, and treatment plans  - Assess nutrition and hydration status and recommend course of action  - Evaluate amount of meals eaten  - Assist patient with eating if necessary   - Allow adequate time for meals  - Recommend/ encourage appropriate diets, oral nutritional supplements, and vitamin/mineral supplements  - Order, calculate, and assess calorie counts as needed  - Recommend, monitor, and adjust tube feedings and TPN/PPN based on assessed needs  - Assess need for intravenous fluids  - Provide specific nutrition/hydration education as appropriate  - Include patient/family/caregiver in decisions related to nutrition  Outcome: Progressing     Problem: Potential for Falls  Goal: Patient will remain free of falls  Description: INTERVENTIONS:  - Assess patient frequently for physical needs  -  Identify cognitive and physical deficits and behaviors that affect risk of falls    -  [place] fall precautions as indicated by assessment   - Educate patient/family on patient safety including physical limitations  - Instruct patient to call for assistance with activity based on assessment  - Modify environment to reduce risk of injury  - Consider OT/PT consult to assist with strengthening/mobility  Outcome: Progressing

## 2020-11-19 NOTE — DISCHARGE INSTR - AVS FIRST PAGE
Dear Karime Saravia,     It was our pleasure to care for you here at [facility]  It is our hope that we were always able to exceed the expected standards for your care during your stay  You were hospitalized due to (ESBL) E  Coli UTI and bacteria in bloodstream   You were cared for on the [facility] 4th floor by Pillo Kennedy PA-C under the service of Hans Disla MD with the [facility] who covers for your primary care physician (PCP), Gibson Hughes MD, while you were hospitalized  If you have any questions or concerns related to this hospitalization, you may contact us at [phone]  For follow up as well as any medication refills, we recommend that you follow up with your primary care physician  A registered nurse will reach out to you by phone within a few days after your discharge to answer any additional questions that you may have after going home  However, at this time we provide for you here, the most important instructions / recommendations at discharge:     · Notable Medication Adjustments -   · Ertapenem IV through 11/25 via port  · Testing Required after Discharge -   · Follow up cbc and bmp through family doctor  · Important follow up information -   · deaccess port once antibiotics complete  · Other Instructions -   · Call if any fever developes  · Please review this entire after visit summary as additional general instructions including medication list, appointments, activity, diet, any pertinent wound care, and other additional recommendations from your care team that may be provided for you        Sincerely,     Pillo Kennedy PA-C

## 2020-11-19 NOTE — NURSING NOTE
Discharge instructions reviewed with patient and wife  Time allotted for questions    No concerns at this time

## 2020-11-20 NOTE — UTILIZATION REVIEW
Notification of Discharge  This is a Notification of Discharge from our facility [address]  Please be advised that this patient has been discharge from our facility  Below you will find the admission and discharge date and time including the patients disposition  PRESENTATION DATE: 11/17/2020  6:26 PM  OBS ADMISSION DATE:   IP ADMISSION DATE: 11/17/20 1826   DISCHARGE DATE: 11/19/2020  2:47 PM  DISPOSITION: Home with [facility] [address] [facility]   Admission Orders listed below:  Admission Orders (From admission, onward)     Ordered        11/17/20 1832  Inpatient Admission  Once                   Please contact the UR Department if additional information is required to close this patient's authorization/case  [address] [employer] Util[employer] Review Department  Main: [phone] x carefully listen to the prompts  All voicemails are confidential   [email or web address]  Send all requests for admission clinical reviews, approved or denied determinations and any other requests to dedicated fax number below belonging to the campus where the patient is receiving treatment   List of dedicated fax numbers:  [address] DENIALS (Administrative/Medical Necessity) [phone]   [address] (Maternity/NICU/Pediatrics) [phone]   Ruddy Gan [phone]   Krishan Valentino [phone]   Josemanuele Muro [phone]   Jerrol [address] [place] [address] [facility] [phone]   Alethea Blunt [phone] [address] [facility] [address]  [address] [facility] [address] [facility] [phone]

## 2021-01-01 ENCOUNTER — HOSPITAL ENCOUNTER (OUTPATIENT)
Dept: INFUSION CENTER | Facility: CLINIC | Age: 76
Discharge: HOME | End: 2021-02-10
Payer: COMMERCIAL

## 2021-01-01 ENCOUNTER — HOSPITAL ENCOUNTER (INPATIENT)
Facility: HOSPITAL | Age: 76
LOS: 2 days | DRG: 435 | End: 2021-02-17
Attending: EMERGENCY MEDICINE | Admitting: INTERNAL MEDICINE
Payer: COMMERCIAL

## 2021-01-01 ENCOUNTER — TELEPHONE (OUTPATIENT)
Dept: HEMATOLOGY ONCOLOGY | Facility: CLINIC | Age: 76
End: 2021-01-01

## 2021-01-01 ENCOUNTER — TELEPHONE (OUTPATIENT)
Dept: RADIOLOGY | Facility: HOSPITAL | Age: 76
End: 2021-01-01

## 2021-01-01 ENCOUNTER — TELEPHONE (OUTPATIENT)
Dept: INPATIENT UNIT | Facility: HOSPITAL | Age: 76
End: 2021-01-01

## 2021-01-01 ENCOUNTER — HOSPITAL ENCOUNTER (OUTPATIENT)
Dept: INFUSION CENTER | Facility: CLINIC | Age: 76
Discharge: HOME | End: 2021-01-13
Payer: COMMERCIAL

## 2021-01-01 ENCOUNTER — APPOINTMENT (INPATIENT)
Dept: RADIOLOGY | Facility: HOSPITAL | Age: 76
DRG: 435 | End: 2021-01-01
Attending: STUDENT IN AN ORGANIZED HEALTH CARE EDUCATION/TRAINING PROGRAM
Payer: COMMERCIAL

## 2021-01-01 ENCOUNTER — HOSPITAL ENCOUNTER (OUTPATIENT)
Dept: INFUSION CENTER | Facility: CLINIC | Age: 76
Discharge: HOME | End: 2021-01-27
Payer: COMMERCIAL

## 2021-01-01 ENCOUNTER — APPOINTMENT (INPATIENT)
Dept: RADIOLOGY | Facility: HOSPITAL | Age: 76
DRG: 435 | End: 2021-01-01
Payer: COMMERCIAL

## 2021-01-01 ENCOUNTER — HOSPITAL ENCOUNTER (OUTPATIENT)
Dept: INFUSION CENTER | Facility: CLINIC | Age: 76
Discharge: HOME | End: 2021-01-15
Payer: COMMERCIAL

## 2021-01-01 ENCOUNTER — TELEPHONE (OUTPATIENT)
Dept: PALLIATIVE MEDICINE | Facility: CLINIC | Age: 76
End: 2021-01-01

## 2021-01-01 ENCOUNTER — PREP FOR PROCEDURE (OUTPATIENT)
Dept: INTERVENTIONAL RADIOLOGY/VASCULAR | Facility: CLINIC | Age: 76
End: 2021-01-01

## 2021-01-01 ENCOUNTER — HOSPITAL ENCOUNTER (OUTPATIENT)
Dept: INFUSION CENTER | Facility: CLINIC | Age: 76
End: 2021-01-01

## 2021-01-01 ENCOUNTER — HOSPITAL ENCOUNTER (OUTPATIENT)
Dept: RADIOLOGY | Facility: HOSPITAL | Age: 76
Discharge: HOME | End: 2021-02-08
Attending: INTERNAL MEDICINE
Payer: COMMERCIAL

## 2021-01-01 ENCOUNTER — APPOINTMENT (EMERGENCY)
Dept: RADIOLOGY | Facility: HOSPITAL | Age: 76
DRG: 435 | End: 2021-01-01
Payer: COMMERCIAL

## 2021-01-01 ENCOUNTER — APPOINTMENT (INPATIENT)
Dept: CT IMAGING | Facility: HOSPITAL | Age: 76
DRG: 435 | End: 2021-01-01
Payer: COMMERCIAL

## 2021-01-01 ENCOUNTER — PREP FOR PROCEDURE (OUTPATIENT)
Dept: HEMATOLOGY ONCOLOGY | Facility: CLINIC | Age: 76
End: 2021-01-01

## 2021-01-01 ENCOUNTER — OFFICE VISIT (OUTPATIENT)
Dept: HEMATOLOGY ONCOLOGY | Facility: CLINIC | Age: 76
End: 2021-01-01
Payer: COMMERCIAL

## 2021-01-01 ENCOUNTER — HOSPITAL ENCOUNTER (OUTPATIENT)
Dept: INFUSION CENTER | Facility: CLINIC | Age: 76
Discharge: HOME | End: 2021-01-06

## 2021-01-01 ENCOUNTER — APPOINTMENT (INPATIENT)
Dept: ULTRASOUND IMAGING | Facility: HOSPITAL | Age: 76
DRG: 435 | End: 2021-01-01
Payer: COMMERCIAL

## 2021-01-01 ENCOUNTER — HOSPITAL ENCOUNTER (OUTPATIENT)
Dept: INFUSION CENTER | Facility: CLINIC | Age: 76
Discharge: HOME | DRG: 435 | End: 2021-02-12
Payer: COMMERCIAL

## 2021-01-01 VITALS
OXYGEN SATURATION: 91 % | HEIGHT: 71 IN | SYSTOLIC BLOOD PRESSURE: 113 MMHG | TEMPERATURE: 100.6 F | HEART RATE: 134 BPM | DIASTOLIC BLOOD PRESSURE: 58 MMHG | BODY MASS INDEX: 31.54 KG/M2 | WEIGHT: 225.31 LBS | RESPIRATION RATE: 18 BRPM

## 2021-01-01 VITALS
DIASTOLIC BLOOD PRESSURE: 72 MMHG | HEIGHT: 72 IN | TEMPERATURE: 97.3 F | OXYGEN SATURATION: 97 % | SYSTOLIC BLOOD PRESSURE: 128 MMHG | BODY MASS INDEX: 30.88 KG/M2 | RESPIRATION RATE: 16 BRPM | HEART RATE: 116 BPM | WEIGHT: 228 LBS

## 2021-01-01 VITALS
TEMPERATURE: 97.7 F | BODY MASS INDEX: 31.29 KG/M2 | OXYGEN SATURATION: 98 % | HEART RATE: 135 BPM | SYSTOLIC BLOOD PRESSURE: 130 MMHG | WEIGHT: 231 LBS | DIASTOLIC BLOOD PRESSURE: 80 MMHG | HEIGHT: 72 IN | RESPIRATION RATE: 16 BRPM

## 2021-01-01 VITALS — SYSTOLIC BLOOD PRESSURE: 129 MMHG | DIASTOLIC BLOOD PRESSURE: 62 MMHG

## 2021-01-01 VITALS — TEMPERATURE: 95.5 F

## 2021-01-01 VITALS
BODY MASS INDEX: 30.95 KG/M2 | SYSTOLIC BLOOD PRESSURE: 128 MMHG | WEIGHT: 228.5 LBS | OXYGEN SATURATION: 97 % | TEMPERATURE: 96.9 F | HEIGHT: 72 IN | HEART RATE: 101 BPM | RESPIRATION RATE: 16 BRPM | DIASTOLIC BLOOD PRESSURE: 76 MMHG

## 2021-01-01 VITALS
HEIGHT: 72 IN | SYSTOLIC BLOOD PRESSURE: 111 MMHG | OXYGEN SATURATION: 98 % | HEART RATE: 125 BPM | TEMPERATURE: 96.5 F | BODY MASS INDEX: 31.08 KG/M2 | DIASTOLIC BLOOD PRESSURE: 59 MMHG | WEIGHT: 229.5 LBS | RESPIRATION RATE: 18 BRPM

## 2021-01-01 VITALS
RESPIRATION RATE: 16 BRPM | WEIGHT: 229 LBS | TEMPERATURE: 98.2 F | HEART RATE: 135 BPM | DIASTOLIC BLOOD PRESSURE: 84 MMHG | OXYGEN SATURATION: 95 % | HEIGHT: 72 IN | SYSTOLIC BLOOD PRESSURE: 162 MMHG | BODY MASS INDEX: 31.02 KG/M2

## 2021-01-01 VITALS — TEMPERATURE: 97.6 F

## 2021-01-01 DIAGNOSIS — C25.0 MALIGNANT NEOPLASM OF HEAD OF PANCREAS (HCC): ICD-10-CM

## 2021-01-01 DIAGNOSIS — E11.65 HYPERGLYCEMIA DUE TO TYPE 2 DIABETES MELLITUS (HCC): ICD-10-CM

## 2021-01-01 DIAGNOSIS — R18.8 OTHER ASCITES: ICD-10-CM

## 2021-01-01 DIAGNOSIS — N18.9 ACUTE-ON-CHRONIC KIDNEY INJURY (HCC): ICD-10-CM

## 2021-01-01 DIAGNOSIS — N30.00 ACUTE CYSTITIS WITHOUT HEMATURIA: ICD-10-CM

## 2021-01-01 DIAGNOSIS — C25.2 MALIGNANT NEOPLASM OF TAIL OF PANCREAS (HCC): Primary | ICD-10-CM

## 2021-01-01 DIAGNOSIS — C25.0 MALIGNANT NEOPLASM OF HEAD OF PANCREAS (HCC): Primary | ICD-10-CM

## 2021-01-01 DIAGNOSIS — K56.7 ILEUS (HCC): ICD-10-CM

## 2021-01-01 DIAGNOSIS — R18.0 MALIGNANT ASCITES: ICD-10-CM

## 2021-01-01 DIAGNOSIS — C25.9 PRIMARY PANCREATIC CANCER WITH METASTASIS TO OTHER SITE (HCC): Primary | ICD-10-CM

## 2021-01-01 DIAGNOSIS — Z95.828 PORT-A-CATH IN PLACE: ICD-10-CM

## 2021-01-01 DIAGNOSIS — N17.9 ACUTE-ON-CHRONIC KIDNEY INJURY (HCC): ICD-10-CM

## 2021-01-01 DIAGNOSIS — R78.81 BACTEREMIA: ICD-10-CM

## 2021-01-01 DIAGNOSIS — C25.9 PANCREATIC CANCER (HCC): Primary | ICD-10-CM

## 2021-01-01 DIAGNOSIS — R33.9 URINARY RETENTION: ICD-10-CM

## 2021-01-01 DIAGNOSIS — R18.8 OTHER ASCITES: Primary | ICD-10-CM

## 2021-01-01 LAB
ALBUMIN SERPL BCP-MCNC: 2.1 G/DL (ref 3.5–5)
ALBUMIN SERPL BCP-MCNC: 3.1 G/DL (ref 3.5–5)
ALBUMIN SERPL-MCNC: 3 G/DL (ref 3.6–5.1)
ALBUMIN SERPL-MCNC: 3.7 G/DL (ref 3.6–5.1)
ALBUMIN SERPL-MCNC: 3.8 G/DL (ref 3.6–5.1)
ALBUMIN/GLOB SERPL: 1 (CALC) (ref 1–2.5)
ALBUMIN/GLOB SERPL: 1.2 (CALC) (ref 1–2.5)
ALBUMIN/GLOB SERPL: 1.3 (CALC) (ref 1–2.5)
ALP SERPL-CCNC: 102 U/L (ref 35–144)
ALP SERPL-CCNC: 107 U/L (ref 46–116)
ALP SERPL-CCNC: 115 U/L (ref 46–116)
ALP SERPL-CCNC: 70 U/L (ref 35–144)
ALP SERPL-CCNC: 85 U/L (ref 35–144)
ALT SERPL W P-5'-P-CCNC: 14 U/L (ref 12–78)
ALT SERPL W P-5'-P-CCNC: 8 U/L (ref 12–78)
ALT SERPL-CCNC: 5 U/L (ref 9–46)
ALT SERPL-CCNC: 7 U/L (ref 9–46)
ALT SERPL-CCNC: 9 U/L (ref 9–46)
ANION GAP SERPL CALCULATED.3IONS-SCNC: 10 MMOL/L (ref 4–13)
ANION GAP SERPL CALCULATED.3IONS-SCNC: 11 MMOL/L (ref 4–13)
ANION GAP SERPL CALCULATED.3IONS-SCNC: 12 MMOL/L (ref 4–13)
ANION GAP SERPL CALCULATED.3IONS-SCNC: 8 MMOL/L (ref 4–13)
APPEARANCE FLD: NORMAL
AST SERPL W P-5'-P-CCNC: 11 U/L (ref 5–45)
AST SERPL W P-5'-P-CCNC: 14 U/L (ref 5–45)
AST SERPL-CCNC: 11 U/L (ref 10–35)
AST SERPL-CCNC: 12 U/L (ref 10–35)
AST SERPL-CCNC: 8 U/L (ref 10–35)
ATRIAL RATE: 126 BPM
ATRIAL RATE: 127 BPM
ATRIAL RATE: 141 BPM
BACTERIA UR QL AUTO: ABNORMAL /HPF
BASE EXCESS BLDA CALC-SCNC: -6 MMOL/L (ref -2–3)
BASOPHILS # BLD AUTO: 0.04 THOUSANDS/ΜL (ref 0–0.1)
BASOPHILS # BLD AUTO: 0.08 THOUSANDS/ΜL (ref 0–0.1)
BASOPHILS # BLD AUTO: 44 CELLS/UL (ref 0–200)
BASOPHILS # BLD AUTO: 53 CELLS/UL (ref 0–200)
BASOPHILS # BLD AUTO: 76 CELLS/UL (ref 0–200)
BASOPHILS # BLD MANUAL: 0 THOUSAND/UL (ref 0–0.1)
BASOPHILS # BLD MANUAL: 0 THOUSAND/UL (ref 0–0.1)
BASOPHILS NFR BLD AUTO: 0 % (ref 0–1)
BASOPHILS NFR BLD AUTO: 0.5 %
BASOPHILS NFR BLD AUTO: 0.5 %
BASOPHILS NFR BLD AUTO: 0.7 %
BASOPHILS NFR BLD AUTO: 1 % (ref 0–1)
BASOPHILS NFR MAR MANUAL: 0 % (ref 0–1)
BASOPHILS NFR MAR MANUAL: 0 % (ref 0–1)
BILIRUB SERPL-MCNC: 0.5 MG/DL (ref 0.2–1.2)
BILIRUB SERPL-MCNC: 0.53 MG/DL (ref 0.2–1)
BILIRUB SERPL-MCNC: 0.53 MG/DL (ref 0.2–1)
BILIRUB SERPL-MCNC: 0.8 MG/DL (ref 0.2–1.2)
BILIRUB SERPL-MCNC: 0.8 MG/DL (ref 0.2–1.2)
BILIRUB UR QL STRIP: NEGATIVE
BILIRUB UR QL STRIP: NEGATIVE
BUN SERPL-MCNC: 10 MG/DL (ref 5–25)
BUN SERPL-MCNC: 11 MG/DL (ref 7–25)
BUN SERPL-MCNC: 12 MG/DL (ref 7–25)
BUN SERPL-MCNC: 23 MG/DL (ref 7–25)
BUN SERPL-MCNC: 75 MG/DL (ref 5–25)
BUN SERPL-MCNC: 77 MG/DL (ref 5–25)
BUN SERPL-MCNC: 80 MG/DL (ref 5–25)
BUN/CREAT SERPL: 16 (CALC) (ref 6–22)
BUN/CREAT SERPL: ABNORMAL (CALC) (ref 6–22)
BUN/CREAT SERPL: ABNORMAL (CALC) (ref 6–22)
CALCIUM ALBUM COR SERPL-MCNC: 9.2 MG/DL (ref 8.3–10.1)
CALCIUM ALBUM COR SERPL-MCNC: 9.6 MG/DL (ref 8.3–10.1)
CALCIUM SERPL-MCNC: 8 MG/DL (ref 8.3–10.1)
CALCIUM SERPL-MCNC: 8.1 MG/DL (ref 8.3–10.1)
CALCIUM SERPL-MCNC: 8.2 MG/DL (ref 8.3–10.1)
CALCIUM SERPL-MCNC: 8.5 MG/DL (ref 8.3–10.1)
CALCIUM SERPL-MCNC: 8.5 MG/DL (ref 8.6–10.3)
CALCIUM SERPL-MCNC: 8.9 MG/DL (ref 8.6–10.3)
CALCIUM SERPL-MCNC: 9.2 MG/DL (ref 8.6–10.3)
CANCER AG19-9 SERPL-ACNC: ABNORMAL U/ML
CHLORIDE SERPL-SCNC: 101 MMOL/L (ref 100–108)
CHLORIDE SERPL-SCNC: 103 MMOL/L (ref 98–110)
CHLORIDE SERPL-SCNC: 104 MMOL/L (ref 98–110)
CHLORIDE SERPL-SCNC: 105 MMOL/L (ref 100–108)
CHLORIDE SERPL-SCNC: 96 MMOL/L (ref 100–108)
CHLORIDE SERPL-SCNC: 98 MMOL/L (ref 100–108)
CHLORIDE SERPL-SCNC: 99 MMOL/L (ref 98–110)
CLARITY UR: CLEAR
CLARITY UR: CLEAR
CO2 SERPL-SCNC: 23 MMOL/L (ref 20–32)
CO2 SERPL-SCNC: 24 MMOL/L (ref 21–32)
CO2 SERPL-SCNC: 26 MMOL/L (ref 20–32)
CO2 SERPL-SCNC: 27 MMOL/L (ref 21–32)
CO2 SERPL-SCNC: 29 MMOL/L (ref 20–32)
COARSE GRAN CASTS URNS QL MICRO: ABNORMAL /LPF
COLOR FLD: YELLOW
COLOR UR: YELLOW
COLOR UR: YELLOW
CREAT SERPL-MCNC: 0.87 MG/DL (ref 0.7–1.18)
CREAT SERPL-MCNC: 0.88 MG/DL (ref 0.7–1.18)
CREAT SERPL-MCNC: 0.99 MG/DL (ref 0.6–1.3)
CREAT SERPL-MCNC: 1.43 MG/DL (ref 0.7–1.18)
CREAT SERPL-MCNC: 1.99 MG/DL (ref 0.6–1.3)
CREAT SERPL-MCNC: 2.1 MG/DL (ref 0.6–1.3)
CREAT SERPL-MCNC: 2.88 MG/DL (ref 0.6–1.3)
DOHLE BOD BLD QL SMEAR: PRESENT
EOSINOPHIL # BLD AUTO: 0.04 THOUSAND/ΜL (ref 0–0.61)
EOSINOPHIL # BLD AUTO: 0.12 THOUSAND/ΜL (ref 0–0.61)
EOSINOPHIL # BLD AUTO: 128 CELLS/UL (ref 15–500)
EOSINOPHIL # BLD AUTO: 148 CELLS/UL (ref 15–500)
EOSINOPHIL # BLD AUTO: 152 CELLS/UL (ref 15–500)
EOSINOPHIL # BLD MANUAL: 0 THOUSAND/UL (ref 0–0.4)
EOSINOPHIL # BLD MANUAL: 0 THOUSAND/UL (ref 0–0.4)
EOSINOPHIL NFR BLD AUTO: 0 % (ref 0–6)
EOSINOPHIL NFR BLD AUTO: 1 %
EOSINOPHIL NFR BLD AUTO: 1.7 %
EOSINOPHIL NFR BLD AUTO: 1.7 %
EOSINOPHIL NFR BLD AUTO: 2 % (ref 0–6)
EOSINOPHIL NFR BLD MANUAL: 0 % (ref 0–6)
EOSINOPHIL NFR BLD MANUAL: 0 % (ref 0–6)
ERYTHROCYTE [DISTWIDTH] IN BLOOD BY AUTOMATED COUNT: 13.3 % (ref 11–15)
ERYTHROCYTE [DISTWIDTH] IN BLOOD BY AUTOMATED COUNT: 14.4 % (ref 11.6–15.1)
ERYTHROCYTE [DISTWIDTH] IN BLOOD BY AUTOMATED COUNT: 14.5 % (ref 11.6–15.1)
ERYTHROCYTE [DISTWIDTH] IN BLOOD BY AUTOMATED COUNT: 14.6 % (ref 11.6–15.1)
ERYTHROCYTE [DISTWIDTH] IN BLOOD BY AUTOMATED COUNT: 15 % (ref 11–15)
ERYTHROCYTE [DISTWIDTH] IN BLOOD BY AUTOMATED COUNT: 15.1 % (ref 11.6–15.1)
ERYTHROCYTE [DISTWIDTH] IN BLOOD BY AUTOMATED COUNT: 15.8 % (ref 11–15)
FIO2 GAS DIL.REBREATH: 100 L
FLUAV RNA RESP QL NAA+PROBE: NEGATIVE
FLUBV RNA RESP QL NAA+PROBE: NEGATIVE
GFR SERPL CREATININE-BSD FRML MDRD: 20 ML/MIN/1.73SQ M
GFR SERPL CREATININE-BSD FRML MDRD: 29 ML/MIN/1.73SQ M
GFR SERPL CREATININE-BSD FRML MDRD: 31 ML/MIN/1.73SQ M
GFR SERPL CREATININE-BSD FRML MDRD: 73 ML/MIN/1.73SQ M
GLOBULIN SER CALC-MCNC: 2.8 G/DL (CALC) (ref 1.9–3.7)
GLOBULIN SER CALC-MCNC: 3 G/DL (CALC) (ref 1.9–3.7)
GLOBULIN SER CALC-MCNC: 3.1 G/DL (CALC) (ref 1.9–3.7)
GLUCOSE P FAST SERPL-MCNC: 164 MG/DL (ref 65–99)
GLUCOSE SERPL-MCNC: 164 MG/DL (ref 65–140)
GLUCOSE SERPL-MCNC: 172 MG/DL (ref 65–99)
GLUCOSE SERPL-MCNC: 187 MG/DL (ref 65–99)
GLUCOSE SERPL-MCNC: 205 MG/DL (ref 65–99)
GLUCOSE SERPL-MCNC: 216 MG/DL (ref 65–140)
GLUCOSE SERPL-MCNC: 227 MG/DL (ref 65–140)
GLUCOSE SERPL-MCNC: 229 MG/DL (ref 65–140)
GLUCOSE SERPL-MCNC: 233 MG/DL (ref 65–140)
GLUCOSE SERPL-MCNC: 238 MG/DL (ref 65–140)
GLUCOSE SERPL-MCNC: 242 MG/DL (ref 65–140)
GLUCOSE SERPL-MCNC: 242 MG/DL (ref 65–140)
GLUCOSE SERPL-MCNC: 244 MG/DL (ref 65–140)
GLUCOSE SERPL-MCNC: 244 MG/DL (ref 65–140)
GLUCOSE SERPL-MCNC: 248 MG/DL (ref 65–140)
GLUCOSE SERPL-MCNC: 259 MG/DL (ref 65–140)
GLUCOSE SERPL-MCNC: 260 MG/DL (ref 65–140)
GLUCOSE UR STRIP-MCNC: NEGATIVE MG/DL
GLUCOSE UR STRIP-MCNC: NEGATIVE MG/DL
HCO3 BLDA-SCNC: 19.6 MMOL/L (ref 24–30)
HCT VFR BLD AUTO: 35.5 % (ref 36.5–49.3)
HCT VFR BLD AUTO: 37.8 % (ref 38.5–50)
HCT VFR BLD AUTO: 38.4 % (ref 36.5–49.3)
HCT VFR BLD AUTO: 38.6 % (ref 38.5–50)
HCT VFR BLD AUTO: 39 % (ref 36.5–49.3)
HCT VFR BLD AUTO: 39.4 % (ref 36.5–49.3)
HCT VFR BLD AUTO: 41.2 % (ref 38.5–50)
HCT VFR BLD CALC: 37 % (ref 36.5–49.3)
HGB BLD-MCNC: 11.2 G/DL (ref 12–17)
HGB BLD-MCNC: 12 G/DL (ref 12–17)
HGB BLD-MCNC: 12.3 G/DL (ref 12–17)
HGB BLD-MCNC: 12.3 G/DL (ref 13.2–17.1)
HGB BLD-MCNC: 12.6 G/DL (ref 12–17)
HGB BLD-MCNC: 12.9 G/DL (ref 13.2–17.1)
HGB BLD-MCNC: 13.3 G/DL (ref 13.2–17.1)
HGB BLDA-MCNC: 12.6 G/DL (ref 12–17)
HGB UR QL STRIP.AUTO: NEGATIVE
HGB UR QL STRIP.AUTO: NEGATIVE
HISTIOCYTES NFR FLD: 30 %
IMM GRANULOCYTES # BLD AUTO: 0.1 THOUSAND/UL (ref 0–0.2)
IMM GRANULOCYTES # BLD AUTO: 0.19 THOUSAND/UL (ref 0–0.2)
IMM GRANULOCYTES NFR BLD AUTO: 1 % (ref 0–2)
IMM GRANULOCYTES NFR BLD AUTO: 1 % (ref 0–2)
INR PPP: 1.27 (ref 0.84–1.19)
KETONES UR STRIP-MCNC: NEGATIVE MG/DL
KETONES UR STRIP-MCNC: NEGATIVE MG/DL
LACTATE SERPL-SCNC: 1.6 MMOL/L (ref 0.5–2)
LEUKOCYTE ESTERASE UR QL STRIP: ABNORMAL
LEUKOCYTE ESTERASE UR QL STRIP: NEGATIVE
LYMPHOCYTES # BLD AUTO: 0.47 THOUSANDS/ΜL (ref 0.6–4.47)
LYMPHOCYTES # BLD AUTO: 0.96 THOUSAND/UL (ref 0.6–4.47)
LYMPHOCYTES # BLD AUTO: 1.2 THOUSAND/UL (ref 0.6–4.47)
LYMPHOCYTES # BLD AUTO: 1.24 THOUSANDS/ΜL (ref 0.6–4.47)
LYMPHOCYTES # BLD AUTO: 1118 CELLS/UL (ref 850–3900)
LYMPHOCYTES # BLD AUTO: 1549 CELLS/UL (ref 850–3900)
LYMPHOCYTES # BLD AUTO: 1657 CELLS/UL (ref 850–3900)
LYMPHOCYTES # BLD AUTO: 2 % (ref 14–44)
LYMPHOCYTES # BLD AUTO: 4 % (ref 14–44)
LYMPHOCYTES NFR BLD AUTO: 10.9 %
LYMPHOCYTES NFR BLD AUTO: 14.9 %
LYMPHOCYTES NFR BLD AUTO: 15 % (ref 14–44)
LYMPHOCYTES NFR BLD AUTO: 17.8 %
LYMPHOCYTES NFR BLD AUTO: 2 % (ref 14–44)
LYMPHOCYTES NFR BLD AUTO: 59 %
MAGNESIUM SERPL-MCNC: 1.7 MG/DL (ref 1.6–2.6)
MCH RBC QN AUTO: 30.4 PG (ref 27–33)
MCH RBC QN AUTO: 30.5 PG (ref 26.8–34.3)
MCH RBC QN AUTO: 30.7 PG (ref 26.8–34.3)
MCH RBC QN AUTO: 30.9 PG (ref 26.8–34.3)
MCH RBC QN AUTO: 30.9 PG (ref 27–33)
MCH RBC QN AUTO: 31.3 PG (ref 26.8–34.3)
MCH RBC QN AUTO: 31.4 PG (ref 27–33)
MCHC RBC AUTO-ENTMCNC: 31.2 G/DL (ref 31.4–37.4)
MCHC RBC AUTO-ENTMCNC: 31.3 G/DL (ref 31.4–37.4)
MCHC RBC AUTO-ENTMCNC: 31.5 G/DL (ref 31.4–37.4)
MCHC RBC AUTO-ENTMCNC: 32.3 G/DL (ref 31.4–37.4)
MCHC RBC AUTO-ENTMCNC: 32.3 G/DL (ref 32–36)
MCHC RBC AUTO-ENTMCNC: 32.5 G/DL (ref 32–36)
MCHC RBC AUTO-ENTMCNC: 33.4 G/DL (ref 32–36)
MCV RBC AUTO: 93.3 FL (ref 80–100)
MCV RBC AUTO: 93.9 FL (ref 80–100)
MCV RBC AUTO: 95.6 FL (ref 80–100)
MCV RBC AUTO: 97 FL (ref 82–98)
MCV RBC AUTO: 98 FL (ref 82–98)
METAMYELOCYTES NFR BLD MANUAL: 1 % (ref 0–1)
MONO+MESO NFR FLD MANUAL: 2 %
MONOCYTES # BLD AUTO: 0 THOUSAND/UL (ref 0–1.22)
MONOCYTES # BLD AUTO: 0.26 THOUSAND/ΜL (ref 0.17–1.22)
MONOCYTES # BLD AUTO: 0.48 THOUSAND/UL (ref 0–1.22)
MONOCYTES # BLD AUTO: 0.55 THOUSAND/ΜL (ref 0.17–1.22)
MONOCYTES # BLD AUTO: 1398 CELLS/UL (ref 200–950)
MONOCYTES # BLD AUTO: 540 CELLS/UL (ref 200–950)
MONOCYTES # BLD AUTO: 679 CELLS/UL (ref 200–950)
MONOCYTES NFR BLD AUTO: 1 % (ref 4–12)
MONOCYTES NFR BLD AUTO: 7 % (ref 4–12)
MONOCYTES NFR BLD AUTO: 7.2 %
MONOCYTES NFR BLD AUTO: 7.8 %
MONOCYTES NFR BLD AUTO: 9.2 %
MONOCYTES NFR BLD: 0 % (ref 4–12)
MONOCYTES NFR BLD: 1 % (ref 4–12)
NEUTROPHILS # BLD AUTO: 18.32 THOUSANDS/ΜL (ref 1.85–7.62)
NEUTROPHILS # BLD AUTO: 5663 CELLS/UL (ref 1500–7800)
NEUTROPHILS # BLD AUTO: 6.11 THOUSANDS/ΜL (ref 1.85–7.62)
NEUTROPHILS # BLD AUTO: 6281 CELLS/UL (ref 1500–7800)
NEUTROPHILS # BLD AUTO: ABNORMAL CELLS/UL (ref 1500–7800)
NEUTROPHILS # BLD MANUAL: 28.71 THOUSAND/UL (ref 1.85–7.62)
NEUTROPHILS # BLD MANUAL: 45.91 THOUSAND/UL (ref 1.85–7.62)
NEUTROPHILS NFR BLD AUTO: 72.2 %
NEUTROPHILS NFR BLD AUTO: 75.5 %
NEUTROPHILS NFR BLD AUTO: 78.4 %
NEUTS BAND NFR BLD MANUAL: 3 % (ref 0–8)
NEUTS BAND NFR BLD MANUAL: 9 % (ref 0–8)
NEUTS SEG NFR BLD AUTO: 74 % (ref 43–75)
NEUTS SEG NFR BLD AUTO: 87 % (ref 43–75)
NEUTS SEG NFR BLD AUTO: 9 %
NEUTS SEG NFR BLD AUTO: 93 % (ref 43–75)
NEUTS SEG NFR BLD AUTO: 96 % (ref 43–75)
NITRITE UR QL STRIP: NEGATIVE
NITRITE UR QL STRIP: NEGATIVE
NON-SQ EPI CELLS URNS QL MICRO: ABNORMAL /HPF
NRBC BLD AUTO-RTO: 0 /100 WBCS
P AXIS: 48 DEGREES
P AXIS: 48 DEGREES
P AXIS: 77 DEGREES
PCO2 BLD: 21 MMOL/L (ref 21–32)
PCO2 BLD: 37 MM HG (ref 42–50)
PH BLD: 7.33 [PH] (ref 7.3–7.4)
PH UR STRIP.AUTO: 5 [PH]
PH UR STRIP.AUTO: 5 [PH]
PLATELET # BLD AUTO: 124 THOUSAND/UL (ref 140–400)
PLATELET # BLD AUTO: 158 THOUSANDS/UL (ref 149–390)
PLATELET # BLD AUTO: 174 THOUSANDS/UL (ref 149–390)
PLATELET # BLD AUTO: 230 THOUSAND/UL (ref 140–400)
PLATELET # BLD AUTO: 240 THOUSANDS/UL (ref 149–390)
PLATELET # BLD AUTO: 64 THOUSAND/UL (ref 140–400)
PLATELET # BLD AUTO: 73 THOUSANDS/UL (ref 149–390)
PLATELET BLD QL SMEAR: ABNORMAL
PLATELET BLD QL SMEAR: ADEQUATE
PMV BLD AUTO: 10.8 FL (ref 8.9–12.7)
PMV BLD AUTO: 11.2 FL (ref 8.9–12.7)
PMV BLD AUTO: 11.3 FL (ref 8.9–12.7)
PMV BLD AUTO: 11.3 FL (ref 8.9–12.7)
PMV BLD REES-ECKER: 12.2 FL (ref 7.5–12.5)
PMV BLD REES-ECKER: 12.7 FL (ref 7.5–12.5)
PMV BLD REES-ECKER: 13.3 FL (ref 7.5–12.5)
PO2 BLD: 51 MM HG (ref 35–45)
POTASSIUM BLD-SCNC: 4.4 MMOL/L (ref 3.5–5.3)
POTASSIUM SERPL-SCNC: 3.2 MMOL/L (ref 3.5–5.3)
POTASSIUM SERPL-SCNC: 3.6 MMOL/L (ref 3.5–5.3)
POTASSIUM SERPL-SCNC: 3.9 MMOL/L (ref 3.5–5.3)
POTASSIUM SERPL-SCNC: 4.1 MMOL/L (ref 3.5–5.3)
POTASSIUM SERPL-SCNC: 4.2 MMOL/L (ref 3.5–5.3)
POTASSIUM SERPL-SCNC: 4.3 MMOL/L (ref 3.5–5.3)
POTASSIUM SERPL-SCNC: 4.4 MMOL/L (ref 3.5–5.3)
PR INTERVAL: 128 MS
PR INTERVAL: 138 MS
PR INTERVAL: 142 MS
PROCALCITONIN SERPL-MCNC: 1.34 NG/ML
PROT SERPL-MCNC: 6 G/DL (ref 6.1–8.1)
PROT SERPL-MCNC: 6.5 G/DL (ref 6.1–8.1)
PROT SERPL-MCNC: 6.5 G/DL (ref 6.4–8.2)
PROT SERPL-MCNC: 6.8 G/DL (ref 6.4–8.2)
PROT SERPL-MCNC: 6.9 G/DL (ref 6.1–8.1)
PROT UR STRIP-MCNC: NEGATIVE MG/DL
PROT UR STRIP-MCNC: NEGATIVE MG/DL
PROTHROMBIN TIME: 16.1 SECONDS (ref 11.6–14.5)
QRS AXIS: -25 DEGREES
QRS AXIS: -29 DEGREES
QRS AXIS: 90 DEGREES
QRSD INTERVAL: 74 MS
QT INTERVAL: 300 MS
QT INTERVAL: 302 MS
QT INTERVAL: 318 MS
QTC INTERVAL: 438 MS
QTC INTERVAL: 459 MS
QTC INTERVAL: 460 MS
RBC # BLD AUTO: 3.62 MILLION/UL (ref 3.88–5.62)
RBC # BLD AUTO: 3.91 MILLION/UL (ref 3.88–5.62)
RBC # BLD AUTO: 4.03 MILLION/UL (ref 3.88–5.62)
RBC # BLD AUTO: 4.03 MILLION/UL (ref 3.88–5.62)
RBC # BLD AUTO: 4.05 MILLION/UL (ref 4.2–5.8)
RBC # BLD AUTO: 4.11 MILLION/UL (ref 4.2–5.8)
RBC # BLD AUTO: 4.31 MILLION/UL (ref 4.2–5.8)
RBC #/AREA URNS AUTO: ABNORMAL /HPF
RSV RNA RESP QL NAA+PROBE: NEGATIVE
SAO2 % BLD FROM PO2: 83 % (ref 60–85)
SARS-COV-2 RNA RESP QL NAA+PROBE: NEGATIVE
SERVICE CMNT-IMP: ABNORMAL
SITE: NORMAL
SL AMB EGFR AFRICAN AMERICAN: 54 ML/MIN/1.73M2
SL AMB EGFR AFRICAN AMERICAN: 96 ML/MIN/1.73M2
SL AMB EGFR AFRICAN AMERICAN: 96 ML/MIN/1.73M2
SL AMB EGFR NON AFRICAN AMERICAN: 47 ML/MIN/1.73M2
SL AMB EGFR NON AFRICAN AMERICAN: 83 ML/MIN/1.73M2
SL AMB EGFR NON AFRICAN AMERICAN: 83 ML/MIN/1.73M2
SODIUM BLD-SCNC: 137 MMOL/L (ref 136–145)
SODIUM SERPL-SCNC: 132 MMOL/L (ref 136–145)
SODIUM SERPL-SCNC: 133 MMOL/L (ref 136–145)
SODIUM SERPL-SCNC: 135 MMOL/L (ref 136–145)
SODIUM SERPL-SCNC: 137 MMOL/L (ref 135–146)
SODIUM SERPL-SCNC: 139 MMOL/L (ref 135–146)
SODIUM SERPL-SCNC: 140 MMOL/L (ref 136–145)
SODIUM SERPL-SCNC: 141 MMOL/L (ref 135–146)
SP GR UR STRIP.AUTO: 1.02 (ref 1–1.03)
SP GR UR STRIP.AUTO: 1.02 (ref 1–1.03)
SPECIMEN SOURCE: ABNORMAL
T WAVE AXIS: 33 DEGREES
T WAVE AXIS: 42 DEGREES
T WAVE AXIS: 58 DEGREES
TOTAL CELLS COUNTED SPEC: 100
TOXIC GRANULES BLD QL SMEAR: PRESENT
TOXIC GRANULES BLD QL SMEAR: PRESENT
UROBILINOGEN UR QL STRIP.AUTO: 0.2 E.U./DL
UROBILINOGEN UR QL STRIP.AUTO: 0.2 E.U./DL
VENTRICULAR RATE: 126 BPM
VENTRICULAR RATE: 127 BPM
VENTRICULAR RATE: 141 BPM
WBC # BLD AUTO: 15.2 THOUSAND/UL (ref 3.8–10.8)
WBC # BLD AUTO: 19.36 THOUSAND/UL (ref 4.31–10.16)
WBC # BLD AUTO: 29.91 THOUSAND/UL (ref 4.31–10.16)
WBC # BLD AUTO: 47.82 THOUSAND/UL (ref 4.31–10.16)
WBC # BLD AUTO: 7.5 THOUSAND/UL (ref 3.8–10.8)
WBC # BLD AUTO: 8.16 THOUSAND/UL (ref 4.31–10.16)
WBC # BLD AUTO: 8.7 THOUSAND/UL (ref 3.8–10.8)
WBC # FLD MANUAL: 282 /UL
WBC #/AREA URNS AUTO: ABNORMAL /HPF

## 2021-01-01 PROCEDURE — 96372 THER/PROPH/DIAG INJ SC/IM: CPT

## 2021-01-01 PROCEDURE — 71045 X-RAY EXAM CHEST 1 VIEW: CPT

## 2021-01-01 PROCEDURE — 93005 ELECTROCARDIOGRAM TRACING: CPT

## 2021-01-01 PROCEDURE — 83605 ASSAY OF LACTIC ACID: CPT | Performed by: PHYSICIAN ASSISTANT

## 2021-01-01 PROCEDURE — 49083 ABD PARACENTESIS W/IMAGING: CPT | Performed by: RADIOLOGY

## 2021-01-01 PROCEDURE — 80048 BASIC METABOLIC PNL TOTAL CA: CPT | Performed by: PHYSICIAN ASSISTANT

## 2021-01-01 PROCEDURE — 1036F TOBACCO NON-USER: CPT | Performed by: NURSE PRACTITIONER

## 2021-01-01 PROCEDURE — 99291 CRITICAL CARE FIRST HOUR: CPT | Performed by: PHYSICIAN ASSISTANT

## 2021-01-01 PROCEDURE — 99285 EMERGENCY DEPT VISIT HI MDM: CPT

## 2021-01-01 PROCEDURE — 84295 ASSAY OF SERUM SODIUM: CPT

## 2021-01-01 PROCEDURE — 97535 SELF CARE MNGMENT TRAINING: CPT

## 2021-01-01 PROCEDURE — 82948 REAGENT STRIP/BLOOD GLUCOSE: CPT

## 2021-01-01 PROCEDURE — 85027 COMPLETE CBC AUTOMATED: CPT | Performed by: EMERGENCY MEDICINE

## 2021-01-01 PROCEDURE — 96415 CHEMO IV INFUSION ADDL HR: CPT

## 2021-01-01 PROCEDURE — 85025 COMPLETE CBC W/AUTO DIFF WBC: CPT | Performed by: INTERNAL MEDICINE

## 2021-01-01 PROCEDURE — 3077F SYST BP >= 140 MM HG: CPT | Performed by: NURSE PRACTITIONER

## 2021-01-01 PROCEDURE — G1004 CDSM NDSC: HCPCS

## 2021-01-01 PROCEDURE — 84145 PROCALCITONIN (PCT): CPT | Performed by: PHYSICIAN ASSISTANT

## 2021-01-01 PROCEDURE — 99223 1ST HOSP IP/OBS HIGH 75: CPT | Performed by: SURGERY

## 2021-01-01 PROCEDURE — 99285 EMERGENCY DEPT VISIT HI MDM: CPT | Performed by: EMERGENCY MEDICINE

## 2021-01-01 PROCEDURE — 76770 US EXAM ABDO BACK WALL COMP: CPT

## 2021-01-01 PROCEDURE — 87040 BLOOD CULTURE FOR BACTERIA: CPT | Performed by: PHYSICIAN ASSISTANT

## 2021-01-01 PROCEDURE — 0T9B70Z DRAINAGE OF BLADDER WITH DRAINAGE DEVICE, VIA NATURAL OR ARTIFICIAL OPENING: ICD-10-PCS | Performed by: INTERNAL MEDICINE

## 2021-01-01 PROCEDURE — 96411 CHEMO IV PUSH ADDL DRUG: CPT

## 2021-01-01 PROCEDURE — 0W9G3ZZ DRAINAGE OF PERITONEAL CAVITY, PERCUTANEOUS APPROACH: ICD-10-PCS | Performed by: RADIOLOGY

## 2021-01-01 PROCEDURE — 85610 PROTHROMBIN TIME: CPT | Performed by: PHYSICIAN ASSISTANT

## 2021-01-01 PROCEDURE — 96413 CHEMO IV INFUSION 1 HR: CPT

## 2021-01-01 PROCEDURE — NC001 PR NO CHARGE: Performed by: STUDENT IN AN ORGANIZED HEALTH CARE EDUCATION/TRAINING PROGRAM

## 2021-01-01 PROCEDURE — 80053 COMPREHEN METABOLIC PANEL: CPT | Performed by: EMERGENCY MEDICINE

## 2021-01-01 PROCEDURE — 81001 URINALYSIS AUTO W/SCOPE: CPT | Performed by: INTERNAL MEDICINE

## 2021-01-01 PROCEDURE — 84132 ASSAY OF SERUM POTASSIUM: CPT

## 2021-01-01 PROCEDURE — 82947 ASSAY GLUCOSE BLOOD QUANT: CPT

## 2021-01-01 PROCEDURE — 97163 PT EVAL HIGH COMPLEX 45 MIN: CPT

## 2021-01-01 PROCEDURE — 80048 BASIC METABOLIC PNL TOTAL CA: CPT | Performed by: NURSE PRACTITIONER

## 2021-01-01 PROCEDURE — 97167 OT EVAL HIGH COMPLEX 60 MIN: CPT

## 2021-01-01 PROCEDURE — 99233 SBSQ HOSP IP/OBS HIGH 50: CPT | Performed by: PHYSICIAN ASSISTANT

## 2021-01-01 PROCEDURE — 96368 THER/DIAG CONCURRENT INF: CPT

## 2021-01-01 PROCEDURE — 3079F DIAST BP 80-89 MM HG: CPT | Performed by: NURSE PRACTITIONER

## 2021-01-01 PROCEDURE — 85027 COMPLETE CBC AUTOMATED: CPT | Performed by: PHYSICIAN ASSISTANT

## 2021-01-01 PROCEDURE — 49083 ABD PARACENTESIS W/IMAGING: CPT

## 2021-01-01 PROCEDURE — 0241U HB NFCT DS VIR RESP RNA 4 TRGT: CPT | Performed by: PHYSICIAN ASSISTANT

## 2021-01-01 PROCEDURE — 80053 COMPREHEN METABOLIC PANEL: CPT | Performed by: INTERNAL MEDICINE

## 2021-01-01 PROCEDURE — 85014 HEMATOCRIT: CPT

## 2021-01-01 PROCEDURE — 85007 BL SMEAR W/DIFF WBC COUNT: CPT | Performed by: NURSE PRACTITIONER

## 2021-01-01 PROCEDURE — 96365 THER/PROPH/DIAG IV INF INIT: CPT

## 2021-01-01 PROCEDURE — G0498 CHEMO EXTEND IV INFUS W/PUMP: HCPCS

## 2021-01-01 PROCEDURE — 96367 TX/PROPH/DG ADDL SEQ IV INF: CPT

## 2021-01-01 PROCEDURE — 99239 HOSP IP/OBS DSCHRG MGMT >30: CPT | Performed by: INTERNAL MEDICINE

## 2021-01-01 PROCEDURE — 93010 ELECTROCARDIOGRAM REPORT: CPT | Performed by: INTERNAL MEDICINE

## 2021-01-01 PROCEDURE — 99214 OFFICE O/P EST MOD 30 MIN: CPT | Performed by: INTERNAL MEDICINE

## 2021-01-01 PROCEDURE — 81003 URINALYSIS AUTO W/O SCOPE: CPT | Performed by: EMERGENCY MEDICINE

## 2021-01-01 PROCEDURE — 99223 1ST HOSP IP/OBS HIGH 75: CPT | Performed by: INTERNAL MEDICINE

## 2021-01-01 PROCEDURE — 82803 BLOOD GASES ANY COMBINATION: CPT

## 2021-01-01 PROCEDURE — 74176 CT ABD & PELVIS W/O CONTRAST: CPT

## 2021-01-01 PROCEDURE — 1160F RVW MEDS BY RX/DR IN RCRD: CPT | Performed by: NURSE PRACTITIONER

## 2021-01-01 PROCEDURE — 83735 ASSAY OF MAGNESIUM: CPT | Performed by: EMERGENCY MEDICINE

## 2021-01-01 PROCEDURE — 85027 COMPLETE CBC AUTOMATED: CPT | Performed by: NURSE PRACTITIONER

## 2021-01-01 PROCEDURE — 85007 BL SMEAR W/DIFF WBC COUNT: CPT | Performed by: EMERGENCY MEDICINE

## 2021-01-01 PROCEDURE — 99214 OFFICE O/P EST MOD 30 MIN: CPT | Performed by: NURSE PRACTITIONER

## 2021-01-01 PROCEDURE — 96366 THER/PROPH/DIAG IV INF ADDON: CPT

## 2021-01-01 PROCEDURE — 71250 CT THORAX DX C-: CPT

## 2021-01-01 PROCEDURE — NC001 PR NO CHARGE: Performed by: RADIOLOGY

## 2021-01-01 PROCEDURE — 89051 BODY FLUID CELL COUNT: CPT | Performed by: INTERNAL MEDICINE

## 2021-01-01 PROCEDURE — 36600 WITHDRAWAL OF ARTERIAL BLOOD: CPT

## 2021-01-01 PROCEDURE — 87070 CULTURE OTHR SPECIMN AEROBIC: CPT | Performed by: INTERNAL MEDICINE

## 2021-01-01 PROCEDURE — 36415 COLL VENOUS BLD VENIPUNCTURE: CPT | Performed by: EMERGENCY MEDICINE

## 2021-01-01 PROCEDURE — 87205 SMEAR GRAM STAIN: CPT | Performed by: INTERNAL MEDICINE

## 2021-01-01 RX ORDER — DEXTROSE MONOHYDRATE 50 MG/ML
20 INJECTION, SOLUTION INTRAVENOUS ONCE
Status: COMPLETED | OUTPATIENT
Start: 2021-01-01 | End: 2021-01-01

## 2021-01-01 RX ORDER — LORAZEPAM 2 MG/ML
0.5 INJECTION INTRAMUSCULAR
Status: DISCONTINUED | OUTPATIENT
Start: 2021-01-01 | End: 2021-01-01 | Stop reason: HOSPADM

## 2021-01-01 RX ORDER — SODIUM CHLORIDE 9 MG/ML
20 INJECTION, SOLUTION INTRAVENOUS ONCE AS NEEDED
Status: CANCELLED | OUTPATIENT
Start: 2021-02-24

## 2021-01-01 RX ORDER — HYDROMORPHONE HCL/PF 1 MG/ML
0.5 SYRINGE (ML) INJECTION
Status: DISCONTINUED | OUTPATIENT
Start: 2021-01-01 | End: 2021-01-01 | Stop reason: HOSPADM

## 2021-01-01 RX ORDER — ONDANSETRON 2 MG/ML
4 INJECTION INTRAMUSCULAR; INTRAVENOUS ONCE
Status: COMPLETED | OUTPATIENT
Start: 2021-01-01 | End: 2021-01-01

## 2021-01-01 RX ORDER — ONDANSETRON 2 MG/ML
4 INJECTION INTRAMUSCULAR; INTRAVENOUS EVERY 6 HOURS PRN
Status: DISCONTINUED | OUTPATIENT
Start: 2021-01-01 | End: 2021-01-01 | Stop reason: HOSPADM

## 2021-01-01 RX ORDER — NOREPINEPHRINE BITARTRATE 1 MG/ML
INJECTION, SOLUTION INTRAVENOUS
Status: DISCONTINUED
Start: 2021-01-01 | End: 2021-01-01 | Stop reason: HOSPADM

## 2021-01-01 RX ORDER — DEXTROSE MONOHYDRATE 50 MG/ML
20 INJECTION, SOLUTION INTRAVENOUS ONCE
Status: CANCELLED | OUTPATIENT
Start: 2021-01-01

## 2021-01-01 RX ORDER — ACETAMINOPHEN 325 MG/1
650 TABLET ORAL EVERY 6 HOURS PRN
Status: DISCONTINUED | OUTPATIENT
Start: 2021-01-01 | End: 2021-01-01

## 2021-01-01 RX ORDER — HYDROMORPHONE HCL/PF 1 MG/ML
0.5 SYRINGE (ML) INJECTION
Status: DISCONTINUED | OUTPATIENT
Start: 2021-01-01 | End: 2021-01-01

## 2021-01-01 RX ORDER — SODIUM CHLORIDE 9 MG/ML
20 INJECTION, SOLUTION INTRAVENOUS ONCE AS NEEDED
Status: DISCONTINUED | OUTPATIENT
Start: 2021-01-01 | End: 2021-01-01 | Stop reason: HOSPADM

## 2021-01-01 RX ORDER — FLUOROURACIL 50 MG/ML
400 INJECTION, SOLUTION INTRAVENOUS ONCE
Status: COMPLETED | OUTPATIENT
Start: 2021-01-01 | End: 2021-01-01

## 2021-01-01 RX ORDER — SODIUM CHLORIDE 9 MG/ML
75 INJECTION, SOLUTION INTRAVENOUS CONTINUOUS
Status: DISCONTINUED | OUTPATIENT
Start: 2021-01-01 | End: 2021-01-01

## 2021-01-01 RX ORDER — OXYCODONE HYDROCHLORIDE 5 MG/1
2.5 TABLET ORAL EVERY 4 HOURS PRN
Status: DISCONTINUED | OUTPATIENT
Start: 2021-01-01 | End: 2021-01-01

## 2021-01-01 RX ORDER — HEPARIN SODIUM 5000 [USP'U]/ML
5000 INJECTION, SOLUTION INTRAVENOUS; SUBCUTANEOUS EVERY 8 HOURS SCHEDULED
Status: DISCONTINUED | OUTPATIENT
Start: 2021-01-01 | End: 2021-01-01

## 2021-01-01 RX ORDER — SODIUM CHLORIDE 9 MG/ML
50 INJECTION, SOLUTION INTRAVENOUS CONTINUOUS
Status: DISCONTINUED | OUTPATIENT
Start: 2021-01-01 | End: 2021-01-01

## 2021-01-01 RX ORDER — FLUOROURACIL 50 MG/ML
400 INJECTION, SOLUTION INTRAVENOUS ONCE
Status: CANCELLED | OUTPATIENT
Start: 2021-01-01

## 2021-01-01 RX ORDER — LIDOCAINE WITH 8.4% SOD BICARB 0.9%(10ML)
SYRINGE (ML) INJECTION CODE/TRAUMA/SEDATION MEDICATION
Status: COMPLETED | OUTPATIENT
Start: 2021-01-01 | End: 2021-01-01

## 2021-01-01 RX ORDER — OXYCODONE HYDROCHLORIDE 5 MG/1
5 TABLET ORAL EVERY 4 HOURS PRN
Status: DISCONTINUED | OUTPATIENT
Start: 2021-01-01 | End: 2021-01-01

## 2021-01-01 RX ORDER — POLYETHYLENE GLYCOL 3350 17 G/17G
17 POWDER, FOR SOLUTION ORAL DAILY
Status: DISCONTINUED | OUTPATIENT
Start: 2021-01-01 | End: 2021-01-01

## 2021-01-01 RX ORDER — TAMSULOSIN HYDROCHLORIDE 0.4 MG/1
0.4 CAPSULE ORAL DAILY
Status: DISCONTINUED | OUTPATIENT
Start: 2021-01-01 | End: 2021-01-01

## 2021-01-01 RX ORDER — SODIUM CHLORIDE 9 MG/ML
20 INJECTION, SOLUTION INTRAVENOUS ONCE AS NEEDED
Status: CANCELLED | OUTPATIENT
Start: 2021-01-01

## 2021-01-01 RX ORDER — DOCUSATE SODIUM 100 MG/1
100 CAPSULE, LIQUID FILLED ORAL 2 TIMES DAILY
Status: DISCONTINUED | OUTPATIENT
Start: 2021-01-01 | End: 2021-01-01

## 2021-01-01 RX ORDER — ASPIRIN 81 MG/1
81 TABLET ORAL DAILY
Status: DISCONTINUED | OUTPATIENT
Start: 2021-01-01 | End: 2021-01-01

## 2021-01-01 RX ORDER — ALBUMIN (HUMAN) 12.5 G/50ML
25 SOLUTION INTRAVENOUS EVERY 8 HOURS
Status: DISCONTINUED | OUTPATIENT
Start: 2021-01-01 | End: 2021-01-01

## 2021-01-01 RX ORDER — SODIUM CHLORIDE 9 MG/ML
75 INJECTION, SOLUTION INTRAVENOUS CONTINUOUS
Status: CANCELLED | OUTPATIENT
Start: 2021-01-01

## 2021-01-01 RX ORDER — DEXTROSE MONOHYDRATE 50 MG/ML
20 INJECTION, SOLUTION INTRAVENOUS ONCE
Status: CANCELLED | OUTPATIENT
Start: 2021-02-24

## 2021-01-01 RX ORDER — FLUOROURACIL 50 MG/ML
400 INJECTION, SOLUTION INTRAVENOUS ONCE
Status: CANCELLED | OUTPATIENT
Start: 2021-02-24

## 2021-01-01 RX ORDER — PANTOPRAZOLE SODIUM 40 MG/1
40 TABLET, DELAYED RELEASE ORAL DAILY
Status: DISCONTINUED | OUTPATIENT
Start: 2021-01-01 | End: 2021-01-01

## 2021-01-01 RX ADMIN — PANTOPRAZOLE SODIUM 40 MG: 40 TABLET, DELAYED RELEASE ORAL at 09:48

## 2021-01-01 RX ADMIN — METRONIDAZOLE 500 MG: 500 INJECTION, SOLUTION INTRAVENOUS at 03:21

## 2021-01-01 RX ADMIN — PEGFILGRASTIM 6 MG: KIT SUBCUTANEOUS at 12:41

## 2021-01-01 RX ADMIN — ALBUMIN (HUMAN) 25 G: 0.25 INJECTION, SOLUTION INTRAVENOUS at 00:56

## 2021-01-01 RX ADMIN — ALBUMIN (HUMAN) 25 G: 0.25 INJECTION, SOLUTION INTRAVENOUS at 03:21

## 2021-01-01 RX ADMIN — DEXAMETHASONE SODIUM PHOSPHATE: 10 INJECTION, SOLUTION INTRAMUSCULAR; INTRAVENOUS at 11:01

## 2021-01-01 RX ADMIN — PEGFILGRASTIM 6 MG: KIT SUBCUTANEOUS at 10:16

## 2021-01-01 RX ADMIN — INSULIN LISPRO 3 UNITS: 100 INJECTION, SOLUTION INTRAVENOUS; SUBCUTANEOUS at 17:10

## 2021-01-01 RX ADMIN — Medication 20 ML: at 11:21

## 2021-01-01 RX ADMIN — FLUOROURACIL 895 MG: 50 INJECTION, SOLUTION INTRAVENOUS at 14:03

## 2021-01-01 RX ADMIN — SODIUM CHLORIDE 50 ML/HR: 0.9 INJECTION, SOLUTION INTRAVENOUS at 16:31

## 2021-01-01 RX ADMIN — DEXTROSE 20 ML/HR: 5 SOLUTION INTRAVENOUS at 08:30

## 2021-01-01 RX ADMIN — SODIUM CHLORIDE, SODIUM LACTATE, POTASSIUM CHLORIDE, AND CALCIUM CHLORIDE 500 ML: .6; .31; .03; .02 INJECTION, SOLUTION INTRAVENOUS at 10:25

## 2021-01-01 RX ADMIN — ONDANSETRON 4 MG: 2 INJECTION INTRAMUSCULAR; INTRAVENOUS at 19:57

## 2021-01-01 RX ADMIN — ONDANSETRON 4 MG: 2 INJECTION INTRAMUSCULAR; INTRAVENOUS at 02:26

## 2021-01-01 RX ADMIN — INSULIN LISPRO 3 UNITS: 100 INJECTION, SOLUTION INTRAVENOUS; SUBCUTANEOUS at 18:44

## 2021-01-01 RX ADMIN — SODIUM CHLORIDE 50 ML/HR: 0.9 INJECTION, SOLUTION INTRAVENOUS at 09:50

## 2021-01-01 RX ADMIN — CEFEPIME HYDROCHLORIDE 2000 MG: 2 INJECTION, POWDER, FOR SOLUTION INTRAVENOUS at 01:20

## 2021-01-01 RX ADMIN — OXYCODONE HYDROCHLORIDE 5 MG: 5 TABLET ORAL at 20:53

## 2021-01-01 RX ADMIN — SODIUM CHLORIDE 20 ML/HR: 0.9 INJECTION, SOLUTION INTRAVENOUS at 11:00

## 2021-01-01 RX ADMIN — OXYCODONE HYDROCHLORIDE 5 MG: 5 TABLET ORAL at 11:13

## 2021-01-01 RX ADMIN — HEPARIN SODIUM 5000 UNITS: 5000 INJECTION INTRAVENOUS; SUBCUTANEOUS at 22:19

## 2021-01-01 RX ADMIN — HEPARIN SODIUM 5000 UNITS: 5000 INJECTION INTRAVENOUS; SUBCUTANEOUS at 16:55

## 2021-01-01 RX ADMIN — ONDANSETRON 4 MG: 2 INJECTION INTRAMUSCULAR; INTRAVENOUS at 00:23

## 2021-01-01 RX ADMIN — OXALIPLATIN 200 MG: 5 INJECTION, SOLUTION, CONCENTRATE INTRAVENOUS at 09:30

## 2021-01-01 RX ADMIN — ALBUMIN (HUMAN) 25 G: 0.25 INJECTION, SOLUTION INTRAVENOUS at 09:48

## 2021-01-01 RX ADMIN — DEXAMETHASONE SODIUM PHOSPHATE: 10 INJECTION, SOLUTION INTRAMUSCULAR; INTRAVENOUS at 08:44

## 2021-01-01 RX ADMIN — FLUOROURACIL 895 MG: 50 INJECTION, SOLUTION INTRAVENOUS at 11:34

## 2021-01-01 RX ADMIN — DOCUSATE SODIUM 100 MG: 100 CAPSULE, LIQUID FILLED ORAL at 09:48

## 2021-01-01 RX ADMIN — LEUCOVORIN CALCIUM 900 MG: 500 INJECTION, POWDER, LYOPHILIZED, FOR SOLUTION INTRAMUSCULAR; INTRAVENOUS at 12:01

## 2021-01-01 RX ADMIN — DEXTROSE 20 ML/HR: 5 SOLUTION INTRAVENOUS at 11:55

## 2021-01-01 RX ADMIN — HYDROMORPHONE HYDROCHLORIDE 0.5 MG: 1 INJECTION, SOLUTION INTRAMUSCULAR; INTRAVENOUS; SUBCUTANEOUS at 07:23

## 2021-01-01 RX ADMIN — Medication 8 ML: at 12:54

## 2021-01-01 RX ADMIN — ONDANSETRON 4 MG: 2 INJECTION INTRAMUSCULAR; INTRAVENOUS at 06:20

## 2021-01-01 RX ADMIN — ALBUMIN (HUMAN) 25 G: 0.25 INJECTION, SOLUTION INTRAVENOUS at 16:43

## 2021-01-01 RX ADMIN — DOCUSATE SODIUM 100 MG: 100 CAPSULE, LIQUID FILLED ORAL at 17:12

## 2021-01-01 RX ADMIN — ALBUMIN (HUMAN) 25 G: 0.25 INJECTION, SOLUTION INTRAVENOUS at 16:53

## 2021-01-01 RX ADMIN — INSULIN LISPRO 2 UNITS: 100 INJECTION, SOLUTION INTRAVENOUS; SUBCUTANEOUS at 11:14

## 2021-01-01 RX ADMIN — TAMSULOSIN HYDROCHLORIDE 0.4 MG: 0.4 CAPSULE ORAL at 09:48

## 2021-01-01 RX ADMIN — OXALIPLATIN 200 MG: 5 INJECTION, SOLUTION, CONCENTRATE INTRAVENOUS at 12:01

## 2021-01-01 RX ADMIN — HYDROMORPHONE HYDROCHLORIDE 0.5 MG: 1 INJECTION, SOLUTION INTRAMUSCULAR; INTRAVENOUS; SUBCUTANEOUS at 06:39

## 2021-01-03 NOTE — PROGRESS NOTES
Hematology/Oncology Outpatient Follow- up Note  Melida Isiah, 1945, [id number]  1/7/2021        Chief Complaint   Patient presents with    Follow-up       HPI:  Nawaf La is a 65 yo male with a history of DM type 2, CKD 3 and newly diagnosed biopsy proved metastatic pancreatic cancer with peritoneal carcinomatosis  PET-CT done on 8/31/20 showed enlarging pancreatic tail mass with mild heterogenous FDG activity compatible with known malignancy with interval progression of multiple peritoneal nodules and a large amount of abdominopelvic ascites compatible with peritoneal carcinomatosis  His CA 19-9 tumor marker was significantly elevated > 9,000 at presentation  Treatment options were discussed with patient and his family with Dr Majorie Goltz at his initial consultation on 9/1/020 to include treatment with systemic chemotherapy vs hospice  Patient elected to undergo treatment     He was started on palliative chemotherapy with modified FOLFOX 6 on 9/21/20    Previous Hematologic/ Oncologic History:    Oncology History   Pancreatic cancer ([facility] [address] )   8/7/2020 Initial Diagnosis    Pancreatic adenocarcinoma ([facility] [address] )     8/7/2020 Biopsy    EUS  - adenocarcinoma      9/21/2020 -  Chemotherapy    fluorouracil (ADRUCIL) injection 900 mg, 400 mg/m2 = 900 mg, Intravenous, Once, 7 of 12 cycles  Administration: 900 mg (9/21/2020), 920 mg (10/6/2020), 910 mg (10/20/2020), 910 mg (11/3/2020), 895 mg (12/1/2020), 895 mg (12/22/2020)  pegfilgrastim (NEULASTA ONPRO) subcutaneous injection kit 6 mg, 6 mg, Subcutaneous, Once, 3 of 8 cycles  Administration: 6 mg (12/3/2020), 6 mg (12/24/2020)  Leucovorin Calcium 900 mg in dextrose 5 % 250 mL IVPB, 920 mg, Intravenous, Once, 7 of 12 cycles  Administration: 900 mg (9/21/2020), 900 mg (10/6/2020), 900 mg (10/20/2020), 900 mg (11/3/2020), 900 mg (12/1/2020), 900 mg (12/22/2020)  oxaliplatin (ELOXATIN) 200 mg in dextrose 5 % 250 mL chemo infusion, 195 5 mg, Intravenous, Once, 7 of 12 cycles  Administration: 200 mg (2020), 200 mg (10/6/2020), 200 mg (10/20/2020), 200 mg (11/3/2020), 200 mg (2020), 200 mg (2020)         Current Hematologic/ Oncologic Treatment:    Modified FOLFOX 6    ECO - Symptomatic but completely ambulatory    Interval History:   The patient presents for routine follow up  Most recent blood work completed on 20 was reviewed  His platelets were low at 64, this has deferred his treatment that was scheduled for this week  His white count was normal, hemoglobin 12 3  CMP shows no concerns  Patient reports that he has been tolerating his chemotherapy fairly well  He has some mild fatigue  His appetite is fair, weight stable denies any problems with nausea or vomiting  No diarrhea or constipation  He has some mild abdominal distension, this is stable  He does endorse intermittent abdominal pain  This continues to be managed with low-dose oxycodone as needed  Overall, patient states he is his baseline  Cancer Staging:  Cancer Staging  No matching staging information was found for the patient  Molecular Testing:         Test Results:    Imaging: No results found  Labs:   Lab Results   Component Value Date    WBC 8 7 2021    HGB 12 3 (L) 2021    HCT 37 8 (L) 2021    MCV 93 3 2021    PLT 64 (L) 2021     Lab Results   Component Value Date    K 3 6 2021     2021    CO2 26 2021    BUN 11 2021    CREATININE 0 87 2021    CALCIUM 8 9 2021    CORRECTEDCA 9 6 2020    AST 12 2021    ALT 9 2021    ALKPHOS 102 2021    EGFR 88 2020           Review of Systems   Constitutional: Positive for fatigue  Gastrointestinal: Positive for abdominal distention and abdominal pain (Intermittent)  All other systems reviewed and are negative          Active Problems:   Patient Active Problem List   Diagnosis    Diabetes mellitus type 2    Obesity (BMI 30 0-34  9)    Hypokalemia    UTI due to extended-spectrum beta lactamase (ESBL) producing Escherichia coli    Sleep apnea    Pancreatic cancer (HCC)    Chronic kidney disease (CKD), stage III (moderate)    SIRS (systemic inflammatory response syndrome) (HCC)    Ascites    Cancer related pain    Essential hypertension    Nausea    Palliative care patient    Moderate protein-calorie malnutrition (HCC)    Hypomagnesemia    ESBL bacteremia due to UTI    Pancytopenia due to antineoplastic chemotherapy ([facility] [address] )    Bacteremia       Past Medical History:   Past Medical History:   Diagnosis Date    Abdominal aortic aneurysm (AAA) ([facility] [address] ) 05/26/2017    Per [place]     Anxiety state 05/26/2017    Per [place]     Benign prostatic hyperplasia     Benign prostatic hyperplasia without lower urinary tract symptoms     Body mass index 33 0-33 9, adult 08/15/2019    Per [place]     Chronic kidney disease, stage II (mild) 08/15/2019    Per [place]     Chronic kidney disease, unspecified 05/26/2017    Per [place]     Chronic obstructive pulmonary disease (COPD) ([facility] ) 09/23/2019    Per [facility]     Diabetes mellitus ([facility] )     Diverticulosis of large intestine without perforation or abscess without bleeding 04/18/2019    Per [facility]     Essential hypertension 05/26/2017    Per [facility]     Hearing loss, bilateral 02/26/2017    Per [place]     Hypertension     Mixed hyperlipidemia 05/26/2017    Per [place]     Obesity, unspecified 05/26/2017    Per [facility]     Type 2 diabetes mellitus without complication ([facility] ) 35/83/8103    Per [place]        Surgical History:   Past Surgical History:   Procedure Laterality Date    CHOLECYSTECTOMY      Per [place]     COLECTOMY      removal of 8 inches of colon for diverticular disease   [facility] [place]     IR PARACENTESIS  9/3/2020    IR PORT PLACEMENT  9/16/2020       Family History:    Family History   Problem Relation Age of Onset    Diabetes Father     No Known Problems Mother Cancer-related family history is not on file  Social History:   Social History     Socioeconomic History    Marital status: /Civil Union     Spouse name: Not on file    Number of children: Not on file    Years of education: Not on file    Highest education level: Not on file   Occupational History    Not on file   Social Needs    Financial resource strain: Not on file    Food insecurity     Worry: Not on file     Inability: Not on file   [employer] needs     Medical: Not on file     Non-medical: Not on file   Tobacco Use    Smoking status: Former Smoker     Quit date:      Years since quittin 0    Smokeless tobacco: Never Used    Tobacco comment: quit 37 years ago   Substance and Sexual Activity    Alcohol use: Yes     Frequency: Monthly or less     Binge frequency: Never     Comment: Occasional- Per [facility] Drug use: Never    Sexual activity: Not on file   Lifestyle    Physical activity     Days per week: Not on file     Minutes per session: Not on file    Stress: Not on file   Relationships    Social connections     Talks on phone: Not on file     Gets together: Not on file     Attends Orthodoxy service: Not on file     Active member of club or organization: Not on file     Attends meetings of clubs or organizations: Not on file     Relationship status: Not on file    Intimate partner violence     Fear of current or ex partner: Not on file     Emotionally abused: Not on file     Physically abused: Not on file     Forced sexual activity: Not on file   Other Topics Concern    Not on file   Social History Narrative    Patient has been  to Isa for over 48 years  They have one adult child together ([address]) and Isa has 6 other children from a previous union  Patient and his wife live with one of their children who has special needs  Family if very supportive  Patient served in the [employer]          Current Medications:   Current Outpatient Medications   Medication Sig Dispense Refill    acetaminophen (TYLENOL) 500 mg tablet Take 1,000 mg by mouth every 6 (six) hours as needed      amLODIPine (NORVASC) 5 mg tablet Take 1 tablet (5 mg total) by mouth 2 (two) times a day      aspirin (ECOTRIN LOW STRENGTH) 81 mg EC tablet Take 81 mg by mouth daily      ertapenem (INVanz) 1 g       fluorouracil 5,375 mg in CADD infusion pump Infuse 5,375 mg (1,200 mg/m2/day x 2 24 m2 (Treatment plan recorded BSA)) into a venous catheter over 46 hours for 2 days Treatment on 1/6/21 1 Device 0    Lancets (freestyle) lancets use 1 LANCET to TEST BLOOD SUGAR once daily      losartan (COZAAR) 50 mg tablet Take 50 mg by mouth daily      metFORMIN (GLUCOPHAGE-XR) 500 mg 24 hr tablet Take 1 tablet (500 mg total) by mouth 2 (two) times a day with meals 180 tablet 0    oxyCODONE (ROXICODONE) 5 mg immediate release tablet Take 1 tablet (5 mg total) by mouth every 4 (four) hours as needed (cancer-related pain)Max Daily Amount: 30 mg 90 tablet 0    pantoprazole (PROTONIX) 40 mg tablet Take 40 mg by mouth daily      prochlorperazine (COMPAZINE) 10 mg tablet Take 1 tablet (10 mg total) by mouth every 6 (six) hours as needed for nausea or vomiting 45 tablet 3    tamsulosin (FLOMAX) 0 4 mg Take 0 4 mg by mouth daily      [START ON 1/13/2021] fluorouracil 5,375 mg in CADD infusion pump Infuse 5,375 mg (1,200 mg/m2/day x 2 24 m2 (Treatment plan recorded BSA)) into a venous catheter over 46 hours for 2 days Treatment on 1/13/21 1 Device 0     No current facility-administered medications for this visit  Allergies: No Known Allergies    Physical Exam:  /84 (BP Location: Left arm)   Pulse (!) 135   Temp 98 2 °F (36 8 °C) (Temporal)   Resp 16   Ht 5' 11 65" (1 82 m)   Wt 104 kg (229 lb)   SpO2 95%   BMI 31 36 kg/m²   Body surface area is 2 25 meters squared      Wt Readings from Last 3 Encounters:   01/07/21 104 kg (229 lb)   12/22/20 104 kg (228 lb 6 3 oz)   12/16/20 105 kg (231 lb 0 7 oz)           Physical Exam  Constitutional:       General: He is not in acute distress  Appearance: He is well-developed  He is not diaphoretic  HENT:      Head: Normocephalic and atraumatic  Mouth/Throat:      Mouth: Mucous membranes are moist       Pharynx: Oropharynx is clear  No oropharyngeal exudate  Eyes:      General: No scleral icterus  Pupils: Pupils are equal, round, and reactive to light  Neck:      Musculoskeletal: Normal range of motion and neck supple  Cardiovascular:      Rate and Rhythm: Normal rate and regular rhythm  Heart sounds: No murmur  Pulmonary:      Effort: Pulmonary effort is normal  No respiratory distress  Breath sounds: Normal breath sounds  Abdominal:      General: Bowel sounds are normal  There is distension  Palpations: Abdomen is soft  There is no mass  Tenderness: There is no abdominal tenderness  Musculoskeletal: Normal range of motion  Right lower leg: No edema  Left lower leg: No edema  Lymphadenopathy:      Cervical: No cervical adenopathy  Skin:     General: Skin is warm and dry  Neurological:      General: No focal deficit present  Mental Status: He is alert and oriented to person, place, and time  Psychiatric:         Mood and Affect: Mood normal          Behavior: Behavior normal          Thought Content: Thought content normal          Judgment: Judgment normal          Assessment / Plan:    1  Malignant neoplasm of tail of pancreas ([facility] [address] )    2  Malignant neoplasm of head of pancreas [facility])    The patient is a pleasant 65 yo male with metastatic pancreatic cancer with carcinomatosis currently undergoing palliative chemotherapy with modified FOLFOX 6  His tumor marker has continued to rise given concern for poor response to current therapy    Imaging was ordered and patient had a CT C/A/P completed on 11/27 results have indicated no significant change in pancreatic tail lesion, consistent with known malignancy and improvement in peritoneal metastatic disease   No new metastatic disease within the chest, abdomen, or pelvis  He has completed 6 cycles of chemo  Cycle 7 has been deferred until next week d/t low platelet count  He will have his blood work repeated and proceed with cycle 7 next week  He will be due for repeat imaging in 2 months  I will order this at his next office visit    Patient will continue on current regimen without change  He will continue to have blood work done every 2 weeks prior to treatment  He will return for a follow-up visit in 4 weeks  He was instructed to call with any questions or concerns prior to his next office visit    Goals and Barriers:  Current Goal:  Prolong Survival from pancreatic cancer  Barriers: None  Patient's Capacity to Self Care:  Patient able to self care  Portions of the record may have been created with voice recognition software  Occasional wrong word or "sound a like" substitutions may have occurred due to the inherent limitations of voice recognition software  Read the chart carefully and recognize, using context, where substitutions have occurred

## 2021-01-04 NOTE — TELEPHONE ENCOUNTER
Confirmed with Imelda Jimenez to draw labs today - patient is scheduled for chemo 1/6/21  They will draw CBCD and CMP

## 2021-01-11 NOTE — TELEPHONE ENCOUNTER
[facility] calling for lab order for patient  He is there now  Order faxed for CBCD and CMP    Fax# [phone]

## 2021-01-13 NOTE — PROGRESS NOTES
Patient here for day 1 cycle 7 folfox  Patient resting with no complaints  Vitals stable  Labs within parameters for treatment  [facility] within reach of patient  Will continue to monitor

## 2021-01-14 NOTE — TELEPHONE ENCOUNTER
I spoke with patient and his wife he will double check his medications with his pharmacist and he will contact his PCP to let him know of the medication  Patient was advised not to take his ASA or fish oil x 2 days  Patient and his wife verbalized understanding

## 2021-01-14 NOTE — TELEPHONE ENCOUNTER
Call from patient's wife Bhavin Snyder  Patient mistakenly took one of his wife's eliquis 2  5 mg because she filled the pill box incorrectly  Instructed wife to observe for signs of bleeding:nosebleeds or any  prolonged bleeding   Call office with any bleeding or seek emergency care with any prolonged bleeding  Also instructed to fill med boxes avoiding distraction as she states she was distracted this AM when filling medication boxes    Wife verbalizes understanding  Will update Dr Melchor Gillis

## 2021-01-15 NOTE — PROGRESS NOTES
Patient arrived for CADD D/C and neulasta on pro  Offers no complaints  Patient tolerated 46 hour infusion of 5FU at home without issues  CADD D/C'd and port flushed per protocol  Neulasta on pro applied to patients right upper arm  Green indicator light verified flashing before d/c  Patient aware of timing of medication and when to remove patch   Patient verified upcoming appointment and declined AVS

## 2021-01-27 PROBLEM — Z95.828 PORT-A-CATH IN PLACE: Status: ACTIVE | Noted: 2021-01-01

## 2021-01-27 NOTE — PROGRESS NOTES
Patients platelet level resulted at 73  Notified Rafaela [place]  Treatment to be deferred 1 week  New schedule printed for patient

## 2021-02-04 NOTE — PROGRESS NOTES
Hematology/Oncology Outpatient Follow- up Note  Kamila Jordan 66 y o  male MRN: @ Encounter: [id number]        Date:  2/4/2021    Presenting Complaint/Diagnosis : Metastatic pancreatic cancer    HPI:    Kamila Jordan is seen for initial consultation 9/10/2020 regarding biopsy-proven metastatic pancreatic cancer  The patient had a PET-CT scan done on the 31st of August which showed enlarging pancreatic tail mass with mild heterogeneous FDG activity compatible with known malignancy with interval progression of multiple peritoneal nodules a large amount of abdominopelvic ascites compatible with peritoneal carcinomatosis    The patient also had reticular nodular densities noted in the lung bases bilaterally which were too small for accurate evaluation but may be infectious/inflammatory patient has ascites    Previous Hematologic/ Oncologic History:    Oncology History   Pancreatic cancer ([facility] [address] )   8/7/2020 Initial Diagnosis    Pancreatic adenocarcinoma ([facility] [address] )     8/7/2020 Biopsy    EUS  - adenocarcinoma      9/21/2020 -  Chemotherapy    fluorouracil (ADRUCIL) injection 900 mg, 400 mg/m2 = 900 mg, Intravenous, Once, 8 of 12 cycles  Administration: 900 mg (9/21/2020), 920 mg (10/6/2020), 910 mg (10/20/2020), 910 mg (11/3/2020), 895 mg (12/1/2020), 895 mg (1/13/2021), 895 mg (12/22/2020)  pegfilgrastim (NEULASTA ONPRO) subcutaneous injection kit 6 mg, 6 mg, Subcutaneous, Once, 4 of 8 cycles  Administration: 6 mg (12/3/2020), 6 mg (12/24/2020), 6 mg (1/15/2021)  Leucovorin Calcium 900 mg in dextrose 5 % 250 mL IVPB, 920 mg, Intravenous, Once, 8 of 12 cycles  Administration: 900 mg (9/21/2020), 900 mg (10/6/2020), 900 mg (10/20/2020), 900 mg (11/3/2020), 900 mg (12/1/2020), 900 mg (1/13/2021), 900 mg (12/22/2020)  oxaliplatin (ELOXATIN) 200 mg in dextrose 5 % 250 mL chemo infusion, 195 5 mg, Intravenous, Once, 8 of 12 cycles  Administration: 200 mg (9/21/2020), 200 mg (10/6/2020), 200 mg (10/20/2020), 200 mg (11/3/2020), 200 mg (12/1/2020), 200 mg (1/13/2021), 200 mg (12/22/2020)         Current Hematologic/ Oncologic Treatment:     modified FOLFOX 6  Leucovorin will be discontinued    Interval History:     the patient returns for follow-up visit  He has more abdominal distension and has obvious ascites at this point  Tumor markers also gone up  I suspect he is progressing  I will get a CT scan to prove this and then switch him to gemcitabine and Abraxane  He will continue with his next dose of chemotherapy but we will discontinue the Leucovorin as his platelets have been running low  Denies any nausea or vomiting  Does have decreased appetite  Does have discomfort from the distension  Is seeing our colleagues in palliative care  Denies any nausea denies any vomiting denies any constipation  The rest of his 14 point review of systems today was negative  Test Results:    Imaging: No results found  Labs:   Lab Results   Component Value Date    WBC 8 16 01/27/2021    HGB 12 3 01/27/2021    HCT 39 4 01/27/2021    MCV 98 01/27/2021    PLT 73 (L) 01/27/2021     Lab Results   Component Value Date    K 3 2 (L) 01/27/2021     01/27/2021    CO2 27 01/27/2021    BUN 10 01/27/2021    CREATININE 0 99 01/27/2021    GLUF 164 (H) 01/27/2021    CALCIUM 8 5 01/27/2021    CORRECTEDCA 9 2 01/27/2021    AST 14 01/27/2021    ALT 14 01/27/2021    ALKPHOS 107 01/27/2021    EGFR 73 01/27/2021       ROS: As stated in the history of present illness otherwise his 14 point review of systems today was negative  Active Problems:   Patient Active Problem List   Diagnosis    Diabetes mellitus type 2    Obesity (BMI 30 0-34  9)    Hypokalemia    UTI due to extended-spectrum beta lactamase (ESBL) producing Escherichia coli    Sleep apnea    Pancreatic cancer (HCC)    Chronic kidney disease (CKD), stage III (moderate)    SIRS (systemic inflammatory response syndrome) (HCC)    Ascites    Cancer related pain    Essential hypertension    Nausea    Palliative care patient    Moderate protein-calorie malnutrition (HCC)    Hypomagnesemia    ESBL bacteremia due to UTI    Pancytopenia due to antineoplastic chemotherapy ([facility] )    Bacteremia    Port-A-Cath in place       Past Medical History:   Past Medical History:   Diagnosis Date    Abdominal aortic aneurysm (AAA) ([facility] ) 05/26/2017    Per [place]     Anxiety state 05/26/2017    Per [place]     Benign prostatic hyperplasia     Benign prostatic hyperplasia without lower urinary tract symptoms     Body mass index 33 0-33 9, adult 08/15/2019    Per [place]     Chronic kidney disease, stage II (mild) 08/15/2019    Per [place]     Chronic kidney disease, unspecified 05/26/2017    Per [place]     Chronic obstructive pulmonary disease (COPD) ([facility] ) 09/23/2019    Per [facility]     Diabetes mellitus ([place] [address] )     Diverticulosis of large intestine without perforation or abscess without bleeding 04/18/2019    Per [facility]     Essential hypertension 05/26/2017    Per [facility]     Hearing loss, bilateral 02/26/2017    Per [place]     Hypertension     Mixed hyperlipidemia 05/26/2017    Per [place]     Obesity, unspecified 05/26/2017    Per [place]     Type 2 diabetes mellitus without complication ([place] [address] ) 52/61/6389    Per [place]        Surgical History:   Past Surgical History:   Procedure Laterality Date    CHOLECYSTECTOMY      Per [place]     COLECTOMY      removal of 8 inches of colon for diverticular disease  Per [place]     IR PARACENTESIS  9/3/2020    IR PORT PLACEMENT  9/16/2020       Family History:    Family History   Problem Relation Age of Onset    Diabetes Father     No Known Problems Mother        Cancer-related family history is not on file      Social History:   Social History     Socioeconomic History    Marital status: /Civil Union     Spouse name: Not on file    Number of children: Not on file    Years of education: Not on file    Highest education level: Not on file   Occupational History    Not on file   Social Needs    Financial resource strain: Not on file    Food insecurity     Worry: Not on file     Inability: Not on file    Transportation needs     Medical: Not on file     Non-medical: Not on file   Tobacco Use    Smoking status: Former Smoker     Quit date:      Years since quittin 1    Smokeless tobacco: Never Used    Tobacco comment: quit 37 years ago   Substance and Sexual Activity    Alcohol use: Yes     Frequency: Monthly or less     Binge frequency: Never     Comment: Occasional- Per [facility] Drug use: Never    Sexual activity: Not on file   Lifestyle    Physical activity     Days per week: Not on file     Minutes per session: Not on file    Stress: Not on file   Relationships    Social connections     Talks on phone: Not on file     Gets together: Not on file     Attends Zoroastrianism service: Not on file     Active member of club or organization: Not on file     Attends meetings of clubs or organizations: Not on file     Relationship status: Not on file    Intimate partner violence     Fear of current or ex partner: Not on file     Emotionally abused: Not on file     Physically abused: Not on file     Forced sexual activity: Not on file   Other Topics Concern    Not on file   Social History Narrative    Patient has been  to [place] for over 48 years  They have one adult child together ([address]) and [place] has 6 other children from a previous union  Patient and his wife live with one of their children who has special needs  Family if very supportive  Patient served in the [employer]          Current Medications:   Current Outpatient Medications   Medication Sig Dispense Refill    acetaminophen (TYLENOL) 500 mg tablet Take 1,000 mg by mouth every 6 (six) hours as needed      amLODIPine (NORVASC) 5 mg tablet Take 1 tablet (5 mg total) by mouth 2 (two) times a day      aspirin (ECOTRIN LOW STRENGTH) 81 mg EC tablet Take 81 mg by mouth daily      ertapenem Jessica Mcconnell) 1 g       [START ON 2/10/2021] fluorouracil 5,375 mg in CADD infusion pump Infuse 5,375 mg (1,200 mg/m2/day x 2 24 m2 (Treatment plan recorded BSA)) into a venous catheter over 46 hours for 2 days 1 Device 0    Lancets (freestyle) lancets use 1 LANCET to TEST BLOOD SUGAR once daily      losartan (COZAAR) 50 mg tablet Take 50 mg by mouth daily      metFORMIN (GLUCOPHAGE-XR) 500 mg 24 hr tablet Take 1 tablet (500 mg total) by mouth 2 (two) times a day with meals 180 tablet 0    oxyCODONE (ROXICODONE) 5 mg immediate release tablet Take 1 tablet (5 mg total) by mouth every 4 (four) hours as needed (cancer-related pain)Max Daily Amount: 30 mg 90 tablet 0    pantoprazole (PROTONIX) 40 mg tablet Take 40 mg by mouth daily      prochlorperazine (COMPAZINE) 10 mg tablet Take 1 tablet (10 mg total) by mouth every 6 (six) hours as needed for nausea or vomiting 45 tablet 3    tamsulosin (FLOMAX) 0 4 mg Take 0 4 mg by mouth daily       No current facility-administered medications for this visit  Allergies: No Known Allergies    Physical Exam:    Body surface area is 2 26 meters squared  Wt Readings from Last 3 Encounters:   02/04/21 105 kg (231 lb)   01/27/21 104 kg (228 lb 8 oz)   01/13/21 103 kg (228 lb)        Temp Readings from Last 3 Encounters:   02/04/21 97 7 °F (36 5 °C) (Temporal)   01/27/21 (!) 96 9 °F (36 1 °C) (Temporal)   01/15/21 97 6 °F (36 4 °C) (Temporal)        BP Readings from Last 3 Encounters:   02/04/21 130/80   01/27/21 128/76   01/13/21 128/72         Pulse Readings from Last 3 Encounters:   02/04/21 (!) 135   01/27/21 101   01/13/21 (!) 116         Physical Exam     Constitutional   General appearance: No acute distress, well appearing and well nourished  Eyes   Conjunctiva and lids: No swelling, erythema or discharge  Pupils and irises: Equal, round and reactive to light      Ears, Nose, Mouth, and Throat   External inspection of ears and nose: Normal     Nasal mucosa, septum, and turbinates: Normal without edema or erythema  Oropharynx: Normal with no erythema, edema, exudate or lesions  Pulmonary   Respiratory effort: No increased work of breathing or signs of respiratory distress  Auscultation of lungs: Clear to auscultation  Cardiovascular   Palpation of heart: Normal PMI, no thrills  Auscultation of heart: Normal rate and rhythm, normal S1 and S2, without murmurs  Examination of extremities for edema and/or varicosities: Normal     Carotid pulses: Normal     Abdomen   Abdomen: Non-tender, no masses  Liver and spleen: No hepatomegaly or splenomegaly  Lymphatic   Palpation of lymph nodes in neck: No lymphadenopathy  Musculoskeletal   Gait and station: Normal     Digits and nails: Normal without clubbing or cyanosis  Inspection/palpation of joints, bones, and muscles: Normal     Skin   Skin and subcutaneous tissue: Normal without rashes or lesions  Neurologic   Cranial nerves: Cranial nerves 2-12 intact  Sensation: No sensory loss  Psychiatric   Orientation to person, place, and time: Normal     Mood and affect: Normal         Assessment / Plan:      The patient is a pleasant 67 yo male with metastatic pancreatic cancer with carcinomatosis currently undergoing palliative chemotherapy with modified FOLFOX 6  His tumor marker has continued to rise given concern for poor response to current therapy   Imaging was ordered and patient had a CT C/A/P completed on 11/27 results have indicated no significant change in pancreatic tail lesion, consistent with known malignancy and improvement in peritoneal metastatic disease   No new metastatic disease within the chest, abdomen, or pelvis  Tumor markers continue to rise  This point I will repeat imaging and see the patient back in a month  Chemotherapy will be continued  Platelets are still running low so I will discontinue the Leucovorin bolus  Hopefully this will help with toxicity    He definitely has ascites today  I will have this drain  I will have him imaged  I think he has progression and if proven on CT scan we will switch him to gemcitabine and Abraxane  I explained this to the patient and his   They agreed  He will see me back with results of the CT scan  He will stay on his current chemotherapy without the Leucovorin  If he has any questions he will call our office  Goals and Barriers:  Current Goal:  Prolong Survival from   Pancreatic cancer  Barriers: None  Patient's Capacity to Self Care:  Patient  able to self care  Portions of the record may have been created with voice recognition software   Occasional wrong word or "sound a like" substitutions may have occurred due to the inherent limitations of voice recognition software   Read the chart carefully and recognize, using context, where substitutions have occurred

## 2021-02-08 NOTE — DISCHARGE INSTRUCTIONS
Abdominal Paracentesis     WHAT YOU NEED TO KNOW:   Abdominal paracentesis is a procedure to remove abnormal fluid buildup in your abdomen  Fluid builds up because of liver problems, such as swelling and scarring  Heart failure, kidney disease, a mass, or problems with your pancreas may also cause fluid buildup  DISCHARGE INSTRUCTIONS:     Follow up with your healthcare provider as directed: Write down your questions so you remember to ask them during your visits  Wound care: Remove dressing after 24 hours  Leave glue in place  Return to your normal activities    Contact Interventional Radiology at [phone] [place] PATIENTS: Contact Interventional Radiology at [phone]) Raynard Gil[place] PATIENTS: Contact Interventional Radiology at [phone]) if:  · You have a fever and your wound is red and swollen  · You have yellow, green, or bad-smelling discharge coming from your wound  · You have pain or swelling in your abdomen  · You have an upset stomach or you vomit  · You have sudden, sharp pain in your abdomen  · You urinate very little or not at all  · You feel confused and more tired than usual    · Your arm or leg feels warm, tender, and painful  It may look swollen and red  · You suddenly feel lightheaded and have trouble breathing

## 2021-02-09 NOTE — TELEPHONE ENCOUNTER
Call from patient  Patient had paracentesis done yesterday for 3700ml  Patient states his abdomen is becoming more distended this AM  "I feel like I filled right back up with fluid"  Patient states his abdomen is "hard" and distended  Will send to Dr Anne Marie braga RN  Patient aware of plan

## 2021-02-09 NOTE — TELEPHONE ENCOUNTER
Discussed with Dr Majorie Goltz  Patient will need to have a Tenckhoff catheter placed by IR  Then I will set up home care to drain this in his home as it may be frequent  I will work on orders shortly and ask scheduling to set up

## 2021-02-10 NOTE — PROGRESS NOTES
Patient tolerated treatment without complications  Patient connected to CADD pump, all connections secured  CADD infusing without issue, pump running and green light flashing  Patient aware of disconnect date and time  Patient given AVS  Daughter notified for patient

## 2021-02-10 NOTE — PROGRESS NOTES
Patient here for day 1 cycle 8 oxaliplatin and CADD connect  Patient reports feeling full of fluids despite recent paracentesis on 2/8  Reports abdominal pain 5/10 and intermittent shortness of breath  Spoke with Cooper Cogan in med onc office and reviewed notes from 2/9, per [facility] request sent to IR for tenckoff drain to be placed, appointment on 2/16  Office also to set up home care for patient post drain placement  Spoke with patient about this, per pt he did not receive a call yet but did not have his phone with him  Spoke with patient's daughter to provide pick uop time, per Geovanny Sarah Beth (daughter) she received call from IR about appointment for drain  Aware to call Dr Edin Melgar office for any further questions or new/worsening issues

## 2021-02-12 NOTE — PROGRESS NOTES
Patient presents today for CADD pump disconnect and Neulasta On-pro placement  CADD pump reservoir volume noted zero ml  Port with excellent blood return noted prior to flushing and de-accessing as per protocol  Neulasta On-pro placed onto right arm with green light indicator light noted flashing  Patient aware of date and time to remove Neulasta On-pro  Patient's next appointment confirmed  AVS printed and given to patient

## 2021-02-15 PROBLEM — R33.9 URINARY RETENTION: Status: ACTIVE | Noted: 2021-01-01

## 2021-02-15 PROBLEM — R18.0 MALIGNANT ASCITES: Status: ACTIVE | Noted: 2021-01-01

## 2021-02-15 PROBLEM — N17.9 ACUTE KIDNEY INJURY SUPERIMPOSED ON CHRONIC KIDNEY DISEASE (HCC): Status: ACTIVE | Noted: 2021-01-01

## 2021-02-15 PROBLEM — D72.829 LEUCOCYTOSIS: Status: ACTIVE | Noted: 2021-01-01

## 2021-02-15 PROBLEM — N18.9 ACUTE KIDNEY INJURY SUPERIMPOSED ON CHRONIC KIDNEY DISEASE (HCC): Status: ACTIVE | Noted: 2021-01-01

## 2021-02-15 NOTE — ASSESSMENT & PLAN NOTE
Lab Results   Component Value Date    EGFR 20 02/15/2021    EGFR 73 01/27/2021    EGFR 88 12/16/2020    CREATININE 2 88 (H) 02/15/2021    CREATININE 1 43 (H) 02/08/2021    CREATININE 0 99 01/27/2021   · POA with creatinine 2 8 from 1 43 last  · Poor intake/1 Liter when straight catheterized in ED/On ARB at home  · Multifactorial due to prerenal/postrenal/use of ARB  Possible impaired renal perfusion from ascites  · Will place stark catheter/Albumin infusion/Gentle IVF  · Consult IR for Tenckhoff catheter and paracentesis  · DC ARB  -110/70 Will hold Norvasc  Avoid hypotension   Albumin IV Q 8 hours for low BP and paracentesis  · Follow renal ultrasound  · T/C renal eval if no improvement

## 2021-02-15 NOTE — ED PROCEDURE NOTE
PROCEDURE  ECG 12 Lead Documentation Only    Date/Time: 2/15/2021 10:24 AM  Performed by: Esther Andres MD  Authorized by: Esther Andres MD     Indications / Diagnosis:  Tachy/ weakness  ECG reviewed by me, the ED Provider: yes    Patient location:  ED  Previous ECG:     Previous ECG:  Compared to current    Comparison ECG info:  11/15/20- other than increaased rate- no other sign changes    Similarity:  No change    Comparison to cardiac monitor: Yes    Interpretation:     Interpretation: non-specific    Rate:     ECG rate:  126    ECG rate assessment: tachycardic    Rhythm:     Rhythm: sinus tachycardia    Ectopy:     Ectopy: none    QRS:     QRS axis:  Normal    QRS intervals:  Normal  Conduction:     Conduction: normal    ST segments:     ST segments:  Normal  T waves:     T waves: flattening      Flattening:  AVL and III  Comments:      Low voltage criteria- no ecg signs of ischemia/ injury / r heart strain / sandie/pericarditis         Esther Andres MD  02/15/21 1028

## 2021-02-15 NOTE — H&P
H&P- Alfreda Quan 1945, 66 y o  male MRN: [id number]    Unit/Bed#: ED 28 Encounter: [id number]    Primary Care Provider: Citlali Olivarez MD   Date and time admitted to hospital: 2/15/2021  9:34 AM        * Acute kidney injury superimposed on chronic kidney disease [facility])  Assessment & Plan  Lab Results   Component Value Date    EGFR 20 02/15/2021    EGFR 73 01/27/2021    EGFR 88 12/16/2020    CREATININE 2 88 (H) 02/15/2021    CREATININE 1 43 (H) 02/08/2021    CREATININE 0 99 01/27/2021   · POA with creatinine 2 8 from 1 43 last  · Poor intake/1 Liter when straight catheterized in ED/On ARB at home  · Multifactorial due to prerenal/postrenal/use of ARB  Possible impaired renal perfusion from ascites  · Will place stark catheter/Albumin infusion/Gentle IVF  · Consult IR for Tenckhoff catheter and paracentesis  · DC ARB  -110/70 Will hold Norvasc  Avoid hypotension  Albumin IV Q 8 hours for low BP and paracentesis  · Follow renal ultrasound  · T/C renal eval if no improvement          Primary pancreatic cancer with metastasis to other site [facility])  Assessment & Plan  Metastatic pancreatic cancer with peritoneal carcinomatosis  Ongoing palliative chemotherapy with Dr Naomi Campuzano  Last chemo Oxaliplatin on 2/10   Neulasta on 2/12  Malignant ascites  Assessment & Plan  Due to peritoneal carcinomatosis  His last paracentesis was 2/8 and is due to Tenckhoff catheter placement  Will consult IR for Tenckhoff catheter and ascitic fluid removal   Will send fluid for routine cultures although low suspicion for infection    Urinary retention  Assessment & Plan  H/o BPH on Flomax  1 liter after straight catheterization in ER  Will insert Stark catheter  May consider voiding trial next 48 hours   No BM for one week  Now having liquid stool  Possible overflow diarrhea vs chemo induced diarrhea    Will give stool softener and stool study if diarrhea recurrs    Diabetes mellitus type 2  Assessment & Plan  Lab Results Component Value Date    HGBA1C 7 2 (H) 11/19/2020       No results for input(s): POCGLU in the last 72 hours  Blood Sugar Average: Last 72 hrs:  Hold metformin  Accucheck and NSS    Leucocytosis  Assessment & Plan  Due to Neulasta injection 2/12  No fever or clear signs of infection  Will trend WBC    VTE Prophylaxis: Heparin  / sequential compression device   Code Status: Full code  POLST: POLST form is not discussed and not completed at this time  Discussion with family: Daughter at bedside    Anticipated Length of Stay:  Patient will be admitted on an Inpatient basis with an anticipated length of stay of  > 2 midnights  Justification for Hospital Stay: Above    Total Time for Visit, including Counseling / Coordination of Care: 30 minutes  Greater than 50% of this total time spent on direct patient counseling and coordination of care  Chief Complaint:   Fatigue and poor appetite     History of Present Illness:    Brandi Malone is a 66 y o  male with PMHx of metastatic pancreatic cancer with peritoneal carcinomatosis ongoing palliative chemotherapy, CKD III, hypertension, type 2 diabetes and BPH who presents with fatigue and poor appetite for about 2 weeks  His last chemo was on 2/10 when he received oxaliplatin  Patient has been having poor appetite even before his chemotherapy  He reports increasing abdominal distension  He is due for having Tenckhoff drain placed by IR tomorrow  He reports decreased urine output and normals dysuria  Denies fever or chills  Denies chest pain  In the ED, Pt is noted afebrile with BP of 110/70   1 liter of urine was removed with straight cath  Cr is 2 88 from last 1 43  Review of Systems:    Review of Systems   Constitutional: Positive for appetite change and fatigue  Negative for fever  Respiratory: Negative for cough and chest tightness  Cardiovascular: Negative for palpitations  Gastrointestinal: Positive for constipation and diarrhea   Negative for abdominal pain and vomiting  Genitourinary: Negative for dysuria and hematuria  All other systems reviewed and are negative  Past Medical and Surgical History:     Past Medical History:   Diagnosis Date    Abdominal aortic aneurysm (AAA) ([facility] [address] ) 05/26/2017    Per [place]     Anxiety state 05/26/2017    Per [place]     Benign prostatic hyperplasia     Benign prostatic hyperplasia without lower urinary tract symptoms     Body mass index 33 0-33 9, adult 08/15/2019    Per [place]     Chronic kidney disease, stage II (mild) 08/15/2019    Per [place]     Chronic kidney disease, unspecified 05/26/2017    Per [place]     Chronic obstructive pulmonary disease (COPD) ([facility] [address] ) 09/23/2019    Per [employer] [employer]     [facility]     Diverticulosis of large intestine without perforation or abscess without bleeding 04/18/2019    Per [place] [employer]     Essential hypertension 05/26/2017    Per [employer] [place] [employer]     Hearing loss, bilateral 02/26/2017    Per [place]     Hypertension     Mixed hyperlipidemia 05/26/2017    Per [place]     Obesity, unspecified 05/26/2017    Per [employer]     Pancreatic cancer ([facility] [address] )     Type 2 diabetes mellitus without complication ([facility] [address] ) 63/43/5678    Per [employer]        Past Surgical History:   Procedure Laterality Date    ABDOMINAL SURGERY      CHOLECYSTECTOMY      Per [employer]     COLECTOMY      removal of 8 inches of colon for diverticular disease  Per [place] COLON SURGERY      IR PARACENTESIS  9/3/2020    IR PARACENTESIS  2/8/2021    IR PORT PLACEMENT  9/16/2020       Meds/Allergies:    Prior to Admission medications    Medication Sig Start Date End Date Taking?  Authorizing Provider   acetaminophen (TYLENOL) 500 mg tablet Take 1,000 mg by mouth every 6 (six) hours as needed   Yes Historical Provider, MD   amLODIPine (NORVASC) 5 mg tablet Take 1 tablet (5 mg total) by mouth 2 (two) times a day  Patient taking differently: Take 10 mg by mouth 2 (two) times a day  11/19/20  Yes Debra Vu PA-C aspirin (ECOTRIN LOW STRENGTH) 81 mg EC tablet Take 81 mg by mouth daily   Yes Historical Provider, MD   losartan (COZAAR) 50 mg tablet Take 50 mg by mouth daily   Yes Historical Provider, MD   metFORMIN (GLUCOPHAGE-XR) 500 mg 24 hr tablet Take 1 tablet (500 mg total) by mouth 2 (two) times a day with meals 20  Yes Ardis Gosselin, MD   oxyCODONE (ROXICODONE) 5 mg immediate release tablet Take 1 tablet (5 mg total) by mouth every 4 (four) hours as needed (cancer-related pain)Max Daily Amount: 30 mg 12/10/20  Yes Lakhwinder Cheng MD   pantoprazole (PROTONIX) 40 mg tablet Take 40 mg by mouth daily 20  Yes Historical Provider, MD   prochlorperazine (COMPAZINE) 10 mg tablet Take 1 tablet (10 mg total) by mouth every 6 (six) hours as needed for nausea or vomiting 20  Yes Roula Gagnon MD   tamsulosin [facility]) 0 4 mg Take 0 4 mg by mouth daily 20  Yes Historical Provider, MD   ertapenem Arlette Waterman) 1 g  20   Historical Provider, MD   Lancets (freestyle) lancets use 1 LANCET to TEST BLOOD SUGAR once daily 20   Historical Provider, MD         Allergies: No Known Allergies    Social History:     Marital Status:   Occupation:   Patient Pre-hospital Living Situation: Home  Patient Pre-hospital Level of Mobility: AMbulatory    Social History     Substance and Sexual Activity   Alcohol Use Yes    Frequency: Monthly or less    Binge frequency: Never    Comment: Occasional- Per [place]      Social History     Tobacco Use   Smoking Status Former Smoker    Quit date: Curt Cisneros Years since quittin 1   Smokeless Tobacco Never Used   Tobacco Comment    quit 37 years ago     Social History     Substance and Sexual Activity   Drug Use Never       Family History:    Family History   Problem Relation Age of Onset    Diabetes Father     No Known Problems Mother        Physical Exam:     Vitals:   Blood Pressure: 112/55 (02/15/21 1400)  Pulse: (!) 106 (02/15/21 1400)  Temperature: 97 5 °F (36 4 °C) (02/15/21 0935)  Temp Source: Oral (02/15/21 0935)  Respirations: 16 (02/15/21 1400)  Height: 5' 11" (180 3 cm) (02/15/21 0935)  Weight - Scale: 104 kg (229 lb 4 5 oz) (02/15/21 0935)  SpO2: 97 % (02/15/21 1400)    Physical Exam  Constitutional:       General: He is not in acute distress  Appearance: He is ill-appearing  He is not toxic-appearing or diaphoretic  Eyes:      General:         Right eye: No discharge  Left eye: No discharge  Cardiovascular:      Rate and Rhythm: Regular rhythm  Tachycardia present  Pulses: Normal pulses  Heart sounds: Normal heart sounds  Pulmonary:      Effort: Pulmonary effort is normal  No respiratory distress  Breath sounds: No wheezing or rhonchi  Abdominal:      General: There is distension  Palpations: Abdomen is soft  Tenderness: There is no abdominal tenderness  There is no guarding  Musculoskeletal:         General: No swelling  Skin:     General: Skin is warm  Neurological:      Mental Status: He is oriented to person, place, and time  Psychiatric:         Mood and Affect: Mood normal          Behavior: Behavior normal          Thought Content:  Thought content normal            Additional Data:     Lab Results:     Results from last 7 days   Lab Units 02/15/21  1016   WBC Thousand/uL 47 82*   HEMOGLOBIN g/dL 12 6   HEMATOCRIT % 39 0   PLATELETS Thousands/uL 240   BANDS PCT % 9*   LYMPHO PCT % 2*   MONO PCT % 1*   EOS PCT % 0     Results from last 7 days   Lab Units 02/15/21  1016   SODIUM mmol/L 132*   POTASSIUM mmol/L 4 4   CHLORIDE mmol/L 96*   CO2 mmol/L 24   BUN mg/dL 80*   CREATININE mg/dL 2 88*   ANION GAP mmol/L 12   CALCIUM mg/dL 8 1*   ALBUMIN g/dL 2 1*   TOTAL BILIRUBIN mg/dL 0 53   ALK PHOS U/L 115   ALT U/L 8*   AST U/L 11   GLUCOSE RANDOM mg/dL 259*                       Imaging:     XR chest 1 view portable   ED Interpretation by Collins Magdaleno MD (02/15 1107)   No change      Final Result by Vida Espinoza Humble Marcus MD (02/15 1126)      Interval development of mild bibasilar subsegmental atelectasis  No other change from 11/15/2020                  Workstation performed: [id number]         US retroperitoneal complete    (Results Pending)       Allscripts / Epic Records Reviewed: Yes     ** Please Note: This note has been constructed using a voice recognition system   **

## 2021-02-15 NOTE — ASSESSMENT & PLAN NOTE
Due to peritoneal carcinomatosis  His last paracentesis was 2/8 and is due to Tenckhoff catheter placement    Will consult IR for Tenckhoff catheter and ascitic fluid removal   Will send fluid for routine cultures although low suspicion for infection

## 2021-02-15 NOTE — CONSULTS
INTERPROFESSIONAL (PHONE) CONSULTATION - Interventional Radiology  Amanda Figueroa 66 y o  male MRN: [id number]  Unit/Bed#: ED 28 Encounter: [id number]    IR has been consulted to evaluate the patient, determine the appropriate procedure, and whether or not a procedure can and should be performed regarding the care of Amanda Figueroa  We were consulted by medicine concerning this patient, and to possibly perform a tunneled peritoneal catheter insertion if medically appropriate for the patient  Consults  02/15/21      Assessment/Recommendation:   66 yoM PMH metastatic pancreatic ca w/peritoneal carcinomatosis and recurrent ascites  He presented with worsening renal failure and distention  He was scheduled for an outpatient placement but medicine is worried about renal function and would like this performed as an inpatient  Of note, he had a paracentesis on 2/8  Will plan for tunneled peritoneal catheter placement tomorrow pending scheduling availability and assuming he has enough ascites to perform safely  Please make NPO at midnight  Total time spent in review of data, discussion with requesting provider and rendering advice was 15 minutes  Patient or appropriate family member was verbally informed by medicine of this consultative service on their behalf to provide more timely access to specialty care in lieu of an in person consultation  Verbal consent was obtained  Thank you for allowing Interventional Radiology to participate in the care of Amanda Figueroa  Please don't hesitate to call or TigerText us with any questions       Peri Diehl MD

## 2021-02-15 NOTE — ASSESSMENT & PLAN NOTE
H/o BPH on Flomax  1 liter after straight catheterization in ER  Will insert Delgado catheter  May consider voiding trial next 48 hours   No BM for one week  Now having liquid stool  Possible overflow diarrhea vs chemo induced diarrhea    Will give stool softener and stool study if diarrhea recurrs

## 2021-02-15 NOTE — ASSESSMENT & PLAN NOTE
Metastatic pancreatic cancer with peritoneal carcinomatosis  Ongoing palliative chemotherapy with Dr Naomi Campuzano  Last chemo Oxaliplatin on 2/10   Neulasta on 2/12

## 2021-02-15 NOTE — ED PROVIDER NOTES
History  Chief Complaint   Patient presents with    Weakness - Generalized     c/o increased generalized weakness, decreased oral intake, increased abdomen bloating (pt scheduled to have "bag placed tomorrow to help drain the fluid") +Chemo Pt     75 YR MALE - WITH ACTIVE PANCREATIC CANCER- LAST CHEMO 2/10--  LAST PARACENTESIS APPROX 1 WEEK AGO-- ACCORDING TO DAUGHTER-  IS SCHEDULED TO HAVE A PERITONEAL DRAIN PUT IN TOMORROW -- DAUGHTER BRINGS PT IS FOR INCREASING WEAKNESS/ FATIGUE - DECREASING INTAKE --  AND PT UNABLE TO AMBULATE TODAY  --  NO FEVERS/ CP/SOB/ NEW COUGH- NO ABD PAIN- NO V/D- NO DYSURIA/ HEMATURIA       History provided by:  Patient and relative   used: No        Prior to Admission Medications   Prescriptions Last Dose Informant Patient Reported? Taking?    Lancets (freestyle) lancets  Self Yes No   Sig: use 1 LANCET to TEST BLOOD SUGAR once daily   acetaminophen (TYLENOL) 500 mg tablet  Self Yes Yes   Sig: Take 1,000 mg by mouth every 6 (six) hours as needed   amLODIPine (NORVASC) 5 mg tablet  Self No Yes   Sig: Take 1 tablet (5 mg total) by mouth 2 (two) times a day   Patient taking differently: Take 10 mg by mouth 2 (two) times a day    aspirin (ECOTRIN LOW STRENGTH) 81 mg EC tablet Past Week at Unknown time Self Yes Yes   Sig: Take 81 mg by mouth daily   ertapenem (INVanz) 1 g  Self Yes No   losartan (COZAAR) 50 mg tablet  Self Yes Yes   Sig: Take 50 mg by mouth daily   metFORMIN (GLUCOPHAGE-XR) 500 mg 24 hr tablet  Self No Yes   Sig: Take 1 tablet (500 mg total) by mouth 2 (two) times a day with meals   oxyCODONE (ROXICODONE) 5 mg immediate release tablet  Self No Yes   Sig: Take 1 tablet (5 mg total) by mouth every 4 (four) hours as needed (cancer-related pain)Max Daily Amount: 30 mg   pantoprazole (PROTONIX) 40 mg tablet  Self Yes Yes   Sig: Take 40 mg by mouth daily   prochlorperazine (COMPAZINE) 10 mg tablet  Self No Yes   Sig: Take 1 tablet (10 mg total) by mouth every 6 (six) hours as needed for nausea or vomiting   tamsulosin (FLOMAX) 0 4 mg Past Week at Unknown time Self Yes Yes   Sig: Take 0 4 mg by mouth daily      Facility-Administered Medications: None       Past Medical History:   Diagnosis Date    Abdominal aortic aneurysm (AAA) ([facility] ) 2017    Per [place]     Anxiety state 2017    Per [place]     Benign prostatic hyperplasia     Benign prostatic hyperplasia without lower urinary tract symptoms     Body mass index 33 0-33 9, adult 08/15/2019    Per [place]     Chronic kidney disease, stage II (mild) 08/15/2019    Per [place]     Chronic kidney disease, unspecified 2017    Per [place]     [facility] 2019    [facility]     Diabetes mellitus ([facility] )     Diverticulosis of large intestine without perforation or abscess without bleeding 2019    Per [facility]     Essential hypertension 2017    Per [facility]     Hearing loss, bilateral 2017    Per [place]     Hypertension     Mixed hyperlipidemia 2017    Per [place]     Obesity, unspecified 2017    Per [place]     Pancreatic cancer ([facility] )     Type 2 diabetes mellitus without complication ([facility] )     Per [place]        Past Surgical History:   Procedure Laterality Date    ABDOMINAL SURGERY      CHOLECYSTECTOMY      Per [place]     COLECTOMY      removal of 8 inches of colon for diverticular disease  Per [place] COLON SURGERY      IR PARACENTESIS  9/3/2020    IR PARACENTESIS  2021    IR PORT PLACEMENT  2020       Family History   Problem Relation Age of Onset    Diabetes Father     No Known Problems Mother      I have reviewed and agree with the history as documented      E-Cigarette/Vaping    E-Cigarette Use Never User      E-Cigarette/Vaping Substances     Social History     Tobacco Use    Smoking status: Former Smoker     Quit date:      Years since quittin 1    Smokeless tobacco: Never Used    Tobacco comment: quit 37 years ago   Substance Use Topics    Alcohol use: Yes     Frequency: Monthly or less     Binge frequency: Never     Comment: Occasional- Per [place] Drug use: Never       Review of Systems   Constitutional: Positive for activity change, appetite change and fatigue  Negative for chills, diaphoresis, fever and unexpected weight change  HENT: Negative  Eyes: Negative  Respiratory: Negative  Cardiovascular: Negative  Gastrointestinal: Positive for abdominal distention  Negative for abdominal pain, anal bleeding, blood in stool, constipation, diarrhea, nausea, rectal pain and vomiting  Endocrine: Negative  Genitourinary: Positive for decreased urine volume  Negative for difficulty urinating, discharge, dysuria, enuresis, flank pain, frequency, genital sores, hematuria, penile pain, penile swelling, scrotal swelling, testicular pain and urgency  DECREASED URIEN STREAM    Musculoskeletal: Positive for back pain  Negative for arthralgias, gait problem, joint swelling, myalgias, neck pain and neck stiffness  LOW BACK PAIN IS OLD    Skin: Negative  Allergic/Immunologic: Negative  Neurological: Negative  Hematological: Negative  Psychiatric/Behavioral: Negative  Physical Exam  Physical Exam  Vitals signs and nursing note reviewed  Constitutional:       General: He is not in acute distress  Appearance: He is not toxic-appearing or diaphoretic  Comments: TACHY- WHICH IMPROVED IN THE ER - PULSE OX  97-99 % ON RA- INTERPRETATION IS  NORMAL- NO INTERVENTION- CHRONICALLY ILL APPEARING   HENT:      Head: Normocephalic and atraumatic  Nose: Nose normal       Mouth/Throat:      Mouth: Mucous membranes are dry  Eyes:      General: No scleral icterus  Right eye: No discharge  Left eye: No discharge  Extraocular Movements: Extraocular movements intact        Conjunctiva/sclera: Conjunctivae normal       Pupils: Pupils are equal, round, and reactive to light  Comments: MM PINK   Neck:      Musculoskeletal: Normal range of motion and neck supple  No neck rigidity or muscular tenderness  Vascular: No carotid bruit  Comments: NO PMT C SPINE   Cardiovascular:      Rate and Rhythm: Regular rhythm  Tachycardia present  Pulses: Normal pulses  Heart sounds: Normal heart sounds  No murmur  No friction rub  No gallop  Pulmonary:      Effort: Pulmonary effort is normal  No respiratory distress  Breath sounds: No stridor  Wheezing and rhonchi present  No rales  Comments: SCATTERED FAINT EXPIR WHEEZES BILATERALLY  WITH BILATERAL LOWER LUNG RHONCI   Chest:      Chest wall: No tenderness  Abdominal:      General: There is distension  Palpations: There is no mass  Tenderness: There is no abdominal tenderness  There is no right CVA tenderness, left CVA tenderness, guarding or rebound  Hernia: No hernia is present  Comments: DISTENDED ABD- POS ASCITES- NT- NO PERITONEAL SIGNS   Musculoskeletal: Normal range of motion  General: No swelling, tenderness, deformity or signs of injury  Right lower leg: No edema  Left lower leg: No edema  Comments: EQUAL BILATERAL RADIAL/DP PULSES- NO BLE EDEMA/CALF TENDERNESS/ASYM/ ERYTHEMA   Lymphadenopathy:      Cervical: No cervical adenopathy  Skin:     General: Skin is warm  Capillary Refill: Capillary refill takes less than 2 seconds  Coloration: Skin is not jaundiced or pale  Findings: No bruising, erythema, lesion or rash  Neurological:      General: No focal deficit present  Mental Status: He is alert and oriented to person, place, and time  Mental status is at baseline  Cranial Nerves: No cranial nerve deficit  Sensory: No sensory deficit  Motor: No weakness        Coordination: Coordination normal       Gait: Gait normal       Comments: NORMAL NON FOCAL NEURO EXAM    Psychiatric:         Mood and Affect: Mood normal          Behavior: Behavior normal          Vital Signs  ED Triage Vitals [02/15/21 0935]   Temperature Pulse Respirations Blood Pressure SpO2   97 5 °F (36 4 °C) (!) 127 22 127/64 99 %      Temp Source Heart Rate Source Patient Position - Orthostatic VS BP Location FiO2 (%)   Oral Monitor Sitting Right arm --      Pain Score       5           Vitals:    02/15/21 0935   BP: 127/64   Pulse: (!) 127   Patient Position - Orthostatic VS: Sitting         Visual Acuity      ED Medications  Medications   lactated ringers bolus 500 mL (has no administration in time range)       Diagnostic Studies  Results Reviewed     Procedure Component Value Units Date/Time    CBC and differential [[id number]] Collected: 02/15/21 1016    Lab Status: In process Specimen: Blood from Arm, Right Updated: 02/15/21 1020    Comprehensive metabolic panel [[id number]] Collected: 02/15/21 1016    Lab Status: In process Specimen: Blood from Arm, Right Updated: 02/15/21 1020    Magnesium [[id number]] Collected: 02/15/21 1016    Lab Status:  In process Specimen: Blood from Arm, Right Updated: 02/15/21 1020    UA (URINE) with reflex to Scope [[id number]]     Lab Status: No result Specimen: Urine                  XR chest 1 view portable    (Results Pending)              Procedures  Procedures         ED Course  ED Course as of Feb 15 1538   Mon Feb 15, 2021   1006 Leonidas cunningham note- 2/8/21cv labs reviewed by leonidas cunningham       1107 Cxr portable-compared to previous 11/20- no sign changes- no free/sq air- r sided port- - no infiltrate/ ptx/pulm edema/       1110 - leonidas cunningham note- last chemo 2/10/21- pt received neulasta 2/12/21       1115 Leonidas cunningham note-  labs discussed with daughter and pt - aware and agree with pending admit slim tiger texted       1141 Leonidas cunningham note- bladder scan greater than 200 ml --       1146 - leonidas cunningham note- pt - unable to urinate- will straight  cath       1222 Leonidas cunningham note- straight cath in urine- almost 1 liter of urine- slim notified- will order retroperitoneal u/s                                               MDM    Disposition  Final diagnoses:   None     ED Disposition     None      Follow-up Information    None         Patient's Medications   Discharge Prescriptions    No medications on file     No discharge procedures on file      PDMP Review       Value Time User    PDMP Reviewed  Yes 12/10/2020 10:15 AM Juve Louise MD          ED Provider  Electronically Signed by           Doron Diaz MD  02/15/21 6041

## 2021-02-15 NOTE — ASSESSMENT & PLAN NOTE
Lab Results   Component Value Date    HGBA1C 7 2 (H) 11/19/2020       No results for input(s): POCGLU in the last 72 hours      Blood Sugar Average: Last 72 hrs:  Hold metformin  Accucheck and NSS

## 2021-02-16 NOTE — PLAN OF CARE
Problem: Potential for Falls  Goal: Patient will remain free of falls  Description: INTERVENTIONS:  - Assess patient frequently for physical needs  -  Identify cognitive and physical deficits and behaviors that affect risk of falls  -  [place] fall precautions as indicated by assessment   - Educate patient/family on patient safety including physical limitations  - Instruct patient to call for assistance with activity based on assessment  - Modify environment to reduce risk of injury  - Consider OT/PT consult to assist with strengthening/mobility  Outcome: Progressing     Problem: Prexisting or High Potential for Compromised Skin Integrity  Goal: Skin integrity is maintained or improved  Description: INTERVENTIONS:  - Identify patients at risk for skin breakdown  - Assess and monitor skin integrity  - Assess and monitor nutrition and hydration status  - Monitor labs   - Assess for incontinence   - Turn and reposition patient  - Assist with mobility/ambulation  - Relieve pressure over bony prominences  - Avoid friction and shearing  - Provide appropriate hygiene as needed including keeping skin clean and dry  - Evaluate need for skin moisturizer/barrier cream  - Collaborate with interdisciplinary team   - Patient/family teaching  - Consider wound care consult   Outcome: Progressing     Problem: Nutrition/Hydration-ADULT  Goal: Nutrient/Hydration intake appropriate for improving, restoring or maintaining nutritional needs  Description: Monitor and assess patient's nutrition/hydration status for malnutrition  Collaborate with interdisciplinary team and initiate plan and interventions as ordered  Monitor patient's weight and dietary intake as ordered or per policy  Utilize nutrition screening tool and intervene as necessary  Determine patient's food preferences and provide high-protein, high-caloric foods as appropriate       INTERVENTIONS:  - Monitor oral intake, urinary output, labs, and treatment plans  - Assess nutrition and hydration status and recommend course of action  - Evaluate amount of meals eaten  - Assist patient with eating if necessary   - Allow adequate time for meals  - Recommend/ encourage appropriate diets, oral nutritional supplements, and vitamin/mineral supplements  - Order, calculate, and assess calorie counts as needed  - Recommend, monitor, and adjust tube feedings and TPN/PPN based on assessed needs  - Assess need for intravenous fluids  - Provide specific nutrition/hydration education as appropriate  - Include patient/family/caregiver in decisions related to nutrition  Outcome: Progressing

## 2021-02-16 NOTE — PROGRESS NOTES
[facility] - Internal Medicine Service  Progress Note - Obed Jha 1945, 66 y o  male MRN: [id number]  Unit/Bed#: W -01 Encounter: [id number]  Primary Care Provider: Hanny Ford MD   Date and time admitted to hospital: 2/15/2021  9:34 AM    * Acute kidney injury superimposed on chronic kidney disease [facility])  Assessment & Plan  Lab Results   Component Value Date    EGFR 20 02/15/2021    EGFR 73 01/27/2021    EGFR 88 12/16/2020    CREATININE 2 88 (H) 02/15/2021    CREATININE 1 43 (H) 02/08/2021    CREATININE 0 99 01/27/2021   · POA with creatinine 2 8 from 1 43 last  · Poor intake/1 Liter when straight catheterized in ED/On ARB at home  · Continue Delgado catheter  · Multifactorial due to prerenal/postrenal/use of ARB  Possible impaired renal perfusion from ascites  · Consult IR for Tenckhoff catheter and paracentesis  · DC ARB  -110/70 Will hold Norvasc  Avoid hypotension  Albumin IV Q 8 hours for low BP and paracentesis  · Renal ultrasound WNL, no obstruction  · Unable to obtain blood work this morning, vascular access consult placed  Will obtain BMP after  · T/C renal eval if no improvement          Urinary retention  Assessment & Plan  · H/o BPH on Flomax  · 1 liter after straight catheterization in ER  · Continue Delgado catheter  May consider voiding trial next 24 hours after removal of ascites  · He had also been constipated for 1 week, but did have bowel movement last night with addition of stool softener  Leucocytosis  Assessment & Plan  Due to Neulasta injection 2/12  No fever or clear signs of infection  Will trend WBC    Malignant ascites  Assessment & Plan  · Due to peritoneal carcinomatosis  · His last paracentesis was 2/8 and is due for Tenckhoff catheter placement    · Will consult IR for Tenckhoff catheter and ascitic fluid removal   · Will send fluid for routine cultures although low suspicion for infection    Primary pancreatic cancer with metastasis to other site [facility])  Assessment & Plan  · Metastatic pancreatic cancer with peritoneal carcinomatosis  · Ongoing palliative chemotherapy with Dr Sherry Dickey  Last chemo Oxaliplatin on 2/10   Neulasta on   Diabetes mellitus type 2  Assessment & Plan  Lab Results   Component Value Date    HGBA1C 7 2 (H) 2020       Recent Labs     02/15/21  1619 02/15/21  2059 21  0817   POCGLU 244* 260* 233*       Blood Sugar Average: Last 72 hrs:  (P) 102 [id number]   · Hold metformin  · Accucheck and ISS    VTE Pharmacologic Prophylaxis:   Pharmacologic: Heparin  Mechanical VTE Prophylaxis in Place: Yes    Patient Centered Rounds: I have performed bedside rounds with nursing staff today  Discussions with Specialists or Other Care Team Provider: CM, nursing    Education and Discussions with Family / Patient: called spouse and daughter     Time Spent for Care: 30 minutes  More than 50% of total time spent on counseling and coordination of care as described above  Current Length of Stay: 1 day(s)    Current Patient Status: Inpatient   Certification Statement: The patient will continue to require additional inpatient hospital stay due to LY, urinary retention    Discharge Plan: 48 hours    Code Status: Level 1 - Full Code      Subjective:   No complaints voiced by the patient  He did have a bowel movement overnight  His Delgado catheter has been draining  Unable to obtain morning blood work secondary to poor venous access  Consultation is now placed for midline IV  Reports minimal pain to me, but does appear to be uncomfortable  Objective:     Vitals:   Temp (24hrs), Av 6 °F (36 4 °C), Min:97 3 °F (36 3 °C), Max:97 8 °F (36 6 °C)    Temp:  [97 3 °F (36 3 °C)-97 8 °F (36 6 °C)] 97 8 °F (36 6 °C)  HR:  [106-114] 108  Resp:  [16-18] 18  BP: (108-153)/(55-77) 153/77  SpO2:  [95 %-98 %] 96 %  Body mass index is 31 42 kg/m²  Input and Output Summary (last 24 hours):        Intake/Output Summary (Last 24 hours) at 2/16/2021 1118  Last data filed at 2/16/2021 1104  Gross per 24 hour   Intake 2007 5 ml   Output 2200 ml   Net -192 5 ml       Physical Exam:     Physical Exam  Vitals signs and nursing note reviewed  Constitutional:       General: He is not in acute distress  Appearance: Normal appearance  HENT:      Head: Normocephalic  Mouth/Throat:      Mouth: Mucous membranes are moist    Eyes:      Pupils: Pupils are equal, round, and reactive to light  Neck:      Musculoskeletal: Normal range of motion  Cardiovascular:      Rate and Rhythm: Normal rate and regular rhythm  Heart sounds: No murmur  Pulmonary:      Effort: Pulmonary effort is normal  No respiratory distress  Breath sounds: Normal breath sounds  No wheezing, rhonchi or rales  Abdominal:      General: Bowel sounds are normal  There is distension  Palpations: Abdomen is soft  Tenderness: There is no abdominal tenderness  There is no guarding  Musculoskeletal:         General: No deformity  Right lower leg: Edema present  Left lower leg: Edema present  Skin:     Capillary Refill: Capillary refill takes less than 2 seconds  Neurological:      General: No focal deficit present  Mental Status: He is alert and oriented to person, place, and time  Mental status is at baseline           Additional Data:     Labs:    Results from last 7 days   Lab Units 02/15/21  1016   WBC Thousand/uL 47 82*   HEMOGLOBIN g/dL 12 6   HEMATOCRIT % 39 0   PLATELETS Thousands/uL 240   BANDS PCT % 9*   LYMPHO PCT % 2*   MONO PCT % 1*   EOS PCT % 0     Results from last 7 days   Lab Units 02/15/21  1016   SODIUM mmol/L 132*   POTASSIUM mmol/L 4 4   CHLORIDE mmol/L 96*   CO2 mmol/L 24   BUN mg/dL 80*   CREATININE mg/dL 2 88*   ANION GAP mmol/L 12   CALCIUM mg/dL 8 1*   ALBUMIN g/dL 2 1*   TOTAL BILIRUBIN mg/dL 0 53   ALK PHOS U/L 115   ALT U/L 8*   AST U/L 11   GLUCOSE RANDOM mg/dL 259*         Results from last 7 days Lab Units 02/16/21  1058 02/16/21  0817 02/15/21  2059 02/15/21  1619   POC GLUCOSE mg/dl 229* 233* 260* 244*                   * I Have Reviewed All Lab Data Listed Above  * Additional Pertinent Lab Tests Reviewed: All Labs Within Last 24 Hours Reviewed    Imaging:    Imaging Reports Reviewed Today Include: Renal US  Imaging Personally Reviewed by Myself Includes:  None     Recent Cultures (last 7 days):           Last 24 Hours Medication List:   Current Facility-Administered Medications   Medication Dose Route Frequency Provider Last Rate    acetaminophen  650 mg Oral Q6H PRN Cecile Akbar, PA-C      albumin human  25 g Intravenous Q8H Ceciletravis Webber, PA-C Stopped (02/16/21 0950)    aspirin  81 mg Oral Daily Brandon Obrien MD      docusate sodium  100 mg Oral BID Brandon Obrien MD      heparin (porcine)  5,000 Units Subcutaneous Q8H [address] Brandon Obrien MD      insulin lispro  1-6 Units Subcutaneous TID [facility] Brandon Obrien MD      ondansetron  4 mg Intravenous Q6H PRN Brandon Obrien MD      oxyCODONE  2 5 mg Oral Q4H PRN Ceciletravis Webber, PA-C      oxyCODONE  5 mg Oral Q4H PRN Ceciletravis Webber, PA-C      pantoprazole  40 mg Oral Daily Brandon Obrien MD      polyethylene glycol  17 g Oral Daily Brandon Obrien MD      sodium chloride  75 mL/hr Intravenous Continuous Nick Son MD      tamsulosin  0 4 mg Oral Daily Brandon Obrien MD          Today, Patient Was Seen By: Devonte Neumann PA-C    ** Please Note: Dictation voice to text software may have been used in the creation of this document   **

## 2021-02-16 NOTE — TELEPHONE ENCOUNTER
Patient cx appt because he is still currently in the hospital at this time  Once he drops in our hfu list we will reach out to schedule

## 2021-02-16 NOTE — ASSESSMENT & PLAN NOTE
· Due to peritoneal carcinomatosis  · His last paracentesis was 2/8 and is due for Tenckhoff catheter placement    · Will consult IR for Tenckhoff catheter and ascitic fluid removal   · Will send fluid for routine cultures although low suspicion for infection

## 2021-02-16 NOTE — ASSESSMENT & PLAN NOTE
· Metastatic pancreatic cancer with peritoneal carcinomatosis  · Ongoing palliative chemotherapy with Dr Barbara William  Last chemo Oxaliplatin on 2/10   Neulasta on 2/12

## 2021-02-16 NOTE — UTILIZATION REVIEW
Notification of Inpatient Admission/Inpatient Authorization Request   This is a Notification of Inpatient Admission for [address]  Be advised that this patient was admitted to our facility under Inpatient Status  Contact Kalra Cohen at [phone] for additional admission information  [facility] DE[facility]  DEDICATED -[phone]  Patient Name:   Ashley Gama   YOB: 1945       [address]   [address]:   [address]  Tax ID: [id number]  NPI: [id number] Attending Provider/NPI:  Address:  Phone: Yaya Mcconnell, Cj Gonzales [[id number]]  Same as the facility  [phone]   Place of Service Code: 24 Place of Service Name:  [address] [place] [address]   Start Date: 2/15/21 1144     Discharge Date & Time: No discharge date for patient encounter  Type of Admission: Inpatient Status Discharge Disposition   (if discharged): Home with [address] [facility]   Patient Diagnoses: Urinary retention [R33 9]  Pancreatic cancer ([facility] [address] ) [C25 9]  Weakness [R53 1]  Acute-on-chronic kidney injury ([facility] [address] ) [N17 9, N18 9]  Hyperglycemia due to type 2 diabetes mellitus ([facility] [address] ) [E11 65]     Orders: Admission Orders (From admission, onward)     Ordered        02/15/21 1144  Inpatient Admission  Once                    Assigned Utilization Review Contact: Karla Cohen  Utilization   Network Utilization Review Department  Phone: [phone]; Fax [phone]  Email: [email or web address]   ATTENTION PAYERS: Please call the assigned Utilization  directly with any questions or concerns ALL voicemails in the department are confidential  Send all requests for admission clinical reviews, approved or denied determinations and any other requests to dedicated fax number belonging to the campus where the patient is receiving treatment

## 2021-02-16 NOTE — PROGRESS NOTES
Patient presented today for placement of a Tenckhoff catheter for palliation of his malignant ascites  I discussed the procedure in depth with the patient and explained to him that he cannot get the catheter wet, and that there is a significant risk of infection with peritoneal drainage catheters  Patient elected to continue with paracentesis for now due to the risk of infection, and because he would still like to be able to take a shower without worrying about the catheter  I suspect he will need paracentesis weekly  Patient understands that if at any point he would like a Tenckhoff catheter placed, he can go that route  Upon discharge, please place an order for outpatient paracentesis  Put a note on the order stating the patient needs it weekly, and call the central schedulers at extension  to let them know it is recurring order  They will set the patient up to have this done on a schedule

## 2021-02-16 NOTE — PLAN OF CARE
Problem: Potential for Falls  Goal: Patient will remain free of falls  Description: INTERVENTIONS:  - Assess patient frequently for physical needs  -  Identify cognitive and physical deficits and behaviors that affect risk of falls  -  [place] fall precautions as indicated by assessment   - Educate patient/family on patient safety including physical limitations  - Instruct patient to call for assistance with activity based on assessment  - Modify environment to reduce risk of injury  - Consider OT/PT consult to assist with strengthening/mobility  Outcome: Progressing     Problem: Prexisting or High Potential for Compromised Skin Integrity  Goal: Skin integrity is maintained or improved  Description: INTERVENTIONS:  - Identify patients at risk for skin breakdown  - Assess and monitor skin integrity  - Assess and monitor nutrition and hydration status  - Monitor labs   - Assess for incontinence   - Turn and reposition patient  - Assist with mobility/ambulation  - Relieve pressure over bony prominences  - Avoid friction and shearing  - Provide appropriate hygiene as needed including keeping skin clean and dry  - Evaluate need for skin moisturizer/barrier cream  - Collaborate with interdisciplinary team   - Patient/family teaching  - Consider wound care consult   Outcome: Progressing     Problem: Nutrition/Hydration-ADULT  Goal: Nutrient/Hydration intake appropriate for improving, restoring or maintaining nutritional needs  Description: Monitor and assess patient's nutrition/hydration status for malnutrition  Collaborate with interdisciplinary team and initiate plan and interventions as ordered  Monitor patient's weight and dietary intake as ordered or per policy  Utilize nutrition screening tool and intervene as necessary  Determine patient's food preferences and provide high-protein, high-caloric foods as appropriate       INTERVENTIONS:  - Monitor oral intake, urinary output, labs, and treatment plans  - Assess nutrition and hydration status and recommend course of action  - Evaluate amount of meals eaten  - Assist patient with eating if necessary   - Allow adequate time for meals  - Recommend/ encourage appropriate diets, oral nutritional supplements, and vitamin/mineral supplements  - Order, calculate, and assess calorie counts as needed  - Recommend, monitor, and adjust tube feedings and TPN/PPN based on assessed needs  - Assess need for intravenous fluids  - Provide specific nutrition/hydration education as appropriate  - Include patient/family/caregiver in decisions related to nutrition  Outcome: Progressing

## 2021-02-16 NOTE — PLAN OF CARE
Problem: OCCUPATIONAL THERAPY ADULT  Goal: Performs self-care activities at highest level of function for planned discharge setting  See evaluation for individualized goals  Description: Treatment Interventions: ADL retraining, Functional transfer training, UE strengthening/ROM, Endurance training, Patient/family training, Equipment evaluation/education, Compensatory technique education, Continued evaluation, Energy conservation, Activityengagement          See flowsheet documentation for full assessment, interventions and recommendations  Outcome: Progressing  Note: Limitation: Decreased ADL status, Decreased Safe judgement during ADL, Decreased cognition, Decreased endurance, Decreased self-care trans, Decreased high-level ADLs  Prognosis: Good  Assessment: Pt is a 66 y o  male seen for OT evaluation s/p admit [facility]15[facility]2021 w/ Acute kidney injury superimposed on chronic kidney disease ([facility] [address] )  Comorbidities affecting pt's functional performance at time of assessment include: diabetes, pancreatic cancer with metastasis, malignant ascites, urinary retention, leucocytosis  Personal factors affecting pt at time of IE include:steps to enter environment, limited home support, difficulty performing ADLS, difficulty performing IADLS  and health management   Prior to admission, pt was independent in ADLs and functional mobility with no AD 1 week PTA, then began to decline, requiring more assistance with ADLs  Upon evaluation: Pt requires min A for functional mobility and min to mod A for ADLs 2* the following deficits impacting occupational performance: weakness, decreased strength, decreased balance, decreased tolerance, impaired attention, impaired initiation, impaired problem solving and impulsivity  Pt to benefit from continued skilled OT tx while in the hospital to address deficits as defined above and maximize level of functional independence w ADL's and functional mobility   Occupational Performance areas to address include: grooming, bathing/shower, toilet hygiene, dressing, functional mobility and community mobility  From OT standpoint, recommendation at time of d/c would be home with family support and home OT       OT Discharge Recommendation: Home with skilled therapy

## 2021-02-16 NOTE — ASSESSMENT & PLAN NOTE
· H/o BPH on Flomax  · 1 liter after straight catheterization in ER  · Continue Delgado catheter  May consider voiding trial next 24 hours after removal of ascites  · He had also been constipated for 1 week, but did have bowel movement last night with addition of stool softener

## 2021-02-16 NOTE — ASSESSMENT & PLAN NOTE
Lab Results   Component Value Date    HGBA1C 7 2 (H) 11/19/2020       Recent Labs     02/15/21  1619 02/15/21  2059 02/16/21  0817   POCGLU 244* 260* 233*       Blood Sugar Average: Last 72 hrs:  (P) 884 [id number]   · Hold metformin  · Accucheck and ISS

## 2021-02-16 NOTE — OCCUPATIONAL THERAPY NOTE
Occupational Therapy Evaluation     Patient Name: Pipe Medina  EBVGZ'G Date: 2/16/2021  Problem List  Principal Problem:    Acute kidney injury superimposed on chronic kidney disease ([facility] )  Active Problems:    Diabetes mellitus type 2    Primary pancreatic cancer with metastasis to other site [facility])    Malignant ascites    Urinary retention    Leucocytosis    Past Medical History  Past Medical History:   Diagnosis Date    [facility] ) 05/26/2017    Per [place]     Anxiety state 05/26/2017    Per [place]     Benign prostatic hyperplasia     Benign prostatic hyperplasia without lower urinary tract symptoms     Body mass index 33 0-33 9, adult 08/15/2019    Per [place]     Chronic kidney disease, stage II (mild) 08/15/2019    Per [place]     Chronic kidney disease, unspecified 05/26/2017    Per [place]     Chronic obstructive pulmonary disease (COPD) ([facility] ) 09/23/2019    Per Bonnita Schlatter     Diabetes mellitus ([facility] )     Diverticulosis of large intestine without perforation or abscess without bleeding 04/18/2019    Per [place] Schlatter     Essential hypertension 05/26/2017    Per [place] Schlatter     Hearing loss, bilateral 02/26/2017    Per [place]     Hypertension     Mixed hyperlipidemia 05/26/2017    Per [place]     Obesity, unspecified 05/26/2017    Per [place]ita Schlatter     Pancreatic cancer ([facility] )     Type 2 diabetes mellitus without complication ([facility] ) 51/74/1809    Per [place]nita Schlatter      Past Surgical History  Past Surgical History:   Procedure Laterality Date    ABDOMINAL SURGERY      CHOLECYSTECTOMY      Per Bonnita Schlatter     COLECTOMY      removal of 8 inches of colon for diverticular disease  Per [facility]      IR PARACENTESIS  9/3/2020    IR PARACENTESIS  2/8/2021    IR PORT PLACEMENT  9/16/2020 02/16/21 1430   OT Last Visit   OT Visit Date 02/16/21   Note Type   Note type Evaluation   Restrictions/Precautions   Other Precautions Cognitive; Chair Alarm; Bed Alarm;Multiple lines; Fall Risk;Hard of hearing;Contact/isolation   Pain Assessment   Pain Assessment Tool Pain Assessment not indicated - pt denies pain   Home Living   Type of [address] [facility] to live on main level with bedroom/bathroom  (1STE)   Bathroom Shower/Tub Tub/shower unit   Bathroom Equipment Shower chair  (Family reports seat is not supportive, rec  bench)   Home Equipment Walker   Additional Comments Family reports pt was able to get in and out of tub shower up until recently when it became more difficult, and he now requires assistance  Prior Function   Level of [place] Independent with ADLs and functional mobility; Needs assistance with IADLs   Lives With Spouse;Daughter  (Daughter has intellectual disability   )   Receives Help From Family   ADL Assistance Independent   IADLs Needs assistance   Falls in the last 6 months 1 to 4  (Fell to knees during trip to bathroom at night)   Comments Was independent up until a week ago, has been requiring more assistance, other daughter who lives out of the home has been coming over to assist with ADLs including bathing  Psychosocial   Psychosocial (WDL) WDL   Subjective   Subjective "I wish he would walk that fast for me at home" Wife reports he moves more slowly at home and appears to be attempting to prove himself to therapy  ADL   Where Assessed Standing at sink   Grooming Assistance 5  Supervision/Setup   Grooming Deficit Wash/dry hands   LB Dressing Assistance 3  Moderate Assistance   LB Dressing Deficit Don/doff R sock; Don/doff L sock   Toileting Assistance  4  Minimal Assistance   Toileting Deficit Verbal cueing;Supervison/safety; Increased time to complete;Grab bar use;Perineal hygiene   Additional Comments Pt sits EOB and adjusts socks, however is unable to doff socks  Ambulates to bathroom with RW and min A and transfers onto toilet with min A and verbal cues for safe hand placement on grab bar  Set up assistance for wiping with min A for thoroughness   Pt stands with min A and transitions to sink to wash/dry hands with supervision  Returns to sit in B/S chair, which he is able to recline independently  Bed Mobility   Rolling L 5  Supervision   Supine to Sit 5  Supervision   Additional items Assist x 1   Additional Comments Pt OOB to chair at end of session  Transfers   Sit to Stand 4  Minimal assistance   Additional items Assist x 1   Stand to Sit 4  Minimal assistance   Additional items Assist x 1   Toilet transfer 4  Minimal assistance   Additional items Assist x 1   Additional Comments RW for all mobility in room  Functional Mobility   Functional Mobility 4  Minimal assistance   Additional items Rolling walker   Balance   Static Sitting Fair +   Dynamic Sitting Fair +   Static Standing Fair   Dynamic Standing Fair -   Activity Tolerance   Activity Tolerance Patient tolerated treatment well   Medical Staff Rosa Melgoza, PT   Nurse [facility] to see per RN   RUE Assessment   RUE Assessment WFL   LUE Assessment   LUE Assessment WFL   Cognition   Overall Cognitive Status Impaired   Arousal/Participation Alert; Cooperative   Attention Attends with cues to redirect   Orientation Level Oriented to person;Oriented to place;Oriented to time   Memory Decreased recall of recent events   Following Commands Follows one step commands with increased time or repetition   Comments Multiple cues required for safe hand placement  Pt may be [place]  Moves at impulsive speed and does not give himself time to navigate objects in room  Assessment   Limitation Decreased ADL status; Decreased Safe judgement during ADL;Decreased cognition;Decreased endurance;Decreased self-care trans;Decreased high-level ADLs   Prognosis Good   Assessment Pt is a 66 y o  male seen for OT evaluation s/p [facility] w/ Acute kidney injury superimposed on chronic kidney disease ([facility] )    Comorbidities affecting pt's functional performance at time of assessment include: diabetes, pancreatic cancer with metastasis, malignant ascites, urinary retention, leucocytosis  Personal factors affecting pt at time of IE include:steps to enter environment, limited home support, difficulty performing ADLS, difficulty performing IADLS  and health management   Prior to admission, pt was independent in ADLs and functional mobility with no AD 1 week PTA, then began to decline, requiring more assistance with ADLs  Upon evaluation: Pt requires min A for functional mobility and min to mod A for ADLs 2* the following deficits impacting occupational performance: weakness, decreased strength, decreased balance, decreased tolerance, impaired attention, impaired initiation, impaired problem solving and impulsivity  Pt to benefit from continued skilled OT tx while in the hospital to address deficits as defined above and maximize level of functional independence w ADL's and functional mobility  Occupational Performance areas to address include: grooming, bathing/shower, toilet hygiene, dressing, functional mobility and community mobility  From OT standpoint, recommendation at time of d/c would be home with family support and home OT  Goals   Patient Goals [place] at [place]  Plan   Treatment Interventions ADL retraining;Functional transfer training;UE strengthening/ROM; Endurance training;Patient/family training;Equipment evaluation/education; Compensatory technique education;Continued evaluation; Energy conservation; Activityengagement   Goal Expiration Date 02/26/21   OT Treatment Day 1   OT Frequency 2-3x/wk   Recommendation   OT Discharge Recommendation Home with skilled therapy   AM-PAC Daily Activity Inpatient   Lower Body Dressing 3   Bathing 3   Toileting 3   Upper Body Dressing 3   Grooming 4   Eating 4   Daily Activity Raw Score 20   Daily Activity Standardized Score (Calc for Raw Score >=11) 42 03   Barthel Index   Feeding 10   Bathing 0   Grooming Score 5   Dressing Score 5   Bladder Score 0   Bowels Score 5   Toilet Use Score 5   Transfers (Bed/Chair) Score 10   Mobility (Level Surface) Score 10   Stairs Score 0   Barthel Index Score 50     GOALS    1) Pt will improve activity tolerance to G for min 30 min txment sessions    2) Pt will complete UB/LB dressing/self care w/ mod I using adaptive device and DME as needed    3) Pt will complete bathing w/ Mod I w/ use of AE and DME as needed    4) Pt will complete toileting w/ mod I w/ G hygiene/thoroughness using DME as needed    5) Pt will improve functional transfers to Mod I on/off all surfaces using DME as needed w/ G balance/safety     6) Pt will improve functional mobility during ADL/IADL/leisure tasks to Mod I using DME as needed w/ G balance/safety     7) Pt will participate in simulated IADL management task to increase independence to Mod I w/ G safety and endurance    8) Pt will demonstrate G attention for 10 minutes during ongoing cognitive assessment to assist w/ safe d/c planning/recommendations    9) Pt will demonstrate G carryover of pt/caregiver education and training as appropriate w/ mod I w/o cues w/ good tolerance    10) Pt will demonstrate 100% carryover of energy conservation techniques w/ mod I t/o functional I/ADL/leisure tasks w/o cues s/p skilled education      Homar Comer, WILLIAM, OTR/L

## 2021-02-16 NOTE — TELEPHONE ENCOUNTER
Call from patient's wife  Patient is inpatient at [facility] course of hospitalization per patient's wife request  Patient's wife verbalizes understanding

## 2021-02-16 NOTE — PLAN OF CARE
Problem: PHYSICAL THERAPY ADULT  Goal: Performs mobility at highest level of function for planned discharge setting  See evaluation for individualized goals  Description: Treatment/Interventions: Functional transfer training, LE strengthening/ROM, Therapeutic exercise, Endurance training, Patient/family training, Cognitive reorientation, Equipment eval/education, Bed mobility, Gait training  Equipment Recommended: Cornelio Fisher       See flowsheet documentation for full assessment, interventions and recommendations  Note: Prognosis: Fair  Problem List: Decreased strength, Decreased endurance, Impaired balance, Decreased mobility, Decreased cognition, Impaired judgement, Decreased safety awareness  Assessment: Pt is a 66 y o  male seen for PT evaluation s/p admit to [facility] on 2/15/2021 w/ Acute kidney injury superimposed on chronic kidney disease ([facility] )  Order placed for PT  Comorbidities affecting pt's physical performance at time of assessment listed above  Personal factors affecting pt at time of IE include: limited home support, advanced age, inability to perform IADLs, inability to perform ADLs, limited insight into impairments and recent fall(s)  Prior to admission, pt was was independent w/ all functional mobility w/ out AD, lived in one floor environment and lived with wife and daughter with intellectual disability  Upon evaluation: Pt requires min A for bed mobility, min A for sit to stand, and min A for ambulation with RW  (Please find full objective findings from PT assessment regarding body systems outlined above)  Impairments and limitations also listed above, especially due to  weakness, impaired balance, decreased endurance, gait deviations, decreased activity tolerance, decreased safety awareness, fall risk and decreased cognition  The following objective measures performed on IE also reveal limitations: Barthel Index 50/100   Pt's clinical presentation is currently unstable/unpredictable seen in pt's presentation of decreased safety awareness, fall risk, and limited insight into deficits  Pt to benefit from continued skilled PT tx while in hospital and upon DC to address deficits as defined above and maximize level of functional mobility  From PT/mobility standpoint, recommendation at time of d/c would be Home PT, home with family support and with rolling walker pending progress in order to maximize pt's functional independence and consistency w/ mobility in order to facilitate return to PLOF  Recommend progression of ambulation and initiation of HEP as appropriate  PT Discharge Recommendation: Return to previous environment with social support, Other (Comment)(HHPT and family support)          See flowsheet documentation for full assessment

## 2021-02-16 NOTE — CASE MANAGEMENT
LOS 1  Patient is not a bundle or a readmission  CM spoke with patient at the bedside  CM introduced self and role  [place] [facility] [place] with his wife and disabled daughter in a first floor apartment  No JULIEN  Patient's wife and daughter provide help at home  Per patient, he does not use DME at home  Patient has a roller walker  Patient has a history of VNA with [facility]  Denies history of inpatient rehab  Patient [facility] [place] [facility] [place]  Patient has prescription insurance and is able to afford his medications  Patient does not have an advance directive/living will  Patient's wife [place] Cover is his emergency contact, [phone]  Patient's daughter Cinda Nielson can also be contacted at [phone]  Patient is retired and does not currently drive  Patient's daughter provides transportation to appointments  Patient's PCP is Dr Brennen Lira  CM discussed with patient at the bedside re:DCP  Patient currently waiting for peritoneal catheter placement for [facility]  Patient stated he is not currently receiving VNA services at home  Patient stated his wife or daughter can be contacted with updates  Patient currently has stark catheter at bedside  CM will f/u with patient after catheter placement to assist with any discharge needs  CM contacted patient's wife [place] Cover at  to introduce self and role  Patient's wife stated she is currently on the phone with the physician and requesting CM to call back later  CM will f/u with wife at a later time  CM reviewed discharge planning process including the following: identifying caregivers at home, preference for d/c planning needs,   availability of [facility] program, availability of treatment team to discuss questions or concerns patient and/or family may have regarding diagnosis, plan of care, old or new medications and discharge planning   CM will continue to follow for care coordination and update assessment as appropriate

## 2021-02-16 NOTE — PHYSICAL THERAPY NOTE
PHYSICAL THERAPY EVALUATION  NAME: Winsome Ley  AGE:   66 y o  MRN:  [id number]  ADMIT DX: Urinary retention [R33 9]  Pancreatic cancer ([facility] ) [C25 9]  Weakness [R53 1]  Acute-on-chronic kidney injury ([facility] ) [N17 9, N18 9]  Hyperglycemia due to type 2 diabetes mellitus ([facility] ) [E11 65]    PMH:   Past Medical History:   Diagnosis Date    Abdominal aortic aneurysm (AAA) ([facility] ) 05/26/2017    Per [place]     Anxiety state 05/26/2017    Per [place]     Benign prostatic hyperplasia     Benign prostatic hyperplasia without lower urinary tract symptoms     Body mass index 33 0-33 9, adult 08/15/2019    Per [place]     Chronic kidney disease, stage II (mild) 08/15/2019    Per [place]     Chronic kidney disease, unspecified 05/26/2017    Per [place]     Chronic obstructive pulmonary disease (COPD) ([place] [address] ) 09/23/2019    Per M[place] Granados     Diabetes mellitus ([place] [address] )     Diverticulosis of large intestine without perforation or abscess without bleeding 04/18/2019    Per [place] Gran[place]     Essential hypertension 05/26/2017    Per [place] Gran[place]     Hearing loss, bilateral 02/26/2017    Per [place]     Hypertension     Mixed hyperlipidemia 05/26/2017    Per [place]     Obesity, unspecified 05/26/2017    Per [place]one Granados     Pancreatic cancer ([place] [address] )     Type 2 diabetes mellitus without complication ([place] [address] ) 35/00/8520    Per [place]      LENGTH OF STAY: 1       02/16/21 1431   PT Last Visit   PT Visit Date 02/16/21   Note Type   Note type Evaluation   Pain Assessment   Pain Assessment Tool Pain Assessment not indicated - pt denies pain   Pain Score No Pain   Home Living   Type of [address] One level  (1 JULIEN)   Bathroom Shower/Tub Tub/shower unit   Bathroom Equipment Shower chair   Home Equipment Walker   Additional Comments Ambulates independently without AD at baseline  Prior Function   Level of [place] Independent with ADLs and functional mobility; Needs assistance with IADLs   Lives With Spouse;Daughter  (daughter has an intellectual disability)   Receives Help From Family   ADL Assistance Independent   IADLs Needs assistance   Falls in the last 6 months 1 to 4  (1 fall)   Comments recently requiring assist with ADLs for ~1 week   Restrictions/Precautions   Weight Bearing Precautions Per Order No   Other Precautions Cognitive; Chair Alarm; Bed Alarm;Multiple lines; Fall Risk;Contact/isolation   General   Family/Caregiver Present Yes   Cognition   Overall Cognitive Status Impaired   Arousal/Participation Cooperative   Attention Attends with cues to redirect   Orientation Level Oriented to person;Oriented to place;Oriented to time   Memory Decreased recall of recent events   Following Commands Follows one step commands with increased time or repetition   Comments Pt identified by name and   RLE Assessment   RLE Assessment X   Strength RLE   RLE Overall Strength 4-/5  (functionally)   LLE Assessment   LLE Assessment X   Strength LLE   LLE Overall Strength 4-/5  (functionally)   Bed Mobility   Supine to Sit 5  Supervision   Additional items HOB elevated; Bedrails; Increased time required;Verbal cues   Sit to Supine Unable to assess  (left OOB in chair post session with alarm intact)   Transfers   Sit to Stand 4  Minimal assistance   Additional items Assist x 1; Increased time required;Verbal cues   Stand to Sit 4  Minimal assistance   Additional items Assist x 1; Increased time required;Verbal cues   Ambulation/Elevation   Gait pattern Decreased foot clearance; Short stride  (quick and unsafe/mildly impulsive)   Gait Assistance 4  Minimal assist   Additional items Assist x 1;Verbal cues   Assistive Device Rolling walker   Distance ~20` x2   Balance   Static Sitting Fair +   Dynamic Sitting Fair +   Static Standing Fair   Dynamic Standing Fair -   Ambulatory Poor +   Endurance Deficit   Endurance Deficit Yes   Endurance Deficit Description limited ambulation distance   Activity Tolerance   Activity Tolerance Patient tolerated treatment well   Nurse Made Aware Per RN, pt appropriate to evaluate   Assessment   Prognosis Fair   Problem List Decreased strength;Decreased endurance; Impaired balance;Decreased mobility; Decreased cognition; Impaired judgement;Decreased safety awareness   Goals   Patient Goals to go home   STG Expiration Date 02/25/21   Short Term Goal #1 Pt will be able to: (1) perform bed mobility with supervision to promote OOB activity (2) perform sit to stand with supervision to decrease burden of care (3) ambulate at least 200` with supervision and least restrictive AD to increase activity tolerance (4) increase standing balance by 1 grade to decrease risk of falls   PT Treatment Day 0   Plan   Treatment/Interventions Functional transfer training;LE strengthening/ROM; Therapeutic exercise; Endurance training;Patient/family training;Cognitive reorientation;Equipment eval/education; Bed mobility;Gait training   PT Frequency   (3-5x/week)   Recommendation   PT Discharge Recommendation Return to previous environment with social support; Other (Comment)  (HHPT and family support)   Equipment Recommended Walker   AM-PAC Basic Mobility Inpatient   Turning in Bed Without Bedrails 3   Lying on Back to Sitting on Edge of Flat Bed 3   Moving Bed to Chair 3   Standing Up From Chair 3   Walk in Room 3   Climb 3-5 Stairs 3   Basic Mobility Inpatient Raw Score 18   Basic Mobility Standardized Score 41 05   Barthel Index   Feeding 10   Bathing 0   Grooming Score 5   Dressing Score 5   Bladder Score 0   Bowels Score 5   Toilet Use Score 5   Transfers (Bed/Chair) Score 10   Mobility (Level Surface) Score 10   Stairs Score 0   Barthel Index Score 50       Assessment: Pt is a 66 y o  male seen for PT evaluation s/p admit to [address] [place] [address] on 2/15/2021 w/ Acute kidney injury superimposed on chronic kidney disease ([facility] )  Order placed for PT  Comorbidities affecting pt's physical performance at time of assessment listed above   Personal factors affecting pt at time of IE include: limited home support, advanced age, inability to perform IADLs, inability to perform ADLs, limited insight into impairments and recent fall(s)  Prior to admission, pt was was independent w/ all functional mobility w/ out AD, lived in one floor environment and lived with wife and daughter with intellectual disability  Upon evaluation: Pt requires min A for bed mobility, min A for sit to stand, and min A for ambulation with RW  (Please find full objective findings from PT assessment regarding body systems outlined above)  Impairments and limitations also listed above, especially due to  weakness, impaired balance, decreased endurance, gait deviations, decreased activity tolerance, decreased safety awareness, fall risk and decreased cognition  The following objective measures performed on IE also reveal limitations: Barthel Index 50/100  Pt's clinical presentation is currently unstable/unpredictable seen in pt's presentation of decreased safety awareness, fall risk, and limited insight into deficits  Pt to benefit from continued skilled PT tx while in hospital and upon DC to address deficits as defined above and maximize level of functional mobility  From PT/mobility standpoint, recommendation at time of d/c would be Home PT, home with family support and with rolling walker pending progress in order to maximize pt's functional independence and consistency w/ mobility in order to facilitate return to PLOF  Recommend progression of ambulation and initiation of HEP as appropriate        Ben Enrrique, PT,DPT

## 2021-02-16 NOTE — UTILIZATION REVIEW
Initial Clinical Review    Admission: Date/Time/Statement:   Admission Orders (From admission, onward)     Ordered        02/15/21 1144  Inpatient Admission  Once                   Orders Placed This Encounter   Procedures    Inpatient Admission     Standing Status:   Standing     Number of Occurrences:   1     Order Specific Question:   Level of Care     Answer:   Med Surg [16]     Order Specific Question:   Estimated length of stay     Answer:   More than 2 Midnights     Order Specific Question:   Certification     Answer:   I certify that inpatient services are medically necessary for this patient for a duration of greater than two midnights  See H&P and MD Progress Notes for additional information about the patient's course of treatment  ED Arrival Information     Expected Arrival Acuity Means of Arrival Escorted By Service Admission Type    - 2/15/2021 09:26 Urgent Walk-In Spouse Hospitalist Urgent    Arrival Complaint    weakness/unable to eat        Chief Complaint   Patient presents with    Weakness - Generalized     c/o increased generalized weakness, decreased oral intake, increased abdomen bloating (pt scheduled to have "bag placed tomorrow to help drain the fluid") +Chemo Pt     Assessment/Plan: 67 yo male with hx of metastatic pancreatic CA with peritoneal carcinomatosis ongoing palliative chemotherapy,last paracentesis 2/8/21 CKD III, hypertension, type 2 diabetes and BPH presents to ED from home with fatigue and poor appetite x 2 wks with decreased UOP and dysuria, also with increasing abdominal distention  Pt has been for placement of Tenkhoff drain 2/16 by IR  On exam, pt tachycardic,has abdominal distention  Pt given IVF bolus in ED  Pts last bm approx 1 week ago, now with liquid stool  Labs show elevated creat up from last creat of 1 43  Labs show elevated WBC's-pt had neulasta injection 2/12-no signs of fever or infection   -Pt was straight cathed for 1 L urine in ED     Pt admitted as IP with LY on CKD(-multifactorial due to prerenal/postrenal/use of ARB  Possible impaired renal perfusion from ascites ) ,malignant ascites, urinary retention  Plan place stark catheter, Albumin infusion q8h for low BP , gentle IVF, consult IR for tenkhoff catheter placement and paracentesis, d/c ARB, hold Norvasc, avoid hypotension, renal US, send ascites fluid for routine cultures once paracentesis done, stool studies if diarrhea recurrs  IR-Plan for tunneled peritoneal catheter placement 2/16 pending scheduling availability and assuming he has enough ascites to perform safely  Please make NPO at midnight  2/16  Continues with stark catheter -will consider voiding trial 24 hrs after removal of ascites  Pt had bm last night ,with addition of stool softener  Consult for venous access placed for midline ctheter, unable to obtain labs this am, will obtain BMP after venous access obtained  Renal US WNL, no obstruction  IR -Patient elected to continue with paracentesis for now w/o Tenkhoff catheter due to risk of infection and pt wants to shower  It is suspected that pt will need paracentesis weekly  2/16 /21 @1308-paracentesis completed , 3000ml emir fluid drained with specimen sent to lab    ED Triage Vitals [02/15/21 0935]   Temperature Pulse Respirations Blood Pressure SpO2   97 5 °F (36 4 °C) (!) 127 22 127/64 99 %      Temp Source Heart Rate Source Patient Position - Orthostatic VS BP Location FiO2 (%)   Oral Monitor Sitting Right arm --      Pain Score       5          Wt Readings from Last 1 Encounters:   02/16/21 102 kg (225 lb 5 oz)     Additional Vital Signs:   Date/Time  Temp  Pulse  Resp  BP  MAP (mmHg)  SpO2    02/16/21 08:18:04  97 8 °F (36 6 °C)  108Abnormal   18  153/77  102  96 %    02/15/21 1618  --  --  --  --  --  --    02/15/21 16:13:26  97 3 °F (36 3 °C)Abnormal   113Abnormal   18  138/69  92  98 %    02/15/21 1550  --  113Abnormal   18  115/60  --  97 %    02/15/21 1530  --  106Abnormal   18 108/59  78  96 %    02/15/21 1400  --  106Abnormal   16  112/55  77  97 %    02/15/21 1300  --  112Abnormal   18  114/58  79  97 %    02/15/21 1230  --  110Abnormal   16  112/57  77  96 %    02/15/21 1200  --  114Abnormal   16  132/56  80  95 %    02/15/21 1030  --  122Abnormal   20  100/60  73  94 %        Pertinent Labs/Diagnostic Test Results:   2/15 ECG  Sinus tachycardia  Otherwise normal ECG  2/15 US-kidney bladder  No evidence of obstruction  2/15 CXR  Interval development of mild bibasilar subsegmental atelectasis    No other change from 11/15/2020      Results from last 7 days   Lab Units 02/15/21  1016   WBC Thousand/uL 47 82*   HEMOGLOBIN g/dL 12 6   HEMATOCRIT % 39 0   PLATELETS Thousands/uL 240   BANDS PCT % 9*         Results from last 7 days   Lab Units 02/15/21  1016   SODIUM mmol/L 132*   POTASSIUM mmol/L 4 4   CHLORIDE mmol/L 96*   CO2 mmol/L 24   ANION GAP mmol/L 12   BUN mg/dL 80*   CREATININE mg/dL 2 88*   EGFR ml/min/1 73sq m 20   CALCIUM mg/dL 8 1*   MAGNESIUM mg/dL 1 7     Results from last 7 days   Lab Units 02/15/21  1016   AST U/L 11   ALT U/L 8*   ALK PHOS U/L 115   TOTAL PROTEIN g/dL 6 5   ALBUMIN g/dL 2 1*   TOTAL BILIRUBIN mg/dL 0 53     Results from last 7 days   Lab Units 02/16/21  0817 02/15/21  2059 02/15/21  1619   POC GLUCOSE mg/dl 233* 260* 244*     Results from last 7 days   Lab Units 02/15/21  1016   GLUCOSE RANDOM mg/dL 259*         Results from last 7 days   Lab Units 02/15/21  1159   CLARITY UA  Clear   COLOR UA  Yellow   SPEC GRAV UA  1 025   PH UA  5 0   GLUCOSE UA mg/dl Negative   KETONES UA mg/dl Negative   BLOOD UA  Negative   PROTEIN UA mg/dl Negative   NITRITE UA  Negative   BILIRUBIN UA  Negative   UROBILINOGEN UA E U /dl 0 2   LEUKOCYTES UA  Negative           Results from last 7 days   Lab Units 02/15/21  1016   TOTAL COUNTED  100           ED Treatment:   Medication Administration from 02/15/2021 0926 to 02/15/2021 1605       Date/Time Order Dose Route Action 02/15/2021 1025 lactated ringers bolus 500 mL 500 mL Intravenous New Bag        Past Medical History:   Diagnosis Date    Abdominal aortic aneurysm (AAA) ([facility] ) 05/26/2017    Per [facility]     Anxiety state 05/26/2017    Per Aretha     Benign prostatic hyperplasia     Benign prostatic hyperplasia without lower urinary tract symptoms     Body mass index 33 0-33 9, adult 08/15/2019    Per Aretha     Chronic kidney disease, stage II (mild) 08/15/2019    Per [place]     Chronic kidney disease, unspecified 05/26/2017    Per Katheryn Alaniz     Chronic obstructive pulmonary disease (COPD) ([facility] ) 09/23/2019    Per Katheryn Alaniz     Diabetes mellitus ([place] [address] )     Diverticulosis of large intestine without perforation or abscess without bleeding 04/18/2019    Per Katheryn Alaniz     Essential hypertension 05/26/2017    Per Katheryn Alaniz     Hearing loss, bilateral 02/26/2017    Per Aretha     Hypertension     Mixed hyperlipidemia 05/26/2017    Per Aretha     Obesity, unspecified 05/26/2017    Per Katheryn Alaniz     Pancreatic cancer ([facility] )     Type 2 diabetes mellitus without complication ([facility] ) 18/63/7178    Per Katheryn Alaniz      Present on Admission:   Diabetes mellitus type 2   Primary pancreatic cancer with metastasis to other site [facility])      Admitting Diagnosis: Urinary retention [R33 9]  Pancreatic cancer ([facility] ) [C25 9]  Weakness [R53 1]  Acute-on-chronic kidney injury ([facility] ) [N17 9, N18 9]  Hyperglycemia due to type 2 diabetes mellitus ([facility] ) [E11 65]  Age/Sex: 66 y o  male  Admission Orders:  Scheduled Medications:  albumin human, 25 g, Intravenous, Q8H  aspirin, 81 mg, Oral, Daily  docusate sodium, 100 mg, Oral, BID  heparin (porcine), 5,000 Units, Subcutaneous, Q8H [address]  insulin lispro, 1-6 Units, Subcutaneous, TID AC  pantoprazole, 40 mg, Oral, Daily  polyethylene glycol, 17 g, Oral, Daily  tamsulosin, 0 4 mg, Oral, Daily      Continuous IV Infusions:  sodium chloride, 50 mL/hr, Intravenous, Continuous      PRN Meds:  acetaminophen, 650 mg, Oral, Q6H PRN  ondansetron, 4 mg, Intravenous, Q6H PRN  oxyCODONE, 5 mg, Oral, Q4H PRN      poct glucose TID  Soap sude enema  Insert stark catheter  SCD's  Daily wt  I/O  NPO  PT/OTevals        INPATIENT CONSULT TO IR    Network Utilization Review Department  ATTENTION: Please call with any questions or concerns to [phone] and carefully listen to the prompts so that you are directed to the right person  All voicemails are confidential   Braulio Pinedo all requests for admission clinical reviews, approved or denied determinations and any other requests to dedicated fax number below belonging to the campus where the patient is receiving treatment   List of dedicated fax numbers for the Facilities:  [address] DENIALS (Administrative/Medical Necessity) [phone]   [address] (Maternity/NICU/Pediatrics) [phone] [address] [place] [address] Dr Lj Ramirez [phone] (  [facility]h [facility]) [address] [place] [address] [address] Shannon Carpio [address] [place]) [address] [place] [address] [phone]

## 2021-02-17 NOTE — CONSULTS
9 [facility] 66 y o  male MRN: [id number]  Unit/Bed#: S -01 Encounter: [id number]      -------------------------------------------------------------------------------------------------------------  Chief Complaint:   "Take this off"    History of Present Illness   Jak Dye is a 66 y o  male who presented to [address] [place] [address] on 2/15 with fatigue and poor appetite  Patient has an extensive PMH of metastatic pancreatic cancer with peritoneal carcinomatosis with ongoing palliative chemotherapy  Additional history includes CKD III, Hypertension, DM2 and BPH  Patients last received oxaliplatin on 2/10  Patient was to have placement of a palliative Tenckhoff catheter for his malignant ascites; however when discussed risk / benefit as well as care, patient elected to not proceed with catheter placement and would continue with receiving paracentesis  Overnight patient with worsening tachycardia and tachypnea  Patient reportedly had several episodes of vomiting earlier in the evening for which he received Zofran with nausea improvement; however remains tachycardic and tacypnic  Nursing further reported patient requiring O2 supplementation with SpO2 currently in low 90s  SLIM provided continued care and support overnight, CT A/P obtained which revealed interval progression of malignancy and findings suggestive of mesenteric metastatic caking as well as opacities throughout the lungs as well as a SBO vs ileus  NG tube was placed by nursing, with imaging revealing incorrect placement as in R Lung  NG tube removed  Patient was transferred to [facility] on [facility] was notified this am that patient with worsening distress, including work of breathing and tachycardia  Immediately arriving at bedside, noted patient in acute distress    Patient whom was conscious, alert, and oriented, indicating he is in the hospital, his cancer is bad, it is 2021, he further provided his full name as well as   Patient then reporting that the wanted the O2 mask removed and in further discussions advised that he did not want further resuscitative measures, including intubation or life support  History obtained from chart review, the patient and discussed with SLIM as well as family   -------------------------------------------------------------------------------------------------------------  Assessment and Plan:    1  Acute Hypoxic Respiratory Failure  2  Metastatic Pancreatic Cancer with peritoneal carcinomatosis  3  Small bowel obstruction vs Ileus  4  Sinus Tachycardia with frequent ventricular ectopy  5  Recurrent Ascites s/p paracentesis  6  Leukocytosis  7  Acute Kidney Injury  8    DM2 with Hyperglycemia      Neuro:   o Plan: patient in acute distress  o Upon further patient and family discussion, no intubation or cardiac resuscitation  o Transitioned to Level 3 code status and rapid continued decline transitioned to Level 4 comfort care  o Dilaudid prn distress / pain / air hunger  o Ativan prn anxiety      CV:    Diagnosis: Sinus Tachycardia with ventricular ectopy  o Plan: currently reviewed telemetry  o Continue telemetry and hemodynamic monitoring  o Physiologic response secondary to problem list above  o DNR as discussed with patient and family  o Rapid decline from respiratory perspective - ultimately transitioned to Level 4       Pulm:   Diagnosis: Acute Hypoxic Respiratory Failure / Bilateral opacifications  o Plan: initially continues SpO2 monitoring with O2 supplementation via NRB  o Patient reporting he does not want intubation despite current Level 1 code status prompting family discussion - patient with GCS of 15 at the time - competent to make this decision  o As further discussed with daughter [facility] via phone during events, she immediately advised that her father would not want any type of life support, even if for short term understanding his underlying metastatic disease process  o Code status initially changed to Level 3, with patient and daughter reporting if patient continues to rapid decline transition to Level 4 - focusing strictly on patients comfort      GI:    Diagnosis: Small Bowel obstruction vs Ileus / Interval progression of malignancy with mesenteric caking  o Plan: initial NG placement with Xray revealing malpositioned in R lung - NG tube removed  o Multiple other attempts without success  o Surgery initially consulted by Hospitalist team - patient initially refusing further attempts at NG tube placement      :    Diagnosis: LY  o Plan: maintain stark with transition to comfort care         Heme/Onc:    Diagnosis: Metastatic Pancreatic Cancer with peritoneal carcinamatosis   o Plan: interval progression in disease upon review of CT obtained earlier this am - compared to prior studies      Endo:    Diagnosis: DM2 with hyperglycemia      o Further systems deferred as patient and family elected for transition to Level 4 - Comfort Care    The above assessment and plan discussed with [facility] Fellow - Dr Justino Redding  Disposition: Spoke with patient and daughter  Patient would not want intubation or Cardiac resuscitation  Patient transitioned to Level 3 Code status, and if further decline per daughter, she would like him transitioned to comfort care  Code Status: Level 3 - DNAR and DNI  --------------------------------------------------------------------------------------------------------------  Review of Systems   Constitutional: Positive for fatigue  Negative for chills  HENT: Positive for congestion  Respiratory: Positive for cough and shortness of breath  Cardiovascular: Positive for palpitations  Negative for chest pain  Gastrointestinal: Positive for abdominal pain, nausea and vomiting  Musculoskeletal: Negative for back pain  Skin: Positive for color change  Neurological: Negative for light-headedness             Physical Exam General: 67 y/o M in acute distress, diaphoretic with noted to be tachypneic, tachycardic, with increased work of breathing and audible gurgling  HEENT: Normocephalic, atraumatic, PERR, Sclera anicteric, conjunctiva pale, oropharynx with dry mucous membranes, tolerating secretions  Neck: supple, trachea midline  Heart: Irregular rate and rhythm, apically tachycardic, no murmur, rub, or gallop  Lungs: course rhonchi throughout, audible gurgling  Abd: distended, without guarding, no peritoneal signs, there are noted active bowel sounds; however decreased  Skin: ashen in color, diaphoretic   --------------------------------------------------------------------------------------------------------------  Vitals:   Vitals:    02/16/21 2217 02/17/21 0057 02/17/21 0328 02/17/21 0504   BP: 133/68  113/58    BP Location: Left arm  Left arm    Pulse: (!) 122 (!) 144 (!) 134    Resp:   18    Temp:  (!) 101 1 °F (38 4 °C) 99 4 °F (37 4 °C) (!) 100 6 °F (38 1 °C)   TempSrc:   Axillary Axillary   SpO2:  90% 91%    Weight:       Height:         Temp  Min: 97 3 °F (36 3 °C)  Max: 101 1 °F (38 4 °C)  IBW: 75 3 kg  Height: 5' 11" (180 3 cm)  Body mass index is 31 42 kg/m²    N/A    Laboratory and Diagnostics:  Results from last 7 days   Lab Units 02/17/21  0543 02/17/21  0013 02/16/21  1122 02/15/21  1016   WBC Thousand/uL  --  19 36* 29 91* 47 82*   HEMOGLOBIN g/dL  --  12 0 11 2* 12 6   I STAT HEMOGLOBIN g/dl 12 6  --   --   --    HEMATOCRIT %  --  38 4 35 5* 39 0   HEMATOCRIT, ISTAT % 37  --   --   --    PLATELETS Thousands/uL  --  174 158 240   NEUTROS PCT %  --  96*  --   --    BANDS PCT %  --   --  3 9*   MONOS PCT %  --  1*  --   --    MONO PCT %  --   --  0* 1*     Results from last 7 days   Lab Units 02/17/21  0543 02/17/21  0013 02/16/21  1122 02/15/21  1016   SODIUM mmol/L  --  135* 133* 132*   POTASSIUM mmol/L  --  4 2 4 1 4 4   CHLORIDE mmol/L  --  101 98* 96*   CO2 mmol/L  --  24 24 24   CO2, I-STAT mmol/L 21  --   -- --    ANION GAP mmol/L  --  10 11 12   BUN mg/dL  --  75* 77* 80*   CREATININE mg/dL  --  1 99* 2 10* 2 88*   CALCIUM mg/dL  --  8 0* 8 2* 8 1*   GLUCOSE RANDOM mg/dL  --  238* 242* 259*   ALT U/L  --   --   --  8*   AST U/L  --   --   --  11   ALK PHOS U/L  --   --   --  115   ALBUMIN g/dL  --   --   --  2 1*   TOTAL BILIRUBIN mg/dL  --   --   --  0 53     Results from last 7 days   Lab Units 02/15/21  1016   MAGNESIUM mg/dL 1 7      Results from last 7 days   Lab Units 02/16/21  1120   INR  1 27*          Results from last 7 days   Lab Units 02/17/21  0052   LACTIC ACID mmol/L 1 6     ABG:    VBG:    Results from last 7 days   Lab Units 02/17/21  0013   PROCALCITONIN ng/ml 1 34*       Micro:  Results from last 7 days   Lab Units 02/16/21  1305   GRAM STAIN RESULT  1+ Polys  No organisms seen       EKG: Telemetry monitor reveals Sinus Tachycardia with frequent PVCs rate 140s  Imaging: I have personally reviewed pertinent reports     and I have personally reviewed pertinent films in PACS    Historical Information   Past Medical History:   Diagnosis Date    Abdominal aortic aneurysm (AAA) ([facility] ) 05/26/2017    Per [place]     Anxiety state 05/26/2017    Per Aretha     Benign prostatic hyperplasia     Benign prostatic hyperplasia without lower urinary tract symptoms     Body mass index 33 0-33 9, adult 08/15/2019    Per [place]     Chronic kidney disease, stage II (mild) 08/15/2019    Per [place]     Chronic kidney disease, unspecified 05/26/2017    Per [place]     Chronic obstructive pulmonary disease (COPD) ([facility] ) 09/23/2019    Per Duarte Tyler     Diabetes mellitus ([facility] )     Diverticulosis of large intestine without perforation or abscess without bleeding 04/18/2019    Per Duarte Tyler     Essential hypertension 05/26/2017    Per Duarte Tyler     Hearing loss, bilateral 02/26/2017    Per [place]     Hypertension     Mixed hyperlipidemia 05/26/2017    Per [place]     Obesity, unspecified 05/26/2017    Per Duarte Tyler     [facility]     Type 2 diabetes mellitus without complication ([facility] )     Per Irvin Najera      Past Surgical History:   Procedure Laterality Date    ABDOMINAL SURGERY      CHOLECYSTECTOMY      Per Irvin Najera     COLECTOMY      removal of 8 inches of colon for diverticular disease   Per [facility] COLON SURGERY      IR PARACENTESIS  9/3/2020    IR PARACENTESIS  2021    IR PORT PLACEMENT  2020     Social History   Social History     Substance and Sexual Activity   Alcohol Use Yes    Frequency: Monthly or less    Binge frequency: Never    Comment: Occasional- Per Lane      Social History     Substance and Sexual Activity   Drug Use Never     Social History     Tobacco Use   Smoking Status Former Smoker    Quit date: 26    Years since quittin 1   Smokeless Tobacco Never Used   Tobacco Comment    quit 37 years ago     Exercise History: None  Family History:   Family History   Problem Relation Age of Onset    Diabetes Father     No Known Problems Mother      Reviewed per the EMR      Medications:  Current Facility-Administered Medications   Medication Dose Route Frequency    acetaminophen (TYLENOL) tablet 650 mg  650 mg Oral Q6H PRN    albumin human (FLEXBUMIN) 25 % injection 25 g  25 g Intravenous Q8H    aspirin (ECOTRIN LOW STRENGTH) EC tablet 81 mg  81 mg Oral Daily    cefepime (MAXIPIME) 2,000 mg in dextrose 5 % 50 mL IVPB  2,000 mg Intravenous Q12H    docusate sodium (COLACE) capsule 100 mg  100 mg Oral BID    heparin (porcine) subcutaneous injection 5,000 Units  5,000 Units Subcutaneous Q8H [address]    HYDROmorphone (DILAUDID) injection 0 5 mg  0 5 mg Intravenous Q3H PRN    insulin lispro (HumaLOG) 100 units/mL subcutaneous injection 1-6 Units  1-6 Units Subcutaneous Q6H    metroNIDAZOLE (FLAGYL) IVPB (premix) 500 mg 100 mL  500 mg Intravenous Q8H    norepinephrine (LEVOPHED) 1 mg/mL injection **ADS Override Pull**        ondansetron (ZOFRAN) injection 4 mg  4 mg Intravenous Q6H PRN    oxyCODONE (ROXICODONE) IR tablet 2 5 mg  2 5 mg Oral Q4H PRN    oxyCODONE (ROXICODONE) IR tablet 5 mg  5 mg Oral Q4H PRN    pantoprazole (PROTONIX) EC tablet 40 mg  40 mg Oral Daily    polyethylene glycol (MIRALAX) packet 17 g  17 g Oral Daily    tamsulosin (FLOMAX) capsule 0 4 mg  0 4 mg Oral Daily     Home medications:  Prior to Admission Medications   Prescriptions Last Dose Informant Patient Reported? Taking?    Lancets (freestyle) lancets  Self Yes No   Sig: use 1 LANCET to TEST BLOOD SUGAR once daily   acetaminophen (TYLENOL) 500 mg tablet  Self Yes Yes   Sig: Take 1,000 mg by mouth every 6 (six) hours as needed   amLODIPine (NORVASC) 5 mg tablet  Self No Yes   Sig: Take 1 tablet (5 mg total) by mouth 2 (two) times a day   Patient taking differently: Take 10 mg by mouth 2 (two) times a day    aspirin (ECOTRIN LOW STRENGTH) 81 mg EC tablet Past Week at Unknown time Self Yes Yes   Sig: Take 81 mg by mouth daily   ertapenem (INVanz) 1 g  Self Yes No   fluorouracil 5,375 mg in CADD infusion pump   No No   Sig: Infuse 5,375 mg (1,200 mg/m2/day x 2 24 m2 (Treatment plan recorded BSA)) into a venous catheter over 46 hours for 2 days   losartan (COZAAR) 50 mg tablet  Self Yes Yes   Sig: Take 50 mg by mouth daily   metFORMIN (GLUCOPHAGE-XR) 500 mg 24 hr tablet  Self No Yes   Sig: Take 1 tablet (500 mg total) by mouth 2 (two) times a day with meals   oxyCODONE (ROXICODONE) 5 mg immediate release tablet  Self No Yes   Sig: Take 1 tablet (5 mg total) by mouth every 4 (four) hours as needed (cancer-related pain)Max Daily Amount: 30 mg   pantoprazole (PROTONIX) 40 mg tablet  Self Yes Yes   Sig: Take 40 mg by mouth daily   prochlorperazine (COMPAZINE) 10 mg tablet  Self No Yes   Sig: Take 1 tablet (10 mg total) by mouth every 6 (six) hours as needed for nausea or vomiting   tamsulosin (FLOMAX) 0 4 mg Past Week at Unknown time Self Yes Yes   Sig: Take 0 4 mg by mouth daily      Facility-Administered Medications: None     Allergies:  No Known Allergies  ------------------------------------------------------------------------------------------------------------  Advance Directive and Living Will:      Power of :    POLST:    ------------------------------------------------------------------------------------------------------------  Anticipated Length of Stay is > 2 midnights    Care Time Delivered:   Upon my evaluation, this patient had a high probability of imminent or life-threatening deterioration due to imminent respiratory failure, which required my direct attention, intervention, and personal management  I have personally provided 55 minutes (8502 [phone] 2742) of critical care time, exclusive of procedures, teaching, family meetings, and any prior time recorded by providers other than myself  Maggy Cr PA-C        Portions of the record may have been created with voice recognition software  Occasional wrong word or "sound a like" substitutions may have occurred due to the inherent limitations of voice recognition software    Read the chart carefully and recognize, using context, where substitutions have occurred

## 2021-02-17 NOTE — DISCHARGE SUMMARY
Discharge- Neil Hankins 1945, 66 y o  male MRN: [id number]    Unit/Bed#: S -01 Encounter: [id number]    Primary Care Provider: Zaheer Oneal MD   Date and time admitted to hospital: 2/15/2021  9:34 AM        * Primary pancreatic cancer with metastasis to other site [facility])  Assessment & Plan  · Metastatic pancreatic cancer with peritoneal carcinomatosis  · He developed associated nausea vomiting, abdominal distension  Repeat CT scan showed: Interval progression of malignancy with ascites and mesenteric nodular changes suggesting mesenteric metastatic caking  Opacities throughout the lungs likely an infection however metastatic disease is also a possibility      Cirrhotic liver with small to moderate volume ascites although this fluid may be secondary to peritoneal carcinomatosis    Mid abdominal small bowel dilatation and air-fluid levels concerning for ileus versus obstruction  Oral contrast is seen within the large bowel likely from a recent prior CT and indicating that there is at least some transit of bowel contents into the   colon    Moderate to large amount of stool within the colon suggesting fecal impaction  ·  Patient had NG tube placed  Surgery was consulted  Prolonged discussion with patient's family at bedside and they decided to opt for comfort care  Patient was started on comfort measures  · Medications were titrated for comfort  He passed away with family at bedside on 21 at 8:25a m  Leucocytosis  Assessment & Plan  Due to Neulasta injection   Urinary retention  Assessment & Plan  · Patient had a Delgado catheter placed for urinary retention  Will remove as patient has   Malignant ascites  Assessment & Plan  · Due to peritoneal carcinomatosis  · His last paracentesis was  and was due for Tenckhoff catheter placement  · Placed on comfort measures and has       Acute kidney injury superimposed on chronic kidney disease ([facility] )  Assessment & Plan  Lab Results   Component Value Date    EGFR 31 02/17/2021    EGFR 29 02/16/2021    EGFR 20 02/15/2021    CREATININE 1 99 (H) 02/17/2021    CREATININE 2 10 (H) 02/16/2021    CREATININE 2 88 (H) 02/15/2021   · POA with creatinine 2 8 from 1 43 last  · Poor intake/1 Liter when straight catheterized in ED/On ARB at home  · Continue Delgado catheter  · Multifactorial due to prerenal/postrenal/use of ARB  Possible impaired renal perfusion from ascites  · Patient placed on comfort measures earlier this morning and passed away with family at bedside          Diabetes mellitus type 2  Assessment & Plan  Lab Results   Component Value Date    HGBA1C 7 2 (H) 11/19/2020       Recent Labs     02/16/21  1655 02/16/21  1709 02/16/21 2051 02/17/21  0309   POCGLU 242* 244* 227* 248*       Blood Sugar Average: Last 72 hrs:  (P) 240 875   ·       Discharging Physician / Practitioner: Lazaro Chinchilla MD  PCP: Geronimo Juarez MD  Admission Date:   Admission Orders (From admission, onward)     Ordered        02/15/21 1144  Inpatient Admission  Once                   Date of death: 02/17/21 0825    Resolved Problems  Date Reviewed: 2/16/2021    None          Consultations During Hospital Stay:  · General surgery  · Radiology    Procedures Performed:   CT scan abdomen  Reason for Admission:  Fatigue and poor oral intake    Hospital Course:     Callie Downs is a 66 y o  male patient who originally presented to the hospital on 2/15/2021 due to fatigue and poor appetite  He had history of metastatic pancreatic cancer with peritoneal carcinomatosis ongoing palliative chemotherapy, stage 3 chronic kidney disease, type 2 diabetes and benign prostatic hypertrophy  Family had reported poor appetite for the last many days prior to admission with increasing abdominal distension  He was found to have acute kidney injury and urinary retention requiring Delgado catheterization and IV hydration    Subsequently patient did developed tachycardia tachypnea and abdominal distension, multiple episodes of nausea and vomiting  He was started on broad-spectrum antibiotics  Repeat CT scan showed interval progression of malignancy and finding suggestive of mesenteric metastatic aching, small-bowel obstruction versus ileus and moderate fecal impaction  NG tube was initially recommended however patient adamantly refused that  Patient was evaluate by Critical Care and upon prolonged discussion with patient's family it was decided to transition him to comfort measures  Patient passed away with family at bedside on 2021 at 8:25 a m     Medical examiner was notified and family declined autopsy  Please see above list of diagnoses and related plan for additional information  Condition at Discharge: Patient      Discharge Day Visit / Exam:     * Please refer to separate progress note for these details *    Discussion with Family:  Patient's wife Wendy Jay and daughter Evangelina Alexander at bedside at the time of patient's death    Discharge instructions/Information to patient and family:   See after visit summary for information provided to patient and family  Provisions for Follow-Up Care:  See after visit summary for information related to follow-up care and any pertinent home health orders  Disposition:     Other: Patient diseased    For Discharges to [facility] SNF:   · Patient  - Not Applicable to this Patient    Planned Readmission: No     Discharge Statement:  I spent 45 minutes discharging the patient  This time was spent on the day of discharge  I had direct contact with the patient on the day of discharge  Greater than 50% of the total time was spent examining patient, answering all patient questions, arranging and discussing plan of care with patient as well as directly providing post-discharge instructions  Additional time then spent on discharge activities      Discharge Medications:  See after visit summary for reconciled discharge medications provided to patient and family        ** Please Note: This note has been constructed using a voice recognition system **

## 2021-02-17 NOTE — QUICK NOTE
Notified by nursing that patient remains tachycardic this evening and is also tachypneic  Patient reportedly had several episodes of vomiting earlier this evening and received Zofran with improvement in his nausea but per nursing he has appeared tachypneic since vomiting and was placed on 2L NC oxygen as pulse ox was noted to 89%  Pulse ox improved into the low 90s on 2L but nursing increased his oxygen to 4L recently given continued tachypnea and pulse ox remains in the low 90s currently  Patient currently denies chest pain or SOB  Per nursing patient did note some chest pain after vomiting  On exam, patient appears chronically ill, sleepy and uncomfortable  He is mildly tachypneic with respirations of 25  Lungs are CTA bilaterally  Heart tachycardic  Abdomen is distented but soft  Edema noted in bilateral upper and lower extremities  Patient meeting sepsis criteria given tachycardia and tachypnea as well as hypoxia  Also with leukocytosis noted on lab work but recently received Neulasta injection  Afebrile currently  Repeat CXR obtained with possible RLL infiltrate (prior CXR 2/15 showed bibasilar atelectasis)  Given recent episodes of vomiting, concern for aspiration PNA vs pneumonitis  Obtain repeat labs including CBC and BMP as well as lactic acid, procalcitonin and blood cultures  Start IV antibiotics with IV cefepime and Flagyl  Keep NPO, aspiration precautions  Encourage IS  Addendum at [phone]: notified by nursing that patient has started vomiting again  Additional dose of Zofran ordered  Patient underwent paracentesis earlier today  Patient continues to deny abdominal pain and abdomen is non-tender  Will obtain CT scan to r/o acute abdominal process       Addendum at 65: Notified by radiologist of CT scan results which show interval progression of malignancy and findings suggestive of mesenteric metastatic caking as well as opacities throughout the lungs, small bowel obstruction vs ileus and a moderate to large amount of stool in the colon  Per discussion with nursing patient last vomited about 10 minutes ago  Surgery consulted, place NG tube now  Keep NPO  In regards to CT scan lung findings, r/o COVID-19

## 2021-02-17 NOTE — ASSESSMENT & PLAN NOTE
· Due to peritoneal carcinomatosis  · His last paracentesis was  and was due for Tenckhoff catheter placement  · Placed on comfort measures and has

## 2021-02-17 NOTE — DEATH NOTE
INPATIENT DEATH NOTE  Gabino Loo 66 y o  male MRN: [id number]  Unit/Bed#: S -01 Encounter: [id number]       Patient had been placed on on comfort measures  Called by nursing that patient has passed away comfortably  PHYSICAL EXAM:  Patient seen and examined at bedside  He was unresponsive to noxious stimuli   No spontaneous respiration or breath sounds audible  Carotid pulses and Heart sounds were absent  His pupillary light reflex was absent  Patient pronounced dead  at 8:25 a m  Date of Death: 2/17/20  Time of Death: 1  Family including patient's wife Maico Espino and biological daughter Sandra Ricardo at bedside      Medical Examiner notification criteria:  NONE APPLICABLE   Medical Examiner's office notified?:  Yes   Medical Examiner accepted case?:  No  Name of Medical Examiner:          Autopsy Options:  Post-mortem examination declined by next of kin    Primary Service Attending Physician notified?:  yes - Attending:  Jodi Lomax MD    Physician/Resident responsible for completing Discharge Summary: Jodi Lomax MD

## 2021-02-17 NOTE — UTILIZATION REVIEW
Notification of Discharge  This is a Notification of Discharge from our facility [address]  Please be advised that this patient has been discharge from our facility  Below you will find the admission and discharge date and time including the patients disposition  PRESENTATION DATE: 2/15/2021  9:34 AM  OBS ADMISSION DATE:   IP ADMISSION DATE: 2/15/21 1144   DISCHARGE DATE: 2021 12:12 PM  DISPOSITION:     Admission Orders listed below:  Admission Orders (From admission, onward)     Ordered        02/15/21 1144  Inpatient Admission  Once                   Please contact the UR Department if additional information is required to close this patient's authorization/case  [address] [facility] Utilization Review Department  Main: [phone] x carefully listen to the prompts  All voicemails are confidential   [email or web address]  Send all requests for admission clinical reviews, approved or denied determinations and any other requests to dedicated fax number below belonging to the campus where the patient is receiving treatment   List of dedicated fax numbers:  [address] DENIALS (Administrative/Medical Necessity) [phone]   [address] (Maternity/NICU/Pediatrics) [phone]   Neha Tate [phone]   [facility] [phone]   Richmond Chu [phone]   Zhanna Sheehan [facility] [address] [facility] [phone]   [facility]  [phone]   [address] [facility] [address]  [address] [facility] [address] [facility] [phone]

## 2021-02-17 NOTE — ASSESSMENT & PLAN NOTE
· Metastatic pancreatic cancer with peritoneal carcinomatosis  · He developed associated nausea vomiting, abdominal distension  Repeat CT scan showed: Interval progression of malignancy with ascites and mesenteric nodular changes suggesting mesenteric metastatic caking  Opacities throughout the lungs likely an infection however metastatic disease is also a possibility      Cirrhotic liver with small to moderate volume ascites although this fluid may be secondary to peritoneal carcinomatosis    Mid abdominal small bowel dilatation and air-fluid levels concerning for ileus versus obstruction  Oral contrast is seen within the large bowel likely from a recent prior CT and indicating that there is at least some transit of bowel contents into the   colon    Moderate to large amount of stool within the colon suggesting fecal impaction  ·  Patient had NG tube placed  Surgery was consulted  Prolonged discussion with patient's family at bedside and they decided to opt for comfort care  Patient was started on comfort measures  · Medications were titrated for comfort  He passed away with family at bedside on 02/17/21 at 8:25a m

## 2021-02-17 NOTE — ASSESSMENT & PLAN NOTE
Lab Results   Component Value Date    HGBA1C 7 2 (H) 11/19/2020       Recent Labs     02/16/21  1655 02/16/21  1709 02/16/21 2051 02/17/21  0309   POCGLU 242* 244* 227* 248*       Blood Sugar Average: Last 72 hrs:  (P) 240 875   ·

## 2021-02-17 NOTE — CONSULTS
Consultation - General Surgery   Wagner Awad 66 y o  male MRN: [id number]  Unit/Bed#: S -01 Encounter: [id number]    Assessment/Plan     Assessment:  64yo M with metastatic pancreatic cancer and malignant ascites, who p/w nausea/vomiting concerning for ileus vs developing SBO  Also with fever, SOB, and hypoxia, likely due to aspiration event  Plan:  Patient adamantly refusing NG tube placement at this time  Had been attempted 3x by nursing staff, with questionable tracheal placement  Attempted to personally place nasogastric tube at bedside; however, patient continues to adamantly refuse any further attempts at this time  Risks of doing so, including very high risk for aspiration, worsening of respiratory status requiring intubation, and possibly even death were thoroughly explained to patient, who states he understands these risks and still wishes to hold off on NGT placement  Given the rapid decline in patient's respiratory status, would recommend transfer to ICU as he will likely require intubation in the near future  Would recommend NG/OG tube placement at that time for decompression  Keep NPO for now with strict aspiration precautions  Agree with broad-spectrum antibiotics for likely aspiration pneumonia  Will continue to follow  History of Present Illness     HPI:  Wagner Awad is a 66 y o  male with PMHx of metastatic pancreatic cancer with peritoneal carcinomatosis undergoing palliative chemotherapy, DM, CKD3, HTN, BPH, who presents with nausea/vomiting  He presented 2/15/21 with malignant ascites and underwent paracentesis yesterday with removal of 3100 cc ascitic fluid  Overnight, he developed nausea/vomiting, as well as fever to 101 1, tachypnea and mild hypoxia (89% on RA)  He has remained tachycardic since presentation  CT A/P showed mid abdominal small bowel dilatation with air-fluid levels c/f SBO vs ileus; oral contrast was noted in colon, however may be from recent prior CT   Also noted were opacities throughout lungs, most likely representing infection  Inpatient consult to Acute Care Surgery     Performed by  Dario Reyes MD     Authorized by Susan Malone PA-C              Review of Systems   All other systems reviewed and are negative  As stated in HPI  Historical Information   Past Medical History:   Diagnosis Date    Abdominal aortic aneurysm (AAA) ([facility] ) 05/26/2017    Per [place]     Anxiety state 05/26/2017    Per [facility]     Benign prostatic hyperplasia     Benign prostatic hyperplasia without lower urinary tract symptoms     Body mass index 33 0-33 9, adult 08/15/2019    Per [place]     Chronic kidney disease, stage II (mild) 08/15/2019    Per [place]     Chronic kidney disease, unspecified 05/26/2017    Per [place]     Chronic obstructive pulmonary disease (COPD) ([facility] ) 09/23/2019    Per [place] [place]     Diabetes mellitus ([facility] )     Diverticulosis of large intestine without perforation or abscess without bleeding 04/18/2019    Per [place]     Essential hypertension 05/26/2017    Per [place]     Hearing loss, bilateral 02/26/2017    Per [place]     Hypertension     Mixed hyperlipidemia 05/26/2017    Per [place]     Obesity, unspecified 05/26/2017    Per [place]     Pancreatic cancer ([facility] )     Type 2 diabetes mellitus without complication ([facility] ) 49/93/3435    Per [facility]      Past Surgical History:   Procedure Laterality Date    ABDOMINAL SURGERY      CHOLECYSTECTOMY      Per [place]     COLECTOMY      removal of 8 inches of colon for diverticular disease   Per [facility] COLON SURGERY      IR PARACENTESIS  9/3/2020    IR PARACENTESIS  2/8/2021    IR PORT PLACEMENT  9/16/2020     Social History   Social History     Substance and Sexual Activity   Alcohol Use Yes    Frequency: Monthly or less    Binge frequency: Never    Comment: Occasional- Per [place]      Social History     Substance and Sexual Activity   Drug Use Never     E-Cigarette/Vaping    E-Cigarette Use Never User      E-Cigarette/Vaping Substances     Social History     Tobacco Use   Smoking Status Former Smoker    Quit date: 26    Years since quittin 1   Smokeless Tobacco Never Used   Tobacco Comment    quit 37 years ago     Family History:   Family History   Problem Relation Age of Onset    Diabetes Father     No Known Problems Mother        Meds/Allergies   all current active meds have been reviewed and current meds:   Current Facility-Administered Medications   Medication Dose Route Frequency    acetaminophen (TYLENOL) tablet 650 mg  650 mg Oral Q6H PRN    albumin human (FLEXBUMIN) 25 % injection 25 g  25 g Intravenous Q8H    aspirin (ECOTRIN LOW STRENGTH) EC tablet 81 mg  81 mg Oral Daily    cefepime (MAXIPIME) 2,000 mg in dextrose 5 % 50 mL IVPB  2,000 mg Intravenous Q12H    docusate sodium (COLACE) capsule 100 mg  100 mg Oral BID    heparin (porcine) subcutaneous injection 5,000 Units  5,000 Units Subcutaneous Q8H [address]    insulin lispro (HumaLOG) 100 units/mL subcutaneous injection 1-6 Units  1-6 Units Subcutaneous Q6H    metroNIDAZOLE (FLAGYL) IVPB (premix) 500 mg 100 mL  500 mg Intravenous Q8H    ondansetron (ZOFRAN) injection 4 mg  4 mg Intravenous Q6H PRN    oxyCODONE (ROXICODONE) IR tablet 2 5 mg  2 5 mg Oral Q4H PRN    oxyCODONE (ROXICODONE) IR tablet 5 mg  5 mg Oral Q4H PRN    pantoprazole (PROTONIX) EC tablet 40 mg  40 mg Oral Daily    polyethylene glycol (MIRALAX) packet 17 g  17 g Oral Daily    tamsulosin (FLOMAX) capsule 0 4 mg  0 4 mg Oral Daily     No Known Allergies    Objective   First Vitals:   Blood Pressure: 127/64 (02/15/21 0935)  Pulse: (!) 127 (02/15/21 0935)  Temperature: 97 5 °F (36 4 °C) (02/15/21 0935)  Temp Source: Oral (02/15/21 0935)  Respirations: 22 (02/15/21 0935)  Height: 5' 11" (180 3 cm) (02/15/21 0935)  Weight - Scale: 104 kg (229 lb 4 5 oz) (02/15/21 0935)  SpO2: 99 % (02/15/21 0935)    Current Vitals:   Blood Pressure: 113/58 (218)  Pulse: (!) 134 (02/17/21 0328)  Temperature: 99 4 °F (37 4 °C) (02/17/21 0328)  Temp Source: Axillary (02/17/21 0328)  Respirations: 18 (02/17/21 0328)  Height: 5' 11" (180 3 cm) (02/15/21 1613)  Weight - Scale: 102 kg (225 lb 5 oz) (02/16/21 0600)  SpO2: 91 % (02/17/21 0328)      Intake/Output Summary (Last 24 hours) at 2/17/2021 0446  Last data filed at 2/16/2021 2022  Gross per 24 hour   Intake 572 5 ml   Output 4501 ml   Net -3928 5 ml       Invasive Devices     Central Venous Catheter Line            Port A Cath 09/16/20 Right Chest 153 days          Peripheral Intravenous Line            Peripheral IV 02/15/21 Right Arm 1 day          Drain            Urethral Catheter 16 Fr  1 day                Physical Exam   Gen:  ill-appearing  HENT: MMM  CV:  tachycardic, regular rhythm  Lungs: tachypneic, markedly increased WOB on 4L NC, very coarse BS  Abd:  full, non-tender, distended   well-healed laparotomy scar  Ext:  no CCE  Skin:  no rashes  Neuro: A&Ox3     Lab Results:   I have personally reviewed pertinent lab results  , CBC:   Lab Results   Component Value Date    WBC 19 36 (H) 02/17/2021    HGB 12 0 02/17/2021    HCT 38 4 02/17/2021    MCV 98 02/17/2021     02/17/2021    MCH 30 7 02/17/2021    MCHC 31 3 (L) 02/17/2021    RDW 14 4 02/17/2021    MPV 11 3 02/17/2021    NRBC 0 02/17/2021   , CMP:   Lab Results   Component Value Date    SODIUM 135 (L) 02/17/2021    K 4 2 02/17/2021     02/17/2021    CO2 24 02/17/2021    BUN 75 (H) 02/17/2021    CREATININE 1 99 (H) 02/17/2021    CALCIUM 8 0 (L) 02/17/2021    EGFR 31 02/17/2021     Imaging: I have personally reviewed pertinent reports  and I have personally reviewed pertinent films in PACS  EKG, Pathology, and Other Studies: I have personally reviewed pertinent reports

## 2021-02-17 NOTE — NURSING NOTE
Attempted NG insertion three times but all attempts resulted in the tube being in the lungs  Supervisor reached out to surgery for a fourth attempt, however when surgery was about to attempt insertion, the patient refused further attempts

## 2021-02-17 NOTE — RESPIRATORY THERAPY NOTE
RT Protocol Note  Pipe Medina 66 y o  male MRN: [id number]  Unit/Bed#: S -01 Encounter: [id number]    Assessment    Principal Problem:    Acute kidney injury superimposed on chronic kidney disease ([facility] )  Active Problems:    Diabetes mellitus type 2    Primary pancreatic cancer with metastasis to other site [facility])    Malignant ascites    Urinary retention    Leucocytosis      Home Pulmonary Medications:         Past Medical History:   Diagnosis Date    Abdominal aortic aneurysm (AAA) ([place] [address] ) 2017    Per [place]     Anxiety state 2017    Per [place]     Benign prostatic hyperplasia     Benign prostatic hyperplasia without lower urinary tract symptoms     Body mass index 33 0-33 9, adult 08/15/2019    Per [place]     Chronic kidney disease, stage II (mild) 08/15/2019    Per [place]     Chronic kidney disease, unspecified 2017    Per [place]     Chronic obstructive pulmonary disease (COPD) ([place] [address] ) 2019    Per [facility]     Diabetes mellitus ([place] [address] )     Diverticulosis of large intestine without perforation or abscess without bleeding 2019    Per [facility]     Essential hypertension 2017    Per [facility]     Hearing loss, bilateral 2017    Per [place]     Hypertension     Mixed hyperlipidemia 2017    Per [place]     Obesity, unspecified 2017    Per [facility]     Pancreatic cancer ([place] [address] )     Type 2 diabetes mellitus without complication ([place] [address] )     Per [place]      Social History     Socioeconomic History    Marital status: /Civil Union     Spouse name: None    Number of children: None    Years of education: None    Highest education level: None   Occupational History    None   Social Needs    Financial resource strain: None    Food insecurity     Worry: None     Inability: None    Transportation needs     Medical: None     Non-medical: None   Tobacco Use    Smoking status: Former Smoker     Quit date:      Years since quittin 1    Smokeless tobacco: Never Used    Tobacco comment: quit 37 years ago   Substance and Sexual Activity    Alcohol use: Yes     Frequency: Monthly or less     Binge frequency: Never     Comment: Occasional- Per Kizzy Ramirez Drug use: Never    Sexual activity: None   Lifestyle    Physical activity     Days per week: None     Minutes per session: None    Stress: None   Relationships    Social connections     Talks on phone: None     Gets together: None     Attends Rastafarian service: None     Active member of club or organization: None     Attends meetings of clubs or organizations: None     Relationship status: None    Intimate partner violence     Fear of current or ex partner: None     Emotionally abused: None     Physically abused: None     Forced sexual activity: None   Other Topics Concern    None   Social History Narrative    Patient has been  to Eleuterio Hayden for over 50 years  They have one adult child together (Vikash Ledezma) and Eleuterio Hayden has 6 other children from a previous union  Patient and his wife live with one of their children who has special needs  Family if very supportive  Patient served in the [employer]  Subjective         Objective    Physical Exam:   Assessment Type: (P) Assess only  General Appearance: (P) Alert, Awake  Respiratory Pattern: (P) Labored, Dyspnea at rest  Chest Assessment: (P) Chest expansion symmetrical  Bilateral Breath Sounds: (P) Expiratory wheezes, Coarse  Cough: (P) Non-productive  Suction: (P) Nasal  O2 Device: (P) 100% NRM    Vitals:  Blood pressure 113/58, pulse (!) 134, temperature (!) 100 6 °F (38 1 °C), temperature source Axillary, resp  rate 18, height 5' 11" (1 803 m), weight 102 kg (225 lb 5 oz), SpO2 91 %  Imaging and other studies: I have personally reviewed pertinent reports  O2 Device: (P) 100% NRM     Plan         Resp Comments: (P) Pt is NT suctioned for copious thin pale yellow & bile like secretions  Pt's spo2 is now 97 on 100% NRM    Pt is in respiratory distress; however, pt is refusing intubation at this time & code status to be changed  Pt will continue to be monitored

## 2021-02-17 NOTE — ASSESSMENT & PLAN NOTE
Lab Results   Component Value Date    EGFR 31 02/17/2021    EGFR 29 02/16/2021    EGFR 20 02/15/2021    CREATININE 1 99 (H) 02/17/2021    CREATININE 2 10 (H) 02/16/2021    CREATININE 2 88 (H) 02/15/2021   · POA with creatinine 2 8 from 1 43 last  · Poor intake/1 Liter when straight catheterized in ED/On ARB at home  · Continue Delgado catheter  · Multifactorial due to prerenal/postrenal/use of ARB   Possible impaired renal perfusion from ascites  · Patient placed on comfort measures earlier this morning and passed away with family at bedside

## 2021-02-19 LAB
BACTERIA SPEC BFLD CULT: NO GROWTH
GRAM STN SPEC: NORMAL
GRAM STN SPEC: NORMAL

## 2021-02-22 LAB
BACTERIA BLD CULT: NORMAL
BACTERIA BLD CULT: NORMAL

## 2021-02-24 ENCOUNTER — TELEPHONE (OUTPATIENT)
Dept: HEMATOLOGY ONCOLOGY | Facility: CLINIC | Age: 76
End: 2021-02-24

## 2021-02-24 NOTE — TELEPHONE ENCOUNTER
Patients wife calling to gather information and find out exactly what happened during her husbands most recent hospital stay  Her  passed away on 2/17/21  Wife would like to know the cause of death  I explained Dr Mario Marcum was not involved with his most recent hospital stay and did not see the patient while he was admitted  Wife is asking to speak with one of the doctors who took care of her     I did give her the number for [facility] to see if she can speak with that team to get in touch with the physician

## 2021-03-17 ENCOUNTER — TELEPHONE (OUTPATIENT)
Dept: HEMATOLOGY ONCOLOGY | Facility: CLINIC | Age: 76
End: 2021-03-17

## 2021-03-17 NOTE — TELEPHONE ENCOUNTER
Patient's wife Dianna Sanchez requested a call from [facility] regarding CT results from the day before patient's death and how her  went so fast  Please call Dianna Sanchez at [phone]

## 2021-10-01 NOTE — TELEPHONE ENCOUNTER
Labs faxed to provided number
Patient wife Mu Morgan called stating they will need new orders faxed to [facility] for blood work needed to be completed for his 9/21 infusion treatment     Nanette Link  call back [phone] was provided for any questions   Fax # [phone]
No

## 2021-12-11 NOTE — ASSESSMENT & PLAN NOTE
Lab Results   Component Value Date    HGBA1C 6 5 (H) 08/02/2020     Hold oral hypoglycemics  On SSI coverage per [facility] 1

## 2022-10-06 NOTE — H&P
Care Transitions Program Follow-up Call    Current Issues/Problems reviewed on call: None at this time.    Details of Interventions/Education completed on call: None at this time.    Review of Systems:   Care Transition System Evaluation: Patient states he is doing well, complains of back pain still, tylenol helps.  Patient has upcoming appt next week with PCP.  Patient denies any current questions, issues, concerns, or needs.  General Symptoms: Weakness  Fever: is not present  Pain: 7  Pain Location: back pain  Respiratory Symptoms: None  Cardiovascular Symptoms: None  Sleeping Behaviors: Reports no problem  GI Symptoms: None   Symptoms: None  Urinary Elimination: Voiding, no difficulities  Feeding Tube Type: None  Skin Symptoms: None  Wound Type: None    The patient istaking all medications as prescribed. The patient did not have questions or concerns regarding the medications prescribed.    Transitional Care Management (TCM) appointment was completed.  TCM appointment plan of care and upcoming appointments were reviewed with patient.  Transportation to upcoming appointments to be provided by son .    Next Care Transitions follow-up call will be within 2 weeks.    Plan for next follow-up call: Follow up   H&P EXAM - Outpatient Endoscopy   Pepe Rosas 66 y o  male MRN: [id number]    [address] [facility]    Encounter: [id number]        Impression:  Pancreatic Tail Mass     Plan: EGD with EUS FNA     Chief Complaint:  66 year male with abdominal pain and weight loss, ct-scan found to have pancreatic mass with possible peritoneal mets    Physical Exam:  HEENT- no icterus, no lymphenopathy   Chest:  Clear   Heart:  s1s2 regular

## 2023-07-14 NOTE — CASE MANAGEMENT
Patient's IV ABX covered 100% with [facility]  CM sent updated referral to [facility]  Waiting for delivery confirmation, home vs hospital     [facility] reviewing and will update CM  Siliq Counseling:  I discussed with the patient the risks of Siliq including but not limited to new or worsening depression, suicidal thoughts and behavior, immunosuppression, malignancy, posterior leukoencephalopathy syndrome, and serious infections.  The patient understands that monitoring is required including a PPD at baseline and must alert us or the primary physician if symptoms of infection or other concerning signs are noted. There is also a special program designed to monitor depression which is required with Siliq.

## 2023-11-24 NOTE — TELEPHONE ENCOUNTER
Patient's wife, Dean Sesay left voicemail stating she is concerned with the "fluid that was in the patient's belly"  I returned the call but no answer  Left message stating that I would discuss this with Dr Cameron Castro the next day however if the patient is starting to feel short of breath then he should go to the emergency room  Left call back phone number 
significant other

## 2025-04-22 NOTE — ASSESSMENT & PLAN NOTE
· PET CT on 8/31 showed enlarging peritoneal nodules, large ascites  · abdo significantly distended  · IR paracentesis ordered for diagnostic & therapeutic purposes regular rate and rhythm/S1 S2 present/no gallops/no rub/no murmur